# Patient Record
Sex: FEMALE | Race: WHITE | Employment: PART TIME | URBAN - METROPOLITAN AREA
[De-identification: names, ages, dates, MRNs, and addresses within clinical notes are randomized per-mention and may not be internally consistent; named-entity substitution may affect disease eponyms.]

---

## 2017-01-05 ENCOUNTER — MYC REFILL (OUTPATIENT)
Dept: INTERNAL MEDICINE | Facility: CLINIC | Age: 32
End: 2017-01-05

## 2017-01-05 DIAGNOSIS — B00.1 RECURRENT COLD SORES: ICD-10-CM

## 2017-01-05 DIAGNOSIS — L40.50 PSORIATIC ARTHRITIS (H): Primary | ICD-10-CM

## 2017-01-11 RX ORDER — OXYCODONE AND ACETAMINOPHEN 5; 325 MG/1; MG/1
1 TABLET ORAL EVERY 4 HOURS PRN
Qty: 30 TABLET | Refills: 0 | Status: SHIPPED | OUTPATIENT
Start: 2017-01-14 | End: 2017-02-07

## 2017-01-11 NOTE — TELEPHONE ENCOUNTER
Reason For Call:   Chief Complaint   Patient presents with     Refill Request       Medication Name, Dose and Monthly Quantity:   Oxycodone with APAP (Percocet): Dose 5-325 Schedule 1 tablet Q 4 hours Monthly Quantity 30     Diagnosis requiring opiates:   Psoriatic arthritis (H) [L40.50]  - Primary     Problem List Updated:   Yes    Opioid Agreement On File - [facility] PAIN CONTRACT ID# [id number]:  Unsure    Last Urine Drug Screen (at least once every 12 months) Date:   N/A  Unexpected Results:   N/A    [place]  Data Reviewed (at least once every 3 months) Date:    01/11/2017    Unexpected Results:    No.    Last Fill Date:   12/15/16    Last Visit with PCP:   05/18/16    Future Visits with PCP:   No.    Processing:   Mail to pharmacy.     Joseph Goodrich RN

## 2017-01-23 ENCOUNTER — MYC REFILL (OUTPATIENT)
Dept: RHEUMATOLOGY | Facility: CLINIC | Age: 32
End: 2017-01-23

## 2017-01-23 ENCOUNTER — MYC REFILL (OUTPATIENT)
Dept: INTERNAL MEDICINE | Facility: CLINIC | Age: 32
End: 2017-01-23

## 2017-01-23 DIAGNOSIS — E11.9 DIABETES MELLITUS (H): Primary | ICD-10-CM

## 2017-01-23 DIAGNOSIS — L40.50 PSORIATIC ARTHRITIS (H): ICD-10-CM

## 2017-01-23 RX ORDER — TRAMADOL HYDROCHLORIDE 50 MG/1
50 TABLET ORAL EVERY 8 HOURS PRN
Qty: 90 TABLET | Refills: 0 | Status: SHIPPED | OUTPATIENT
Start: 2017-01-23 | End: 2017-02-20

## 2017-01-23 NOTE — TELEPHONE ENCOUNTER
Last OFV: 11/30/16  Pending OFV: 6/9/17  Dx: Psoriatic Arthritis  Last filled: 1/3/17  Qty: 90  Pharmacy: [facility] in [place]    percocet 5-325mg #30 filled 1/14/17 by Dr. Haskins.  Filled at [facility]

## 2017-01-23 NOTE — TELEPHONE ENCOUNTER
Message from [employer]:  Original authorizing provider: Rene Granados MD    Chrissy Sanchezrigal would like a refill of the following medications:  traMADol (ULTRAM) 50 MG tablet [Rene Granados MD]    Preferred pharmacy: [facility] [address] [place] [address]  AT [facility]    Comment:      Medication renewals requested in this message routed to other providers:  metFORMIN (GLUCOPHAGE) 500 MG tablet [Nando Haskins MD]  vitamin D (ERGOCALCIFEROL) 84909 UNIT capsule [Nando Haskins MD]

## 2017-01-26 ENCOUNTER — MYC MEDICAL ADVICE (OUTPATIENT)
Dept: OTHER | Age: 32
End: 2017-01-26

## 2017-01-26 NOTE — TELEPHONE ENCOUNTER
Message left informing pt that pharmacy was called and the okay was given to fill this Rx as of today and that if her arthritis is causing a sustained need for pain pills we need to reevaluate her current treatment plan in clinic ASAP.  Pt to call back if this is the case.

## 2017-01-26 NOTE — TELEPHONE ENCOUNTER
Ms Dixon can refill early on this one occasion.  However, I am concerned that the refill request now implies greater than 3 tramadol per day for inflammatory arthritis. This rate of use is neither safe nor sustainable. We should be using immunomodulatory medications as the major control for inflammatory arthritis pain, not opiate-related pain pills. So if the arthritis is causing sustained need for pain pills, reevaluation of the arthritis treatment regimen in clinic needs to happen ASAP.

## 2017-01-26 NOTE — TELEPHONE ENCOUNTER
Provider to please see the patient message below and advise if okay to refill medication early.  Last OFV: 11/30/16  Pending OFV: 6/9/17  Dx: Psoriatic Arthritis  Last filled: 1/3/17  Qty: 90

## 2017-02-06 ENCOUNTER — MYC REFILL (OUTPATIENT)
Dept: INTERNAL MEDICINE | Facility: CLINIC | Age: 32
End: 2017-02-06

## 2017-02-06 DIAGNOSIS — B00.1 RECURRENT COLD SORES: ICD-10-CM

## 2017-02-06 DIAGNOSIS — L40.50 PSORIATIC ARTHRITIS (H): Primary | ICD-10-CM

## 2017-02-07 DIAGNOSIS — E11.9 DIABETES MELLITUS (H): ICD-10-CM

## 2017-02-07 DIAGNOSIS — E55.9 VITAMIN D DEFICIENCY: Primary | ICD-10-CM

## 2017-02-07 RX ORDER — OXYCODONE AND ACETAMINOPHEN 5; 325 MG/1; MG/1
1 TABLET ORAL EVERY 4 HOURS PRN
Qty: 30 TABLET | Refills: 0 | Status: SHIPPED | OUTPATIENT
Start: 2017-02-13 | End: 2017-03-08

## 2017-02-07 NOTE — TELEPHONE ENCOUNTER
metFORMIN (GLUCOPHAGE) 500 MG      Last Written Prescription Date:  12/6/16  Last Fill Quantity: 360,   # refills: 0  Last Office Visit : 5/18/16  Future Office visit:  NONE  CREATININE   Date Value Ref Range Status   11/30/2016 0.53 0.52 - 1.04 mg/dL Final    Routing refill request to provider for review/approval because:  OVER DUE MD APPT. (EVERY 6 MOS)   APPT.REMINDER LETTER TO PT + 30 DAY RF     vitamin D 50,,000 U       Last Written Prescription Date:  12/8/16  Last Fill Quantity: 240,   # refills: 0

## 2017-02-07 NOTE — TELEPHONE ENCOUNTER
Reason For Call:   Chief Complaint   Patient presents with     Refill Request       Medication Name, Dose and Monthly Quantity:   Oxycodone with APAP (Percocet): Dose 5-325 Schedule 1 Q 4 hours  Monthly Quantity 30     Diagnosis requiring opiates:   Psoriatic arthritis (H) [L40.50]  - Primary     Problem List Updated:   Yes    Opioid Agreement On File - [facility] PAIN CONTRACT ID# [id number]:  No    Last Urine Drug Screen (at least once every 12 months) Date:   N/A    Unexpected Results:   N/A    [place]  Data Reviewed (at least once every 3 months) Date:   02/07/2017    Unexpected Results:    No.    Last Fill Date:   01/14/17    Last Visit with PCP:   05/18/16    Future Visits with PCP:   No.    Processing:   Mail to patient.     Joseph Goodrich RN

## 2017-02-07 NOTE — Clinical Note
February 7, 2017        TO: Chrissy Zack  [address] [place] [id number]         Dear Ms. Chrissy Dixon,  This letter is a reminder that you are overdue to see your Primary Care Provider for an Annual Visit and needed labs. You must be seen by your Primary Care Provider on a yearly basis and have appropriate labs drawn for continued care and prescription refills. Please call [phone] to schedule an appointment for an Annual Visit with INDU ALAN MD.   LAST SEEN 5/18/16   *APPT. EVERY 6 MOS WHILE ON THIS MEDICTION    You have been given a 30 day supply/refill of your metFORMIN (GLUCOPHAGE) 500 MG while you get your clinic visit/labs completed.    Regards,  [facility]

## 2017-02-09 DIAGNOSIS — E55.9 VITAMIN D DEFICIENCY: Primary | ICD-10-CM

## 2017-02-14 DIAGNOSIS — E55.9 VITAMIN D DEFICIENCY: ICD-10-CM

## 2017-02-14 RX ORDER — ERGOCALCIFEROL 1.25 MG/1
CAPSULE, LIQUID FILLED ORAL
Qty: 24 CAPSULE | Refills: 0 | OUTPATIENT
Start: 2017-02-14

## 2017-02-14 RX ORDER — ERGOCALCIFEROL 1.25 MG/1
50000 CAPSULE, LIQUID FILLED ORAL
Qty: 24 CAPSULE | Refills: 0 | Status: SHIPPED | OUTPATIENT
Start: 2017-02-16 | End: 2017-04-03

## 2017-02-20 ENCOUNTER — MYC REFILL (OUTPATIENT)
Dept: RHEUMATOLOGY | Facility: CLINIC | Age: 32
End: 2017-02-20

## 2017-02-20 DIAGNOSIS — L40.50 PSORIATIC ARTHRITIS (H): ICD-10-CM

## 2017-02-20 NOTE — TELEPHONE ENCOUNTER
"The following refill request for a controlled substance was sent to the covering nurse to be addressed.  Prescription(s):  Pending Prescriptions:                       Disp   Refills    traMADol (ULTRAM) 50 MG tablet            90 tab*0            Sig: Take 1 tablet (50 mg) by mouth every 8 hours as           needed for moderate pain     [facility] [address] [place] [address] [address] AT [facility] [address]    Last Date Ordered: 1/23/17 - \"Do not fill until 2/2/17\"  Last Office Visit: 11/30/16  Next Office Visit: 6/9/17    Melony Lambert CMA (AAMA)        "

## 2017-02-20 NOTE — TELEPHONE ENCOUNTER
Message from [employer]:  Original authorizing provider: MD Adriana Hornnifer Zack would like a refill of the following medications:  traMADol (ULTRAM) 50 MG tablet [Rene Granados MD]    Preferred pharmacy: [facility] [address] [place] [address] [address] [facility] [address]    Comment:

## 2017-02-21 RX ORDER — USTEKINUMAB 45 MG/.5ML
INJECTION, SOLUTION SUBCUTANEOUS
OUTPATIENT
Start: 2017-02-21

## 2017-02-21 NOTE — TELEPHONE ENCOUNTER
STELARA 45 MG/0.5ML SOSY      Last Written Prescription Date:  2-19-16  Last Fill Quantity: 0.5 ml,   # refills: 4  Last Office Visit : 5-18-16  Future Office visit:  6-9-17    Kathleen M Doege RN

## 2017-02-22 RX ORDER — TRAMADOL HYDROCHLORIDE 50 MG/1
50 TABLET ORAL EVERY 8 HOURS PRN
Qty: 90 TABLET | Refills: 0 | Status: SHIPPED | OUTPATIENT
Start: 2017-02-22 | End: 2017-03-21

## 2017-02-28 ENCOUNTER — TELEPHONE (OUTPATIENT)
Dept: RHEUMATOLOGY | Facility: CLINIC | Age: 32
End: 2017-02-28

## 2017-02-28 NOTE — TELEPHONE ENCOUNTER
Called patient back to update that the paper copy of the Rx was awaiting Dr. Granados's signature, and that once it was signed we would provide it to her [facility] pharmacy in [facility] either via mail or by calling in.  Patient thanked caller for the update.

## 2017-02-28 NOTE — TELEPHONE ENCOUNTER
Prior Authorization Specialty Medication Request    Medication/Dose: ustekinumab (STELARA) 45 MG/0.5ML SOSY  Diagnosis and ICD: Psoriatic arthritis (H) (L40.50)  New/Renewal/Insurance Change PA: Renewal    Important Lab Values: n/a    Previously Tried and Failed Therapies: Humira, Otezla and Leflunomide    Rationale:    Would you like to include any research articles?    If yes please include the hyperlink(s) below or fax @ [phone].    (Include Name and MRN)    If you received a fax notification from an outside Pharmacy;  Pharmacy Name:[facility]  Pharmacy #:  Pharmacy Fax:

## 2017-02-28 NOTE — TELEPHONE ENCOUNTER
PA Initiation    Medication: ustekinumab (STELARA) 45 MG/0.5ML SOSY  Insurance Company: [employer] - Phone [phone] Fax [phone]  Pharmacy Filling the Rx: [facility] - [place], [place] - [address]  Filling Pharmacy Phone:    Filling Pharmacy Fax:    Start Date: 2/28/2017

## 2017-03-01 NOTE — TELEPHONE ENCOUNTER
Called medication in to pharmacy [facility].  Called patient to let her know, left generic VM that RX was called in to her pharmacy.

## 2017-03-02 NOTE — TELEPHONE ENCOUNTER
Phoned [employer] at tel [phone] and the PA was received and is in process.

## 2017-03-03 RX ORDER — ERGOCALCIFEROL 1.25 MG/1
50000 CAPSULE, LIQUID FILLED ORAL
Qty: 24 CAPSULE | Refills: 0 | OUTPATIENT
Start: 2017-03-06

## 2017-03-06 ASSESSMENT — ENCOUNTER SYMPTOMS
ABDOMINAL PAIN: 1
ALTERED TEMPERATURE REGULATION: 1
FATIGUE: 1
PANIC: 1
RECTAL PAIN: 0
INCREASED ENERGY: 1
JAUNDICE: 0
MUSCLE CRAMPS: 1
DOUBLE VISION: 0
TREMORS: 0
MUSCLE WEAKNESS: 1
NECK PAIN: 0
STIFFNESS: 1
NAUSEA: 1
CONSTIPATION: 0
EYE PAIN: 0
SINUS PAIN: 0
MEMORY LOSS: 0
HEADACHES: 1
EYE WATERING: 0
MYALGIAS: 1
EYE REDNESS: 0
NAIL CHANGES: 0
SINUS CONGESTION: 1
DEPRESSION: 0
PARALYSIS: 0
TROUBLE SWALLOWING: 0
EYE IRRITATION: 1
HEARTBURN: 0
INSOMNIA: 1
POOR WOUND HEALING: 0
DECREASED APPETITE: 0
FEVER: 0
DECREASED CONCENTRATION: 1
VOMITING: 0
NUMBNESS: 1
NIGHT SWEATS: 1
SEIZURES: 0
NECK MASS: 0
LOSS OF CONSCIOUSNESS: 0
ARTHRALGIAS: 1
BLOOD IN STOOL: 1
TASTE DISTURBANCE: 0
WEIGHT LOSS: 0
HOARSE VOICE: 0
RECTAL BLEEDING: 0
SMELL DISTURBANCE: 0
HALLUCINATIONS: 0
NERVOUS/ANXIOUS: 1
WEAKNESS: 1
BLOATING: 1
WEIGHT GAIN: 0
SKIN CHANGES: 0
POLYPHAGIA: 0
JOINT SWELLING: 1
BACK PAIN: 1
TINGLING: 1
DIARRHEA: 1
BOWEL INCONTINENCE: 0
DIZZINESS: 1
SORE THROAT: 1
CHILLS: 0
SPEECH CHANGE: 0
DISTURBANCES IN COORDINATION: 0
POLYDIPSIA: 0

## 2017-03-06 ASSESSMENT — ACTIVITIES OF DAILY LIVING (ADL)
ARE_THERE_CARBON_MONOXIDE_DETECTORS_IN_YOUR_HOME?: Y
ARE_THERE_SMOKE_DETECTORS_IN_YOUR_HOME?: Y
ARE_THERE_FIREARMS_IN_YOUR_HOME?: N
DO_MEMBERS_OF_YOUR_HOUSEHOLD_USE_SUNSCREEN?: Y
DO_MEMBERS_OF_YOUR_HOUSEHOLD_USE_SAFETY_HELMETS?: Y
DO_MEMBERS_OF_YOUR_HOUSEHOLD_WEAR_SEAT_BELTS?: Y

## 2017-03-13 NOTE — TELEPHONE ENCOUNTER
Patient requested that she will need assistance in paying for the Stelara. Signed patient up for Stelara co-pay card. Mailed patient a copy of the card and mycharted her back.      *Patient offered to switch to [facility] but patient states she was told by [employer] she must use [employer].

## 2017-03-13 NOTE — TELEPHONE ENCOUNTER
Prior Authorization Approval    Authorization Effective Date: 2/28/2017  Authorization Expiration Date: 2/28/2018  Medication: ustekinumab (STELARA) 45 MG/0.5ML SOSY APPROVED  Approved Dose/Quantity: 1 EVERY 84 DAYS  Reference #: N/A   Insurance Company: [employer] - Phone [phone] Fax [phone]  Expected CoPay: N/A     CoPay Card Available:      Foundation Assistance Needed:    Which Pharmacy is filling the prescription (Not needed for infusion/clinic administered): [facility] - [place], [place] - [address] [place] [address]  Pharmacy Notified: No  Patient Notified: Yes

## 2017-03-20 ENCOUNTER — OFFICE VISIT (OUTPATIENT)
Dept: INTERNAL MEDICINE | Facility: CLINIC | Age: 32
End: 2017-03-20

## 2017-03-20 VITALS
WEIGHT: 110.5 LBS | BODY MASS INDEX: 19.57 KG/M2 | HEART RATE: 105 BPM | DIASTOLIC BLOOD PRESSURE: 77 MMHG | SYSTOLIC BLOOD PRESSURE: 120 MMHG

## 2017-03-20 DIAGNOSIS — R51.9 NONINTRACTABLE HEADACHE, UNSPECIFIED CHRONICITY PATTERN, UNSPECIFIED HEADACHE TYPE: ICD-10-CM

## 2017-03-20 DIAGNOSIS — Z79.4 CONTROLLED TYPE 2 DIABETES MELLITUS WITHOUT COMPLICATION, WITH LONG-TERM CURRENT USE OF INSULIN (H): Primary | ICD-10-CM

## 2017-03-20 DIAGNOSIS — D50.0 IRON DEFICIENCY ANEMIA DUE TO CHRONIC BLOOD LOSS: ICD-10-CM

## 2017-03-20 DIAGNOSIS — L40.50: ICD-10-CM

## 2017-03-20 DIAGNOSIS — L40.50 PSORIATIC ARTHRITIS (H): ICD-10-CM

## 2017-03-20 DIAGNOSIS — E11.9 CONTROLLED TYPE 2 DIABETES MELLITUS WITHOUT COMPLICATION, WITH LONG-TERM CURRENT USE OF INSULIN (H): ICD-10-CM

## 2017-03-20 DIAGNOSIS — D64.9 ANEMIA, UNSPECIFIED TYPE: ICD-10-CM

## 2017-03-20 DIAGNOSIS — E55.9 VITAMIN D DEFICIENCY: ICD-10-CM

## 2017-03-20 DIAGNOSIS — E11.9 CONTROLLED TYPE 2 DIABETES MELLITUS WITHOUT COMPLICATION, WITH LONG-TERM CURRENT USE OF INSULIN (H): Primary | ICD-10-CM

## 2017-03-20 DIAGNOSIS — Z79.899 HIGH RISK MEDICATIONS (NOT ANTICOAGULANTS) LONG-TERM USE: ICD-10-CM

## 2017-03-20 DIAGNOSIS — Z79.4 CONTROLLED TYPE 2 DIABETES MELLITUS WITHOUT COMPLICATION, WITH LONG-TERM CURRENT USE OF INSULIN (H): ICD-10-CM

## 2017-03-20 LAB
ALBUMIN SERPL-MCNC: 3.9 G/DL (ref 3.4–5)
ALP SERPL-CCNC: 59 U/L (ref 40–150)
ALT SERPL W P-5'-P-CCNC: 15 U/L (ref 0–50)
ANION GAP SERPL CALCULATED.3IONS-SCNC: 9 MMOL/L (ref 3–14)
AST SERPL W P-5'-P-CCNC: 6 U/L (ref 0–45)
BASOPHILS # BLD AUTO: 0 10E9/L (ref 0–0.2)
BASOPHILS NFR BLD AUTO: 0.5 %
BILIRUB SERPL-MCNC: 0.2 MG/DL (ref 0.2–1.3)
BUN SERPL-MCNC: 9 MG/DL (ref 7–30)
CALCIUM SERPL-MCNC: 8.6 MG/DL (ref 8.5–10.1)
CHLORIDE SERPL-SCNC: 107 MMOL/L (ref 94–109)
CO2 SERPL-SCNC: 27 MMOL/L (ref 20–32)
CREAT SERPL-MCNC: 0.63 MG/DL (ref 0.52–1.04)
CREAT UR-MCNC: 138 MG/DL
DIFFERENTIAL METHOD BLD: ABNORMAL
EOSINOPHIL # BLD AUTO: 0.2 10E9/L (ref 0–0.7)
EOSINOPHIL NFR BLD AUTO: 4.1 %
ERYTHROCYTE [DISTWIDTH] IN BLOOD BY AUTOMATED COUNT: 19.1 % (ref 10–15)
FERRITIN SERPL-MCNC: 1 NG/ML (ref 12–150)
GFR SERPL CREATININE-BSD FRML MDRD: ABNORMAL ML/MIN/1.7M2
GLUCOSE SERPL-MCNC: 139 MG/DL (ref 70–99)
HBA1C MFR BLD: 8.2 % (ref 4.3–6)
HCT VFR BLD AUTO: 26.7 % (ref 35–47)
HGB BLD-MCNC: 7.4 G/DL (ref 11.7–15.7)
IMM GRANULOCYTES # BLD: 0 10E9/L (ref 0–0.4)
IMM GRANULOCYTES NFR BLD: 0.2 %
IRON SATN MFR SERPL: 2 % (ref 15–46)
IRON SERPL-MCNC: 10 UG/DL (ref 35–180)
LYMPHOCYTES # BLD AUTO: 2 10E9/L (ref 0.8–5.3)
LYMPHOCYTES NFR BLD AUTO: 33.9 %
MCH RBC QN AUTO: 16.9 PG (ref 26.5–33)
MCHC RBC AUTO-ENTMCNC: 27.7 G/DL (ref 31.5–36.5)
MCV RBC AUTO: 61 FL (ref 78–100)
MICROALBUMIN UR-MCNC: 8 MG/L
MICROALBUMIN/CREAT UR: 5.76 MG/G CR (ref 0–25)
MONOCYTES # BLD AUTO: 0.3 10E9/L (ref 0–1.3)
MONOCYTES NFR BLD AUTO: 4.8 %
NEUTROPHILS # BLD AUTO: 3.3 10E9/L (ref 1.6–8.3)
NEUTROPHILS NFR BLD AUTO: 56.5 %
NRBC # BLD AUTO: 0 10*3/UL
NRBC BLD AUTO-RTO: 0 /100
PLATELET # BLD AUTO: 475 10E9/L (ref 150–450)
POTASSIUM SERPL-SCNC: 4.1 MMOL/L (ref 3.4–5.3)
PROT SERPL-MCNC: 7.1 G/DL (ref 6.8–8.8)
RBC # BLD AUTO: 4.38 10E12/L (ref 3.8–5.2)
RETICS # AUTO: 39.9 10E9/L (ref 25–95)
RETICS/RBC NFR AUTO: 0.9 % (ref 0.5–2)
SODIUM SERPL-SCNC: 143 MMOL/L (ref 133–144)
TIBC SERPL-MCNC: 451 UG/DL (ref 240–430)
TSH SERPL DL<=0.005 MIU/L-ACNC: 0.51 MU/L (ref 0.4–4)
WBC # BLD AUTO: 5.8 10E9/L (ref 4–11)

## 2017-03-20 RX ORDER — GABAPENTIN 300 MG/1
CAPSULE ORAL
COMMUNITY
Start: 2016-11-16 | End: 2017-03-20

## 2017-03-20 RX ORDER — FERROUS GLUCONATE 324(38)MG
324 TABLET ORAL
Qty: 100 TABLET | Refills: 11 | Status: SHIPPED | OUTPATIENT
Start: 2017-03-20 | End: 2017-05-05

## 2017-03-20 ASSESSMENT — PAIN SCALES - GENERAL: PAINLEVEL: SEVERE PAIN (7)

## 2017-03-20 NOTE — PROGRESS NOTES
HPI  HISTORY OF PRESENT ILLNESS:  This is a 31 year old with psoriatic arthritis managed by Rheumatology with immunosuppression.  She has been monitoring her diabetes at home and feels that this has been well controlled by her report, although her most recent A1c was elevated.  This has not been checked now for the last 4 months.  She has had no hypoglycemic episodes.  No problems on her present insulin regimen.  She is having a persistent headache.  This is an infraorbital headache.  It does not feel like it is in the sinuses.  It is not associated with nasal stuffiness or rhinorrhea.  It tends to be present when she gets up in the morning and persists through the day.  There are no visual symptoms.  She had a recent eye exam, which was negative.  She has not noted any focal neurologic symptoms, but she continues to get the intermittent pain and tingling in the extremities as before.  She is intolerant of gabapentin for this.  She has had persistent fatigue.  She does have heavy monthly menstrual periods.  She is chronically anemic, and her most recent iron levels were very low.  She was intolerant of ferrous sulfate and was not able to take that.  She has been taking an over-the-counter iron supplement for this.  No recent blood check, however.  She does have 2 small children at home and spends a lot of time chasing after them.  She has cut her work back to 3 days a week in part related to this as well as her other medical issues.       Past Medical History   Diagnosis Date     Diabetes      Other psoriasis      Polyarthritis      probable psoriatic arthritis     Past Surgical History   Procedure Laterality Date     Surgical history of -        .  Tonsillar abscess     Family History   Problem Relation Age of Onset     Thyroid Disease Mother      DIABETES Mother      DIABETES Maternal Grandfather      DIABETES Maternal Uncle      DIABETES Maternal Uncle      Asthma No family hx of      Hypertension Mother       Hypertension Maternal Grandmother      Hypertension Maternal Grandfather      Prostate Cancer Maternal Grandfather      Connective Tissue Disorder Father      Psoriasis     Social History     Social History     Marital status:      Spouse name: N/A     Number of children: N/A     Years of education: N/A     Occupational History     Dental Assist. Unknown     Social History Main Topics     Smoking status: Former Smoker     Packs/day: 1.00     Years: 5.00     Types: Cigarettes     Quit date: 11/1/2007     Smokeless tobacco: Never Used     Alcohol use No     Drug use: No     Sexual activity: Yes     Partners: Male     Birth control/ protection: IUD      Comment: Mirena     Other Topics Concern     Exercise No     Social History Narrative    How much exercise per week? No    How much calcium per day? Diet      How much caffeine per day? Coffee  2    How much vitamin D per day? Diet    Do you/your family wear seatbelts?  Yes    Do you/your family use safety helmets? No    Do you/your family use sunscreen? Yes    Do you/your family keep firearms in the home? No    Do you/your family have a smoke detector(s)? Yes        Do you feel safe in your home? Yes    Has anyone ever touched you in an unwanted manner? No     Explain              Answers for HPI/ROS submitted by the patient on 3/6/2017   Annual Exam:  General Symptoms: Yes  Skin Symptoms: Yes  HENT Symptoms: Yes  EYE SYMPTOMS: Yes  HEART SYMPTOMS: No  LUNG SYMPTOMS: No  INTESTINAL SYMPTOMS: Yes  URINARY SYMPTOMS: No  GYNECOLOGIC SYMPTOMS: No  BREAST SYMPTOMS: No  SKELETAL SYMPTOMS: Yes  BLOOD SYMPTOMS: No  NERVOUS SYSTEM SYMPTOMS: Yes  MENTAL HEALTH SYMPTOMS: Yes  Fever: No  Loss of appetite: No  Weight loss: No  Weight gain: No  Fatigue: Yes  Night sweats: Yes  Chills: No  Increased stress: No  Excessive hunger: No  Excessive thirst: No  Feeling hot or cold when others believe the temperature is normal: Yes  Loss of height: No  Post-operative complications:  No  Surgical site pain: No  Hallucinations: No  Change in or Loss of Energy: Yes  Hyperactivity: No  Confusion: No  Changes in hair: No  Changes in moles/birth marks: No  Itching: No  Rashes: Yes  Changes in nails: No  Acne: Yes  Hair in places you don't want it: No  Change in facial hair: No  Warts: No  Non-healing sores: No  Scarring: No  Flaking of skin: Yes  Color changes of hands/feet in cold : Yes  Sun sensitivity: No  Skin thickening: No  Ear pain: Yes  Ear discharge: No  Hearing loss: No  Tinnitus: No  Nosebleeds: No  Congestion: Yes  Sinus pain: No  Trouble swallowing: No   Voice hoarseness: No  Sore throat: Yes  Tooth pain: No  Gum tenderness: No  Bleeding gums: No  Change in taste: No  Change in sense of smell: No  Dry mouth: No  Hearing aid used: No  Neck lump: No  Eye pain: No  Vision loss: No  Dry eyes: No  Watery eyes: No  Eye bulging: No  Double vision: No  Flashing of lights: No  Spots: Yes  Floaters: Yes  Redness: No  Crossed eyes: No  Tunnel Vision: No  Yellowing of eyes: No  Eye irritation: Yes  Heart burn or indigestion: No  Nausea: Yes  Vomiting: No  Abdominal pain: Yes  Bloating: Yes  Constipation: No  Diarrhea: Yes  Blood in stool: Yes  Black stools: No  Rectal or Anal pain: No  Fecal incontinence: No  Rectal bleeding: No  Yellowing of skin or eyes: No  Vomit with blood: No  Change in stools: Yes  Hemorrhoids: Yes  Back pain: Yes  Muscle aches: Yes  Neck pain: No  Swollen joints: Yes  Joint pain: Yes  Bone pain: No  Muscle cramps: Yes  Muscle weakness: Yes  Joint stiffness: Yes  Bone fracture: No  Trouble with coordination: No  Dizziness or trouble with balance: Yes  Fainting or black-out spells: No  Memory loss: No  Headache: Yes  Seizures: No  Speech problems: No  Tingling: Yes  Tremor: No  Weakness: Yes  Difficulty walking: No  Paralysis: No  Numbness: Yes  Nervous or Anxious: Yes  Depression: No  Trouble sleeping: Yes  Trouble thinking or concentrating: Yes  Mood changes: No  Panic  attacks: Yes  Frequency of exercise:: 1 day/week  Duration of exercise:: 15-30 minutes    Exam:  /77 (BP Location: Right arm, Patient Position: Chair, Cuff Size: Adult Regular)  Pulse 105  Wt 50.1 kg (110 lb 8 oz)  BMI 19.57 kg/m2  110 lbs 8 oz  PHYSICAL EXAMINATION:   The patient is alert, oriented with a clear sensorium.   Skin shows no lesions or rashes and good turgor.   Head is normocephalic and atraumatic.   Eyes show PERRLA. Fundi are benign.   Ears show normal TMs bilaterally.   Mouth shows clear oral mucosa.   Neck shows no nodes, thyromegaly or bruits.   Back is nontender.   Lungs are clear to percussion and auscultation.   Heart shows normal S1 and S2 without murmur or gallop.   Abdomen is soft, nontender without masses or organomegaly.   Extremities show no edema and no evidence of active synovitis.   Neurologic examination shows cranial nerves II-XII intact. Motor shows normal strength. Reflexes are full and symmetrical.     ASSESSMENT:   1.  Psoriasis, appears well controlled.   2.  Psoriatic arthritis, fair control.   3.  Diabetes mellitus, needs reevaluation.   4.  Iron-deficiency anemia, needs reevaluation.   5.  History of vitamin D deficiency, needs followup.   6.  Mild headache, suspect multifactorial related to all of the above.      PLAN:  I am going to reassess her labs today.  If she remains low on iron, we will try her on ferrous gluconate, and if unsuccessful, we may consider trying IV iron on her.  We will have her reassessed in 3 months or follow up sooner or immediately if any increased symptoms or problems before that time.       Nando Haskins MD  General Internal Medicine  [facility]  [phone]

## 2017-03-20 NOTE — MR AVS SNAPSHOT
After Visit Summary   3/20/2017    Chrissy Dixon    MRN: [id number]           Patient Information     Date Of Birth          1985        Visit Information        Provider Department      3/20/2017 4:40 PM Nando Haskins MD [facility]        Today's Diagnoses     Controlled type 2 diabetes mellitus without complication, with long-term current use of insulin (H)    -  1    Psoriasis arthropathica (H)        Nonintractable headache, unspecified chronicity pattern, unspecified headache type        Vitamin D deficiency        Anemia, unspecified type          Care Instructions    Primary Care Center Medication Refill Request Information:  * Please contact your pharmacy regarding ANY request for medication refills.  ** [facility] Prescription Fax = [phone]  * Please allow 3 business days for routine medication refills.  * Please allow 5 business days for controlled substance medication refills.     Primary Care Center Test Result notification information:  *You will be notified with in 7-10 days of your appointment day regarding the results of your test.  If you are on MyChart you will be notified as soon as the provider has reviewed the results and signed off on them.      Primary Care Center   [facility]  [address] [place] [address] (4th Floor )   [place] [address] [phone]  [phone]          Follow-ups after your visit        Follow-up notes from your care team     Return in about 3 months (around 6/20/2017).      Your next 10 appointments already scheduled     Mar 20, 2017  6:00 PM CDT   LAB with  LAB    Health Lab ([facility])    [address] [place] [address]  1st Floor  [facility] [address] [phone]           Patient must bring picture ID.  Patient should be prepared to give a urine specimen  Please do not eat 10-12 hours before your appointment if you are coming in fasting for labs on lipids, cholesterol, or glucose (sugar).   Pregnant women should follow their Care Team instructions. Water with medications is okay. Do not drink coffee or other fluids.   If you have concerns about taking  your medications, please ask at office or if scheduling via iConclude, send a message by clicking on Secure Messaging, Message Your Care Team.            Jun 09, 2017  9:30 AM CDT   (Arrive by 9:15 AM)   Return Visit with Rene Granados MD   [facility] ([facility])    [address] [place] [address] [phone]              Future tests that were ordered for you today     Open Future Orders        Priority Expected Expires Ordered    Albumin Random Urine Quantitative - (Today) Routine 3/20/2017 3/20/2018 3/20/2017    Vitamin D Deficiency Routine  3/20/2018 3/20/2017    TSH with free T4 reflex Routine  3/20/2018 3/20/2017    Hemoglobin A1c Routine  3/20/2018 3/20/2017    CBC with platelets differential Routine  3/20/2018 3/20/2017    Ferritin Routine  3/20/2018 3/20/2017    Iron and iron binding capacity Routine  3/20/2018 3/20/2017    Reticulocyte count Routine  3/20/2018 3/20/2017    Comprehensive metabolic panel Routine  3/20/2018 3/20/2017            Who to contact     Please call your clinic at [phone] to:    Ask questions about your health    Make or cancel appointments    Discuss your medicines    Learn about your test results    Speak to your doctor   If you have compliments or concerns about an experience at your clinic, or if you wish to file a complaint, please contact [facility] Patient Relations at [phone] or email us at [email or web address]         Additional Information About Your Visit        linkedÃ¼hart Information     iConclude gives you secure access to your electronic health record. If you see a primary care provider, you can also send messages to your care team and make appointments. If you have questions, please call your  primary care clinic.  If you do not have a primary care provider, please call [phone] and they will assist you.      [employer] is an electronic gateway that provides easy, online access to your medical records. With [employer], you can request a clinic appointment, read your test results, renew a prescription or communicate with your care team.     To access your existing account, please contact your [facility] or call [phone] for assistance.        Care EveryWhere ID     This is your Care EveryWhere ID. This could be used by other organizations to access your [facility] medical records  [id number]        Your Vitals Were     Pulse BMI (Body Mass Index)                105 19.57 kg/m2           Blood Pressure from Last 3 Encounters:   03/20/17 120/77   11/30/16 113/76   05/18/16 122/80    Weight from Last 3 Encounters:   03/20/17 50.1 kg (110 lb 8 oz)   11/30/16 49.9 kg (110 lb)   05/18/16 52.6 kg (116 lb)                 Today's Medication Changes          These changes are accurate as of: 3/20/17  5:47 PM.  If you have any questions, ask your nurse or doctor.               These medicines have changed or have updated prescriptions.        Dose/Directions    clobetasol 0.05 % external solution   Commonly known as:  TEMOVATE   This may have changed:  Another medication with the same name was removed. Continue taking this medication, and follow the directions you see here.   Used for:  Psoriasis   Changed by:  Yung Henderson MD        Apply topically 2 times daily   Quantity:  50 mL   Refills:  6       DERMA-SMOOTHE/FS SCALP 0.01 % Oil   This may have changed:  Another medication with the same name was removed. Continue taking this medication, and follow the directions you see here.   Used for:  Other psoriasis   Changed by:  Yvan Camacho MD        Apply to scalp at bedtime, then wear showercap, rinse off in morning.   Quantity:  118 mL   Refills:  3       ibuprofen 600  MG tablet   Commonly known as:  ADVIL/MOTRIN   This may have changed:  Another medication with the same name was removed. Continue taking this medication, and follow the directions you see here.   Used for:  Psoriasis arthropathica (H)   Changed by:  Nando Haskins MD        Dose:  600 mg   Take 1 tablet (600 mg) by mouth every 8 hours as needed for pain   Quantity:  90 tablet   Refills:  2         Stop taking these medicines if you haven't already. Please contact your care team if you have questions.     betamethasone dipropionate 0.05 % cream   Commonly known as:  DIPROSONE   Stopped by:  Nando Haskins MD           calcipotriene 0.005 % Soln solution   Commonly known as:  DOVONOX   Stopped by:  Nando Haskins MD           citalopram 20 MG tablet   Commonly known as:  celeXA   Stopped by:  Nando Haskins MD           ferrous sulfate 325 (65 FE) MG tablet   Commonly known as:  IRON   Stopped by:  Nando Haskins MD           folic acid 1 MG tablet   Commonly known as:  FOLVITE   Stopped by:  Nando Haskins MD           gabapentin 300 MG capsule   Commonly known as:  NEURONTIN   Stopped by:  Nando Haskins MD           leflunomide 20 MG tablet   Commonly known as:  ARAVA   Stopped by:  Nando Haskins MD           methotrexate 25 MG/ML injection   Stopped by:  Nando Haskins MD                    Primary Care Provider Office Phone # Fax #    Nando Haskins [phone]        [facility] [address] [facility] [address]  [facility] [address]        Thank you!     Thank you for choosing [facility]  for your care. Our goal is always to provide you with excellent care. Hearing back from our patients is one way we can continue to improve our services. Please take a few minutes to complete the written survey that you may receive in the mail after your visit with us. Thank you!              Your Updated Medication List - Protect others around you: Learn how to safely use, store and throw away your medicines at www.disposemymeds.org.          This list is accurate as of: 3/20/17  5:47 PM.  Always use your most recent med list.                   Brand Name Dispense Instructions for use    apremilast 30 MG tablet    OTEZLA    180 tablet    Take 1 tablet (30 mg) by mouth 2 times daily       blood glucose monitoring test strip    ACCU-CHEK SMARTVIEW    700 each    Use to test blood sugar 6-8 times daily or as directed.       clobetasol 0.05 % external solution    TEMOVATE    50 mL    Apply topically 2 times daily       DERMA-SMOOTHE/FS SCALP 0.01 % Oil     118 mL    Apply to scalp at bedtime, then wear showercap, rinse off in morning.       fluticasone 50 MCG/ACT spray    FLONASE    1 Package    Spray 2 sprays into both nostrils daily       ibuprofen 600 MG tablet    ADVIL/MOTRIN    90 tablet    Take 1 tablet (600 mg) by mouth every 8 hours as needed for pain       insulin lispro 100 UNIT/ML injection    HumaLOG KWIKpen    15 mL    USE 3 TIMES DAILY WITH MEALS, CURRENTLY  30 UNITS DAILY.  ADJUST PER MD ORDERS *MD APPT. & LAB/ MICROALBUMIN FOR FURTHER RF       insulin pen needle 29G X 12.7MM    BD ULTRA-FINE    200 each    Used to inject insulin up to twice daily       * LEVEMIR FLEXpen 100 UNIT/ML injection   Generic drug:  insulin detemir     3 Month    6 units in the morning, 14 units in evening = for total of 20 units daily       * insulin detemir 100 UNIT/ML injection    LEVEMIR FLEXTOUCH    30 mL    Inject 25 Units Subcutaneous At Bedtime       levonorgestrel 20 MCG/24HR IUD    MIRENA (52 MG)    1 each    Use as directed       metFORMIN 500 MG tablet    GLUCOPHAGE    120 tablet    Take 2 tablets (1,000 mg) by mouth 2 times daily (with meals) *LETTER SENT/ MD APPT. FOR RFs       ondansetron 4 MG ODT tab    ZOFRAN-ODT    60 tablet    Take 1 tablet (4 mg) by mouth every 8 hours as needed for nausea        oxyCODONE-acetaminophen 5-325 MG per tablet    PERCOCET    30 tablet    Take 1 tablet by mouth every 4 hours as needed (must last 30 days from dispense date)       * predniSONE 10 MG tablet    DELTASONE    30 tablet    Take 1 tablet (10 mg) by mouth daily As needed for flares only       * predniSONE 5 MG tablet    DELTASONE    50 tablet    3 tabs daily for 1 wk, then 2 tabs daily for 1 week.       * predniSONE 5 MG tablet    DELTASONE    20 tablet    Take 3 tablets (15mg) by mouth for 4 days, then 2 tablets (10mg) by mouth for 4 days.       traMADol 50 MG tablet    ULTRAM    90 tablet    Take 1 tablet (50 mg) by mouth every 8 hours as needed for moderate pain       triamcinolone 0.1 % cream    KENALOG    453.6 g    Apply topically 2 times daily       ustekinumab 45 MG/0.5ML Sosy    STELARA    3 Syringe    Inject 0.5 mLs (45 mg) Subcutaneous every 3 months       valACYclovir 500 MG tablet    VALTREX    90 tablet    Take 1 tablet (500 mg) by mouth daily       vitamin D 84336 UNIT capsule    ERGOCALCIFEROL    24 capsule    Take 1 capsule (50,000 Units) by mouth twice a week       * Notice:  This list has 5 medication(s) that are the same as other medications prescribed for you. Read the directions carefully, and ask your doctor or other care provider to review them with you.

## 2017-03-20 NOTE — NURSING NOTE
Chief Complaint   Patient presents with     Headache     pt would like to discuss her headaches     Sisi Rowell CMA at 5:07 PM on 3/20/2017.

## 2017-03-20 NOTE — PATIENT INSTRUCTIONS
Primary Care Center Medication Refill Request Information:  * Please contact your pharmacy regarding ANY request for medication refills.  ** [facility] Prescription Fax = [phone]  * Please allow 3 business days for routine medication refills.  * Please allow 5 business days for controlled substance medication refills.     Primary Care Center Test Result notification information:  *You will be notified with in 7-10 days of your appointment day regarding the results of your test.  If you are on MyChart you will be notified as soon as the provider has reviewed the results and signed off on them.      Primary Care Center   [facility]  [address] [place] [address] (4th Floor )   [place] [address] [phone]  [phone]

## 2017-03-21 ENCOUNTER — MYC REFILL (OUTPATIENT)
Dept: RHEUMATOLOGY | Facility: CLINIC | Age: 32
End: 2017-03-21

## 2017-03-21 DIAGNOSIS — L40.50 PSORIATIC ARTHRITIS (H): ICD-10-CM

## 2017-03-21 LAB — DEPRECATED CALCIDIOL+CALCIFEROL SERPL-MC: 39 UG/L (ref 20–75)

## 2017-03-21 NOTE — TELEPHONE ENCOUNTER
traMADol (ULTRAM) 50 MG tablet       Last Written Prescription Date:  2-22-17 (signed) appears not to have been called to the pharmacy until 3-1-17  Last Fill Quantity: 90,   # refills: 0  Last Office Visit : 11-30-16  Future Office visit:  6-9-17    Routing refill request to provider for review/approval because:  Drug not on the [facility], [facility] or [facility] refill protocol or controlled substance.    Kathleen M Doege RN

## 2017-03-21 NOTE — TELEPHONE ENCOUNTER
Message from Erica:  Pedro Marti, RN Tue Mar 21, 2017 12:25 PM        ----- Message -----   From: Chrissy Dixon   Sent: 3/21/2017 12:23 PM   To: Adult Rheum Triage-[facility]  Subject: Medication Renewal Request     Original authorizing provider: MD Chrissy Horn would like a refill of the following medications:  traMADol (ULTRAM) 50 MG tablet [Rene Granados MD]    Preferred pharmacy: [facility] [address] [place] [address] [address] AT [facility] [address]    Comment:

## 2017-03-27 ENCOUNTER — MYC MEDICAL ADVICE (OUTPATIENT)
Dept: INTERNAL MEDICINE | Facility: CLINIC | Age: 32
End: 2017-03-27

## 2017-03-27 NOTE — TELEPHONE ENCOUNTER
According to , pt last filled the tramadol on 3/1/17 with qty 90. She also refilled her oxycodone -acetaminophen on 3/14/17.     Routed to Dr Granados.

## 2017-03-28 RX ORDER — TRAMADOL HYDROCHLORIDE 50 MG/1
50 TABLET ORAL EVERY 8 HOURS PRN
Qty: 90 TABLET | Refills: 0 | Status: SHIPPED | OUTPATIENT
Start: 2017-03-28 | End: 2017-04-23

## 2017-03-29 NOTE — TELEPHONE ENCOUNTER
Tramadol refill called to [facility] as requested by pt. [employer] message sent to pt informing her of this.

## 2017-03-30 DIAGNOSIS — D50.0 IRON DEFICIENCY ANEMIA DUE TO CHRONIC BLOOD LOSS: Primary | ICD-10-CM

## 2017-03-30 RX ORDER — DIPHENHYDRAMINE HCL 25 MG
25 TABLET ORAL
Status: CANCELLED
Start: 2017-03-31

## 2017-03-30 RX ORDER — ACETAMINOPHEN 325 MG/1
650 TABLET ORAL
Status: CANCELLED
Start: 2017-03-31

## 2017-04-03 ENCOUNTER — MYC REFILL (OUTPATIENT)
Dept: INTERNAL MEDICINE | Facility: CLINIC | Age: 32
End: 2017-04-03

## 2017-04-03 DIAGNOSIS — E11.9 DIABETES MELLITUS (H): ICD-10-CM

## 2017-04-03 DIAGNOSIS — E55.9 VITAMIN D DEFICIENCY: ICD-10-CM

## 2017-04-03 DIAGNOSIS — E11.9 DIABETES MELLITUS, TYPE 2 (H): ICD-10-CM

## 2017-04-04 ENCOUNTER — MYC MEDICAL ADVICE (OUTPATIENT)
Dept: INTERNAL MEDICINE | Facility: CLINIC | Age: 32
End: 2017-04-04

## 2017-04-04 ENCOUNTER — MYC REFILL (OUTPATIENT)
Dept: INTERNAL MEDICINE | Facility: CLINIC | Age: 32
End: 2017-04-04

## 2017-04-04 DIAGNOSIS — L40.50 PSORIATIC ARTHRITIS (H): ICD-10-CM

## 2017-04-04 DIAGNOSIS — B00.1 RECURRENT COLD SORES: ICD-10-CM

## 2017-04-04 RX ORDER — OXYCODONE AND ACETAMINOPHEN 5; 325 MG/1; MG/1
1 TABLET ORAL EVERY 4 HOURS PRN
Qty: 30 TABLET | Refills: 0 | Status: CANCELLED | OUTPATIENT
Start: 2017-04-04

## 2017-04-04 RX ORDER — ERGOCALCIFEROL 1.25 MG/1
50000 CAPSULE, LIQUID FILLED ORAL
Qty: 24 CAPSULE | Refills: 0 | Status: SHIPPED | OUTPATIENT
Start: 2017-04-06 | End: 2017-07-11

## 2017-04-04 NOTE — TELEPHONE ENCOUNTER
Vitamin D 50,000 U      Last Written Prescription Date:  2-16-17  Last Fill Quantity: 24,   # refills: 0        Metformin      Last Written Prescription Date:  2-7-17  Last Fill Quantity: 120,   # refills: 0  Creatinine   Date Value Ref Range Status   03/20/2017 0.63 0.52 - 1.04 mg/dL Final   ]  Lab Results   Component Value Date    A1C 8.2 03/20/2017    A1C 8.7 11/30/2016    A1C 7.9 05/18/2016    A1C 7.0 04/09/2014    A1C 4.5 10/11/2013     Lab Results   Component Value Date    MICROL 8 03/20/2017     No results found for: MICROALBUMIN      Last Office Visit : 3-20-17  Future Office visit:  none    Kathleen M Doege RN

## 2017-04-04 NOTE — TELEPHONE ENCOUNTER
Message from Erica:  Christen Roberson, RN Mon Apr 3, 2017 9:06 AM        ----- Message -----   From: Chrissy Dixon   Sent: 4/3/2017 9:00 AM   To: [facility] Nursing Staff-  Subject: Medication Renewal Request     Original authorizing provider: MD Chrissy Vallecillo would like a refill of the following medications:  blood glucose monitoring (ACCU-CHEK SMARTVIEW) test strip [Nando Haskins MD]  metFORMIN (GLUCOPHAGE) 500 MG tablet [Nando Haskins MD]  vitamin D (ERGOCALCIFEROL) 65744 UNIT capsule [Nando Haskins MD]    Preferred pharmacy: [facility] [address] [place] [address] [address] [place] [facility] [place] [address]    Comment:

## 2017-04-10 RX ORDER — OXYCODONE AND ACETAMINOPHEN 5; 325 MG/1; MG/1
1 TABLET ORAL EVERY 4 HOURS PRN
Qty: 30 TABLET | Refills: 0 | Status: SHIPPED | OUTPATIENT
Start: 2017-04-10 | End: 2017-05-05

## 2017-04-17 ENCOUNTER — MYC REFILL (OUTPATIENT)
Dept: RHEUMATOLOGY | Facility: CLINIC | Age: 32
End: 2017-04-17

## 2017-04-17 DIAGNOSIS — L40.50 PSORIATIC ARTHRITIS (H): ICD-10-CM

## 2017-04-17 NOTE — TELEPHONE ENCOUNTER
Message from Erica:  Pedro Marti, RAISA Mon Apr 17, 2017 8:47 AM    Please queue up and send to Esa to check .  Thanks!    ----- Message -----   From: Chrissy Dixon   Sent: 4/17/2017 8:43 AM   To: Adult Rheum Triage-[facility]  Subject: Medication Renewal Request     Original authorizing provider: MD Chrissy Horn would like a refill of the following medications:  traMADol (ULTRAM) 50 MG tablet [Rene Granados MD]    Preferred pharmacy: [facility] [address] [place] [address] [address] AT [facility] [address]    Comment:

## 2017-04-18 NOTE — TELEPHONE ENCOUNTER
Last seen: 11/30/2016  Pending appt: 6/09/2017  Medication: Tramadol 50 mg    Last filled: 03/29/2017    Qty: 90 tablets      checked and no other narcotics noted.   Samantha Gomez LPN

## 2017-04-19 DIAGNOSIS — L40.50: ICD-10-CM

## 2017-04-20 RX ORDER — TRAMADOL HYDROCHLORIDE 50 MG/1
50 TABLET ORAL EVERY 8 HOURS PRN
Qty: 90 TABLET | Refills: 0 | OUTPATIENT
Start: 2017-04-20

## 2017-04-21 ENCOUNTER — INFUSION THERAPY VISIT (OUTPATIENT)
Dept: INFUSION THERAPY | Facility: CLINIC | Age: 32
End: 2017-04-21
Attending: INTERNAL MEDICINE
Payer: COMMERCIAL

## 2017-04-21 VITALS
OXYGEN SATURATION: 98 % | HEART RATE: 87 BPM | SYSTOLIC BLOOD PRESSURE: 116 MMHG | RESPIRATION RATE: 18 BRPM | DIASTOLIC BLOOD PRESSURE: 74 MMHG | TEMPERATURE: 99.1 F

## 2017-04-21 DIAGNOSIS — R53.81 MALAISE AND FATIGUE: ICD-10-CM

## 2017-04-21 DIAGNOSIS — D50.0 IRON DEFICIENCY ANEMIA DUE TO CHRONIC BLOOD LOSS: Primary | ICD-10-CM

## 2017-04-21 DIAGNOSIS — R53.83 MALAISE AND FATIGUE: ICD-10-CM

## 2017-04-21 PROCEDURE — 25000132 ZZH RX MED GY IP 250 OP 250 PS 637: Mod: ZF | Performed by: INTERNAL MEDICINE

## 2017-04-21 PROCEDURE — 25000128 H RX IP 250 OP 636: Mod: ZF | Performed by: INTERNAL MEDICINE

## 2017-04-21 PROCEDURE — 96365 THER/PROPH/DIAG IV INF INIT: CPT

## 2017-04-21 RX ORDER — DIPHENHYDRAMINE HCL 25 MG
25 CAPSULE ORAL
Status: COMPLETED | OUTPATIENT
Start: 2017-04-21 | End: 2017-04-21

## 2017-04-21 RX ORDER — ACETAMINOPHEN 325 MG/1
650 TABLET ORAL
Status: CANCELLED
Start: 2017-04-22

## 2017-04-21 RX ORDER — IBUPROFEN 600 MG/1
600 TABLET, FILM COATED ORAL EVERY 8 HOURS PRN
Qty: 100 TABLET | Refills: 3 | Status: SHIPPED | OUTPATIENT
Start: 2017-04-21 | End: 2017-04-24

## 2017-04-21 RX ORDER — ACETAMINOPHEN 325 MG/1
650 TABLET ORAL
Status: DISCONTINUED | OUTPATIENT
Start: 2017-04-21 | End: 2017-04-21 | Stop reason: HOSPADM

## 2017-04-21 RX ORDER — DIPHENHYDRAMINE HCL 25 MG
25 TABLET ORAL
Status: CANCELLED
Start: 2017-04-22

## 2017-04-21 RX ORDER — DIPHENHYDRAMINE HCL 25 MG
25 TABLET ORAL
Status: DISCONTINUED | OUTPATIENT
Start: 2017-04-21 | End: 2017-04-21 | Stop reason: CLARIF

## 2017-04-21 RX ADMIN — IRON SUCROSE 200 MG: 20 INJECTION, SOLUTION INTRAVENOUS at 14:30

## 2017-04-21 RX ADMIN — ACETAMINOPHEN 650 MG: 325 TABLET ORAL at 14:10

## 2017-04-21 RX ADMIN — DIPHENHYDRAMINE HYDROCHLORIDE 25 MG: 25 CAPSULE ORAL at 14:11

## 2017-04-21 NOTE — PATIENT INSTRUCTIONS
Dear Chrissy Dixon    Thank you for choosing [facility] ([facility]) for your infusion.  The following information is a summary of our appointment as well as important reminders.      Today was your first dose of venofer. You have 4 more doses ordered by your doctor. Each dose needs to be at least 48 hours apart.    We look forward in seeing you on your next appointment here at [facility].  Please don t hesitate to call us at [phone] to reschedule any of your appointments or to speak with one of the [facility] registered nurses.  It was a pleasure taking care of you today.    Sincerely,  Ericka Severson, RAISA  [facility]  [address] [place] [address]  [address]  Phone:  [phone]      Patient Education    Iron Sucrose Chewable tablet    Iron Sucrose Solution for injection  Iron Sucrose Solution for injection  What is this medicine?  IRON SUCROSE (AHY maikol STAR krohs) is an iron complex. Iron is used to make healthy red blood cells, which carry oxygen and nutrients throughout the body. This medicine is used to treat iron deficiency anemia in people with chronic kidney disease.  This medicine may be used for other purposes; ask your health care provider or pharmacist if you have questions.  What should I tell my health care provider before I take this medicine?  They need to know if you have any of these conditions:    anemia not caused by low iron levels    heart disease    high levels of iron in the blood    kidney disease    liver disease    an unusual or allergic reaction to iron, other medicines, foods, dyes, or preservatives    pregnant or trying to get pregnant    breast-feeding  How should I use this medicine?  This medicine is for infusion into a vein. It is given by a health care professional in a hospital or clinic setting.  Talk to your pediatrician regarding the use of this  medicine in children. While this drug may be prescribed for children as young as 2 years for selected conditions, precautions do apply.  Overdosage: If you think you have taken too much of this medicine contact a poison control center or emergency room at once.  NOTE: This medicine is only for you. Do not share this medicine with others.  What if I miss a dose?  It is important not to miss your dose. Call your doctor or health care professional if you are unable to keep an appointment.  What may interact with this medicine?  Do not take this medicine with any of the following medications:    deferoxamine    dimercaprol    other iron products  This medicine may also interact with the following medications:    chloramphenicol    deferasirox  This list may not describe all possible interactions. Give your health care provider a list of all the medicines, herbs, non-prescription drugs, or dietary supplements you use. Also tell them if you smoke, drink alcohol, or use illegal drugs. Some items may interact with your medicine.  What should I watch for while using this medicine?  Visit your doctor or healthcare professional regularly. Tell your doctor or healthcare professional if your symptoms do not start to get better or if they get worse. You may need blood work done while you are taking this medicine.  You may need to follow a special diet. Talk to your doctor. Foods that contain iron include: whole grains/cereals, dried fruits, beans, or peas, leafy green vegetables, and organ meats (liver, kidney).  What side effects may I notice from receiving this medicine?  Side effects that you should report to your doctor or health care professional as soon as possible:    allergic reactions like skin rash, itching or hives, swelling of the face, lips, or tongue    breathing problems    changes in blood pressure    cough    fast, irregular heartbeat    feeling faint or lightheaded, falls    fever or chills    flushing,  sweating, or hot feelings    joint or muscle aches/pains    seizures    swelling of the ankles or feet    unusually weak or tired  Side effects that usually do not require medical attention (report to your doctor or health care professional if they continue or are bothersome):    diarrhea    feeling achy    headache    irritation at site where injected    nausea, vomiting    stomach upset    tiredness  This list may not describe all possible side effects. Call your doctor for medical advice about side effects. You may report side effects to FDA at 1-352-RGX-1533.  Where should I keep my medicine?  This drug is given in a hospital or clinic and will not be stored at home.  NOTE:This sheet is a summary. It may not cover all possible information. If you have questions about this medicine, talk to your doctor, pharmacist, or health care provider. Copyright  2016 [employer]

## 2017-04-21 NOTE — MR AVS SNAPSHOT
After Visit Summary   4/21/2017    Chrissy Dixon    MRN: [id number]           Patient Information     Date Of Birth          1985        Visit Information        Provider Department      4/21/2017 2:00 PM UC 43 ATC;  SPEC [facility]        Today's Diagnoses     Iron deficiency anemia due to chronic blood loss    -  1    Malaise and fatigue          Care Instructions    Dear Chrissy Dixon    Thank you for choosing [facility] ([facility]) for your infusion.  The following information is a summary of our appointment as well as important reminders.      Today was your first dose of venofer. You have 4 more doses ordered by your doctor. Each dose needs to be at least 48 hours apart.    We look forward in seeing you on your next appointment here at [facility].  Please don t hesitate to call us at [phone] to reschedule any of your appointments or to speak with one of the [facility] registered nurses.  It was a pleasure taking care of you today.    Sincerely,  Ericka Severson, RN  [facility]  [facility]  [address] [place] [address]  [id number]  Phone:  [phone]      Patient Education    Iron Sucrose Chewable tablet    Iron Sucrose Solution for injection  Iron Sucrose Solution for injection  What is this medicine?  IRON SUCROSE (AHY maikol STAR krohs) is an iron complex. Iron is used to make healthy red blood cells, which carry oxygen and nutrients throughout the body. This medicine is used to treat iron deficiency anemia in people with chronic kidney disease.  This medicine may be used for other purposes; ask your health care provider or pharmacist if you have questions.  What should I tell my health care provider before I take this medicine?  They need to know if you have any of these conditions:    anemia not caused by low iron  levels    heart disease    high levels of iron in the blood    kidney disease    liver disease    an unusual or allergic reaction to iron, other medicines, foods, dyes, or preservatives    pregnant or trying to get pregnant    breast-feeding  How should I use this medicine?  This medicine is for infusion into a vein. It is given by a health care professional in a hospital or clinic setting.  Talk to your pediatrician regarding the use of this medicine in children. While this drug may be prescribed for children as young as 2 years for selected conditions, precautions do apply.  Overdosage: If you think you have taken too much of this medicine contact a poison control center or emergency room at once.  NOTE: This medicine is only for you. Do not share this medicine with others.  What if I miss a dose?  It is important not to miss your dose. Call your doctor or health care professional if you are unable to keep an appointment.  What may interact with this medicine?  Do not take this medicine with any of the following medications:    deferoxamine    dimercaprol    other iron products  This medicine may also interact with the following medications:    chloramphenicol    deferasirox  This list may not describe all possible interactions. Give your health care provider a list of all the medicines, herbs, non-prescription drugs, or dietary supplements you use. Also tell them if you smoke, drink alcohol, or use illegal drugs. Some items may interact with your medicine.  What should I watch for while using this medicine?  Visit your doctor or healthcare professional regularly. Tell your doctor or healthcare professional if your symptoms do not start to get better or if they get worse. You may need blood work done while you are taking this medicine.  You may need to follow a special diet. Talk to your doctor. Foods that contain iron include: whole grains/cereals, dried fruits, beans, or peas, leafy green vegetables, and organ  meats (liver, kidney).  What side effects may I notice from receiving this medicine?  Side effects that you should report to your doctor or health care professional as soon as possible:    allergic reactions like skin rash, itching or hives, swelling of the face, lips, or tongue    breathing problems    changes in blood pressure    cough    fast, irregular heartbeat    feeling faint or lightheaded, falls    fever or chills    flushing, sweating, or hot feelings    joint or muscle aches/pains    seizures    swelling of the ankles or feet    unusually weak or tired  Side effects that usually do not require medical attention (report to your doctor or health care professional if they continue or are bothersome):    diarrhea    feeling achy    headache    irritation at site where injected    nausea, vomiting    stomach upset    tiredness  This list may not describe all possible side effects. Call your doctor for medical advice about side effects. You may report side effects to FDA at 0-932-FDA-0895.  Where should I keep my medicine?  This drug is given in a hospital or clinic and will not be stored at home.  NOTE:This sheet is a summary. It may not cover all possible information. If you have questions about this medicine, talk to your doctor, pharmacist, or health care provider. Copyright  2016 Gold Standard              Follow-ups after your visit        Your next 10 appointments already scheduled     May 01, 2017 11:00 AM CDT   Infusion 60 with UC SPEC INFUSION, UC 49 ATC   [facility] Specialty and Procedure ([facility])    [address] [place] [id number] [phone]            May 05, 2017  2:00 PM CDT   Infusion 60 with UC SPEC INFUSION, UC 41 ATC   [facility] Procedure ([facility])    [address] [place] [address] [id number] [phone]            May 12,  2017  2:00 PM CDT   Infusion 60 with UC SPEC INFUSION, UC 46 ATC   [facility] Specialty and Procedure ([facility])    [address]  2nd Floor  [facility] [id number] [phone]            May 19, 2017  2:00 PM CDT   Infusion 60 with UC SPEC INFUSION, UC 49 ATC   [facility] Specialty [facility] Procedure ([facility])    [address]  2nd Floor  [facility] [id number] [phone]            Jun 09, 2017  9:30 AM CDT   (Arrive by 9:15 AM)   Return Visit with Rene Granados MD   [facility] Rheumatology ([facility])    [address]  3rd Floor  [facility] [id number] [phone]              Who to contact     If you have questions or need follow up information about today's clinic visit or your schedule please contact [facility] SPECIALTY AND PROCEDURE directly at [phone].  Normal or non-critical lab and imaging results will be communicated to you by [employer], letter or phone within 4 business days after the clinic has received the results. If you do not hear from us within 7 days, please contact the clinic through [employer] or phone. If you have a critical or abnormal lab result, we will notify you by phone as soon as possible.  Submit refill requests through [employer] or call your pharmacy and they will forward the refill request to us. Please allow 3 business days for your refill to be completed.          Additional Information About Your Visit        [employer] Information     [employer] gives you secure access to your electronic health record. If you see a primary care provider, you can also send messages to your care team and make appointments. If you have questions, please call your primary care clinic.  If you do not have a primary care provider, please call [phone] and they will assist you.        Care EveryWhere ID     This is your  Care EveryWhere ID. This could be used by other organizations to access your [place] medical records  [id number]        Your Vitals Were     Pulse Temperature Respirations Pulse Oximetry          87 99.1  F (37.3  C) (Oral) 18 98%         Blood Pressure from Last 3 Encounters:   04/21/17 116/74   03/20/17 120/77   11/30/16 113/76    Weight from Last 3 Encounters:   03/20/17 50.1 kg (110 lb 8 oz)   11/30/16 49.9 kg (110 lb)   05/18/16 52.6 kg (116 lb)              Today, you had the following     No orders found for display       Primary Care Provider Office Phone # Fax #    Nando Haskins [phone]        [facility] [address] [facility] [address]  [facility] [address]        Thank you!     Thank you for choosing [facility] SPECIALTY [facility] PROCEDURE  for your care. Our goal is always to provide you with excellent care. Hearing back from our patients is one way we can continue to improve our services. Please take a few minutes to complete the written survey that you may receive in the mail after your visit with us. Thank you!             Your Updated Medication List - Protect others around you: Learn how to safely use, store and throw away your medicines at www.disposemymeds.org.          This list is accurate as of: 4/21/17  2:57 PM.  Always use your most recent med list.                   Brand Name Dispense Instructions for use    apremilast 30 MG tablet    OTEZLA    180 tablet    Take 1 tablet (30 mg) by mouth 2 times daily       blood glucose monitoring test strip    ACCU-CHEK SMARTVIEW    700 each    Use to test blood sugar 6-8 times daily or as directed.       clobetasol 0.05 % external solution    TEMOVATE    50 mL    Apply topically 2 times daily       DERMA-SMOOTHE/FS SCALP 0.01 % Oil     118 mL    Apply to scalp at bedtime, then wear showercap, rinse off in morning.       ferrous gluconate 324 (38 FE) MG tablet    FERGON    100 tablet    Take 1 tablet (324  mg) by mouth 3 times daily (with meals)       fluticasone 50 MCG/ACT spray    FLONASE    1 Package    Spray 2 sprays into both nostrils daily       ibuprofen 600 MG tablet    ADVIL/MOTRIN    100 tablet    Take 1 tablet (600 mg) by mouth every 8 hours as needed for moderate pain       insulin lispro 100 UNIT/ML injection    HumaLOG KWIKpen    15 mL    USE 3 TIMES DAILY WITH MEALS, CURRENTLY  30 UNITS DAILY.  ADJUST PER MD ORDERS *MD APPT. & LAB/ MICROALBUMIN FOR FURTHER RF       insulin pen needle 29G X 12.7MM    BD ULTRA-FINE    200 each    Used to inject insulin up to twice daily       * LEVEMIR FLEXpen 100 UNIT/ML injection   Generic drug:  insulin detemir     3 Month    6 units in the morning, 14 units in evening = for total of 20 units daily       * insulin detemir 100 UNIT/ML injection    LEVEMIR FLEXTOUCH    30 mL    Inject 25 Units Subcutaneous At Bedtime       levonorgestrel 20 MCG/24HR IUD    MIRENA (52 MG)    1 each    Use as directed       metFORMIN 500 MG tablet    GLUCOPHAGE    360 tablet    Take 2 tablets (1,000 mg) by mouth 2 times daily (with meals)       ondansetron 4 MG ODT tab    ZOFRAN-ODT    60 tablet    Take 1 tablet (4 mg) by mouth every 8 hours as needed for nausea       oxyCODONE-acetaminophen 5-325 MG per tablet    PERCOCET    30 tablet    Take 1 tablet by mouth every 4 hours as needed (must last 30 days from dispense date)       * predniSONE 10 MG tablet    DELTASONE    30 tablet    Take 1 tablet (10 mg) by mouth daily As needed for flares only       * predniSONE 5 MG tablet    DELTASONE    50 tablet    3 tabs daily for 1 wk, then 2 tabs daily for 1 week.       * predniSONE 5 MG tablet    DELTASONE    20 tablet    Take 3 tablets (15mg) by mouth for 4 days, then 2 tablets (10mg) by mouth for 4 days.       traMADol 50 MG tablet    ULTRAM    90 tablet    Take 1 tablet (50 mg) by mouth every 8 hours as needed for moderate pain       triamcinolone 0.1 % cream    KENALOG    453.6 g    Apply  topically 2 times daily       ustekinumab 45 MG/0.5ML Sosy    STELARA    3 Syringe    Inject 0.5 mLs (45 mg) Subcutaneous every 3 months       valACYclovir 500 MG tablet    VALTREX    90 tablet    Take 1 tablet (500 mg) by mouth daily       vitamin D 54232 UNIT capsule    ERGOCALCIFEROL    24 capsule    Take 1 capsule (50,000 Units) by mouth twice a week       * Notice:  This list has 5 medication(s) that are the same as other medications prescribed for you. Read the directions carefully, and ask your doctor or other care provider to review them with you.

## 2017-04-21 NOTE — TELEPHONE ENCOUNTER
IBUPROFEN 600MG      Last Written Prescription Date:  10/12/16  Last Fill Quantity: 90,   # refills: 2  Last Office Visit : 3/20/17  Future Office visit:  6/9/17  BP Readings from Last 3 Encounters:   03/20/17 120/77   11/30/16 113/76   05/18/16 122/80     Creatinine   Date Value Ref Range Status   03/20/2017 0.63 0.52 - 1.04 mg/dL Final

## 2017-04-21 NOTE — PROGRESS NOTES
Infusion Nursing Note:  Chrissy Dixon presents today to [facility] for a Venofer infusion.  During today's [facility] appointment orders from Dr. Nando Barnard were completed.  Frequency: Today is dose 1 of 5.    Progress note:  ID verified by name and .  Assessment completed. Vitals were stable throughout time in [facility]. Patient education regarding infusion and possible side effects provided.  Patient verbalized understanding.   Patient verbalized that she had an allergic reaction in the past to a multivitamin containing iron and hasn't tolerated oral iron supplements either.     Premedications: administered per order. Patient took PO Benadryl and tylenol- this is patient's first dose of venofer.    Infusion Rates: Infusion given over approximately 20 minutes.     Labs were not ordered for this appointment.    Vascular access: Peripheral IV placed today.    Treatment Conditions:   Patient denies fever, chills, signs of infections, recent illness, on antibiotics, productive cough, or elevated temperature.    Patient tolerated infusion well.    Discharge Plan:   Follow up plan of care with: [facility]ng[facility]  Discharge instructions were reviewed with patient.  Patient/representative verbalized understanding of discharge instructions and all questions answered.    Patient discharged [facility] in stable condition.    Ericka Severson, RN    Administrations This Visit     acetaminophen (TYLENOL) tablet 650 mg     Admin Date Action Dose Route Administered By             2017 Given 650 mg Oral Severson, Ericka, RN                    diphenhydrAMINE (BENADRYL) capsule 25 mg     Admin Date Action Dose Route Administered By             2017 Given 25 mg Oral Severson, Ericka, RN                    iron sucrose (VENOFER) 200 mg in NaCl 0.9 % 100 mL intermittent infusion     Admin Date Action Dose Rate Route Administered By          2017 New  Bag 200 mg 480 mL/hr Intravenous Severson, Ericka, RN                         /74  Pulse 87  Temp 99.1  F (37.3  C) (Oral)  Resp 18  SpO2 98%

## 2017-04-23 ENCOUNTER — MYC REFILL (OUTPATIENT)
Dept: RHEUMATOLOGY | Facility: CLINIC | Age: 32
End: 2017-04-23

## 2017-04-23 DIAGNOSIS — L40.50 PSORIATIC ARTHRITIS (H): ICD-10-CM

## 2017-04-24 ENCOUNTER — MYC MEDICAL ADVICE (OUTPATIENT)
Dept: INTERNAL MEDICINE | Facility: CLINIC | Age: 32
End: 2017-04-24

## 2017-04-24 DIAGNOSIS — L40.50: ICD-10-CM

## 2017-04-24 RX ORDER — IBUPROFEN 600 MG/1
600 TABLET, FILM COATED ORAL EVERY 8 HOURS PRN
Qty: 270 TABLET | Refills: 3 | Status: SHIPPED | OUTPATIENT
Start: 2017-04-24 | End: 2018-04-02

## 2017-04-24 NOTE — TELEPHONE ENCOUNTER
ibuprofen (ADVIL/MOTRIN) 600 MG tablet   *PATIENT REQUESTING A 90 DAY SUPPLY  Last Written Prescription Date:  4/21/2017  Last Fill Quantity: 100,   # refills: 3  Last Office Visit : 3/20/2017  Future Office visit:  6/7/2017    Creatinine   Date Value Ref Range Status   03/20/2017 0.63 0.52 - 1.04 mg/dL Final   ]  BP Readings from Last 1 Encounters:   04/21/17 116/74        Meets requirements.  Last filled qty # 100.  Patient may take 3 times daily-  Will send for qty #270 with 3 refills.

## 2017-04-25 NOTE — TELEPHONE ENCOUNTER
Writer spoke with patient and informed her that he refill request was denied due to medication refill requested to soon. Medication will be able to refill 4/29/2017. Writer will send rx to provider to review and advise.   Samantha Gomez LPN

## 2017-04-25 NOTE — TELEPHONE ENCOUNTER
Message from Erica:  Vaishali Batres, RN Mon Apr 24, 2017 8:53 AM        ----- Message -----   From: Chrissy Dixon   Sent: 4/23/2017 8:18 PM   To: Adult [facility]  Subject: Medication Renewal Request     Original authorizing provider: MD Chrissy Horn would like a refill of the following medications:  traMADol (ULTRAM) 50 MG tablet [Rene Granados MD]    Preferred pharmacy: [facility] [address] [place] [address] [address] AT [facility] [address]    Comment:

## 2017-04-25 NOTE — TELEPHONE ENCOUNTER
Last seen: 11/30/2016  Pending appt: 6/9/2017  Medication: Tramadol 50 mg tabs   Last filled: 3/29/2017  Qty: 90 tabs 0 refills     Patient requesting that rx be called in to desired pharmacy 4/28/2017 so she can refill the rx 4/29/2017. Encounter being routed to provider to review and advise.     Samantha Gomez LPN

## 2017-04-26 ENCOUNTER — MYC REFILL (OUTPATIENT)
Dept: INTERNAL MEDICINE | Facility: CLINIC | Age: 32
End: 2017-04-26

## 2017-04-26 DIAGNOSIS — L40.50 PSORIATIC ARTHRITIS (H): ICD-10-CM

## 2017-04-26 RX ORDER — TRAMADOL HYDROCHLORIDE 50 MG/1
50 TABLET ORAL EVERY 8 HOURS PRN
Qty: 90 TABLET | Refills: 0 | Status: SHIPPED | OUTPATIENT
Start: 2017-04-26 | End: 2017-05-22

## 2017-04-27 NOTE — TELEPHONE ENCOUNTER
Valtrex 500  mg tabs      Last Written Prescription Date:  8-14-14  Last Fill Quantity: 90   # refills: 3  Last Office Visit : 3-20-17  Future Office visit:  5-5-17    Creatinine   Date Value Ref Range Status   03/20/2017 0.63 0.52 - 1.04 mg/dL Final   ]      Kathleen M Doege RN

## 2017-04-27 NOTE — TELEPHONE ENCOUNTER
Message from Erica:  Christen Roberson, RN Wed Apr 26, 2017 12:02 PM        ----- Message -----   From: Chrissy Dixon   Sent: 4/26/2017 11:19 AM   To: [facility] Nursing Staff-  Subject: Medication Renewal Request     Original authorizing provider: MD Chrissy Vallecillo would like a refill of the following medications:  valACYclovir (VALTREX) 500 MG tablet [Nando Haskins MD]    Preferred pharmacy: [facility] [address] [place] [address]  AT [facility]    Comment:

## 2017-04-28 RX ORDER — VALACYCLOVIR HYDROCHLORIDE 500 MG/1
500 TABLET, FILM COATED ORAL DAILY
Qty: 90 TABLET | Refills: 0 | Status: SHIPPED | OUTPATIENT
Start: 2017-04-28 | End: 2018-04-26

## 2017-04-28 NOTE — TELEPHONE ENCOUNTER
Writer called in rx to desired pharmacy and left a voice message with patient. Writer verbally informed patient that medication can't be refilled until 4/29/2017. Patient stated understanding.   Samantha Gomez LPN

## 2017-05-01 ENCOUNTER — INFUSION THERAPY VISIT (OUTPATIENT)
Dept: INFUSION THERAPY | Facility: CLINIC | Age: 32
End: 2017-05-01
Attending: INTERNAL MEDICINE
Payer: COMMERCIAL

## 2017-05-01 VITALS
TEMPERATURE: 99.3 F | OXYGEN SATURATION: 100 % | HEART RATE: 78 BPM | DIASTOLIC BLOOD PRESSURE: 67 MMHG | SYSTOLIC BLOOD PRESSURE: 121 MMHG

## 2017-05-01 DIAGNOSIS — R53.83 MALAISE AND FATIGUE: ICD-10-CM

## 2017-05-01 DIAGNOSIS — D50.0 IRON DEFICIENCY ANEMIA DUE TO CHRONIC BLOOD LOSS: Primary | ICD-10-CM

## 2017-05-01 DIAGNOSIS — R53.81 MALAISE AND FATIGUE: ICD-10-CM

## 2017-05-01 PROCEDURE — 96365 THER/PROPH/DIAG IV INF INIT: CPT

## 2017-05-01 PROCEDURE — 25000128 H RX IP 250 OP 636: Mod: ZF | Performed by: INTERNAL MEDICINE

## 2017-05-01 RX ORDER — ACETAMINOPHEN 325 MG/1
650 TABLET ORAL
Status: CANCELLED
Start: 2017-05-03

## 2017-05-01 RX ORDER — ACETAMINOPHEN 325 MG/1
650 TABLET ORAL
Status: DISCONTINUED | OUTPATIENT
Start: 2017-05-01 | End: 2017-05-01 | Stop reason: HOSPADM

## 2017-05-01 RX ORDER — DIPHENHYDRAMINE HCL 25 MG
25 TABLET ORAL
Status: CANCELLED
Start: 2017-05-03

## 2017-05-01 RX ORDER — DIPHENHYDRAMINE HCL 25 MG
25 CAPSULE ORAL
Status: DISCONTINUED | OUTPATIENT
Start: 2017-05-01 | End: 2017-05-01 | Stop reason: HOSPADM

## 2017-05-01 RX ADMIN — IRON SUCROSE 200 MG: 20 INJECTION, SOLUTION INTRAVENOUS at 11:22

## 2017-05-01 NOTE — PROGRESS NOTES
Nursing Note  Chrissy Dixon [facility]:   Chief Complaint   Patient presents with     Infusion     Venofer     During today's [facility] appointment, orders from Dr. Haskins were completed.  Frequency: today is dose 2 of 5 total.    Progress note:  Patient identification verified by name and date of birth.  Assessment completed.  Vitals recorded in Doc Flowsheets.  Patient was provided with education regarding infusion and possible side effects.  Patient verbalized understanding.      needed: No  Premedications: declined due to side effect of drowsiness and hx of no reaction to IV Venofer.  Infusion Rates: infusion given over approximately 20 minutes.  Approximate Infusion length:20 minutes.   Labs: were not ordered for this appointment.  Vascular access: peripheral IV placed today.  Treatment Conditions: non-applicable.  Patient tolerated infusion: well.      Discharge Plan:   Follow up plan of care with: ongoing infusions at [facility].  Discharge instructions were reviewed with patient.  Patient/representative verbalized understanding of discharge instructions and all questions answered.  Patient discharged [facility] in stable condition.    Gemma Nolasco RN    Administrations This Visit     iron sucrose (VENOFER) 200 mg in NaCl 0.9 % 100 mL intermittent infusion     Admin Date Action Dose Rate Route Administered By          05/01/2017 New Bag 200 mg 480 mL/hr Intravenous Gemma Nolasco RN                         /66  Pulse 83  Temp 99.3  F (37.4  C) (Oral)  SpO2 100%

## 2017-05-01 NOTE — MR AVS SNAPSHOT
After Visit Summary   5/1/2017    Chrissy Dixon    MRN: [id number]           Patient Information     Date Of Birth          1985        Visit Information        Provider Department      5/1/2017 11:00 AM UC 49 ATC; [facility] SPEC INFUSION [facility]        Today's Diagnoses     Iron deficiency anemia due to chronic blood loss    -  1    Malaise and fatigue          Care Instructions    Dear Chrissy Dixon    Thank you for choosing [facility] ([facility]) for your infusion.  The following information is a summary of our appointment as well as important reminders.        Patient Education    Iron Sucrose Chewable tablet    Iron Sucrose Solution for injection  Iron Sucrose Solution for injection  What is this medicine?  IRON SUCROSE (AHY maikol STAR krohs) is an iron complex. Iron is used to make healthy red blood cells, which carry oxygen and nutrients throughout the body. This medicine is used to treat iron deficiency anemia in people with chronic kidney disease.  This medicine may be used for other purposes; ask your health care provider or pharmacist if you have questions.  What should I tell my health care provider before I take this medicine?  They need to know if you have any of these conditions:    anemia not caused by low iron levels    heart disease    high levels of iron in the blood    kidney disease    liver disease    an unusual or allergic reaction to iron, other medicines, foods, dyes, or preservatives    pregnant or trying to get pregnant    breast-feeding  How should I use this medicine?  This medicine is for infusion into a vein. It is given by a health care professional in a hospital or clinic setting.  Talk to your pediatrician regarding the use of this medicine in children. While this drug may be prescribed for children as young as 2 years for selected conditions, precautions do  apply.  Overdosage: If you think you have taken too much of this medicine contact a poison control center or emergency room at once.  NOTE: This medicine is only for you. Do not share this medicine with others.  What if I miss a dose?  It is important not to miss your dose. Call your doctor or health care professional if you are unable to keep an appointment.  What may interact with this medicine?  Do not take this medicine with any of the following medications:    deferoxamine    dimercaprol    other iron products  This medicine may also interact with the following medications:    chloramphenicol    deferasirox  This list may not describe all possible interactions. Give your health care provider a list of all the medicines, herbs, non-prescription drugs, or dietary supplements you use. Also tell them if you smoke, drink alcohol, or use illegal drugs. Some items may interact with your medicine.  What should I watch for while using this medicine?  Visit your doctor or healthcare professional regularly. Tell your doctor or healthcare professional if your symptoms do not start to get better or if they get worse. You may need blood work done while you are taking this medicine.  You may need to follow a special diet. Talk to your doctor. Foods that contain iron include: whole grains/cereals, dried fruits, beans, or peas, leafy green vegetables, and organ meats (liver, kidney).  What side effects may I notice from receiving this medicine?  Side effects that you should report to your doctor or health care professional as soon as possible:    allergic reactions like skin rash, itching or hives, swelling of the face, lips, or tongue    breathing problems    changes in blood pressure    cough    fast, irregular heartbeat    feeling faint or lightheaded, falls    fever or chills    flushing, sweating, or hot feelings    joint or muscle aches/pains    seizures    swelling of the ankles or feet    unusually weak or tired  Side  effects that usually do not require medical attention (report to your doctor or health care professional if they continue or are bothersome):    diarrhea    feeling achy    headache    irritation at site where injected    nausea, vomiting    stomach upset    tiredness  This list may not describe all possible side effects. Call your doctor for medical advice about side effects. You may report side effects to FDA at 9-987-OPX-3687.  Where should I keep my medicine?  This drug is given in a hospital or clinic and will not be stored at home.  NOTE:This sheet is a summary. It may not cover all possible information. If you have questions about this medicine, talk to your doctor, pharmacist, or health care provider. Copyright  2016 Gold Standard          Additional information: you had an infusion of Venofer 200 mg today via IV.      We look forward in seeing you on your next appointment here at [facility].  Please don t hesitate to call us at [phone] to reschedule any of your appointments or to speak with one of the [facility] registered nurses.  It was a pleasure taking care of you today.    Sincerely,  Gemma Nolasco RN  [facility]  Specialty Infusion & Procedure Center  [address] [place] [address]  [address]  Phone:  [phone]          Follow-ups after your visit        Your next 10 appointments already scheduled     May 05, 2017  1:20 PM CDT   (Arrive by 1:05 PM)   ACUTE/CHRONIC SINGLE CONDITION with Nando Haskins MD   [facility] ([facility])    [address] [place] [address]  4th [facility] [address] [phone]            May 05, 2017  2:00 PM CDT   Infusion 60 with UC SPEC INFUSION, UC 41 ATC   [facility] Special[facility] ([facility])    [address] [place] [address]  2nd [facility] [address] [phone]            May 12, 2017  2:00 PM CDT   Infusion 60 with UC  SPEC INFUSION, UC 46 ATC   [facility] Specialty and Procedure ([facility])    [address]  2nd Floor  [facility] [id number] [phone]            May 19, 2017  2:00 PM CDT   Infusion 60 with UC SPEC INFUSION, UC 49 ATC   [facility] Specialty and Procedure ([facility])    [address]  2nd Floor  [facility] [id number] [phone]            Jun 09, 2017  9:30 AM CDT   (Arrive by 9:15 AM)   Return Visit with Rene Granados MD   [facility] ([facility])    [address]  3rd Floor  [facility] [address] [phone]              Who to contact     If you have questions or need follow up information about today's clinic visit or your schedule please contact [facility] SPECIALTY AND PROCEDURE directly at [phone].  Normal or non-critical lab and imaging results will be communicated to you by [employer], letter or phone within 4 business days after the clinic has received the results. If you do not hear from us within 7 days, please contact the clinic through [employer] or phone. If you have a critical or abnormal lab result, we will notify you by phone as soon as possible.  Submit refill requests through [employer] or call your pharmacy and they will forward the refill request to us. Please allow 3 business days for your refill to be completed.          Additional Information About Your Visit        [employer] Information     [employer] gives you secure access to your electronic health record. If you see a primary care provider, you can also send messages to your care team and make appointments. If you have questions, please call your primary care clinic.  If you do not have a primary care provider, please call [phone] and they will assist you.        Care EveryWhere ID     This is your Care EveryWhere ID. This could be used  by other organizations to access your [place] medical records  [id number]        Your Vitals Were     Pulse Temperature Pulse Oximetry             83 99.3  F (37.4  C) (Oral) 100%          Blood Pressure from Last 3 Encounters:   05/01/17 119/66   04/21/17 116/74   03/20/17 120/77    Weight from Last 3 Encounters:   03/20/17 50.1 kg (110 lb 8 oz)   11/30/16 49.9 kg (110 lb)   05/18/16 52.6 kg (116 lb)              Today, you had the following     No orders found for display       Primary Care Provider Office Phone # Fax #    Nando Haskins [phone]        [facility] [address] [facility] [address]  [facility] [address]        Thank you!     Thank you for choosing [facility] SPECIALTY AND PROCEDURE  for your care. Our goal is always to provide you with excellent care. Hearing back from our patients is one way we can continue to improve our services. Please take a few minutes to complete the written survey that you may receive in the mail after your visit with us. Thank you!             Your Updated Medication List - Protect others around you: Learn how to safely use, store and throw away your medicines at www.disposemymeds.org.          This list is accurate as of: 5/1/17 11:24 AM.  Always use your most recent med list.                   Brand Name Dispense Instructions for use    apremilast 30 MG tablet    OTEZLA    180 tablet    Take 1 tablet (30 mg) by mouth 2 times daily       blood glucose monitoring test strip    ACCU-CHEK SMARTVIEW    700 each    Use to test blood sugar 6-8 times daily or as directed.       clobetasol 0.05 % external solution    TEMOVATE    50 mL    Apply topically 2 times daily       DERMA-SMOOTHE/FS SCALP 0.01 % Oil     118 mL    Apply to scalp at bedtime, then wear showercap, rinse off in morning.       ferrous gluconate 324 (38 FE) MG tablet    FERGON    100 tablet    Take 1 tablet (324 mg) by mouth 3 times daily (with meals)        fluticasone 50 MCG/ACT spray    FLONASE    1 Package    Spray 2 sprays into both nostrils daily       ibuprofen 600 MG tablet    ADVIL/MOTRIN    270 tablet    Take 1 tablet (600 mg) by mouth every 8 hours as needed for moderate pain       insulin lispro 100 UNIT/ML injection    HumaLOG KWIKpen    15 mL    USE 3 TIMES DAILY WITH MEALS, CURRENTLY  30 UNITS DAILY.  ADJUST PER MD ORDERS *MD APPT. & LAB/ MICROALBUMIN FOR FURTHER RF       insulin pen needle 29G X 12.7MM    BD ULTRA-FINE    200 each    Used to inject insulin up to twice daily       * LEVEMIR FLEXpen 100 UNIT/ML injection   Generic drug:  insulin detemir     3 Month    6 units in the morning, 14 units in evening = for total of 20 units daily       * insulin detemir 100 UNIT/ML injection    LEVEMIR FLEXTOUCH    30 mL    Inject 25 Units Subcutaneous At Bedtime       levonorgestrel 20 MCG/24HR IUD    MIRENA (52 MG)    1 each    Use as directed       metFORMIN 500 MG tablet    GLUCOPHAGE    360 tablet    Take 2 tablets (1,000 mg) by mouth 2 times daily (with meals)       ondansetron 4 MG ODT tab    ZOFRAN-ODT    60 tablet    Take 1 tablet (4 mg) by mouth every 8 hours as needed for nausea       oxyCODONE-acetaminophen 5-325 MG per tablet    PERCOCET    30 tablet    Take 1 tablet by mouth every 4 hours as needed (must last 30 days from dispense date)       * predniSONE 10 MG tablet    DELTASONE    30 tablet    Take 1 tablet (10 mg) by mouth daily As needed for flares only       * predniSONE 5 MG tablet    DELTASONE    50 tablet    3 tabs daily for 1 wk, then 2 tabs daily for 1 week.       * predniSONE 5 MG tablet    DELTASONE    20 tablet    Take 3 tablets (15mg) by mouth for 4 days, then 2 tablets (10mg) by mouth for 4 days.       traMADol 50 MG tablet    ULTRAM    90 tablet    Take 1 tablet (50 mg) by mouth every 8 hours as needed for moderate pain       triamcinolone 0.1 % cream    KENALOG    453.6 g    Apply topically 2 times daily       ustekinumab 45  MG/0.5ML Sosy    STELARA    3 Syringe    Inject 0.5 mLs (45 mg) Subcutaneous every 3 months       valACYclovir 500 MG tablet    VALTREX    90 tablet    Take 1 tablet (500 mg) by mouth daily       vitamin D 38508 UNIT capsule    ERGOCALCIFEROL    24 capsule    Take 1 capsule (50,000 Units) by mouth twice a week       * Notice:  This list has 5 medication(s) that are the same as other medications prescribed for you. Read the directions carefully, and ask your doctor or other care provider to review them with you.

## 2017-05-01 NOTE — PATIENT INSTRUCTIONS
Dear Chrissy Dixon    Thank you for choosing [facility] ([facility]) for your infusion.  The following information is a summary of our appointment as well as important reminders.        Patient Education    Iron Sucrose Chewable tablet    Iron Sucrose Solution for injection  Iron Sucrose Solution for injection  What is this medicine?  IRON SUCROSE (CARLEY blakelyohs) is an iron complex. Iron is used to make healthy red blood cells, which carry oxygen and nutrients throughout the body. This medicine is used to treat iron deficiency anemia in people with chronic kidney disease.  This medicine may be used for other purposes; ask your health care provider or pharmacist if you have questions.  What should I tell my health care provider before I take this medicine?  They need to know if you have any of these conditions:    anemia not caused by low iron levels    heart disease    high levels of iron in the blood    kidney disease    liver disease    an unusual or allergic reaction to iron, other medicines, foods, dyes, or preservatives    pregnant or trying to get pregnant    breast-feeding  How should I use this medicine?  This medicine is for infusion into a vein. It is given by a health care professional in a hospital or clinic setting.  Talk to your pediatrician regarding the use of this medicine in children. While this drug may be prescribed for children as young as 2 years for selected conditions, precautions do apply.  Overdosage: If you think you have taken too much of this medicine contact a poison control center or emergency room at once.  NOTE: This medicine is only for you. Do not share this medicine with others.  What if I miss a dose?  It is important not to miss your dose. Call your doctor or health care professional if you are unable to keep an appointment.  What may interact with this medicine?  Do not take this medicine with any of the  following medications:    deferoxamine    dimercaprol    other iron products  This medicine may also interact with the following medications:    chloramphenicol    deferasirox  This list may not describe all possible interactions. Give your health care provider a list of all the medicines, herbs, non-prescription drugs, or dietary supplements you use. Also tell them if you smoke, drink alcohol, or use illegal drugs. Some items may interact with your medicine.  What should I watch for while using this medicine?  Visit your doctor or healthcare professional regularly. Tell your doctor or healthcare professional if your symptoms do not start to get better or if they get worse. You may need blood work done while you are taking this medicine.  You may need to follow a special diet. Talk to your doctor. Foods that contain iron include: whole grains/cereals, dried fruits, beans, or peas, leafy green vegetables, and organ meats (liver, kidney).  What side effects may I notice from receiving this medicine?  Side effects that you should report to your doctor or health care professional as soon as possible:    allergic reactions like skin rash, itching or hives, swelling of the face, lips, or tongue    breathing problems    changes in blood pressure    cough    fast, irregular heartbeat    feeling faint or lightheaded, falls    fever or chills    flushing, sweating, or hot feelings    joint or muscle aches/pains    seizures    swelling of the ankles or feet    unusually weak or tired  Side effects that usually do not require medical attention (report to your doctor or health care professional if they continue or are bothersome):    diarrhea    feeling achy    headache    irritation at site where injected    nausea, vomiting    stomach upset    tiredness  This list may not describe all possible side effects. Call your doctor for medical advice about side effects. You may report side effects to FDA at 4-244-BBZ-6366.  Where  should I keep my medicine?  This drug is given in a hospital or clinic and will not be stored at home.  NOTE:This sheet is a summary. It may not cover all possible information. If you have questions about this medicine, talk to your doctor, pharmacist, or health care provider. Copyright  2016 Gold Standard          Additional information: you had an infusion of Venofer 200 mg today via IV.      We look forward in seeing you on your next appointment here at [facility].  Please don t hesitate to call us at [phone] to reschedule any of your appointments or to speak with one of the [facility] registered nurses.  It was a pleasure taking care of you today.    Sincerely,  Gemma Nolasco RN  [facility]  Specialty Infusion & Procedure Center  [address] [place] [address]  [address]  Phone:  [phone]

## 2017-05-05 ENCOUNTER — INFUSION THERAPY VISIT (OUTPATIENT)
Dept: INFUSION THERAPY | Facility: CLINIC | Age: 32
End: 2017-05-05
Attending: INTERNAL MEDICINE
Payer: COMMERCIAL

## 2017-05-05 ENCOUNTER — OFFICE VISIT (OUTPATIENT)
Dept: INTERNAL MEDICINE | Facility: CLINIC | Age: 32
End: 2017-05-05

## 2017-05-05 VITALS — SYSTOLIC BLOOD PRESSURE: 136 MMHG | DIASTOLIC BLOOD PRESSURE: 67 MMHG | HEART RATE: 86 BPM | TEMPERATURE: 97 F

## 2017-05-05 VITALS
WEIGHT: 110 LBS | BODY MASS INDEX: 19.49 KG/M2 | HEART RATE: 96 BPM | SYSTOLIC BLOOD PRESSURE: 127 MMHG | DIASTOLIC BLOOD PRESSURE: 66 MMHG

## 2017-05-05 DIAGNOSIS — L40.50 PSORIATIC ARTHRITIS (H): ICD-10-CM

## 2017-05-05 DIAGNOSIS — R53.83 MALAISE AND FATIGUE: ICD-10-CM

## 2017-05-05 DIAGNOSIS — D50.9 IRON DEFICIENCY ANEMIA, UNSPECIFIED IRON DEFICIENCY ANEMIA TYPE: ICD-10-CM

## 2017-05-05 DIAGNOSIS — B00.1 RECURRENT COLD SORES: ICD-10-CM

## 2017-05-05 DIAGNOSIS — D50.0 IRON DEFICIENCY ANEMIA DUE TO CHRONIC BLOOD LOSS: ICD-10-CM

## 2017-05-05 DIAGNOSIS — D50.9 IRON DEFICIENCY ANEMIA, UNSPECIFIED IRON DEFICIENCY ANEMIA TYPE: Primary | ICD-10-CM

## 2017-05-05 DIAGNOSIS — D50.0 IRON DEFICIENCY ANEMIA DUE TO CHRONIC BLOOD LOSS: Primary | ICD-10-CM

## 2017-05-05 DIAGNOSIS — R53.81 MALAISE AND FATIGUE: ICD-10-CM

## 2017-05-05 LAB
ERYTHROCYTE [DISTWIDTH] IN BLOOD BY AUTOMATED COUNT: 26.4 % (ref 10–15)
FERRITIN SERPL-MCNC: 31 NG/ML (ref 12–150)
HCT VFR BLD AUTO: 32.1 % (ref 35–47)
HGB BLD-MCNC: 9 G/DL (ref 11.7–15.7)
IRON SATN MFR SERPL: 5 % (ref 15–46)
IRON SERPL-MCNC: 21 UG/DL (ref 35–180)
MCH RBC QN AUTO: 18.3 PG (ref 26.5–33)
MCHC RBC AUTO-ENTMCNC: 28 G/DL (ref 31.5–36.5)
MCV RBC AUTO: 65 FL (ref 78–100)
PLATELET # BLD AUTO: 298 10E9/L (ref 150–450)
RBC # BLD AUTO: 4.93 10E12/L (ref 3.8–5.2)
TIBC SERPL-MCNC: 435 UG/DL (ref 240–430)
WBC # BLD AUTO: 6.9 10E9/L (ref 4–11)

## 2017-05-05 PROCEDURE — 85027 COMPLETE CBC AUTOMATED: CPT | Performed by: INTERNAL MEDICINE

## 2017-05-05 PROCEDURE — 83550 IRON BINDING TEST: CPT | Performed by: INTERNAL MEDICINE

## 2017-05-05 PROCEDURE — 82728 ASSAY OF FERRITIN: CPT | Performed by: INTERNAL MEDICINE

## 2017-05-05 PROCEDURE — 25000128 H RX IP 250 OP 636: Mod: ZF | Performed by: INTERNAL MEDICINE

## 2017-05-05 PROCEDURE — 96365 THER/PROPH/DIAG IV INF INIT: CPT

## 2017-05-05 PROCEDURE — 83540 ASSAY OF IRON: CPT | Performed by: INTERNAL MEDICINE

## 2017-05-05 RX ORDER — ACETAMINOPHEN 325 MG/1
650 TABLET ORAL
Status: CANCELLED
Start: 2017-05-07

## 2017-05-05 RX ORDER — FERROUS GLUCONATE 324(38)MG
324 TABLET ORAL EVERY OTHER DAY
Qty: 100 TABLET | Refills: 11 | COMMUNITY
Start: 2017-05-05 | End: 2020-09-15

## 2017-05-05 RX ORDER — ACETAMINOPHEN 325 MG/1
650 TABLET ORAL
Status: DISCONTINUED | OUTPATIENT
Start: 2017-05-05 | End: 2017-05-05 | Stop reason: HOSPADM

## 2017-05-05 RX ORDER — OXYCODONE AND ACETAMINOPHEN 5; 325 MG/1; MG/1
1 TABLET ORAL EVERY 4 HOURS PRN
Qty: 30 TABLET | Refills: 0 | Status: SHIPPED | OUTPATIENT
Start: 2017-05-05 | End: 2017-05-31

## 2017-05-05 RX ORDER — DIPHENHYDRAMINE HCL 25 MG
25 CAPSULE ORAL
Status: DISCONTINUED | OUTPATIENT
Start: 2017-05-05 | End: 2017-05-05 | Stop reason: HOSPADM

## 2017-05-05 RX ORDER — DIPHENHYDRAMINE HCL 25 MG
25 TABLET ORAL
Status: CANCELLED
Start: 2017-05-07

## 2017-05-05 RX ADMIN — IRON SUCROSE 200 MG: 20 INJECTION, SOLUTION INTRAVENOUS at 14:24

## 2017-05-05 ASSESSMENT — PAIN SCALES - GENERAL: PAINLEVEL: SEVERE PAIN (7)

## 2017-05-05 NOTE — MR AVS SNAPSHOT
After Visit Summary   5/5/2017    Chrissy Dixon    MRN: [id number]           Patient Information     Date Of Birth          1985        Visit Information        Provider Department      5/5/2017 2:00 PM UC 41 ATC; UC SPEC INFUSION [facility]        Today's Diagnoses     Iron deficiency anemia due to chronic blood loss    -  1    Malaise and fatigue        Iron deficiency anemia, unspecified iron deficiency anemia type          Care Instructions    Dear Chrissy Dixon    Thank you for choosing [facility] ([facility]) for your infusion.  The following information is a summary of our appointment as well as important reminders.      We look forward in seeing you on your next appointment here at [facility].  Please don t hesitate to call us at [phone] to reschedule any of your appointments or to speak with one of the [facility] registered nurses.  It was a pleasure taking care of you today.    Sincerely,  Hillary Cruz RN  [facility]  [address] [place] [address]  [address]  Phone:  [phone]          Follow-ups after your visit        Your next 10 appointments already scheduled     May 12, 2017  2:00 PM CDT   Infusion 60 with UC SPEC INFUSION, UC 46 ATC   [facility] ([facility])    [address] [place] [address] [phone]            May 19, 2017  2:00 PM CDT   Infusion 60 with UC SPEC INFUSION, UC 49 ATC   [facility] ([facility])    [address] [place] [address] [phone]            Jun 09, 2017  9:30 AM CDT   (Arrive by 9:15 AM)   Return Visit with Rene Granados MD   [facility] ([facility])    [address] [facility]  3rd Floor  [facility] [address] [phone]              Future tests that were ordered for you today     Open Standing Orders        Priority Remaining Interval Expires Ordered    CBC with platelets Routine 7/8 5/5/2018 5/5/2017            Who to contact     If you have questions or need follow up information about today's clinic visit or your schedule please contact [facility] directly at [phone].  Normal or non-critical lab and imaging results will be communicated to you by Avanzithart, letter or phone within 4 business days after the clinic has received the results. If you do not hear from us within 7 days, please contact the clinic through Buildingeyet or phone. If you have a critical or abnormal lab result, we will notify you by phone as soon as possible.  Submit refill requests through Telovations or call your pharmacy and they will forward the refill request to us. Please allow 3 business days for your refill to be completed.          Additional Information About Your Visit        Telovations Information     Telovations gives you secure access to your electronic health record. If you see a primary care provider, you can also send messages to your care team and make appointments. If you have questions, please call your primary care clinic.  If you do not have a primary care provider, please call [phone] and they will assist you.        Care EveryWhere ID     This is your Care EveryWhere ID. This could be used by other organizations to access your [facility] medical records  [id number]        Your Vitals Were     Pulse Temperature                86 97  F (36.1  C) (Oral)           Blood Pressure from Last 3 Encounters:   05/05/17 136/67   05/05/17 127/66   05/01/17 121/67    Weight from Last 3 Encounters:   05/05/17 49.9 kg (110 lb)   03/20/17 50.1 kg (110 lb 8 oz)   11/30/16 49.9 kg (110 lb)              We Performed  the Following     CBC with platelets     Ferritin     Iron and iron binding capacity          Today's Medication Changes          These changes are accurate as of: 5/5/17  2:52 PM.  If you have any questions, ask your nurse or doctor.               These medicines have changed or have updated prescriptions.        Dose/Directions    ferrous gluconate 324 (38 FE) MG tablet   Commonly known as:  FERGON   This may have changed:  when to take this   Used for:  Iron deficiency anemia due to chronic blood loss, Iron deficiency anemia, unspecified iron deficiency anemia type   Changed by:  Nando Haskins MD        Dose:  324 mg   Take 1 tablet (324 mg) by mouth every other day   Quantity:  100 tablet   Refills:  11                Primary Care Provider Office Phone # Fax #    Nando Haskins [phone]        [facility] [address] [facility] [address]  [facility] [address]        Thank you!     Thank you for choosing [facility]  for your care. Our goal is always to provide you with excellent care. Hearing back from our patients is one way we can continue to improve our services. Please take a few minutes to complete the written survey that you may receive in the mail after your visit with us. Thank you!             Your Updated Medication List - Protect others around you: Learn how to safely use, store and throw away your medicines at www.disposemymeds.org.          This list is accurate as of: 5/5/17  2:52 PM.  Always use your most recent med list.                   Brand Name Dispense Instructions for use    apremilast 30 MG tablet    OTEZLA    180 tablet    Take 1 tablet (30 mg) by mouth 2 times daily       blood glucose monitoring test strip    ACCU-CHEK SMARTVIEW    700 each    Use to test blood sugar 6-8 times daily or as directed.       clobetasol 0.05 % external solution    TEMOVATE    50 mL    Apply topically 2 times daily        DERMA-SMOOTHE/FS SCALP 0.01 % Oil     118 mL    Apply to scalp at bedtime, then wear showercap, rinse off in morning.       ferrous gluconate 324 (38 FE) MG tablet    FERGON    100 tablet    Take 1 tablet (324 mg) by mouth every other day       fluticasone 50 MCG/ACT spray    FLONASE    1 Package    Spray 2 sprays into both nostrils daily       ibuprofen 600 MG tablet    ADVIL/MOTRIN    270 tablet    Take 1 tablet (600 mg) by mouth every 8 hours as needed for moderate pain       insulin lispro 100 UNIT/ML injection    HumaLOG KWIKpen    15 mL    USE 3 TIMES DAILY WITH MEALS, CURRENTLY  30 UNITS DAILY.  ADJUST PER MD ORDERS *MD APPT. & LAB/ MICROALBUMIN FOR FURTHER RF       insulin pen needle 29G X 12.7MM    BD ULTRA-FINE    200 each    Used to inject insulin up to twice daily       * LEVEMIR FLEXpen 100 UNIT/ML injection   Generic drug:  insulin detemir     3 Month    6 units in the morning, 14 units in evening = for total of 20 units daily       * insulin detemir 100 UNIT/ML injection    LEVEMIR FLEXTOUCH    30 mL    Inject 25 Units Subcutaneous At Bedtime       levonorgestrel 20 MCG/24HR IUD    MIRENA (52 MG)    1 each    Use as directed       metFORMIN 500 MG tablet    GLUCOPHAGE    360 tablet    Take 2 tablets (1,000 mg) by mouth 2 times daily (with meals)       ondansetron 4 MG ODT tab    ZOFRAN-ODT    60 tablet    Take 1 tablet (4 mg) by mouth every 8 hours as needed for nausea       oxyCODONE-acetaminophen 5-325 MG per tablet    PERCOCET    30 tablet    Take 1 tablet by mouth every 4 hours as needed (must last 30 days from dispense date)       * predniSONE 10 MG tablet    DELTASONE    30 tablet    Take 1 tablet (10 mg) by mouth daily As needed for flares only       * predniSONE 5 MG tablet    DELTASONE    50 tablet    3 tabs daily for 1 wk, then 2 tabs daily for 1 week.       * predniSONE 5 MG tablet    DELTASONE    20 tablet    Take 3 tablets (15mg) by mouth for 4 days, then 2 tablets (10mg) by mouth for 4  days.       traMADol 50 MG tablet    ULTRAM    90 tablet    Take 1 tablet (50 mg) by mouth every 8 hours as needed for moderate pain       triamcinolone 0.1 % cream    KENALOG    453.6 g    Apply topically 2 times daily       ustekinumab 45 MG/0.5ML Sosy    STELARA    3 Syringe    Inject 0.5 mLs (45 mg) Subcutaneous every 3 months       valACYclovir 500 MG tablet    VALTREX    90 tablet    Take 1 tablet (500 mg) by mouth daily       vitamin D 52477 UNIT capsule    ERGOCALCIFEROL    24 capsule    Take 1 capsule (50,000 Units) by mouth twice a week       * Notice:  This list has 5 medication(s) that are the same as other medications prescribed for you. Read the directions carefully, and ask your doctor or other care provider to review them with you.

## 2017-05-05 NOTE — NURSING NOTE
Chief Complaint   Patient presents with     Recheck Medication     Pt is here to follow up on her medications.      Nani Whitman LPN May 5, 2017 1:22 PM

## 2017-05-05 NOTE — PATIENT INSTRUCTIONS
Dear Chrissy Dixon    Thank you for choosing [facility] ([facility]) for your infusion.  The following information is a summary of our appointment as well as important reminders.      We look forward in seeing you on your next appointment here at [facility].  Please don t hesitate to call us at [phone] to reschedule any of your appointments or to speak with one of the [facility] registered nurses.  It was a pleasure taking care of you today.    Sincerely,  Hillary Cruz RN  [facility]  Special[facility]  [address] [place] [address]  [address]  Phone:  [phone]

## 2017-05-05 NOTE — PROGRESS NOTES
HPI  HISTORY OF PRESENT ILLNESS:  The patient is here today for reevaluation.  She is in the midst of her iron infusions.  These have been going well; however, she has not noted any significant improvement in her pep and energy at this point.  She still feels somewhat fatigued although she has no other.  Blood sugars are more stable and not as fluctuating.  She has had issues with some recurrent mouth sores as well as frequent viral infections but attributes this to her immunosuppression.  Had no side effects or ill effects related to the iron.  We did discuss the use of oral supplemental iron taken once every other day and she will try this with food.  Otherwise, blood sugars have been under reasonably good control recently.       Past Medical History:   Diagnosis Date     Diabetes      Other psoriasis      Polyarthritis     probable psoriatic arthritis     Past Surgical History:   Procedure Laterality Date     SURGICAL HISTORY OF -       .  Tonsillar abscess     Family History   Problem Relation Age of Onset     Thyroid Disease Mother      DIABETES Mother      DIABETES Maternal Grandfather      DIABETES Maternal Uncle      DIABETES Maternal Uncle      Asthma No family hx of      Hypertension Mother      Hypertension Maternal Grandmother      Hypertension Maternal Grandfather      Prostate Cancer Maternal Grandfather      Connective Tissue Disorder Father      Psoriasis     Social History     Social History     Marital status:      Spouse name: N/A     Number of children: N/A     Years of education: N/A     Occupational History     Dental Assist. Unknown     Social History Main Topics     Smoking status: Former Smoker     Packs/day: 1.00     Years: 5.00     Types: Cigarettes     Quit date: 11/1/2007     Smokeless tobacco: Never Used     Alcohol use No     Drug use: No     Sexual activity: Yes     Partners: Male     Birth control/ protection: IUD      Comment: Mirena     Other Topics Concern     Exercise No      Social History Narrative    How much exercise per week? No    How much calcium per day? Diet      How much caffeine per day? Coffee  2    How much vitamin D per day? Diet    Do you/your family wear seatbelts?  Yes    Do you/your family use safety helmets? No    Do you/your family use sunscreen? Yes    Do you/your family keep firearms in the home? No    Do you/your family have a smoke detector(s)? Yes        Do you feel safe in your home? Yes    Has anyone ever touched you in an unwanted manner? No     Explain                Exam:  /66  Pulse 96  Wt 49.9 kg (110 lb)  BMI 19.49 kg/m2  110 lbs 0 oz  The patient is alert, oriented with a clear sensorium.   Skin shows no lesions or rashes and good turgor.   Head is normocephalic and atraumatic.    Neck shows no nodes, thyromegaly.     Lungs are clear.   Heart shows normal S1 and S2 without murmur or gallop.    Extremities show no edema.    ASSESSMENT:   1.  Iron deficiency anemia, on parenteral iron supplementation.   2.  Diabetes mellitus, fair control.   3.  Psoriatic arthritis, on immunosuppression.        PLAN:  I am going to have her finish her iron infusions.  We will check her CBC today and will check her CBC and her iron studies next month after the set of 5 infusions.  I am going to have her try and take oral iron 1 every other day with food.  We will notify her of lab results and any additional treatment and followup.     Over 25 minutes spent with patient the majority in counseling and coordinating care.    Nando Haskins MD  General Internal Medicine  Primary Care Center  [phone]

## 2017-05-05 NOTE — PROGRESS NOTES
Nursing Note  Chrissy Dixon [facility] for:   Chief Complaint   Patient presents with     Infusion     Venofer     During today's [facility] appointment, orders from Dr. Haskins were completed.  Frequency: 3/5    Progress note:  Patient identification verified by name and date of birth.  Assessment completed.  Vitals recorded in Doc Flowsheets.  Patient was provided with education regarding infusion and possible side effects.  Patient verbalized understanding.      needed: No  Premedications: were not ordered.  Infusion Rates: infusion given over approximately 15 minutes.  Approximate Infusion length:15 minutes.   Labs: were drawn per orders.   Vascular access: peripheral IV placed today.  Treatment Conditions: non-applicable.  Patient tolerated infusion: well.    Discharge Plan:   Follow up plan of care with: ongoing infusions at [facility].  Discharge instructions were reviewed with patient.  Patient/representative verbalized understanding of discharge instructions and all questions answered.  Patient discharged from [facility] in stable condition.    Hillary Cruz RN        /78  Pulse 80  Temp 97  F (36.1  C) (Oral)

## 2017-05-05 NOTE — PATIENT INSTRUCTIONS
Primary Care Center Medication Refill Request Information:  * Please contact your pharmacy regarding ANY request for medication refills.  ** [facility] Prescription Fax = [phone]  * Please allow 3 business days for routine medication refills.  * Please allow 5 business days for controlled substance medication refills.     Primary Care Center Test Result notification information:  *You will be notified within 7-10 days of your appointment day regarding the results of your test.  If you are on MyChart you will be notified as soon as the provider has reviewed the results and signed off on them.

## 2017-05-05 NOTE — MR AVS SNAPSHOT
After Visit Summary   5/5/2017    Chrissy Dixon    MRN: [id number]           Patient Information     Date Of Birth          1985        Visit Information        Provider Department      5/5/2017 1:20 PM Nando Haskins MD [facility]        Today's Diagnoses     Iron deficiency anemia, unspecified iron deficiency anemia type    -  1    Iron deficiency anemia due to chronic blood loss        Psoriatic arthritis (H)        Recurrent cold sores          Care Instructions    Primary Care Center Medication Refill Request Information:  * Please contact your pharmacy regarding ANY request for medication refills.  ** [facility] Prescription Fax = [phone]  * Please allow 3 business days for routine medication refills.  * Please allow 5 business days for controlled substance medication refills.     Primary Care Center Test Result notification information:  *You will be notified within 7-10 days of your appointment day regarding the results of your test.  If you are on MyChart you will be notified as soon as the provider has reviewed the results and signed off on them.          Follow-ups after your visit        Your next 10 appointments already scheduled     May 12, 2017  2:00 PM CDT   Infusion 60 with UC SPEC INFUSION, UC 46 ATC   [facility] ([facility])    [address] [place] [address] [phone]            May 19, 2017  2:00 PM CDT   Infusion 60 with UC SPEC INFUSION, UC 49 ATC   [facility] ([facility])    [address]  [address] [facility] [address] [phone]            Jun 09, 2017  9:30 AM CDT   (Arrive by 9:15 AM)   Return Visit with Rene Granados MD   [facility] ([facility])    [address] [place] [address]  3rd [facility] [address]    [phone]              Who to contact     Please call your clinic at [phone] to:    Ask questions about your health    Make or cancel appointments    Discuss your medicines    Learn about your test results    Speak to your doctor   If you have compliments or concerns about an experience at your clinic, or if you wish to file a complaint, please contact [facility] Patient Relations at [phone] or email us at [email or web address]         Additional Information About Your Visit        Tranzhart Information     Winkapp gives you secure access to your electronic health record. If you see a primary care provider, you can also send messages to your care team and make appointments. If you have questions, please call your primary care clinic.  If you do not have a primary care provider, please call [phone] and they will assist you.      Winkapp is an electronic gateway that provides easy, online access to your medical records. With Winkapp, you can request a clinic appointment, read your test results, renew a prescription or communicate with your care team.     To access your existing account, please contact your [facility] or call [phone] for assistance.        Care EveryWhere ID     This is your Care EveryWhere ID. This could be used by other organizations to access your [place] medical records  [id number]        Your Vitals Were     Pulse BMI (Body Mass Index)                96 19.49 kg/m2           Blood Pressure from Last 3 Encounters:   05/05/17 136/67   05/05/17 127/66   05/01/17 121/67    Weight from Last 3 Encounters:   05/05/17 49.9 kg (110 lb)   03/20/17 50.1 kg (110 lb 8 oz)   11/30/16 49.9 kg (110 lb)                 Today's Medication Changes          These changes are accurate as of: 5/5/17 11:59 PM.  If you have any questions, ask your nurse or doctor.               These medicines have changed or have updated  prescriptions.        Dose/Directions    ferrous gluconate 324 (38 FE) MG tablet   Commonly known as:  FERGON   This may have changed:  when to take this   Used for:  Iron deficiency anemia due to chronic blood loss, Iron deficiency anemia, unspecified iron deficiency anemia type   Changed by:  Nando Haskins MD        Dose:  324 mg   Take 1 tablet (324 mg) by mouth every other day   Quantity:  100 tablet   Refills:  11            Where to get your medicines      Some of these will need a paper prescription and others can be bought over the counter.  Ask your nurse if you have questions.     Bring a paper prescription for each of these medications     oxyCODONE-acetaminophen 5-325 MG per tablet                Primary Care Provider Office Phone # Fax #    Nando Haskins -[phone]        [facility] [address] [facility] [address]  [facility] [address]        Thank you!     Thank you for choosing [facility]  for your care. Our goal is always to provide you with excellent care. Hearing back from our patients is one way we can continue to improve our services. Please take a few minutes to complete the written survey that you may receive in the mail after your visit with us. Thank you!             Your Updated Medication List - Protect others around you: Learn how to safely use, store and throw away your medicines at www.disposemymeds.org.          This list is accurate as of: 5/5/17 11:59 PM.  Always use your most recent med list.                   Brand Name Dispense Instructions for use    apremilast 30 MG tablet    OTEZLA    180 tablet    Take 1 tablet (30 mg) by mouth 2 times daily       blood glucose monitoring test strip    ACCU-CHEK SMARTVIEW    700 each    Use to test blood sugar 6-8 times daily or as directed.       clobetasol 0.05 % external solution    TEMOVATE    50 mL    Apply topically 2 times daily       DERMA-SMOOTHE/FS SCALP 0.01 % Oil     118 mL     Apply to scalp at bedtime, then wear showercap, rinse off in morning.       ferrous gluconate 324 (38 FE) MG tablet    FERGON    100 tablet    Take 1 tablet (324 mg) by mouth every other day       fluticasone 50 MCG/ACT spray    FLONASE    1 Package    Spray 2 sprays into both nostrils daily       ibuprofen 600 MG tablet    ADVIL/MOTRIN    270 tablet    Take 1 tablet (600 mg) by mouth every 8 hours as needed for moderate pain       insulin lispro 100 UNIT/ML injection    HumaLOG KWIKpen    15 mL    USE 3 TIMES DAILY WITH MEALS, CURRENTLY  30 UNITS DAILY.  ADJUST PER MD ORDERS *MD APPT. & LAB/ MICROALBUMIN FOR FURTHER RF       insulin pen needle 29G X 12.7MM    BD ULTRA-FINE    200 each    Used to inject insulin up to twice daily       * LEVEMIR FLEXpen 100 UNIT/ML injection   Generic drug:  insulin detemir     3 Month    6 units in the morning, 14 units in evening = for total of 20 units daily       * insulin detemir 100 UNIT/ML injection    LEVEMIR FLEXTOUCH    30 mL    Inject 25 Units Subcutaneous At Bedtime       levonorgestrel 20 MCG/24HR IUD    MIRENA (52 MG)    1 each    Use as directed       metFORMIN 500 MG tablet    GLUCOPHAGE    360 tablet    Take 2 tablets (1,000 mg) by mouth 2 times daily (with meals)       ondansetron 4 MG ODT tab    ZOFRAN-ODT    60 tablet    Take 1 tablet (4 mg) by mouth every 8 hours as needed for nausea       oxyCODONE-acetaminophen 5-325 MG per tablet    PERCOCET    30 tablet    Take 1 tablet by mouth every 4 hours as needed (must last 30 days from dispense date)       * predniSONE 10 MG tablet    DELTASONE    30 tablet    Take 1 tablet (10 mg) by mouth daily As needed for flares only       * predniSONE 5 MG tablet    DELTASONE    50 tablet    3 tabs daily for 1 wk, then 2 tabs daily for 1 week.       * predniSONE 5 MG tablet    DELTASONE    20 tablet    Take 3 tablets (15mg) by mouth for 4 days, then 2 tablets (10mg) by mouth for 4 days.       traMADol 50 MG tablet    ULTRAM    90  tablet    Take 1 tablet (50 mg) by mouth every 8 hours as needed for moderate pain       triamcinolone 0.1 % cream    KENALOG    453.6 g    Apply topically 2 times daily       ustekinumab 45 MG/0.5ML Sosy    STELARA    3 Syringe    Inject 0.5 mLs (45 mg) Subcutaneous every 3 months       valACYclovir 500 MG tablet    VALTREX    90 tablet    Take 1 tablet (500 mg) by mouth daily       vitamin D 03120 UNIT capsule    ERGOCALCIFEROL    24 capsule    Take 1 capsule (50,000 Units) by mouth twice a week       * Notice:  This list has 5 medication(s) that are the same as other medications prescribed for you. Read the directions carefully, and ask your doctor or other care provider to review them with you.

## 2017-05-10 DIAGNOSIS — E11.9 DIABETES MELLITUS (H): ICD-10-CM

## 2017-05-12 ENCOUNTER — INFUSION THERAPY VISIT (OUTPATIENT)
Dept: INFUSION THERAPY | Facility: CLINIC | Age: 32
End: 2017-05-12
Attending: INTERNAL MEDICINE
Payer: COMMERCIAL

## 2017-05-12 VITALS
OXYGEN SATURATION: 100 % | SYSTOLIC BLOOD PRESSURE: 116 MMHG | TEMPERATURE: 98.6 F | DIASTOLIC BLOOD PRESSURE: 65 MMHG | HEART RATE: 91 BPM | RESPIRATION RATE: 16 BRPM

## 2017-05-12 DIAGNOSIS — R53.83 MALAISE AND FATIGUE: ICD-10-CM

## 2017-05-12 DIAGNOSIS — D50.0 IRON DEFICIENCY ANEMIA DUE TO CHRONIC BLOOD LOSS: Primary | ICD-10-CM

## 2017-05-12 DIAGNOSIS — R53.81 MALAISE AND FATIGUE: ICD-10-CM

## 2017-05-12 PROCEDURE — 25000128 H RX IP 250 OP 636: Mod: ZF | Performed by: INTERNAL MEDICINE

## 2017-05-12 PROCEDURE — 96365 THER/PROPH/DIAG IV INF INIT: CPT

## 2017-05-12 RX ORDER — ACETAMINOPHEN 325 MG/1
650 TABLET ORAL
Status: DISCONTINUED | OUTPATIENT
Start: 2017-05-12 | End: 2017-05-12 | Stop reason: HOSPADM

## 2017-05-12 RX ORDER — ACETAMINOPHEN 325 MG/1
650 TABLET ORAL
Status: CANCELLED
Start: 2017-05-13

## 2017-05-12 RX ORDER — INSULIN LISPRO 100 [IU]/ML
INJECTION, SOLUTION INTRAVENOUS; SUBCUTANEOUS
Qty: 30 ML | Refills: 1 | Status: SHIPPED | OUTPATIENT
Start: 2017-05-12 | End: 2017-06-13

## 2017-05-12 RX ORDER — DIPHENHYDRAMINE HCL 25 MG
25 CAPSULE ORAL
Status: DISCONTINUED | OUTPATIENT
Start: 2017-05-12 | End: 2017-05-12 | Stop reason: HOSPADM

## 2017-05-12 RX ORDER — DIPHENHYDRAMINE HCL 25 MG
25 TABLET ORAL
Status: CANCELLED
Start: 2017-05-13

## 2017-05-12 RX ADMIN — IRON SUCROSE 200 MG: 20 INJECTION, SOLUTION INTRAVENOUS at 14:06

## 2017-05-12 NOTE — TELEPHONE ENCOUNTER
Humalog  Last Written Prescription Date:  10/12/16  Last Fill Quantity: 15,   # refills: 0  Last Office Visit : 5/5/17  Future Office visit:  No future appt  Lab Results   Component Value Date    A1C 8.2 03/20/2017    A1C 8.7 11/30/2016     3/20/17  6:21 PM [id number]    Component Results   Component Value Flag Ref Range Units Status Collected Lab   Creatinine Urine 138   mg/dL Final 03/20/2017  6:21 PM FrStHsLb   Albumin Urine mg/L 8   mg/L Final 03/20/2017  6:21 PM FrStHsLb   Albumin Urine mg/g Cr 5.76  0 - 25 mg/g Cr Final 03/20/2017  6:21 PM

## 2017-05-12 NOTE — PROGRESS NOTES
Infusion Nursing Note:  Chrissy Dixon presents today to [facility] for a iron infusion.  During today's [facility] appointment orders from Dr. Haskins were completed.  Frequency: dose 4/5    Progress note:  ID verified by name and .  Assessment completed. Vitals were stable throughout time in [facility]. Patient education regarding infusion and possible side effects provided.  Patient verbalized understanding. yes    Premedications: were not ordered.    Infusion Rates: Infusion given over approximately 15 minutes.     Labs were not ordered for this appointment.    Vascular access: Peripheral IV placed today.    Treatment Conditions:   No treatment conditions.    Patient tolerated infusion well.    Discharge Plan:   Follow up plan of care with: Ongoing infusions [facility]  Discharge instructions were reviewed with patient.  Patient/representative verbalized understanding of discharge instructions and all questions answered.    Patient discharged [facility] in stable condition.    Eufemia Gotti RN        /80  Temp 98.6  F (37  C) (Oral)  Resp 16  SpO2 100%

## 2017-05-12 NOTE — MR AVS SNAPSHOT
After Visit Summary   5/12/2017    Chrissy Dixon    MRN: [id number]           Patient Information     Date Of Birth          1985        Visit Information        Provider Department      5/12/2017 2:00 PM UC 46 ATC; UC SPEC INFUSION [facility] Procedure        Today's Diagnoses     Iron deficiency anemia due to chronic blood loss    -  1    Malaise and fatigue           Follow-ups after your visit        Your next 10 appointments already scheduled     May 19, 2017  2:00 PM CDT   Infusion 60 with UC SPEC INFUSION, UC 49 ATC   [facility] Specialty [facility] Procedure ([facility] [place])    [address] [place]  2nd Floor  [facility] [id number] [phone]            Jun 09, 2017  9:30 AM CDT   (Arrive by 9:15 AM)   Return Visit with Rene Granados MD   [facility] Rheumatology ([facility])    [address] [place]  3rd Floor  [facility] [id number] [phone]              Who to contact     If you have questions or need follow up information about today's clinic visit or your schedule please contact [facility] SPECIALTY AND PROCEDURE directly at [phone].  Normal or non-critical lab and imaging results will be communicated to you by MyChart, letter or phone within 4 business days after the clinic has received the results. If you do not hear from us within 7 days, please contact the clinic through [employer] or phone. If you have a critical or abnormal lab result, we will notify you by phone as soon as possible.  Submit refill requests through [employer] or call your pharmacy and they will forward the refill request to us. Please allow 3 business days for your refill to be completed.          Additional Information About Your Visit        MyChart Information     [employer] gives you secure access to your electronic health record. If you see a primary care provider, you  can also send messages to your care team and make appointments. If you have questions, please call your primary care clinic.  If you do not have a primary care provider, please call [phone] and they will assist you.        Care EveryWhere ID     This is your Care EveryWhere ID. This could be used by other organizations to access your [place] medical records  [id number]        Your Vitals Were     Pulse Temperature Respirations Pulse Oximetry          91 98.6  F (37  C) (Oral) 16 100%         Blood Pressure from Last 3 Encounters:   05/12/17 116/65   05/05/17 136/67   05/05/17 127/66    Weight from Last 3 Encounters:   05/05/17 49.9 kg (110 lb)   03/20/17 50.1 kg (110 lb 8 oz)   11/30/16 49.9 kg (110 lb)              Today, you had the following     No orders found for display       Primary Care Provider Office Phone # Fax #    Nando Haskins -[phone]        [facility] [address]  [facility] [address]        Thank you!     Thank you for choosing [facility] SPECIALTY [facility] PROCEDURE  for your care. Our goal is always to provide you with excellent care. Hearing back from our patients is one way we can continue to improve our services. Please take a few minutes to complete the written survey that you may receive in the mail after your visit with us. Thank you!             Your Updated Medication List - Protect others around you: Learn how to safely use, store and throw away your medicines at www.disposemymeds.org.          This list is accurate as of: 5/12/17  2:36 PM.  Always use your most recent med list.                   Brand Name Dispense Instructions for use    apremilast 30 MG tablet    OTEZLA    180 tablet    Take 1 tablet (30 mg) by mouth 2 times daily       blood glucose monitoring test strip    ACCU-CHEK SMARTVIEW    700 each    Use to test blood sugar 6-8 times daily or as directed.       clobetasol 0.05 % external solution     TEMOVATE    50 mL    Apply topically 2 times daily       DERMA-SMOOTHE/FS SCALP 0.01 % Oil     118 mL    Apply to scalp at bedtime, then wear showercap, rinse off in morning.       ferrous gluconate 324 (38 FE) MG tablet    FERGON    100 tablet    Take 1 tablet (324 mg) by mouth every other day       fluticasone 50 MCG/ACT spray    FLONASE    1 Package    Spray 2 sprays into both nostrils daily       ibuprofen 600 MG tablet    ADVIL/MOTRIN    270 tablet    Take 1 tablet (600 mg) by mouth every 8 hours as needed for moderate pain       insulin lispro 100 UNIT/ML injection    HumaLOG KWIKpen    15 mL    USE 3 TIMES DAILY WITH MEALS, CURRENTLY  30 UNITS DAILY.  ADJUST PER MD ORDERS *MD APPT. & LAB/ MICROALBUMIN FOR FURTHER RF       insulin pen needle 29G X 12.7MM    BD ULTRA-FINE    200 each    Used to inject insulin up to twice daily       * LEVEMIR FLEXpen 100 UNIT/ML injection   Generic drug:  insulin detemir     3 Month    6 units in the morning, 14 units in evening = for total of 20 units daily       * insulin detemir 100 UNIT/ML injection    LEVEMIR FLEXTOUCH    30 mL    Inject 25 Units Subcutaneous At Bedtime       levonorgestrel 20 MCG/24HR IUD    MIRENA (52 MG)    1 each    Use as directed       metFORMIN 500 MG tablet    GLUCOPHAGE    360 tablet    Take 2 tablets (1,000 mg) by mouth 2 times daily (with meals)       ondansetron 4 MG ODT tab    ZOFRAN-ODT    60 tablet    Take 1 tablet (4 mg) by mouth every 8 hours as needed for nausea       oxyCODONE-acetaminophen 5-325 MG per tablet    PERCOCET    30 tablet    Take 1 tablet by mouth every 4 hours as needed (must last 30 days from dispense date)       * predniSONE 10 MG tablet    DELTASONE    30 tablet    Take 1 tablet (10 mg) by mouth daily As needed for flares only       * predniSONE 5 MG tablet    DELTASONE    50 tablet    3 tabs daily for 1 wk, then 2 tabs daily for 1 week.       * predniSONE 5 MG tablet    DELTASONE    20 tablet    Take 3 tablets (15mg) by  mouth for 4 days, then 2 tablets (10mg) by mouth for 4 days.       traMADol 50 MG tablet    ULTRAM    90 tablet    Take 1 tablet (50 mg) by mouth every 8 hours as needed for moderate pain       triamcinolone 0.1 % cream    KENALOG    453.6 g    Apply topically 2 times daily       ustekinumab 45 MG/0.5ML Sosy    STELARA    3 Syringe    Inject 0.5 mLs (45 mg) Subcutaneous every 3 months       valACYclovir 500 MG tablet    VALTREX    90 tablet    Take 1 tablet (500 mg) by mouth daily       vitamin D 44759 UNIT capsule    ERGOCALCIFEROL    24 capsule    Take 1 capsule (50,000 Units) by mouth twice a week       * Notice:  This list has 5 medication(s) that are the same as other medications prescribed for you. Read the directions carefully, and ask your doctor or other care provider to review them with you.

## 2017-05-19 ENCOUNTER — INFUSION THERAPY VISIT (OUTPATIENT)
Dept: INFUSION THERAPY | Facility: CLINIC | Age: 32
End: 2017-05-19
Attending: INTERNAL MEDICINE
Payer: COMMERCIAL

## 2017-05-19 VITALS
RESPIRATION RATE: 16 BRPM | DIASTOLIC BLOOD PRESSURE: 76 MMHG | SYSTOLIC BLOOD PRESSURE: 121 MMHG | HEART RATE: 79 BPM | OXYGEN SATURATION: 100 %

## 2017-05-19 DIAGNOSIS — R53.81 MALAISE AND FATIGUE: ICD-10-CM

## 2017-05-19 DIAGNOSIS — R53.83 MALAISE AND FATIGUE: ICD-10-CM

## 2017-05-19 DIAGNOSIS — D50.0 IRON DEFICIENCY ANEMIA DUE TO CHRONIC BLOOD LOSS: Primary | ICD-10-CM

## 2017-05-19 PROCEDURE — 25000128 H RX IP 250 OP 636: Mod: ZF | Performed by: INTERNAL MEDICINE

## 2017-05-19 PROCEDURE — 96365 THER/PROPH/DIAG IV INF INIT: CPT

## 2017-05-19 RX ORDER — ACETAMINOPHEN 325 MG/1
650 TABLET ORAL
Status: CANCELLED
Start: 2017-05-20

## 2017-05-19 RX ORDER — ACETAMINOPHEN 325 MG/1
650 TABLET ORAL
Status: DISCONTINUED | OUTPATIENT
Start: 2017-05-19 | End: 2017-05-19 | Stop reason: HOSPADM

## 2017-05-19 RX ORDER — DIPHENHYDRAMINE HCL 25 MG
25 TABLET ORAL
Status: CANCELLED
Start: 2017-05-20

## 2017-05-19 RX ORDER — DIPHENHYDRAMINE HCL 25 MG
25 CAPSULE ORAL
Status: DISCONTINUED | OUTPATIENT
Start: 2017-05-19 | End: 2017-05-19 | Stop reason: HOSPADM

## 2017-05-19 RX ADMIN — IRON SUCROSE 200 MG: 20 INJECTION, SOLUTION INTRAVENOUS at 14:32

## 2017-05-19 NOTE — PATIENT INSTRUCTIONS
Dear Chrissy Dixon    Thank you for choosing [facility] ([facility]) for your infusion.  The following information is a summary of our appointment as well as important reminders.          We look forward in seeing you on your next appointment here at [facility].  Please don t hesitate to call us at [phone] to reschedule any of your appointments or to speak with one of the [facility] registered nurses.  It was a pleasure taking care of you today.    Sincerely,  Alicia Hua, RAISA  [facility]  [facility]  [address] [place] [address]  [address]  Phone:  [phone]

## 2017-05-19 NOTE — MR AVS SNAPSHOT
After Visit Summary   5/19/2017    Chrissy Dixon    MRN: [id number]           Patient Information     Date Of Birth          1985        Visit Information        Provider Department      5/19/2017 2:00 PM UC 49 ATC; UC SPEC INFUSION [facility] Procedure        Today's Diagnoses     Iron deficiency anemia due to chronic blood loss    -  1    Malaise and fatigue          Care Instructions    Dear Chrissy Dixon    Thank you for choosing [facility] ([facility]) for your infusion.  The following information is a summary of our appointment as well as important reminders.          We look forward in seeing you on your next appointment here at [facility].  Please don t hesitate to call us at [phone] to reschedule any of your appointments or to speak with one of the [facility] registered nurses.  It was a pleasure taking care of you today.    Sincerely,  Alicia Hua RN  [facility]  [facility]  [address] [place] [address]  [address]  Phone:  [phone]          Follow-ups after your visit        Your next 10 appointments already scheduled     Jun 09, 2017  9:30 AM CDT   (Arrive by 9:15 AM)   Return Visit with Rene Granados MD   [facility] Rheumatology ([facility])    [address] [place] [id number] [phone]              Who to contact     If you have questions or need follow up information about today's clinic visit or your schedule please contact [facility] SPECIALTY AND PROCEDURE directly at [phone].  Normal or non-critical lab and imaging results will be communicated to you by MyChart, letter or phone within 4 business days after the clinic has received the results. If you do not hear from us within 7 days, please contact the clinic through MyChart or  phone. If you have a critical or abnormal lab result, we will notify you by phone as soon as possible.  Submit refill requests through [employer] or call your pharmacy and they will forward the refill request to us. Please allow 3 business days for your refill to be completed.          Additional Information About Your Visit        [employer] Information     [employer] gives you secure access to your electronic health record. If you see a primary care provider, you can also send messages to your care team and make appointments. If you have questions, please call your primary care clinic.  If you do not have a primary care provider, please call [phone] and they will assist you.        Care EveryWhere ID     This is your Care EveryWhere ID. This could be used by other organizations to access your [place] medical records  [id number]        Your Vitals Were     Pulse Respirations Pulse Oximetry             79 16 100%          Blood Pressure from Last 3 Encounters:   05/19/17 121/76   05/12/17 116/65   05/05/17 136/67    Weight from Last 3 Encounters:   05/05/17 49.9 kg (110 lb)   03/20/17 50.1 kg (110 lb 8 oz)   11/30/16 49.9 kg (110 lb)              Today, you had the following     No orders found for display       Primary Care Provider Office Phone # Fax #    Nando Haskins [phone]        [facility] [address] [place] [address] [address]  [facility] [address]        Thank you!     Thank you for choosing [facility] SPECIALTY AND PROCEDURE  for your care. Our goal is always to provide you with excellent care. Hearing back from our patients is one way we can continue to improve our services. Please take a few minutes to complete the written survey that you may receive in the mail after your visit with us. Thank you!             Your Updated Medication List - Protect others around you: Learn how to safely use, store and throw away your medicines at www.disposemymeds.org.           This list is accurate as of: 5/19/17  4:11 PM.  Always use your most recent med list.                   Brand Name Dispense Instructions for use    apremilast 30 MG tablet    OTEZLA    180 tablet    Take 1 tablet (30 mg) by mouth 2 times daily       blood glucose monitoring test strip    ACCU-CHEK SMARTVIEW    700 each    Use to test blood sugar 6-8 times daily or as directed.       clobetasol 0.05 % external solution    TEMOVATE    50 mL    Apply topically 2 times daily       DERMA-SMOOTHE/FS SCALP 0.01 % Oil     118 mL    Apply to scalp at bedtime, then wear showercap, rinse off in morning.       ferrous gluconate 324 (38 FE) MG tablet    FERGON    100 tablet    Take 1 tablet (324 mg) by mouth every other day       fluticasone 50 MCG/ACT spray    FLONASE    1 Package    Spray 2 sprays into both nostrils daily       HumaLOG KWIKpen 100 UNIT/ML injection   Generic drug:  insulin lispro     30 mL    USE THREE TIMES DAILY WITH MEALS, CURRENTLY 30 UNITS DAILY       ibuprofen 600 MG tablet    ADVIL/MOTRIN    270 tablet    Take 1 tablet (600 mg) by mouth every 8 hours as needed for moderate pain       insulin pen needle 29G X 12.7MM    BD ULTRA-FINE    200 each    Used to inject insulin up to twice daily       * LEVEMIR FLEXpen 100 UNIT/ML injection   Generic drug:  insulin detemir     3 Month    6 units in the morning, 14 units in evening = for total of 20 units daily       * insulin detemir 100 UNIT/ML injection    LEVEMIR FLEXTOUCH    30 mL    Inject 25 Units Subcutaneous At Bedtime       levonorgestrel 20 MCG/24HR IUD    MIRENA (52 MG)    1 each    Use as directed       metFORMIN 500 MG tablet    GLUCOPHAGE    360 tablet    Take 2 tablets (1,000 mg) by mouth 2 times daily (with meals)       ondansetron 4 MG ODT tab    ZOFRAN-ODT    60 tablet    Take 1 tablet (4 mg) by mouth every 8 hours as needed for nausea       oxyCODONE-acetaminophen 5-325 MG per tablet    PERCOCET    30 tablet    Take 1 tablet by mouth every 4  hours as needed (must last 30 days from dispense date)       * predniSONE 10 MG tablet    DELTASONE    30 tablet    Take 1 tablet (10 mg) by mouth daily As needed for flares only       * predniSONE 5 MG tablet    DELTASONE    50 tablet    3 tabs daily for 1 wk, then 2 tabs daily for 1 week.       * predniSONE 5 MG tablet    DELTASONE    20 tablet    Take 3 tablets (15mg) by mouth for 4 days, then 2 tablets (10mg) by mouth for 4 days.       traMADol 50 MG tablet    ULTRAM    90 tablet    Take 1 tablet (50 mg) by mouth every 8 hours as needed for moderate pain       triamcinolone 0.1 % cream    KENALOG    453.6 g    Apply topically 2 times daily       ustekinumab 45 MG/0.5ML Sosy    STELARA    3 Syringe    Inject 0.5 mLs (45 mg) Subcutaneous every 3 months       valACYclovir 500 MG tablet    VALTREX    90 tablet    Take 1 tablet (500 mg) by mouth daily       vitamin D 08260 UNIT capsule    ERGOCALCIFEROL    24 capsule    Take 1 capsule (50,000 Units) by mouth twice a week       * Notice:  This list has 5 medication(s) that are the same as other medications prescribed for you. Read the directions carefully, and ask your doctor or other care provider to review them with you.

## 2017-05-19 NOTE — PROGRESS NOTES
Nursing Note  Chrissy Dixon [facility] for:   Chief Complaint   Patient presents with     Infusion     Venofer infusion     During today's [facility] appointment, orders from Dr. Nando Haskins were completed.  Frequency: final dose.     Progress note:  Patient identification verified by name and date of birth.  Assessment completed.  Vitals recorded in Doc Flowsheets.  Patient was provided with education regarding infusion and possible side effects.  Patient verbalized understanding.      needed: No  Premedications: declined due to pt receiving Venofer infusion previously without pre meds and tolerating well.  Infusion Rates: infusion given over approximately 15-20 min.  Approximate Infusion length:15 minutes.   Labs: were not ordered for this appointment.  Vascular access: peripheral IV placed today.  Treatment Conditions: non-applicable.  Patient tolerated infusion: well.          Discharge Plan:   Follow up plan of care with: primary medical doctor.  Discharge instructions were reviewed with patient.  Patient/representative verbalized understanding of discharge instructions and all questions answered.  [facility] in stable condition.    Alicia Hua RN    Administrations This Visit     iron sucrose (VENOFER) 200 mg in NaCl 0.9 % 100 mL intermittent infusion     Admin Date Action Dose Rate Route Administered By          05/19/2017 New Bag 200 mg 480 mL/hr Intravenous Alicia Hua RN                         /77  Pulse 74  Resp 16  SpO2 100%

## 2017-05-22 ENCOUNTER — MYC REFILL (OUTPATIENT)
Dept: RHEUMATOLOGY | Facility: CLINIC | Age: 32
End: 2017-05-22

## 2017-05-22 DIAGNOSIS — L40.50 PSORIATIC ARTHRITIS (H): ICD-10-CM

## 2017-05-22 RX ORDER — TRAMADOL HYDROCHLORIDE 50 MG/1
50 TABLET ORAL EVERY 8 HOURS PRN
Qty: 90 TABLET | Refills: 0 | Status: SHIPPED | OUTPATIENT
Start: 2017-05-22 | End: 2017-06-09

## 2017-05-22 NOTE — TELEPHONE ENCOUNTER
Message from Erica:  Samantha Gomez LPN Mon May 22, 2017 9:48 AM        ----- Message -----   From: Chrissy Dixon   Sent: 5/22/2017 7:26 AM   To: Adult [facility]um T[facility]  Subject: Medication Renewal Request     Original authorizing provider: MD Chrissy Horn would like a refill of the following medications:  traMADol (ULTRAM) 50 MG tablet [Rene Granados MD]    Preferred pharmacy: [facility] [address] [place] [address] [address] AT [facility] [address]    Comment:

## 2017-05-22 NOTE — TELEPHONE ENCOUNTER
Last seen: 11/30/16  Pending appt: 6/9/17  Medication: tramadol 50 mg    Last filled: 4/29/17  Qty: 90 tabs 0 refills      checked and no other providers prescribing medication.   Samantha Gomez LPN

## 2017-05-31 ENCOUNTER — MYC REFILL (OUTPATIENT)
Dept: INTERNAL MEDICINE | Facility: CLINIC | Age: 32
End: 2017-05-31

## 2017-05-31 DIAGNOSIS — B00.1 RECURRENT COLD SORES: ICD-10-CM

## 2017-05-31 DIAGNOSIS — L40.50 PSORIATIC ARTHRITIS (H): ICD-10-CM

## 2017-06-01 DIAGNOSIS — R11.0 NAUSEA: ICD-10-CM

## 2017-06-01 RX ORDER — OXYCODONE AND ACETAMINOPHEN 5; 325 MG/1; MG/1
1 TABLET ORAL EVERY 4 HOURS PRN
Qty: 30 TABLET | Refills: 0 | Status: SHIPPED | OUTPATIENT
Start: 2017-06-11 | End: 2017-06-05

## 2017-06-01 NOTE — TELEPHONE ENCOUNTER
Reason For Call:   Chief Complaint   Patient presents with     Refill Request       Medication Name, Dose and Monthly Quantity:   Oxycodone with APAP (Percocet): Dose 5-325 Schedule 1 tablet Q4H  Monthly Quantity 30     Diagnosis requiring opiates:   Psoriatic arthritis (H) [L40.50]  - Primary     Problem List Updated:   Yes    Opioid Agreement On File - [facility] PAIN CONTRACT ID# [id number]:  No    Last Urine Drug Screen (at least once every 12 months) Date:   N/A    Unexpected Results:   N/A    [place]  Data Reviewed (at least once every 3 months) Date:   6/01/07    Unexpected Results:    No.    Last Fill Date:   5/13/17    Last Visit with PCP:   05/05/2017    Future Visits with PCP:   No.    Processing:   Mail to patient. At [address] [place] [address]    Tyra Singleton RN

## 2017-06-04 RX ORDER — ONDANSETRON 4 MG/1
4 TABLET, ORALLY DISINTEGRATING ORAL EVERY 8 HOURS PRN
Qty: 60 TABLET | Refills: 2 | Status: SHIPPED | OUTPATIENT
Start: 2017-06-04 | End: 2017-11-16

## 2017-06-04 NOTE — TELEPHONE ENCOUNTER
ondansetron (ZOFRAN-ODT) 4 MG ODT tab        Last Written Prescription Date:  5-18-17  Last Fill Quantity: 60,   # refills: 3  Last Office Visit : 5-5-17  Future Office visit:  none    Kathleen M Doege RN

## 2017-06-05 DIAGNOSIS — B00.1 RECURRENT COLD SORES: ICD-10-CM

## 2017-06-05 DIAGNOSIS — L40.50 PSORIATIC ARTHRITIS (H): ICD-10-CM

## 2017-06-05 RX ORDER — OXYCODONE AND ACETAMINOPHEN 5; 325 MG/1; MG/1
1 TABLET ORAL EVERY 4 HOURS PRN
Qty: 30 TABLET | Refills: 0 | Status: SHIPPED | OUTPATIENT
Start: 2017-06-11 | End: 2017-07-03

## 2017-06-08 ENCOUNTER — MYC MEDICAL ADVICE (OUTPATIENT)
Dept: INTERNAL MEDICINE | Facility: CLINIC | Age: 32
End: 2017-06-08

## 2017-06-08 NOTE — PROGRESS NOTES
"Rheumatology Visit     Chrissy Dixon MRN# [id number]   YOB: 1985 Age: 31 year old     Date of Visit: June 9, 2017  Primary care provider: Nando Haskins        Assessment and Plan:   # Psoriatic arthritis (polyarthritis, scalp and pustular psoriasis; failed Humira and leflunomide, observed post-partum in 2015): She displays remarkable improvement in flaring, prednisone-sensitive oligoarticular arthritis , associated with combination stelara and otezla use. Exam shows no synovitis or psoriasis lesions. CBC, LFT, and Cr show only mild microcytic anemia on 5-5-17. I think that she is responding well to therapy. We discussed that while I am loath to argue with therapy that is working, the combination of otezla and stelara is not well studied for either safety or efficacy. I'm glad that she is doing well with minimal side effects, but I think risks of opportunistic infection may be increased with the combination treatment, especially given the patient's diagnosis of type I diabetes. Patient is concerned that discontinuing either agent will be attended by resurgent painful joint symptoms and more skin disease. I recommend that she make a trial of reducing Katlyn led to 30 mg once daily. If this is well tolerated, she can make a trial of discontinuing otezla and moving ahead with stelara monotherapy in 3 months.    - continue Otezla 30 mg qd  -  Continue ustekinumab q 3 months  -- q 10-12 week LFT, CBC, Cr, UA  - patient may use \"bridge\" treatment with prednisone 15 X 7, 10 X 7d once q 3-4 mos    # Pustular psoriasis, started after injection of Humira in 8-15: Rash has not recurred since early 2016 when the drug was discontinued. Reason for temporal association between psoriasis with restart of anti-TNF is not clear. I do not think she should receive anti-TNF again, however.     #Anxiety/dysphoria:  Symptoms are improved, but still present. Weight has stabilized and symptoms are more stable under " monitoring and therapy by Dr. Haskins.    However, I am concerned that the patient reports use of 2 different opiates for pain control, with dosing concentrated in the evenings. I recommend that she discontinue Percocet, and that she utilize tramadol 50 mg no more often than every 8 hours. I prescribed number 90 tramadol; I expect this prescription should last at least 30 days for the patient.    # Microcytic anemia:  Anemia is stabilized and improved following 5 intravenous iron infusions per Dr. Haskins.  Source of blood loss is presumed to be heavy menstrual flow.    No orders of the defined types were placed in this encounter.      Rene Granados M.D.  Staff Rheumatologist, [facility]  Pager [phone]          Active Problem List:     Patient Active Problem List    Diagnosis Date Noted     Iron deficiency anemia due to chronic blood loss 03/30/2017     Priority: Medium     Malaise and fatigue 10/01/2012     Priority: Medium     Problem list name updated by automated process. Provider to review       Psoriasis arthropathica (H) 02/09/2012     Priority: Medium     Psoriatic arthritis (H) 01/31/2012     Priority: Medium     Diabetes mellitus type 2, controlled (H) 01/31/2012     Priority: Medium     CARDIOVASCULAR SCREENING; LDL GOAL LESS THAN 160 10/31/2010     Priority: Medium            History of Present Illness:   Presents for f/u psoriatic arthritis. Last seen 11-16. Otezla monotherapy was continued at that visit.    H/o psoriasis since birth (presented with diffuse rash at birth and not dx'd with psoriasis until age 11) and then developed arthritis in her 20s. Was on Enbrel age 21 but developed infections such as pneumonia, sinusitis and mono, and stopped it within 7 months. Was on MTX from 2011 until Feb 2012; she did not tolerate this due to GI upset. She switched to sulfasalazine 2/2012 and tolerated well at 1,000mg tid, but had decreasing efficacy as of 11-1-13. Started leflunomide in 10-13;  "stopped in 1-14 due to GI intolerance. Started humira 1-14. Held humira in 7-14 during pregnancy. Restarted in 7-15 for flaring disease. Restarted mtx +Humira 7-15 which was then discontinued due to worsened psoriasis and lack of efficacy. Leflunomide was restarted but loose stools developed. Otezla was started on 9/11/2015 and leflunomide stopped. Started stelara in Fall 2015; stopped due to expense and inconvenience in early 2016. Continued otezla monotherapy through present. Restarted stelara in 2017.    Interval history 6-17:    She reports minor \"flares\" of joint pain in the knees, L > R. Reduced bending capacity, or pain with sitting. Fluid behind the knee intermittently. Her skin has been well-controlled with small \"breakouts\" on hands and feet, lasting only 3-4 days. She restarted Stelara in 3-17, 2nd dose at end of May-- the impetus for restarting the drug is not clear to me, as the patient had discontinued it in early 2016 due to cost and inconvenience. However, she noted gradual improvement in joint symptoms overall since then.  She has minimal low back pain, there is no symptom of Kurtis synovitis, dactylitis, iritis. She continues apremilast qd or bid. She took one course of prednisone for a knee flare in 4-17.    She had dramatic increase in fatigue, dizziness in early 2017. Dr. Haskins diagnosed Fe defcy anemia; she started Iron infusions in 4-17--she has tolerated well, and her fatigue/energy has increased.  Dizziness has receded substantially.    She reports using one Percocet and 2 to 3 tablets of tramadol in the evenings for \"pain\". She feels that her anxiety and stress arising from increased business and responsibility at work is a factor in the requirements for opiate dosing in the evenings.    Interval history 11-16:    She is doing well overall. No psoriasis, and joint symptoms have been much reduced. No morning stiffness. Joints are controlled. She has skipped some evening doses of otezla due " "to nausea--no N/V with morning dose, interestingly. The R wrist is subject to intermittent stiffness. The lower back is still painful in the morning, but there is no specific EAS. Sometimes her hands have a blue color.  She has continued otezla, but stopped stelara (due to logistics of injection). She needed 2 courses of prednisone (last over the summer) for joint flares.    She notes significant nausea/diarrhea and intermittent dizzy spells, which she attributes possibly to otezla.  Episodes occur several times weekly, and there is no obvious trigger. No problems with stelara, except that it was too inconvenient to travel across town to receive in-clinic injection. Last Stelara injection was 3-16.    She reports complete resolution of former diffuse, pruritic rash involving the palms and soles.         Review of Systems:   Gen: no fevers or chills; weight is stable  HEENT: no oral/nasal ulcers; + \"pressure\" behind the eyes, not associated with nasal drainage.   CV: no chest pain  Lungs: no shortness of breath; sometimes gets occasional cough with sinusitis  GI: no N/V. Has loose stools daily  : frequent urination--not thirsty.  MSK: see HPI  Skin: HPI  Endo: sugars improved off of prednisone  12 point review o/w negative  Neuro: numbness, L leg intermittent          Past Medical History:     Past Medical History:   Diagnosis Date     Diabetes      Other psoriasis      Polyarthritis     probable psoriatic arthritis     Past Surgical History:   Procedure Laterality Date     SURGICAL HISTORY OF -       .  Tonsillar abscess            Social History:     Social History     Occupational History     Dental Assist. Unknown     Social History Main Topics     Smoking status: Former Smoker     Packs/day: 1.00     Years: 5.00     Types: Cigarettes     Quit date: 11/1/2007     Smokeless tobacco: Never Used     Alcohol use No     Drug use: No     Sexual activity: Yes     Partners: Male     Birth control/ protection: IUD      " Comment: Mirena            Family History:     Family History   Problem Relation Age of Onset     Thyroid Disease Mother      DIABETES Mother      DIABETES Maternal Grandfather      DIABETES Maternal Uncle      DIABETES Maternal Uncle      Asthma No family hx of      Hypertension Mother      Hypertension Maternal Grandmother      Hypertension Maternal Grandfather      Prostate Cancer Maternal Grandfather      Connective Tissue Disorder Father      Psoriasis            Allergies:     Allergies   Allergen Reactions     Diagnostic X-Ray Materials Swelling     Betadine [Povidone Iodine] Swelling     Levofloxacin Other (See Comments)     High blood sugars, doesn't feel well at all      Penicillins Hives     Pneumococcal Vaccines Swelling     Prenatal Vit-Fe Fumarate-Fa [Cavan-Folate Ob] Hives     Tdap [Daptacel] Swelling            Medications:     Current Outpatient Prescriptions   Medication Sig Dispense Refill     traMADol (ULTRAM) 50 MG tablet Take 1 tablet (50 mg) by mouth every 8 hours as needed for moderate pain 90 tablet 0     apremilast (OTEZLA) 30 MG tablet Take 1 tablet (30 mg) by mouth 2 times daily 180 tablet 3     [START ON 6/11/2017] oxyCODONE-acetaminophen (PERCOCET) 5-325 MG per tablet Take 1 tablet by mouth every 4 hours as needed (must last 30 days from dispense date) 30 tablet 0     ondansetron (ZOFRAN-ODT) 4 MG ODT tab Take 1 tablet (4 mg) by mouth every 8 hours as needed for nausea 60 tablet 2     HUMALOG KWIKPEN 100 UNIT/ML soln USE THREE TIMES DAILY WITH MEALS, CURRENTLY 30 UNITS DAILY 30 mL 1     ferrous gluconate (FERGON) 324 (38 FE) MG tablet Take 1 tablet (324 mg) by mouth every other day 100 tablet 11     valACYclovir (VALTREX) 500 MG tablet Take 1 tablet (500 mg) by mouth daily 90 tablet 0     ibuprofen (ADVIL/MOTRIN) 600 MG tablet Take 1 tablet (600 mg) by mouth every 8 hours as needed for moderate pain 270 tablet 3     metFORMIN (GLUCOPHAGE) 500 MG tablet Take 2 tablets (1,000 mg) by mouth  "2 times daily (with meals) 360 tablet 1     vitamin D (ERGOCALCIFEROL) 07689 UNIT capsule Take 1 capsule (50,000 Units) by mouth twice a week 24 capsule 0     ustekinumab (STELARA) 45 MG/0.5ML SOSY Inject 0.5 mLs (45 mg) Subcutaneous every 3 months 3 Syringe 1     insulin pen needle (BD ULTRA-FINE) 29G X 12.7MM Used to inject insulin up to twice daily 200 each 3     blood glucose monitoring (ACCU-CHEK SMARTVIEW) test strip Use to test blood sugar 6-8 times daily or as directed. 700 each 3     insulin detemir (LEVEMIR FLEXTOUCH) 100 UNIT/ML soln Inject 25 Units Subcutaneous At Bedtime 30 mL 3     predniSONE (DELTASONE) 5 MG tablet Take 3 tablets (15mg) by mouth for 4 days, then 2 tablets (10mg) by mouth for 4 days. 20 tablet 0     predniSONE (DELTASONE) 5 MG tablet 3 tabs daily for 1 wk, then 2 tabs daily for 1 week. 50 tablet 2     Fluocinolone Acetonide (DERMA-SMOOTHE/FS SCALP) 0.01 % OIL Apply to scalp at bedtime, then wear showercap, rinse off in morning. 118 mL 3     triamcinolone (KENALOG) 0.1 % cream Apply topically 2 times daily 453.6 g 1     predniSONE (DELTASONE) 10 MG tablet Take 1 tablet (10 mg) by mouth daily As needed for flares only 30 tablet 1     clobetasol (TEMOVATE) 0.05 % external solution Apply topically 2 times daily 50 mL 6     fluticasone (FLONASE) 50 MCG/ACT nasal spray Spray 2 sprays into both nostrils daily 1 Package 12     LEVEMIR FLEXPEN 100 UNIT/ML 6 units in the morning, 14 units in evening = for total of 20 units daily 3 Month 1     levonorgestrel (MIRENA) 20 MCG/24HR IUD Use as directed 1 each 0     Using metformin  Not on methotrexate or stelara         Physical Exam:   Blood pressure 119/78, pulse 89, height 1.6 m (5' 3\"), weight 49.7 kg (109 lb 8 oz), SpO2 100 %, not currently breastfeeding.  Wt Readings from Last 4 Encounters:   06/09/17 49.7 kg (109 lb 8 oz)   05/05/17 49.9 kg (110 lb)   03/20/17 50.1 kg (110 lb 8 oz)   11/30/16 49.9 kg (110 lb)     Constitutional: " well-developed, appearing stated age; vibrant but very thin  Eyes: nl EOM, PERRLA, vision, conjunctiva, sclera   ENT: nl external ears, nose, hearing, lips, teeth, gums, throat   No mucous membrane lesions, normal saliva pool   Neck: no mass or thyroid enlargement   Resp: lungs clear to auscultation, nl to palpation   CV: RRR, no murmurs, rubs or gallops, no edema   GI: no ABD mass or tenderness, no HSM   : not tested   MS: All TMJ, neck, shoulder, elbow, wrist, MCP/PIP/DIP, spine, hip, knee, ankle, and foot MTP/IP joints were examined.Sustained resolution of all former abnormalities: No synovitis at the wrists; unrestricted ROM. No effusions at knees and ankles. The MCPs are nontender to palpation and  ROM is normal.  There is no dactylitis or tenosynovitis.  Skin: no visible psoriasis  Neuro: nl cranial nerves, strength, sensation, DTRs.   Psych: nl judgement, orientation, memory, affect.  All TMJ, neck, shoulder, elbow, wrist, MCP/PIP/DIP, spine, hip, knee, ankle, and foot MTP/IP joints were examined.Sustained resolution of all former abnormalities: No synovitis at the wrists; unrestricted ROM. No effusions at knees and ankles. The MCPs are nontender to palpation and  ROM is normal.  There is no dactylitis or tenosynovitis.       Data:     Results for orders placed or performed in visit on 05/05/17   Ferritin   Result Value Ref Range    Ferritin 31 12 - 150 ng/mL   Iron and iron binding capacity   Result Value Ref Range    Iron 21 (L) 35 - 180 ug/dL    Iron Binding Cap 435 (H) 240 - 430 ug/dL    Iron Saturation Index 5 (L) 15 - 46 %   CBC with platelets   Result Value Ref Range    WBC 6.9 4.0 - 11.0 10e9/L    RBC Count 4.93 3.8 - 5.2 10e12/L    Hemoglobin 9.0 (L) 11.7 - 15.7 g/dL    Hematocrit 32.1 (L) 35.0 - 47.0 %    MCV 65 (L) 78 - 100 fl    MCH 18.3 (L) 26.5 - 33.0 pg    MCHC 28.0 (L) 31.5 - 36.5 g/dL    RDW 26.4 (H) 10.0 - 15.0 %    Platelet Count 298 150 - 450 10e9/L       Recent Labs   Lab Test   05/05/17   1411  03/20/17   1815  11/30/16   1042  05/18/16   1947   10/11/13   1644   WBC  6.9  5.8  5.5  5.9   < >  6.1   HGB  9.0*  7.4*  8.2*  8.2*   < >  11.6*   HCT  32.1*  26.7*  29.7*  28.5*   < >  35.9   MCV  65*  61*  62*  67*   < >  91   PLT  298  475*  481*  316   < >  374   BUN   --   9  7   --    --   15   TSH   --   0.51   --   1.32   --   2.23   AST   --   6  9  7   < >  16   ALT   --   15  14  12   < >  28   ALKPHOS   --   59  56   --    --   75    < > = values in this interval not displayed.       Reviewed Rheumatology lab flowsheet

## 2017-06-09 ENCOUNTER — OFFICE VISIT (OUTPATIENT)
Dept: RHEUMATOLOGY | Facility: CLINIC | Age: 32
End: 2017-06-09
Attending: INTERNAL MEDICINE
Payer: COMMERCIAL

## 2017-06-09 VITALS
OXYGEN SATURATION: 100 % | HEIGHT: 63 IN | DIASTOLIC BLOOD PRESSURE: 78 MMHG | BODY MASS INDEX: 19.4 KG/M2 | HEART RATE: 89 BPM | WEIGHT: 109.5 LBS | SYSTOLIC BLOOD PRESSURE: 119 MMHG

## 2017-06-09 DIAGNOSIS — L40.50 PSORIATIC ARTHRITIS (H): ICD-10-CM

## 2017-06-09 PROCEDURE — 99212 OFFICE O/P EST SF 10 MIN: CPT | Mod: ZF

## 2017-06-09 RX ORDER — TRAMADOL HYDROCHLORIDE 50 MG/1
50 TABLET ORAL EVERY 8 HOURS PRN
Qty: 90 TABLET | Refills: 0 | Status: SHIPPED | OUTPATIENT
Start: 2017-06-09 | End: 2017-07-17

## 2017-06-09 RX ORDER — APREMILAST 30 MG/1
TABLET, FILM COATED ORAL
Qty: 60 TABLET | Refills: 4 | OUTPATIENT
Start: 2017-06-09

## 2017-06-09 ASSESSMENT — PAIN SCALES - GENERAL: PAINLEVEL: SEVERE PAIN (7)

## 2017-06-09 NOTE — LETTER
"6/9/2017       RE: Chrissy Dixon  [address] [facility] [address]     Dear Colleague,    Thank you for referring your patient, Chrissy Dixon, to the [facility] RHEUMATOLOGY at [facility]. Please see a copy of my visit note below.    Rheumatology Visit     Chrissy Dixon MRN# [id number]   YOB: 1985 Age: 31 year old     Date of Visit: June 9, 2017  Primary care provider: Nando Haskins        Assessment and Plan:   # Psoriatic arthritis (polyarthritis, scalp and pustular psoriasis; failed Humira and leflunomide, observed post-partum in 2015): She displays remarkable improvement in flaring, prednisone-sensitive oligoarticular arthritis , associated with combination stelara and otezla use. Exam shows no synovitis or psoriasis lesions. CBC, LFT, and Cr show only mild microcytic anemia on 5-5-17. I think that she is responding well to therapy. We discussed that while I am loath to argue with therapy that is working, the combination of otezla and stelara is not well studied for either safety or efficacy. I'm glad that she is doing well with minimal side effects, but I think risks of opportunistic infection may be increased with the combination treatment, especially given the patient's diagnosis of type I diabetes. Patient is concerned that discontinuing either agent will be attended by resurgent painful joint symptoms and more skin disease. I recommend that she make a trial of reducing Katlyn led to 30 mg once daily. If this is well tolerated, she can make a trial of discontinuing otezla and moving ahead with stelara monotherapy in 3 months.    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 Reason for temporal association between psoriasis with restart of anti-TNF is not clear. I do not think she should receive anti-TNF again, however.     #Anxiety/dysphoria:  Symptoms are improved, but still present. Weight has stabilized and symptoms are more stable under monitoring and therapy by Dr. Haskins.    However, I am concerned that the patient reports use of 2 different opiates for pain control, with dosing concentrated in the evenings. I recommend that she discontinue Percocet, and that she utilize tramadol 50 mg no more often than every 8 hours. I prescribed number 90 tramadol; I expect this prescription should last at least 30 days for the patient.    # Microcytic anemia:  Anemia is stabilized and improved following 5 intravenous iron infusions per Dr. Haskins.  Source of blood loss is presumed to be heavy menstrual flow.    No orders of the defined types were placed in this encounter.      Rene Granados M.D.  Staff Rheumatologist, [facility]  Pager [phone]          Active Problem List:     Patient Active Problem List    Diagnosis Date Noted     Iron deficiency anemia due to chronic blood loss 03/30/2017     Priority: Medium     Malaise and fatigue 10/01/2012     Priority: Medium     Problem list name updated by automated process. Provider to review       Psoriasis arthropathica (H) 02/09/2012     Priority: Medium     Psoriatic arthritis (H) 01/31/2012     Priority: Medium     Diabetes mellitus type 2, controlled (H) 01/31/2012     Priority: Medium     CARDIOVASCULAR SCREENING; LDL GOAL LESS THAN 160 10/31/2010     Priority: Medium            History of Present Illness:   Presents for f/u psoriatic arthritis. Last seen 11-16. Otezla monotherapy was continued at that visit.    H/o psoriasis since birth (presented with diffuse rash at birth and not dx'd with psoriasis until age 11) and then developed arthritis in her 20s. Was on Enbrel age 21 but developed infections such as  "pneumonia, sinusitis and mono, and stopped it within 7 months. Was on MTX from 2011 until Feb 2012; she did not tolerate this due to GI upset. She switched to sulfasalazine 2/2012 and tolerated well at 1,000mg tid, but had decreasing efficacy as of 11-1-13. Started leflunomide in 10-13; stopped in 1-14 due to GI intolerance. Started humira 1-14. Held humira in 7-14 during pregnancy. Restarted in 7-15 for flaring disease. Restarted mtx +Humira 7-15 which was then discontinued due to worsened psoriasis and lack of efficacy. Leflunomide was restarted but loose stools developed. Otezla was started on 9/11/2015 and leflunomide stopped. Started stelara in Fall 2015; stopped due to expense and inconvenience in early 2016. Continued otezla monotherapy through present. Restarted stelara in 2017.    Interval history 6-17:    She reports minor \"flares\" of joint pain in the knees, L > R. Reduced bending capacity, or pain with sitting. Fluid behind the knee intermittently. Her skin has been well-controlled with small \"breakouts\" on hands and feet, lasting only 3-4 days. She restarted Stelara in 3-17, 2nd dose at end of May-- the impetus for restarting the drug is not clear to me, as the patient had discontinued it in early 2016 due to cost and inconvenience. However, she noted gradual improvement in joint symptoms overall since then.  She has minimal low back pain, there is no symptom of Kurtis synovitis, dactylitis, iritis. She continues apremilast qd or bid. She took one course of prednisone for a knee flare in 4-17.    She had dramatic increase in fatigue, dizziness in early 2017. Dr. Haskins diagnosed Fe defcy anemia; she started Iron infusions in 4-17--she has tolerated well, and her fatigue/energy has increased.  Dizziness has receded substantially.    She reports using one Percocet and 2 to 3 tablets of tramadol in the evenings for \"pain\". She feels that her anxiety and stress arising from increased business and " "responsibility at work is a factor in the requirements for opiate dosing in the evenings.    Interval history 11-16:    She is doing well overall. No psoriasis, and joint symptoms have been much reduced. No morning stiffness. Joints are controlled. She has skipped some evening doses of otezla due to nausea--no N/V with morning dose, interestingly. The R wrist is subject to intermittent stiffness. The lower back is still painful in the morning, but there is no specific EAS. Sometimes her hands have a blue color.  She has continued otezla, but stopped stelara (due to logistics of injection). She needed 2 courses of prednisone (last over the summer) for joint flares.    She notes significant nausea/diarrhea and intermittent dizzy spells, which she attributes possibly to otezla.  Episodes occur several times weekly, and there is no obvious trigger. No problems with stelara, except that it was too inconvenient to travel across town to receive in-clinic injection. Last Stelara injection was 3-16.    She reports complete resolution of former diffuse, pruritic rash involving the palms and soles.         Review of Systems:   Gen: no fevers or chills; weight is stable  HEENT: no oral/nasal ulcers; + \"pressure\" behind the eyes, not associated with nasal drainage.   CV: no chest pain  Lungs: no shortness of breath; sometimes gets occasional cough with sinusitis  GI: no N/V. Has loose stools daily  : frequent urination--not thirsty.  MSK: see HPI  Skin: HPI  Endo: sugars improved off of prednisone  12 point review o/w negative  Neuro: numbness, L leg intermittent          Past Medical History:     Past Medical History:   Diagnosis Date     Diabetes      Other psoriasis      Polyarthritis     probable psoriatic arthritis     Past Surgical History:   Procedure Laterality Date     SURGICAL HISTORY OF -       .  Tonsillar abscess            Social History:     Social History     Occupational History     Dental Assist. Unknown "     Social History Main Topics     Smoking status: Former Smoker     Packs/day: 1.00     Years: 5.00     Types: Cigarettes     Quit date: 11/1/2007     Smokeless tobacco: Never Used     Alcohol use No     Drug use: No     Sexual activity: Yes     Partners: Male     Birth control/ protection: IUD      Comment: Mirena            Family History:     Family History   Problem Relation Age of Onset     Thyroid Disease Mother      DIABETES Mother      DIABETES Maternal Grandfather      DIABETES Maternal Uncle      DIABETES Maternal Uncle      Asthma No family hx of      Hypertension Mother      Hypertension Maternal Grandmother      Hypertension Maternal Grandfather      Prostate Cancer Maternal Grandfather      Connective Tissue Disorder Father      Psoriasis            Allergies:     Allergies   Allergen Reactions     Diagnostic X-Ray Materials Swelling     Betadine [Povidone Iodine] Swelling     Levofloxacin Other (See Comments)     High blood sugars, doesn't feel well at all      Penicillins Hives     Pneumococcal Vaccines Swelling     Prenatal Vit-Fe Fumarate-Fa [Cavan-Folate Ob] Hives     Tdap [Daptacel] Swelling            Medications:     Current Outpatient Prescriptions   Medication Sig Dispense Refill     traMADol (ULTRAM) 50 MG tablet Take 1 tablet (50 mg) by mouth every 8 hours as needed for moderate pain 90 tablet 0     apremilast (OTEZLA) 30 MG tablet Take 1 tablet (30 mg) by mouth 2 times daily 180 tablet 3     [START ON 6/11/2017] oxyCODONE-acetaminophen (PERCOCET) 5-325 MG per tablet Take 1 tablet by mouth every 4 hours as needed (must last 30 days from dispense date) 30 tablet 0     ondansetron (ZOFRAN-ODT) 4 MG ODT tab Take 1 tablet (4 mg) by mouth every 8 hours as needed for nausea 60 tablet 2     HUMALOG KWIKPEN 100 UNIT/ML soln USE THREE TIMES DAILY WITH MEALS, CURRENTLY 30 UNITS DAILY 30 mL 1     ferrous gluconate (FERGON) 324 (38 FE) MG tablet Take 1 tablet (324 mg) by mouth every other day 100  tablet 11     valACYclovir (VALTREX) 500 MG tablet Take 1 tablet (500 mg) by mouth daily 90 tablet 0     ibuprofen (ADVIL/MOTRIN) 600 MG tablet Take 1 tablet (600 mg) by mouth every 8 hours as needed for moderate pain 270 tablet 3     metFORMIN (GLUCOPHAGE) 500 MG tablet Take 2 tablets (1,000 mg) by mouth 2 times daily (with meals) 360 tablet 1     vitamin D (ERGOCALCIFEROL) 66854 UNIT capsule Take 1 capsule (50,000 Units) by mouth twice a week 24 capsule 0     ustekinumab (STELARA) 45 MG/0.5ML SOSY Inject 0.5 mLs (45 mg) Subcutaneous every 3 months 3 Syringe 1     insulin pen needle (BD ULTRA-FINE) 29G X 12.7MM Used to inject insulin up to twice daily 200 each 3     blood glucose monitoring (ACCU-CHEK SMARTVIEW) test strip Use to test blood sugar 6-8 times daily or as directed. 700 each 3     insulin detemir (LEVEMIR FLEXTOUCH) 100 UNIT/ML soln Inject 25 Units Subcutaneous At Bedtime 30 mL 3     predniSONE (DELTASONE) 5 MG tablet Take 3 tablets (15mg) by mouth for 4 days, then 2 tablets (10mg) by mouth for 4 days. 20 tablet 0     predniSONE (DELTASONE) 5 MG tablet 3 tabs daily for 1 wk, then 2 tabs daily for 1 week. 50 tablet 2     Fluocinolone Acetonide (DERMA-SMOOTHE/FS SCALP) 0.01 % OIL Apply to scalp at bedtime, then wear showercap, rinse off in morning. 118 mL 3     triamcinolone (KENALOG) 0.1 % cream Apply topically 2 times daily 453.6 g 1     predniSONE (DELTASONE) 10 MG tablet Take 1 tablet (10 mg) by mouth daily As needed for flares only 30 tablet 1     clobetasol (TEMOVATE) 0.05 % external solution Apply topically 2 times daily 50 mL 6     fluticasone (FLONASE) 50 MCG/ACT nasal spray Spray 2 sprays into both nostrils daily 1 Package 12     LEVEMIR FLEXPEN 100 UNIT/ML 6 units in the morning, 14 units in evening = for total of 20 units daily 3 Month 1     levonorgestrel (MIRENA) 20 MCG/24HR IUD Use as directed 1 each 0     Using metformin  Not on methotrexate or stelara         Physical Exam:   Blood  "pressure 119/78, pulse 89, height 1.6 m (5' 3\"), weight 49.7 kg (109 lb 8 oz), SpO2 100 %, not currently breastfeeding.  Wt Readings from Last 4 Encounters:   06/09/17 49.7 kg (109 lb 8 oz)   05/05/17 49.9 kg (110 lb)   03/20/17 50.1 kg (110 lb 8 oz)   11/30/16 49.9 kg (110 lb)     Constitutional: well-developed, appearing stated age; vibrant but very thin  Eyes: nl EOM, PERRLA, vision, conjunctiva, sclera   ENT: nl external ears, nose, hearing, lips, teeth, gums, throat   No mucous membrane lesions, normal saliva pool   Neck: no mass or thyroid enlargement   Resp: lungs clear to auscultation, nl to palpation   CV: RRR, no murmurs, rubs or gallops, no edema   GI: no ABD mass or tenderness, no HSM   : not tested   MS: All TMJ, neck, shoulder, elbow, wrist, MCP/PIP/DIP, spine, hip, knee, ankle, and foot MTP/IP joints were examined.Sustained resolution of all former abnormalities: No synovitis at the wrists; unrestricted ROM. No effusions at knees and ankles. The MCPs are nontender to palpation and  ROM is normal.  There is no dactylitis or tenosynovitis.  Skin: no visible psoriasis  Neuro: nl cranial nerves, strength, sensation, DTRs.   Psych: nl judgement, orientation, memory, affect.  All TMJ, neck, shoulder, elbow, wrist, MCP/PIP/DIP, spine, hip, knee, ankle, and foot MTP/IP joints were examined.Sustained resolution of all former abnormalities: No synovitis at the wrists; unrestricted ROM. No effusions at knees and ankles. The MCPs are nontender to palpation and  ROM is normal.  There is no dactylitis or tenosynovitis.       Data:     Results for orders placed or performed in visit on 05/05/17   Ferritin   Result Value Ref Range    Ferritin 31 12 - 150 ng/mL   Iron and iron binding capacity   Result Value Ref Range    Iron 21 (L) 35 - 180 ug/dL    Iron Binding Cap 435 (H) 240 - 430 ug/dL    Iron Saturation Index 5 (L) 15 - 46 %   CBC with platelets   Result Value Ref Range    WBC 6.9 4.0 - 11.0 10e9/L    " RBC Count 4.93 3.8 - 5.2 10e12/L    Hemoglobin 9.0 (L) 11.7 - 15.7 g/dL    Hematocrit 32.1 (L) 35.0 - 47.0 %    MCV 65 (L) 78 - 100 fl    MCH 18.3 (L) 26.5 - 33.0 pg    MCHC 28.0 (L) 31.5 - 36.5 g/dL    RDW 26.4 (H) 10.0 - 15.0 %    Platelet Count 298 150 - 450 10e9/L       Recent Labs   Lab Test  05/05/17   1411  03/20/17   1815  11/30/16   1042  05/18/16   1947   10/11/13   1644   WBC  6.9  5.8  5.5  5.9   < >  6.1   HGB  9.0*  7.4*  8.2*  8.2*   < >  11.6*   HCT  32.1*  26.7*  29.7*  28.5*   < >  35.9   MCV  65*  61*  62*  67*   < >  91   PLT  298  475*  481*  316   < >  374   BUN   --   9  7   --    --   15   TSH   --   0.51   --   1.32   --   2.23   AST   --   6  9  7   < >  16   ALT   --   15  14  12   < >  28   ALKPHOS   --   59  56   --    --   75    < > = values in this interval not displayed.       Reviewed Rheumatology lab flowsheet      Again, thank you for allowing me to participate in the care of your patient.      Sincerely,    Rene Granados MD

## 2017-06-09 NOTE — NURSING NOTE
"Chief Complaint   Patient presents with     RECHECK     Follow up for Psoriasis arthropathica/Psoriatic arthritis       Initial /78  Pulse 89  Ht 1.6 m (5' 3\")  Wt 49.7 kg (109 lb 8 oz)  SpO2 100%  BMI 19.4 kg/m2 Estimated body mass index is 19.4 kg/(m^2) as calculated from the following:    Height as of this encounter: 1.6 m (5' 3\").    Weight as of this encounter: 49.7 kg (109 lb 8 oz).  Medication Reconciliation: complete   Lilian Pineda CMA    "

## 2017-06-09 NOTE — PATIENT INSTRUCTIONS
As needed:  Prednisone 15 mg daily X 7, then 10 mg dialy X 7 for joint flares--contact clinic with plans to do prednisone course.      Plan use only tramadol or only percocet for nighttime pain.  Continue otezla 2 tabs daily, stelara every 3 months.

## 2017-06-09 NOTE — MR AVS SNAPSHOT
After Visit Summary   6/9/2017    Chrissy Dixon    MRN: [id number]           Patient Information     Date Of Birth          1985        Visit Information        Provider Department      6/9/2017 9:30 AM Rene Granados MD [facility] Rheumatology        Today's Diagnoses     Psoriatic arthritis (H)          Care Instructions    As needed:  Prednisone 15 mg daily X 7, then 10 mg dialy X 7 for joint flares--contact clinic with plans to do prednisone course.      Plan use only tramadol or only percocet for nighttime pain.  Continue otezla 2 tabs daily, stelara every 3 months.          Follow-ups after your visit        Follow-up notes from your care team     Return in about 4 months (around 10/9/2017).      Who to contact     If you have questions or need follow up information about today's clinic visit or your schedule please contact [facility] RHEUMATOLOGY directly at [phone].  Normal or non-critical lab and imaging results will be communicated to you by [employer], letter or phone within 4 business days after the clinic has received the results. If you do not hear from us within 7 days, please contact the clinic through [employer] or phone. If you have a critical or abnormal lab result, we will notify you by phone as soon as possible.  Submit refill requests through [employer] or call your pharmacy and they will forward the refill request to us. Please allow 3 business days for your refill to be completed.          Additional Information About Your Visit        MyChart Information     [employer] gives you secure access to your electronic health record. If you see a primary care provider, you can also send messages to your care team and make appointments. If you have questions, please call your primary care clinic.  If you do not have a primary care provider, please call [phone] and they will assist you.        Care EveryWhere ID     This is your Care EveryWhere ID. This could be used by other  "organizations to access your [facility] medical records  [id number]        Your Vitals Were     Pulse Height Pulse Oximetry BMI (Body Mass Index)          89 1.6 m (5' 3\") 100% 19.4 kg/m2         Blood Pressure from Last 3 Encounters:   06/09/17 119/78   05/19/17 121/76   05/12/17 116/65    Weight from Last 3 Encounters:   06/09/17 49.7 kg (109 lb 8 oz)   05/05/17 49.9 kg (110 lb)   03/20/17 50.1 kg (110 lb 8 oz)              Today, you had the following     No orders found for display         Where to get your medicines      These medications were sent to [id number] - [place], [place] - [address] AT [facility] [address], [place] [address]    Hours:  called pharm.  they do accept faxes Phone:  [phone]     apremilast 30 MG tablet         Some of these will need a paper prescription and others can be bought over the counter.  Ask your nurse if you have questions.     Bring a paper prescription for each of these medications     traMADol 50 MG tablet          Primary Care Provider Office Phone # Fax #    Nando Haskins [phone]        [facility] [address] [facility] [address]  [facility] [address]        Thank you!     Thank you for choosing [facility]  for your care. Our goal is always to provide you with excellent care. Hearing back from our patients is one way we can continue to improve our services. Please take a few minutes to complete the written survey that you may receive in the mail after your visit with us. Thank you!             Your Updated Medication List - Protect others around you: Learn how to safely use, store and throw away your medicines at www.disposemymeds.org.          This list is accurate as of: 6/9/17 11:59 PM.  Always use your most recent med list.                   Brand Name Dispense Instructions for use    apremilast 30 MG tablet    OTEZLA    180 tablet    Take 1 tablet (30 " mg) by mouth 2 times daily       blood glucose monitoring test strip    ACCU-CHEK SMARTVIEW    700 each    Use to test blood sugar 6-8 times daily or as directed.       clobetasol 0.05 % external solution    TEMOVATE    50 mL    Apply topically 2 times daily       DERMA-SMOOTHE/FS SCALP 0.01 % Oil     118 mL    Apply to scalp at bedtime, then wear showercap, rinse off in morning.       ferrous gluconate 324 (38 FE) MG tablet    FERGON    100 tablet    Take 1 tablet (324 mg) by mouth every other day       fluticasone 50 MCG/ACT spray    FLONASE    1 Package    Spray 2 sprays into both nostrils daily       HumaLOG KWIKpen 100 UNIT/ML injection   Generic drug:  insulin lispro     30 mL    USE THREE TIMES DAILY WITH MEALS, CURRENTLY 30 UNITS DAILY       ibuprofen 600 MG tablet    ADVIL/MOTRIN    270 tablet    Take 1 tablet (600 mg) by mouth every 8 hours as needed for moderate pain       insulin pen needle 29G X 12.7MM    BD ULTRA-FINE    200 each    Used to inject insulin up to twice daily       * LEVEMIR FLEXpen 100 UNIT/ML injection   Generic drug:  insulin detemir     3 Month    6 units in the morning, 14 units in evening = for total of 20 units daily       * insulin detemir 100 UNIT/ML injection    LEVEMIR FLEXTOUCH    30 mL    Inject 25 Units Subcutaneous At Bedtime       levonorgestrel 20 MCG/24HR IUD    MIRENA (52 MG)    1 each    Use as directed       metFORMIN 500 MG tablet    GLUCOPHAGE    360 tablet    Take 2 tablets (1,000 mg) by mouth 2 times daily (with meals)       ondansetron 4 MG ODT tab    ZOFRAN-ODT    60 tablet    Take 1 tablet (4 mg) by mouth every 8 hours as needed for nausea       oxyCODONE-acetaminophen 5-325 MG per tablet   Start taking on:  6/11/2017    PERCOCET    30 tablet    Take 1 tablet by mouth every 4 hours as needed (must last 30 days from dispense date)       * predniSONE 10 MG tablet    DELTASONE    30 tablet    Take 1 tablet (10 mg) by mouth daily As needed for flares only       *  predniSONE 5 MG tablet    DELTASONE    50 tablet    3 tabs daily for 1 wk, then 2 tabs daily for 1 week.       * predniSONE 5 MG tablet    DELTASONE    20 tablet    Take 3 tablets (15mg) by mouth for 4 days, then 2 tablets (10mg) by mouth for 4 days.       traMADol 50 MG tablet    ULTRAM    90 tablet    Take 1 tablet (50 mg) by mouth every 8 hours as needed for moderate pain       triamcinolone 0.1 % cream    KENALOG    453.6 g    Apply topically 2 times daily       ustekinumab 45 MG/0.5ML Sosy    STELARA    3 Syringe    Inject 0.5 mLs (45 mg) Subcutaneous every 3 months       valACYclovir 500 MG tablet    VALTREX    90 tablet    Take 1 tablet (500 mg) by mouth daily       vitamin D 88543 UNIT capsule    ERGOCALCIFEROL    24 capsule    Take 1 capsule (50,000 Units) by mouth twice a week       * Notice:  This list has 5 medication(s) that are the same as other medications prescribed for you. Read the directions carefully, and ask your doctor or other care provider to review them with you.

## 2017-06-09 NOTE — LETTER
"6/9/2017      RE: Chrissy Dixon  [address] [address]       Rheumatology Visit     Chrissy Dixon MRN# [id number]   YOB: 1985 Age: 31 year old     Date of Visit: June 9, 2017  Primary care provider: Nando Haskins        Assessment and Plan:   # Psoriatic arthritis (polyarthritis, scalp and pustular psoriasis; failed Humira and leflunomide, observed post-partum in 2015): She displays remarkable improvement in flaring, prednisone-sensitive oligoarticular arthritis , associated with combination stelara and otezla use. Exam shows no synovitis or psoriasis lesions. CBC, LFT, and Cr show only mild microcytic anemia on 5-5-17. I think that she is responding well to therapy. We discussed that while I am loath to argue with therapy that is working, the combination of otezla and stelara is not well studied for either safety or efficacy. I'm glad that she is doing well with minimal side effects, but I think risks of opportunistic infection may be increased with the combination treatment, especially given the patient's diagnosis of type I diabetes. Patient is concerned that discontinuing either agent will be attended by resurgent painful joint symptoms and more skin disease. I recommend that she make a trial of reducing Katlyn led to 30 mg once daily. If this is well tolerated, she can make a trial of discontinuing otezla and moving ahead with stelara monotherapy in 3 months.    - continue Otezla 30 mg qd  -  Continue ustekinumab q 3 months  -- q 10-12 week LFT, CBC, Cr, UA  - patient may use \"bridge\" treatment with prednisone 15 X 7, 10 X 7d once q 3-4 mos    # Pustular psoriasis, started after injection of Humira in 8-15: Rash has not recurred since early 2016 when the drug was discontinued. Reason for temporal association between psoriasis with restart of anti-TNF is not clear. I do not think she should receive anti-TNF again, however.     #Anxiety/dysphoria:  Symptoms are " improved, but still present. Weight has stabilized and symptoms are more stable under monitoring and therapy by Dr. Haskins.    However, I am concerned that the patient reports use of 2 different opiates for pain control, with dosing concentrated in the evenings. I recommend that she discontinue Percocet, and that she utilize tramadol 50 mg no more often than every 8 hours. I prescribed number 90 tramadol; I expect this prescription should last at least 30 days for the patient.    # Microcytic anemia:  Anemia is stabilized and improved following 5 intravenous iron infusions per Dr. Haskins.  Source of blood loss is presumed to be heavy menstrual flow.    No orders of the defined types were placed in this encounter.      Rene Granados M.D.  Staff Rheumatologist, [facility]  Pager [phone]          Active Problem List:     Patient Active Problem List    Diagnosis Date Noted     Iron deficiency anemia due to chronic blood loss 03/30/2017     Priority: Medium     Malaise and fatigue 10/01/2012     Priority: Medium     Problem list name updated by automated process. Provider to review       Psoriasis arthropathica (H) 02/09/2012     Priority: Medium     Psoriatic arthritis (H) 01/31/2012     Priority: Medium     Diabetes mellitus type 2, controlled (H) 01/31/2012     Priority: Medium     CARDIOVASCULAR SCREENING; LDL GOAL LESS THAN 160 10/31/2010     Priority: Medium            History of Present Illness:   Presents for f/u psoriatic arthritis. Last seen 11-16. Otezla monotherapy was continued at that visit.    H/o psoriasis since birth (presented with diffuse rash at birth and not dx'd with psoriasis until age 11) and then developed arthritis in her 20s. Was on Enbrel age 21 but developed infections such as pneumonia, sinusitis and mono, and stopped it within 7 months. Was on MTX from 2011 until Feb 2012; she did not tolerate this due to GI upset. She switched to sulfasalazine 2/2012 and tolerated well at  "1,000mg tid, but had decreasing efficacy as of 11-1-13. Started leflunomide in 10-13; stopped in 1-14 due to GI intolerance. Started humira 1-14. Held humira in 7-14 during pregnancy. Restarted in 7-15 for flaring disease. Restarted mtx +Humira 7-15 which was then discontinued due to worsened psoriasis and lack of efficacy. Leflunomide was restarted but loose stools developed. Otezla was started on 9/11/2015 and leflunomide stopped. Started stelara in Fall 2015; stopped due to expense and inconvenience in early 2016. Continued otezla monotherapy through present. Restarted stelara in 2017.    Interval history 6-17:    She reports minor \"flares\" of joint pain in the knees, L > R. Reduced bending capacity, or pain with sitting. Fluid behind the knee intermittently. Her skin has been well-controlled with small \"breakouts\" on hands and feet, lasting only 3-4 days. She restarted Stelara in 3-17, 2nd dose at end of May-- the impetus for restarting the drug is not clear to me, as the patient had discontinued it in early 2016 due to cost and inconvenience. However, she noted gradual improvement in joint symptoms overall since then.  She has minimal low back pain, there is no symptom of Kurtis synovitis, dactylitis, iritis. She continues apremilast qd or bid. She took one course of prednisone for a knee flare in 4-17.    She had dramatic increase in fatigue, dizziness in early 2017. Dr. Haskins diagnosed Fe defcy anemia; she started Iron infusions in 4-17--she has tolerated well, and her fatigue/energy has increased.  Dizziness has receded substantially.    She reports using one Percocet and 2 to 3 tablets of tramadol in the evenings for \"pain\". She feels that her anxiety and stress arising from increased business and responsibility at work is a factor in the requirements for opiate dosing in the evenings.    Interval history 11-16:    She is doing well overall. No psoriasis, and joint symptoms have been much reduced. No " "morning stiffness. Joints are controlled. She has skipped some evening doses of otezla due to nausea--no N/V with morning dose, interestingly. The R wrist is subject to intermittent stiffness. The lower back is still painful in the morning, but there is no specific EAS. Sometimes her hands have a blue color.  She has continued otezla, but stopped stelara (due to logistics of injection). She needed 2 courses of prednisone (last over the summer) for joint flares.    She notes significant nausea/diarrhea and intermittent dizzy spells, which she attributes possibly to otezla.  Episodes occur several times weekly, and there is no obvious trigger. No problems with stelara, except that it was too inconvenient to travel across town to receive in-clinic injection. Last Stelara injection was 3-16.    She reports complete resolution of former diffuse, pruritic rash involving the palms and soles.         Review of Systems:   Gen: no fevers or chills; weight is stable  HEENT: no oral/nasal ulcers; + \"pressure\" behind the eyes, not associated with nasal drainage.   CV: no chest pain  Lungs: no shortness of breath; sometimes gets occasional cough with sinusitis  GI: no N/V. Has loose stools daily  : frequent urination--not thirsty.  MSK: see HPI  Skin: HPI  Endo: sugars improved off of prednisone  12 point review o/w negative  Neuro: numbness, L leg intermittent          Past Medical History:     Past Medical History:   Diagnosis Date     Diabetes      Other psoriasis      Polyarthritis     probable psoriatic arthritis     Past Surgical History:   Procedure Laterality Date     SURGICAL HISTORY OF -       .  Tonsillar abscess            Social History:     Social History     Occupational History     Dental Assist. Unknown     Social History Main Topics     Smoking status: Former Smoker     Packs/day: 1.00     Years: 5.00     Types: Cigarettes     Quit date: 11/1/2007     Smokeless tobacco: Never Used     Alcohol use No     Drug " use: No     Sexual activity: Yes     Partners: Male     Birth control/ protection: IUD      Comment: Mirena            Family History:     Family History   Problem Relation Age of Onset     Thyroid Disease Mother      DIABETES Mother      DIABETES Maternal Grandfather      DIABETES Maternal Uncle      DIABETES Maternal Uncle      Asthma No family hx of      Hypertension Mother      Hypertension Maternal Grandmother      Hypertension Maternal Grandfather      Prostate Cancer Maternal Grandfather      Connective Tissue Disorder Father      Psoriasis            Allergies:     Allergies   Allergen Reactions     Diagnostic X-Ray Materials Swelling     Betadine [Povidone Iodine] Swelling     Levofloxacin Other (See Comments)     High blood sugars, doesn't feel well at all      Penicillins Hives     Pneumococcal Vaccines Swelling     Prenatal Vit-Fe Fumarate-Fa [Cavan-Folate Ob] Hives     Tdap [Daptacel] Swelling            Medications:     Current Outpatient Prescriptions   Medication Sig Dispense Refill     traMADol (ULTRAM) 50 MG tablet Take 1 tablet (50 mg) by mouth every 8 hours as needed for moderate pain 90 tablet 0     apremilast (OTEZLA) 30 MG tablet Take 1 tablet (30 mg) by mouth 2 times daily 180 tablet 3     [START ON 6/11/2017] oxyCODONE-acetaminophen (PERCOCET) 5-325 MG per tablet Take 1 tablet by mouth every 4 hours as needed (must last 30 days from dispense date) 30 tablet 0     ondansetron (ZOFRAN-ODT) 4 MG ODT tab Take 1 tablet (4 mg) by mouth every 8 hours as needed for nausea 60 tablet 2     HUMALOG KWIKPEN 100 UNIT/ML soln USE THREE TIMES DAILY WITH MEALS, CURRENTLY 30 UNITS DAILY 30 mL 1     ferrous gluconate (FERGON) 324 (38 FE) MG tablet Take 1 tablet (324 mg) by mouth every other day 100 tablet 11     valACYclovir (VALTREX) 500 MG tablet Take 1 tablet (500 mg) by mouth daily 90 tablet 0     ibuprofen (ADVIL/MOTRIN) 600 MG tablet Take 1 tablet (600 mg) by mouth every 8 hours as needed for moderate  "pain 270 tablet 3     metFORMIN (GLUCOPHAGE) 500 MG tablet Take 2 tablets (1,000 mg) by mouth 2 times daily (with meals) 360 tablet 1     vitamin D (ERGOCALCIFEROL) 47237 UNIT capsule Take 1 capsule (50,000 Units) by mouth twice a week 24 capsule 0     ustekinumab (STELARA) 45 MG/0.5ML SOSY Inject 0.5 mLs (45 mg) Subcutaneous every 3 months 3 Syringe 1     insulin pen needle (BD ULTRA-FINE) 29G X 12.7MM Used to inject insulin up to twice daily 200 each 3     blood glucose monitoring (ACCU-CHEK SMARTVIEW) test strip Use to test blood sugar 6-8 times daily or as directed. 700 each 3     insulin detemir (LEVEMIR FLEXTOUCH) 100 UNIT/ML soln Inject 25 Units Subcutaneous At Bedtime 30 mL 3     predniSONE (DELTASONE) 5 MG tablet Take 3 tablets (15mg) by mouth for 4 days, then 2 tablets (10mg) by mouth for 4 days. 20 tablet 0     predniSONE (DELTASONE) 5 MG tablet 3 tabs daily for 1 wk, then 2 tabs daily for 1 week. 50 tablet 2     Fluocinolone Acetonide (DERMA-SMOOTHE/FS SCALP) 0.01 % OIL Apply to scalp at bedtime, then wear showercap, rinse off in morning. 118 mL 3     triamcinolone (KENALOG) 0.1 % cream Apply topically 2 times daily 453.6 g 1     predniSONE (DELTASONE) 10 MG tablet Take 1 tablet (10 mg) by mouth daily As needed for flares only 30 tablet 1     clobetasol (TEMOVATE) 0.05 % external solution Apply topically 2 times daily 50 mL 6     fluticasone (FLONASE) 50 MCG/ACT nasal spray Spray 2 sprays into both nostrils daily 1 Package 12     LEVEMIR FLEXPEN 100 UNIT/ML 6 units in the morning, 14 units in evening = for total of 20 units daily 3 Month 1     levonorgestrel (MIRENA) 20 MCG/24HR IUD Use as directed 1 each 0     Using metformin  Not on methotrexate or stelara         Physical Exam:   Blood pressure 119/78, pulse 89, height 1.6 m (5' 3\"), weight 49.7 kg (109 lb 8 oz), SpO2 100 %, not currently breastfeeding.  Wt Readings from Last 4 Encounters:   06/09/17 49.7 kg (109 lb 8 oz)   05/05/17 49.9 kg (110 lb) "   03/20/17 50.1 kg (110 lb 8 oz)   11/30/16 49.9 kg (110 lb)     Constitutional: well-developed, appearing stated age; vibrant but very thin  Eyes: nl EOM, PERRLA, vision, conjunctiva, sclera   ENT: nl external ears, nose, hearing, lips, teeth, gums, throat   No mucous membrane lesions, normal saliva pool   Neck: no mass or thyroid enlargement   Resp: lungs clear to auscultation, nl to palpation   CV: RRR, no murmurs, rubs or gallops, no edema   GI: no ABD mass or tenderness, no HSM   : not tested   MS: All TMJ, neck, shoulder, elbow, wrist, MCP/PIP/DIP, spine, hip, knee, ankle, and foot MTP/IP joints were examined.Sustained resolution of all former abnormalities: No synovitis at the wrists; unrestricted ROM. No effusions at knees and ankles. The MCPs are nontender to palpation and  ROM is normal.  There is no dactylitis or tenosynovitis.  Skin: no visible psoriasis  Neuro: nl cranial nerves, strength, sensation, DTRs.   Psych: nl judgement, orientation, memory, affect.  All TMJ, neck, shoulder, elbow, wrist, MCP/PIP/DIP, spine, hip, knee, ankle, and foot MTP/IP joints were examined.Sustained resolution of all former abnormalities: No synovitis at the wrists; unrestricted ROM. No effusions at knees and ankles. The MCPs are nontender to palpation and  ROM is normal.  There is no dactylitis or tenosynovitis.       Data:     Results for orders placed or performed in visit on 05/05/17   Ferritin   Result Value Ref Range    Ferritin 31 12 - 150 ng/mL   Iron and iron binding capacity   Result Value Ref Range    Iron 21 (L) 35 - 180 ug/dL    Iron Binding Cap 435 (H) 240 - 430 ug/dL    Iron Saturation Index 5 (L) 15 - 46 %   CBC with platelets   Result Value Ref Range    WBC 6.9 4.0 - 11.0 10e9/L    RBC Count 4.93 3.8 - 5.2 10e12/L    Hemoglobin 9.0 (L) 11.7 - 15.7 g/dL    Hematocrit 32.1 (L) 35.0 - 47.0 %    MCV 65 (L) 78 - 100 fl    MCH 18.3 (L) 26.5 - 33.0 pg    MCHC 28.0 (L) 31.5 - 36.5 g/dL    RDW 26.4 (H)  10.0 - 15.0 %    Platelet Count 298 150 - 450 10e9/L       Recent Labs   Lab Test  05/05/17   1411  03/20/17   1815  11/30/16   1042  05/18/16   1947   10/11/13   1644   WBC  6.9  5.8  5.5  5.9   < >  6.1   HGB  9.0*  7.4*  8.2*  8.2*   < >  11.6*   HCT  32.1*  26.7*  29.7*  28.5*   < >  35.9   MCV  65*  61*  62*  67*   < >  91   PLT  298  475*  481*  316   < >  374   BUN   --   9  7   --    --   15   TSH   --   0.51   --   1.32   --   2.23   AST   --   6  9  7   < >  16   ALT   --   15  14  12   < >  28   ALKPHOS   --   59  56   --    --   75    < > = values in this interval not displayed.       Reviewed Rheumatology lab flowsheet      Rene Granados MD

## 2017-06-13 DIAGNOSIS — E11.9 DIABETES MELLITUS (H): ICD-10-CM

## 2017-06-13 DIAGNOSIS — L40.50 PSORIATIC ARTHRITIS (H): ICD-10-CM

## 2017-06-13 DIAGNOSIS — E11.9 DIABETES MELLITUS, TYPE 2 (H): Primary | ICD-10-CM

## 2017-06-13 NOTE — TELEPHONE ENCOUNTER
HUMALOG KWIKPEN 100 UNIT/ML soln      Last Written Prescription Date:  5/12/2017  Last Fill Quantity: 30 ml,   # refills: 1  Last Office Visit : 5/5/2017  Future Office visit:  0    Lab Results   Component Value Date    A1C 8.2 03/20/2017     Lab Results   Component Value Date    MICROL 8 03/20/2017         Meets protocol requirements- OK refill for 6 months supply.

## 2017-06-14 ENCOUNTER — TELEPHONE (OUTPATIENT)
Dept: RHEUMATOLOGY | Facility: CLINIC | Age: 32
End: 2017-06-14

## 2017-06-14 DIAGNOSIS — L40.50 PSORIATIC ARTHRITIS (H): ICD-10-CM

## 2017-06-14 NOTE — TELEPHONE ENCOUNTER
[facility] left VM re: tramadol. Saying too soon to refill 90 tabs Rx, want to make sure Dr. Granados fine with doing so. Please advise at [phone]. Sent to [facility].

## 2017-06-15 NOTE — TELEPHONE ENCOUNTER
Pt called back for Samantha, call was disconnected before Triage RN could help pt. Sent to Samantha, JOELLE.

## 2017-06-15 NOTE — TELEPHONE ENCOUNTER
Pharmacy technician stated that patient is requesting medication too soon and insurance will not cover medication's cost. Patient to [facility], from [facility], to have filled. She informed pharmacy staff that she was instructed to take more when swelling. Writer did not note such instructions in provider's last OV note. Writer left message attempting to inform patient.   Samantha Gomez LPN

## 2017-06-21 ENCOUNTER — MYC MEDICAL ADVICE (OUTPATIENT)
Dept: RHEUMATOLOGY | Facility: CLINIC | Age: 32
End: 2017-06-21

## 2017-06-21 ENCOUNTER — OFFICE VISIT - HEALTHEAST (OUTPATIENT)
Dept: FAMILY MEDICINE | Facility: CLINIC | Age: 32
End: 2017-06-21

## 2017-06-21 DIAGNOSIS — M79.89 PAIN AND SWELLING OF LOWER LEG, LEFT: ICD-10-CM

## 2017-06-21 DIAGNOSIS — M79.662 PAIN AND SWELLING OF LOWER LEG, LEFT: ICD-10-CM

## 2017-06-21 NOTE — TELEPHONE ENCOUNTER
Dr. Granados's note below;      Rene Granados MD                   A synovial cyst is possible, but I am concerned about the possibility of an infection or a clot as well. Since the symptoms are progressing, I recommend that Ms. Dixon be seen in primary care or urgent care/ER today.   Thanks. (Routing comment)           Writer attempted to contact patient regarding provider's note. Writer left message and will attempt to contact patient by the end of the business day.     Samantha Gomez LPN

## 2017-06-21 NOTE — TELEPHONE ENCOUNTER
"Pt returned call and MyChart message. See notes below. The \"hard cyst\" sensation is behind the left knee, she experiences pain when she straightens it, and it will feel like she has shin splints. She is calling today because it was especially bad this am, couldn't straighten it and is noting swelling on the outer left calf just below the knee. Chrissy states it is a pitting edema.      In the past the pain was worse in the am and would get better throughout the day- she is feeling it whenever she stands and it will go away when she sits. Chrissy states she will sometimes get a sharp pain up her shin when she brings her toes up or points them. Denies a hx of blood clots. Denies discoloration, heat or warmth of the lower leg. No chest pain, shortness of breath, difficulty breathing, or chest tightness.    Pt is not on prednisone, and would prefer not to be if possible as her BS was elevated (230) this AM. Message routed to Dr Granados.    DAVIS Moya RN  Rheumatology RN Coordinator  [facility]    "

## 2017-06-21 NOTE — TELEPHONE ENCOUNTER
Writer informed patient of provider's note. She stated that she understood and will have her  take her to the nearest healthcare facility.   Samantha Gomez LPN

## 2017-06-26 ENCOUNTER — MYC MEDICAL ADVICE (OUTPATIENT)
Dept: RHEUMATOLOGY | Facility: CLINIC | Age: 32
End: 2017-06-26

## 2017-06-26 DIAGNOSIS — M71.20 BAKER'S CYST OF KNEE, UNSPECIFIED LATERALITY: Primary | ICD-10-CM

## 2017-06-26 NOTE — TELEPHONE ENCOUNTER
Spoke with patient, informed her order was in for US.     Provided her with number for [facility] appt line and number for [facility] app[place], patient will try both and see when she can get in soonest.

## 2017-06-28 DIAGNOSIS — T48.5X5A RHINITIS MEDICAMENTOSA: ICD-10-CM

## 2017-06-28 DIAGNOSIS — J31.0 RHINITIS MEDICAMENTOSA: ICD-10-CM

## 2017-06-28 RX ORDER — FLUTICASONE PROPIONATE 50 MCG
2 SPRAY, SUSPENSION (ML) NASAL DAILY
Qty: 1 BOTTLE | Refills: 6 | Status: SHIPPED | OUTPATIENT
Start: 2017-06-28 | End: 2017-06-30

## 2017-06-28 NOTE — TELEPHONE ENCOUNTER
flonase  Last Written Prescription Date:  10/11/13  Last Fill Quantity: 1,   # refills: 12  Last Office Visit : 5/5/17  Future Office visit:  No    Changed to 90 day supply per pharmacy request.

## 2017-06-29 ENCOUNTER — MYC MEDICAL ADVICE (OUTPATIENT)
Dept: RHEUMATOLOGY | Facility: CLINIC | Age: 32
End: 2017-06-29

## 2017-06-29 NOTE — TELEPHONE ENCOUNTER
Patient called regarding this, very anxious to have a plan in place before the long weekend, requesting call back ASAP with plan, has been waiting since Tuesday.

## 2017-06-29 NOTE — TELEPHONE ENCOUNTER
Writer called patient to update her of recent ultra sound that has not been resulted.   Samantha Gomez LPN

## 2017-06-30 RX ORDER — FLUTICASONE PROPIONATE 50 MCG
2 SPRAY, SUSPENSION (ML) NASAL DAILY
Qty: 48 G | Refills: 1 | Status: SHIPPED | OUTPATIENT
Start: 2017-06-30 | End: 2017-12-27

## 2017-06-30 NOTE — TELEPHONE ENCOUNTER
VIT D 50,000 IU D2 (ERGO) CAPS (RX)      Last Written Prescription Date:  12-8-16  Last Fill Quantity: 24,   # refills: 0  Last Office Visit : 5-18-16  Future Office visit:  none    Vitamin D Deficiency Screening Results:  Lab Results   Component Value Date    VITDT 18* 11/30/2016    VITDT 20* 10/11/2013       Kathleen M Doege RN    
Note Text (......Xxx Chief Complaint.): This diagnosis correlates with the
Detail Level: Detailed
Other (Free Text): Patient reports present 20+ years.
Other (Free Text): Recommend close observation. Biopsy with any change

## 2017-07-03 ENCOUNTER — MYC MEDICAL ADVICE (OUTPATIENT)
Dept: RHEUMATOLOGY | Facility: CLINIC | Age: 32
End: 2017-07-03

## 2017-07-03 ENCOUNTER — MYC REFILL (OUTPATIENT)
Dept: INTERNAL MEDICINE | Facility: CLINIC | Age: 32
End: 2017-07-03

## 2017-07-03 ENCOUNTER — TELEPHONE (OUTPATIENT)
Dept: GASTROENTEROLOGY | Facility: CLINIC | Age: 32
End: 2017-07-03

## 2017-07-03 DIAGNOSIS — B00.1 RECURRENT COLD SORES: ICD-10-CM

## 2017-07-03 DIAGNOSIS — L40.50 PSORIATIC ARTHRITIS (H): ICD-10-CM

## 2017-07-03 NOTE — TELEPHONE ENCOUNTER
Writer called IM and orthopaedics to check availability for an appointment. IM stated that the nurse and the provider will need to view schedule to determine if patient can been seen sooner than August.  stated that a message will be given to nurse to follow up. Orthopaedics stated that there is an appointment available 7/11/17. Writer left voice message with patient attempting to inform patient.   Samantha Gomez LPN

## 2017-07-03 NOTE — TELEPHONE ENCOUNTER
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    Last Fill Date:   6/12/17    Last Visit with PCP:   5/05/17    Future Visits with PCP:   No.    Processing:   Mail to patient.

## 2017-07-03 NOTE — TELEPHONE ENCOUNTER
It shows large Baker cyst. i recommend an urgent eval by ortho to drain the cyst and inject with steroid.      Here is the result:    Ultrasound lower extremity venous duplex left 6/26/2017     HISTORY: Synovial cyst of the popliteal space in the left knee     COMPARISON: 1/31/2012     Ordering provider: Dr. Granados     FINDINGS:  Targeted grayscale and color Doppler imaging was performed in the area  of clinical interest in the left popliteal fossa. In this region,  there is a circumscribed anechoic fluid collection with a few thick  septum. No internal blood flow. It contains echogenic debris. This  fluid collection measures 9.7 x 7.4 x 2.7 cm.     No Doppler evaluation of the vasculature was performed.         IMPRESSION:  Large left Baker's cyst.     I have personally reviewed the examination and initial interpretation  and I agree with the findings.     DARREN JAMES MD

## 2017-07-03 NOTE — TELEPHONE ENCOUNTER
Patient spoke with Rheumatology clinic today and had her US read. Showed a cyst and was advised to have the fluid removed and a cortisol shot to be given. Patient unable to get in with her PCP until August 1st and was calling back requesting assistance trying to get a sooner appointment expedited.    Patient can be reached at her mobile number and ok to leave a detailed message.

## 2017-07-03 NOTE — TELEPHONE ENCOUNTER
Writer spoke with patient and informed her of provider's note. Patient stated that she would like to have her primary care provider drain cyst and inject with steroid as he has done this for patient in the past. Writer discussed with on call provider and provider is okay with PCP draining cyst. Patient notified and stated understanding.   Samantha Gomez LPN

## 2017-07-05 ENCOUNTER — MYC MEDICAL ADVICE (OUTPATIENT)
Dept: INTERNAL MEDICINE | Facility: CLINIC | Age: 32
End: 2017-07-05

## 2017-07-05 RX ORDER — OXYCODONE AND ACETAMINOPHEN 5; 325 MG/1; MG/1
1 TABLET ORAL EVERY 4 HOURS PRN
Qty: 30 TABLET | Refills: 0 | Status: SHIPPED | OUTPATIENT
Start: 2017-07-11 | End: 2017-07-31

## 2017-07-05 NOTE — TELEPHONE ENCOUNTER
Writer noted that there are several encounters open regarding matter. Writer has resolved patient's concern by offering an appointment with ortho. Writer will close encounter.   Samantha Gomez LPN

## 2017-07-07 ENCOUNTER — OFFICE VISIT (OUTPATIENT)
Dept: INTERNAL MEDICINE | Facility: CLINIC | Age: 32
End: 2017-07-07

## 2017-07-07 VITALS
OXYGEN SATURATION: 100 % | BODY MASS INDEX: 19.91 KG/M2 | RESPIRATION RATE: 16 BRPM | WEIGHT: 112.4 LBS | HEART RATE: 82 BPM | DIASTOLIC BLOOD PRESSURE: 77 MMHG | SYSTOLIC BLOOD PRESSURE: 119 MMHG

## 2017-07-07 DIAGNOSIS — L40.50 PSORIATIC ARTHRITIS (H): ICD-10-CM

## 2017-07-07 DIAGNOSIS — Z79.4 CONTROLLED TYPE 2 DIABETES MELLITUS WITHOUT COMPLICATION, WITH LONG-TERM CURRENT USE OF INSULIN (H): ICD-10-CM

## 2017-07-07 DIAGNOSIS — E11.9 CONTROLLED TYPE 2 DIABETES MELLITUS WITHOUT COMPLICATION, WITH LONG-TERM CURRENT USE OF INSULIN (H): ICD-10-CM

## 2017-07-07 DIAGNOSIS — Z13.89 SCREENING FOR DIABETIC PERIPHERAL NEUROPATHY: Primary | ICD-10-CM

## 2017-07-07 DIAGNOSIS — Z79.899 HIGH RISK MEDICATIONS (NOT ANTICOAGULANTS) LONG-TERM USE: ICD-10-CM

## 2017-07-07 LAB
ALT SERPL W P-5'-P-CCNC: 13 U/L (ref 0–50)
AST SERPL W P-5'-P-CCNC: 7 U/L (ref 0–45)
CHOLEST SERPL-MCNC: 143 MG/DL
ERYTHROCYTE [DISTWIDTH] IN BLOOD BY AUTOMATED COUNT: 22.8 % (ref 10–15)
HCT VFR BLD AUTO: 40 % (ref 35–47)
HDLC SERPL-MCNC: 66 MG/DL
HGB BLD-MCNC: 12.8 G/DL (ref 11.7–15.7)
LDLC SERPL CALC-MCNC: 65 MG/DL
MCH RBC QN AUTO: 25 PG (ref 26.5–33)
MCHC RBC AUTO-ENTMCNC: 32 G/DL (ref 31.5–36.5)
MCV RBC AUTO: 78 FL (ref 78–100)
NONHDLC SERPL-MCNC: 78 MG/DL
PLATELET # BLD AUTO: 276 10E9/L (ref 150–450)
RBC # BLD AUTO: 5.13 10E12/L (ref 3.8–5.2)
TRIGL SERPL-MCNC: 63 MG/DL
WBC # BLD AUTO: 5.4 10E9/L (ref 4–11)

## 2017-07-07 RX ORDER — LIDOCAINE HYDROCHLORIDE AND EPINEPHRINE 10; 10 MG/ML; UG/ML
7 INJECTION, SOLUTION INFILTRATION; PERINEURAL ONCE
Qty: 7 ML | Refills: 0
Start: 2017-07-07 | End: 2017-07-07

## 2017-07-07 RX ORDER — TRIAMCINOLONE ACETONIDE 40 MG/ML
40 INJECTION, SUSPENSION INTRA-ARTICULAR; INTRAMUSCULAR ONCE
Qty: 1 ML | Refills: 0
Start: 2017-07-07 | End: 2017-07-07

## 2017-07-07 ASSESSMENT — PAIN SCALES - GENERAL: PAINLEVEL: SEVERE PAIN (7)

## 2017-07-07 NOTE — NURSING NOTE
Chief Complaint   Patient presents with     Cyst     Patient has a cyst on the left knee.      Swelling     Patient has swelling on the right knee.     Soo Carlos LPN at 3:45 PM on 7/7/2017.

## 2017-07-07 NOTE — MR AVS SNAPSHOT
After Visit Summary   7/7/2017    Chrissy Dixon    MRN: [id number]           Patient Information     Date Of Birth          1985        Visit Information        Provider Department      7/7/2017 4:00 PM Nando Haskins MD [facility]        Today's Diagnoses     Screening for diabetic peripheral neuropathy    -  1    Psoriatic arthritis (H)        Controlled type 2 diabetes mellitus without complication, with long-term current use of insulin (H)          Care Instructions    Primary Care Center Medication Refill Request Information:  * Please contact your pharmacy regarding ANY request for medication refills.  ** [facility] Prescription Fax = [phone]  * Please allow 3 business days for routine medication refills.  * Please allow 5 business days for controlled substance medication refills.     Primary Care Center Test Result notification information:  *You will be notified with in 7-10 days of your appointment day regarding the results of your test.  If you are on MyChart you will be notified as soon as the provider has reviewed the results and signed off on them.    [facility] [phone]             Follow-ups after your visit        Follow-up notes from your care team     Return if symptoms worsen or fail to improve.      Who to contact     Please call your clinic at [phone] to:    Ask questions about your health    Make or cancel appointments    Discuss your medicines    Learn about your test results    Speak to your doctor   If you have compliments or concerns about an experience at your clinic, or if you wish to file a complaint, please contact [facility] Patient Relations at [phone] or email us at [email or web address]         Additional Information About Your Visit        MyChart Information     [employer] gives you secure access to your electronic health record. If you see a primary care provider, you can  also send messages to your care team and make appointments. If you have questions, please call your primary care clinic.  If you do not have a primary care provider, please call [phone] and they will assist you.      [employer] is an electronic gateway that provides easy, online access to your medical records. With [employer], you can request a clinic appointment, read your test results, renew a prescription or communicate with your care team.     To access your existing account, please contact your [facility] or call [phone] for assistance.        Care EveryWhere ID     This is your Care EveryWhere ID. This could be used by other organizations to access your [facility] medical records  [id number]        Your Vitals Were     Pulse Respirations Last Period Pulse Oximetry Breastfeeding? BMI (Body Mass Index)    82 16 06/13/2017 (Approximate) 100% No 19.91 kg/m2       Blood Pressure from Last 3 Encounters:   07/07/17 119/77   06/09/17 119/78   05/19/17 121/76    Weight from Last 3 Encounters:   07/07/17 51 kg (112 lb 6.4 oz)   06/09/17 49.7 kg (109 lb 8 oz)   05/05/17 49.9 kg (110 lb)              We Performed the Following     DRAIN/INJECT LARGE JOINT/BURSA     FOOT EXAM - NO CHARGE          Today's Medication Changes          These changes are accurate as of: 7/7/17 11:59 PM.  If you have any questions, ask your nurse or doctor.               Start taking these medicines.        Dose/Directions    lidocaine 1% with EPINEPHrine 1:100,000 1 %-1:824440 injection   Used for:  Psoriatic arthritis (H)   Started by:  Nando Haskins MD        Dose:  7 mL   Inject 7 mLs as directed once for 1 dose   Quantity:  7 mL   Refills:  0       triamcinolone acetonide 40 MG/ML injection   Commonly known as:  KENALOG-40   Used for:  Psoriatic arthritis (H)   Started by:  Nando Haskins MD        Dose:  40 mg   1 mL (40 mg) by INTRA-ARTICULAR route once for 1 dose   Quantity:  1 mL    Refills:  0            Where to get your medicines      Some of these will need a paper prescription and others can be bought over the counter.  Ask your nurse if you have questions.     You don't need a prescription for these medications     lidocaine 1% with EPINEPHrine 1:100,000 1 %-1:787132 injection    triamcinolone acetonide 40 MG/ML injection                Primary Care Provider Office Phone # Fax #    Nando David Haskins [phone]        [facility] [address]  [facility] [address]        Equal Access to Services     SHERINE MARTINEZ : Hadii vick ku hadasho Soomaali, waaxda luqadaha, qaybta kaalmada adeegyada, stanley oropeza . So [facility] [phone].    ATENCIÓN: Si habla español, tiene a hameed disposición servicios gratuitos de asistencia lingüística. [facility] [phone].    We comply with applicable federal civil rights laws and [place] laws. We do not discriminate on the basis of race, color, national origin, age, disability sex, sexual orientation or gender identity.            Thank you!     Thank you for choosing [facility]  for your care. Our goal is always to provide you with excellent care. Hearing back from our patients is one way we can continue to improve our services. Please take a few minutes to complete the written survey that you may receive in the mail after your visit with us. Thank you!             Your Updated Medication List - Protect others around you: Learn how to safely use, store and throw away your medicines at www.disposemymeds.org.          This list is accurate as of: 7/7/17 11:59 PM.  Always use your most recent med list.                   Brand Name Dispense Instructions for use Diagnosis    * apremilast 30 MG tablet    OTEZLA    180 tablet    Take 1 tablet (30 mg) by mouth 2 times daily    Psoriatic arthritis (H)       * apremilast 30 MG tablet    OTEZLA    180 tablet    Take 1 tablet (30 mg) by  mouth 2 times daily    Psoriatic arthritis (H)       blood glucose monitoring test strip    ACCU-CHEK SMARTVIEW    700 each    Use to test blood sugar 6-8 times daily or as directed.    Diabetes mellitus, type 2 (H)       clobetasol 0.05 % external solution    TEMOVATE    50 mL    Apply topically 2 times daily    Psoriasis       DERMA-SMOOTHE/FS SCALP 0.01 % Oil     118 mL    Apply to scalp at bedtime, then wear showercap, rinse off in morning.    Other psoriasis       ferrous gluconate 324 (38 FE) MG tablet    FERGON    100 tablet    Take 1 tablet (324 mg) by mouth every other day    Iron deficiency anemia due to chronic blood loss, Iron deficiency anemia, unspecified iron deficiency anemia type       fluticasone 50 MCG/ACT spray    FLONASE    48 g    Spray 2 sprays into both nostrils daily    Rhinitis medicamentosa       ibuprofen 600 MG tablet    ADVIL/MOTRIN    270 tablet    Take 1 tablet (600 mg) by mouth every 8 hours as needed for moderate pain    Psoriasis arthropathica (H)       insulin lispro 100 UNIT/ML injection    HumaLOG KWIKpen    30 mL    USE THREE TIMES DAILY WITH MEALS, CURRENTLY 30 UNITS DAILY    Diabetes mellitus, type 2 (H)       insulin pen needle 29G X 12.7MM    BD ULTRA-FINE    200 each    Used to inject insulin up to twice daily    Diabetes mellitus (H)       * LEVEMIR FLEXpen 100 UNIT/ML injection   Generic drug:  insulin detemir     3 Month    6 units in the morning, 14 units in evening = for total of 20 units daily    Diabetes mellitus type 2, uncontrolled (H), Psoriatic arthritis (H), Other malaise and fatigue       * insulin detemir 100 UNIT/ML injection    LEVEMIR FLEXTOUCH    30 mL    Inject 25 Units Subcutaneous At Bedtime    Diabetes mellitus (H)       levonorgestrel 20 MCG/24HR IUD    MIRENA (52 MG)    1 each    Use as directed    Family planning, IUD (intrauterine device) check/reinsertion/removal       lidocaine 1% with EPINEPHrine 1:100,000 1 %-1:751422 injection     7 mL     Inject 7 mLs as directed once for 1 dose    Psoriatic arthritis (H)       metFORMIN 500 MG tablet    GLUCOPHAGE    360 tablet    Take 2 tablets (1,000 mg) by mouth 2 times daily (with meals)    Diabetes mellitus (H)       ondansetron 4 MG ODT tab    ZOFRAN-ODT    60 tablet    Take 1 tablet (4 mg) by mouth every 8 hours as needed for nausea    Nausea       oxyCODONE-acetaminophen 5-325 MG per tablet    PERCOCET    30 tablet    Take 1 tablet by mouth every 4 hours as needed (must last 30 days from dispense date)    Psoriatic arthritis (H), Recurrent cold sores       * predniSONE 10 MG tablet    DELTASONE    30 tablet    Take 1 tablet (10 mg) by mouth daily As needed for flares only    Psoriatic arthropathy (H)       * predniSONE 5 MG tablet    DELTASONE    50 tablet    3 tabs daily for 1 wk, then 2 tabs daily for 1 week.    Psoriatic arthritis (H)       * predniSONE 5 MG tablet    DELTASONE    20 tablet    Take 3 tablets (15mg) by mouth for 4 days, then 2 tablets (10mg) by mouth for 4 days.    Psoriatic arthritis (H)       traMADol 50 MG tablet    ULTRAM    90 tablet    Take 1 tablet (50 mg) by mouth every 8 hours as needed for moderate pain    Psoriatic arthritis (H)       triamcinolone 0.1 % cream    KENALOG    453.6 g    Apply topically 2 times daily    Guttate psoriasis       triamcinolone acetonide 40 MG/ML injection    KENALOG-40    1 mL    1 mL (40 mg) by INTRA-ARTICULAR route once for 1 dose    Psoriatic arthritis (H)       ustekinumab 45 MG/0.5ML Sosy    STELARA    3 Syringe    Inject 0.5 mLs (45 mg) Subcutaneous every 3 months    Psoriatic arthritis (H)       valACYclovir 500 MG tablet    VALTREX    90 tablet    Take 1 tablet (500 mg) by mouth daily    Psoriatic arthritis (H)       * Notice:  This list has 7 medication(s) that are the same as other medications prescribed for you. Read the directions carefully, and ask your doctor or other care provider to review them with you.

## 2017-07-07 NOTE — PROGRESS NOTES
Chrissy Gil is a 31-year-old with history of diabetes mellitus and psoriatic arthritis presents today with swelling and discomfort in the left knee she's noted increased swelling posteriorly in the knee is been associated with some occasional swelling in the leg she recently had a sound done which showed evidence of a popliteal cyst. Because of the increased swelling in the knee and behind the knee she is interested in potential injection therapy. We discussed the risks and benefits of an intra-articular steroid injection and aspiration of the joint fluid she verbally consented to proceed    Objective on examination the knee shows a mild to moderate joint effusion no joint line tenderness bilateral popliteal cyst left greater than right.  Procedure note patient was placed supine the knee and slight flexion the superior medial aspect of the knee was prepped with Betadine and injected with 3 cc of 1% lidocaine. Using a 18-gauge needle the knee was then aspirated for 18 cc of clear yellow fluid without difficulty. The knee was then injected with 40 mg or 1 cc of Kenalog and 4 cc of 1% lidocaine there is no evidence of complications the patient felt better after the procedure.    Assessment #1 psoriatic arthritis with flare #2 popliteal cysts secondary to above #3 diabetes mellitus #4 history of iron deficiency anemia    Plan she is to monitor blood sugars closely and adjust her insulin based on any rise in the blood sugar advised to take it easy on the knee for the next several days she'll follow-up p.r.n. regarding this fluid was not sent for analysis    Nando Haskins

## 2017-07-10 DIAGNOSIS — E11.9 DIABETES MELLITUS (H): ICD-10-CM

## 2017-07-11 DIAGNOSIS — E55.9 VITAMIN D DEFICIENCY: ICD-10-CM

## 2017-07-11 RX ORDER — INSULIN DETEMIR 100 [IU]/ML
INJECTION, SOLUTION SUBCUTANEOUS
Qty: 30 ML | Refills: 0 | OUTPATIENT
Start: 2017-07-11

## 2017-07-11 NOTE — TELEPHONE ENCOUNTER
vitamin D (ERGOCALCIFEROL) 08671 UNIT capsule      Last Written Prescription Date:  4/6/2017  Last Fill Quantity: 24,   # refills: 0  Last Office Visit : 7/7/2017  Future Office visit:  0    Routing refill request to provider for review/approval because:  1. Per the [facility] refill protocol for this dose strength Vit D.   2. Allergy/Contraindication.

## 2017-07-12 ENCOUNTER — MYC REFILL (OUTPATIENT)
Dept: INTERNAL MEDICINE | Facility: CLINIC | Age: 32
End: 2017-07-12

## 2017-07-12 ENCOUNTER — MYC MEDICAL ADVICE (OUTPATIENT)
Dept: RHEUMATOLOGY | Facility: CLINIC | Age: 32
End: 2017-07-12

## 2017-07-12 DIAGNOSIS — E11.9 DIABETES MELLITUS (H): ICD-10-CM

## 2017-07-12 DIAGNOSIS — M05.742 RHEUMATOID ARTHRITIS INVOLVING BOTH HANDS WITH POSITIVE RHEUMATOID FACTOR (H): Primary | ICD-10-CM

## 2017-07-12 DIAGNOSIS — M05.741 RHEUMATOID ARTHRITIS INVOLVING BOTH HANDS WITH POSITIVE RHEUMATOID FACTOR (H): Primary | ICD-10-CM

## 2017-07-12 RX ORDER — ERGOCALCIFEROL 1.25 MG/1
50000 CAPSULE, LIQUID FILLED ORAL
Qty: 24 CAPSULE | Refills: 1 | Status: SHIPPED | OUTPATIENT
Start: 2017-07-13 | End: 2017-11-16

## 2017-07-14 NOTE — TELEPHONE ENCOUNTER
Writer attempting to contact patient to gather more information about encounter below.   Samantha Gomez LPN

## 2017-07-17 ENCOUNTER — MYC REFILL (OUTPATIENT)
Dept: RHEUMATOLOGY | Facility: CLINIC | Age: 32
End: 2017-07-17

## 2017-07-17 DIAGNOSIS — L40.50 PSORIATIC ARTHRITIS (H): ICD-10-CM

## 2017-07-18 RX ORDER — PREDNISONE 5 MG/1
TABLET ORAL
Qty: 18 TABLET | Refills: 0 | Status: SHIPPED | OUTPATIENT
Start: 2017-07-18 | End: 2019-04-22

## 2017-07-18 NOTE — TELEPHONE ENCOUNTER
Writer cued up medication for provider to review and advise. Writer left voice message with patient attempting to inform her of provider's recommendation.   Samantha Gomez LPN

## 2017-07-19 NOTE — TELEPHONE ENCOUNTER
Message from Erica:  Samantha Gomez LPN Mon Jul 17, 2017 9:48 AM        ----- Message -----   From: Chrissy Dixon   Sent: 7/17/2017 7:18 AM   To: Adult Rheum Triage[facility]  Subject: Medication Renewal Request     Original authorizing provider: MD Chrissy Horn would like a refill of the following medications:  traMADol (ULTRAM) 50 MG tablet [Rene Granados MD]    Preferred pharmacy: [facility] [address] [place] [address] [address] AT [facility] [place] [place] [address]    Comment:

## 2017-07-19 NOTE — TELEPHONE ENCOUNTER
Writer informed patient of provider's note and patient stated understanding.   Samantha Gomez LPN

## 2017-07-20 RX ORDER — TRAMADOL HYDROCHLORIDE 50 MG/1
50 TABLET ORAL EVERY 8 HOURS PRN
Qty: 90 TABLET | Refills: 0 | Status: SHIPPED | OUTPATIENT
Start: 2017-07-20 | End: 2017-08-14

## 2017-07-20 NOTE — TELEPHONE ENCOUNTER
Last seen: 6/9/17  Pending appt: none  Medication: tramadol    Last filled: 6/22/17  Qty: 90 tabs 0 refills      checked and no other provider's prescribing medication.   Samantha Gomez LPN

## 2017-07-21 NOTE — TELEPHONE ENCOUNTER
Pt called, asking about Rx: below. Triage RN did not see if Rx had been called into pharmacy. Please advise. Sent to JOELLE Singh.

## 2017-07-24 ENCOUNTER — MYC MEDICAL ADVICE (OUTPATIENT)
Dept: RHEUMATOLOGY | Facility: CLINIC | Age: 32
End: 2017-07-24

## 2017-07-26 DIAGNOSIS — E11.9 DIABETES MELLITUS, TYPE 2 (H): Primary | ICD-10-CM

## 2017-07-30 ENCOUNTER — MYC MEDICAL ADVICE (OUTPATIENT)
Dept: RHEUMATOLOGY | Facility: CLINIC | Age: 32
End: 2017-07-30

## 2017-07-31 ENCOUNTER — MYC REFILL (OUTPATIENT)
Dept: INTERNAL MEDICINE | Facility: CLINIC | Age: 32
End: 2017-07-31

## 2017-07-31 DIAGNOSIS — B00.1 RECURRENT COLD SORES: ICD-10-CM

## 2017-07-31 DIAGNOSIS — L40.50 PSORIATIC ARTHRITIS (H): ICD-10-CM

## 2017-07-31 RX ORDER — OXYCODONE AND ACETAMINOPHEN 5; 325 MG/1; MG/1
1 TABLET ORAL EVERY 4 HOURS PRN
Qty: 30 TABLET | Refills: 0 | Status: SHIPPED | OUTPATIENT
Start: 2017-08-09 | End: 2017-08-07

## 2017-07-31 NOTE — TELEPHONE ENCOUNTER
Reason For Call:   Chief Complaint   Patient presents with     Refill Request            Medication Name, Dose and Monthly Quantity:   Oxycodone with APAP (Percocet): Dose 5-325mg Schedule 1 tab Q4H Monthly Quantity 30     Diagnosis requiring opiates:   Psoriatic arthritis (H) [L40.50]       Recurrent cold sores [B00.1]           Problem List Updated:   No    Opioid Agreement On File - [facility] PAIN CONTRACT ID# [id number]:  Unsure    Last Urine Drug Screen (at least once every 12 months) Date:   none  Unexpected Results:   N/A    [place]  Data Reviewed (at least once every 3 months) Date:   7/31/17   Unexpected Results:    No.    Last Fill Date:   7/11/17    Last Visit with PCP:   7/07/17    Future Visits with PCP:   No.    Processing:   Mail to patient.     Tyra Singleton RN

## 2017-07-31 NOTE — TELEPHONE ENCOUNTER
Called pt and offered appointment on Friday, 8/4/17 at 7 am. Pt accepted. Staff message sent to the pool asking for assistance in scheduling this appointment. Pt did not have any questions at this time.    ERIC MoyaN RN  Rheumatology RN Coordinator  TERI Portillo

## 2017-08-04 ENCOUNTER — OFFICE VISIT (OUTPATIENT)
Dept: RHEUMATOLOGY | Facility: CLINIC | Age: 32
End: 2017-08-04
Attending: INTERNAL MEDICINE
Payer: COMMERCIAL

## 2017-08-04 VITALS
HEIGHT: 63 IN | WEIGHT: 110.7 LBS | SYSTOLIC BLOOD PRESSURE: 114 MMHG | BODY MASS INDEX: 19.61 KG/M2 | DIASTOLIC BLOOD PRESSURE: 74 MMHG | OXYGEN SATURATION: 100 % | HEART RATE: 95 BPM

## 2017-08-04 DIAGNOSIS — L40.50 PSORIATIC ARTHRITIS (H): ICD-10-CM

## 2017-08-04 DIAGNOSIS — E11.9 DIABETES MELLITUS (H): ICD-10-CM

## 2017-08-04 DIAGNOSIS — M25.561 ACUTE PAIN OF RIGHT KNEE: Primary | ICD-10-CM

## 2017-08-04 DIAGNOSIS — M25.561 ARTHRALGIA OF RIGHT LOWER LEG: ICD-10-CM

## 2017-08-04 DIAGNOSIS — E11.9 DIABETES MELLITUS, TYPE 2 (H): Primary | ICD-10-CM

## 2017-08-04 LAB
ALT SERPL W P-5'-P-CCNC: 14 U/L (ref 0–50)
AST SERPL W P-5'-P-CCNC: 9 U/L (ref 0–45)
CREAT SERPL-MCNC: 0.56 MG/DL (ref 0.52–1.04)
CRP SERPL-MCNC: <2.9 MG/L (ref 0–8)
GFR SERPL CREATININE-BSD FRML MDRD: NORMAL ML/MIN/1.7M2

## 2017-08-04 PROCEDURE — 25000125 ZZHC RX 250: Mod: ZF

## 2017-08-04 PROCEDURE — 20610 DRAIN/INJ JOINT/BURSA W/O US: CPT | Mod: ZF | Performed by: INTERNAL MEDICINE

## 2017-08-04 PROCEDURE — 25000128 H RX IP 250 OP 636: Mod: ZF

## 2017-08-04 PROCEDURE — 82565 ASSAY OF CREATININE: CPT | Performed by: INTERNAL MEDICINE

## 2017-08-04 PROCEDURE — 99212 OFFICE O/P EST SF 10 MIN: CPT | Mod: ZF

## 2017-08-04 PROCEDURE — 86140 C-REACTIVE PROTEIN: CPT | Performed by: INTERNAL MEDICINE

## 2017-08-04 PROCEDURE — 84450 TRANSFERASE (AST) (SGOT): CPT | Performed by: INTERNAL MEDICINE

## 2017-08-04 PROCEDURE — 84460 ALANINE AMINO (ALT) (SGPT): CPT | Performed by: INTERNAL MEDICINE

## 2017-08-04 PROCEDURE — 36415 COLL VENOUS BLD VENIPUNCTURE: CPT | Performed by: INTERNAL MEDICINE

## 2017-08-04 RX ORDER — TRIAMCINOLONE ACETONIDE 40 MG/ML
40 INJECTION, SUSPENSION INTRA-ARTICULAR; INTRAMUSCULAR ONCE
Status: DISCONTINUED | OUTPATIENT
Start: 2017-08-04 | End: 2019-04-22

## 2017-08-04 RX ORDER — LIDOCAINE HYDROCHLORIDE 10 MG/ML
5 INJECTION, SOLUTION EPIDURAL; INFILTRATION; INTRACAUDAL; PERINEURAL ONCE
Status: DISCONTINUED | OUTPATIENT
Start: 2017-08-04 | End: 2019-04-22

## 2017-08-04 ASSESSMENT — PAIN SCALES - GENERAL
PAINLEVEL: SEVERE PAIN (7)
PAINLEVEL: SEVERE PAIN (7)

## 2017-08-04 NOTE — PROGRESS NOTES
"Rheumatology Visit     Chrissy Dixon MRN# [id number]   YOB: 1985 Age: 31 year old     Date of Visit: August 4, 2017  Primary care provider: Nando Haskins       Assessment and Plan:   # Psoriatic arthritis (polyarthritis, scalp and pustular psoriasis; failed Humira and leflunomide, dx'd post-partum in 2015): She displays continued overall improvement in flaring, prednisone-sensitive oligoarticular arthritis , associated with combination stelara and otezla use. Exam shows R knee effusion and posterior scalp psoriasis. CBC shows resolution of microcytic anemia in 7-17. I think that she is responding well to therapy. We discussed that the combination of otezla and stelara is not well studied for either safety or efficacy. I'm glad that she is doing well with minimal side effects, but I think risks of opportunistic infection may be increased with the combination treatment, especially given the patient's diagnosis of type I diabetes. I recommend that she make a trial of continuing Otezla monotherapy. If poor results, she can consider moving ahead with stelara monotherapy in 3 months.    - continue Otezla 30 mg bid  -  HOLD ustekinumab q 3 months; if Ps or arthritis flare despite continuous bid otezla, will reconsider subsstitution of anti-IL-12  -- q 10-12 week LFT, CBC, Cr, UA  - patient may use \"bridge\" treatment with prednisone 15 X 7, 10 X 7d once q 3-4 mos    # Pustular psoriasis, started after injection of Humira in 8-15: Rash has not recurred since early 2016 when the drug was discontinued. Reason for temporal association between psoriasis with restart of anti-TNF is not clear. I do not think she should receive anti-TNF again, however.    # R knee effusion: s/p aspiration/injection--see procedure note. R knee synovial fluid aspirated today was not inflammatory in appearance, although I suspect loculation on the basis of my inability to easily remove joint fluid. I expect that " swelling/pain should decrease over the next 7-10 days. Pt will call prn problems over the weekend.    #Anxiety/dysphoria:  Symptoms are improved, but still present. Weight has stabilized and symptoms are more stable under monitoring and therapy by Dr. Haskins. Opiates (percocet and tramadol) are managed through Dr. Haskins.    # Microcytic anemia:  Anemia is stabilized and improved following 5 intravenous iron infusions per Dr. Haskins.  Source of blood loss is presumed to be heavy menstrual flow.    No orders of the defined types were placed in this encounter.    RTC 3 mos    Rene Granados M.D.  Staff Rheumatologist, [facility]  Pager [phone]          Active Problem List:     Patient Active Problem List    Diagnosis Date Noted     Iron deficiency anemia due to chronic blood loss 03/30/2017     Priority: Medium     Malaise and fatigue 10/01/2012     Priority: Medium     Problem list name updated by automated process. Provider to review       Psoriasis arthropathica (H) 02/09/2012     Priority: Medium     Psoriatic arthritis (H) 01/31/2012     Priority: Medium     Diabetes mellitus type 2, controlled (H) 01/31/2012     Priority: Medium     CARDIOVASCULAR SCREENING; LDL GOAL LESS THAN 160 10/31/2010     Priority: Medium     Barnett's cyst, L knee 2017.       History of Present Illness:   Presents for f/u psoriatic arthritis. Last seen 6-17. Otezla and stelara therapy were continued at that visit.    H/o psoriasis since birth (presented with diffuse rash at birth and not dx'd with psoriasis until age 11) and then developed arthritis in her 20s. Was on Enbrel age 21 but developed infections such as pneumonia, sinusitis and mono, and stopped it within 7 months. Was on MTX from 2011 until Feb 2012; she did not tolerate this due to GI upset. She switched to sulfasalazine 2/2012 and tolerated well at 1,000mg tid, but had decreasing efficacy as of 11-1-13. Started leflunomide in 10-13; stopped in 1-14 due to GI  "intolerance. Started humira 1-14. Held humira in 7-14 during pregnancy. Restarted in 7-15 for flaring disease. Restarted mtx +Humira 7-15 which was then discontinued due to worsened psoriasis and lack of efficacy. Leflunomide was restarted but loose stools developed. Otezla was started on 9/11/2015 and leflunomide stopped. Started stelara in Fall 2015; stopped due to expense and inconvenience in early 2016. Continued otezla monotherapy through present. Restarted stelara in 2017--last dose 5-17.    Interval 8-17:    She developed swelling/stiffness in the L knee worse after last visit. She was diagnosed with Baker's cyst by ultrasound, and given steroid injection in mid-July by Dr. Haskins. L knee symptoms have now resolved completely.  ~ 1 week ago, she developed swelling/stiffness, and pain with prolonged standing in the R knee. No F/C/S; no prior injury. No inflammatory signs in the knee.  Otherwise, she reports minor \"flares\" of joint pain in the fingers, L > R hands.  Her skin has been well-controlled with small \"breakouts\" on hands and feet, lasting only 3-4 days. She has minimal low back pain, there is no symptom of tenosynovitis, dactylitis, iritis. She continues apremilast bid. She restarted Stelara in 3-17, 2nd dose at end of May, but has no further plans to take more at present.    Interval history 6-17:    She reports minor \"flares\" of joint pain in the knees, L > R. Reduced bending capacity, or pain with sitting. Fluid behind the knee intermittently. Her skin has been well-controlled with small \"breakouts\" on hands and feet, lasting only 3-4 days. She restarted Stelara in 3-17, 2nd dose at end of May-- the impetus for restarting the drug is not clear to me, as the patient had discontinued it in early 2016 due to cost and inconvenience. However, she noted gradual improvement in joint symptoms overall since then.  She has minimal low back pain, there is no symptom of Kurtis synovitis, dactylitis, iritis. " "She continues apremilast qd or bid    She had dramatic increase in fatigue, dizziness in early 2017. Dr. Haskins diagnosed Fe defcy anemia; she started Iron infusions in 4-17--she has tolerated well, and her fatigue/energy has increased.  Dizziness has receded substantially.    She reports using one Percocet and 2 to 3 tablets of tramadol in the evenings for \"pain\". She feels that her anxiety and stress arising from increased business and responsibility at work is a factor in the requirements for opiate dosing in the evenings.    Interval history 11-16:    She is doing well overall. No psoriasis, and joint symptoms have been much reduced. No morning stiffness. Joints are controlled. She has skipped some evening doses of otezla due to nausea--no N/V with morning dose, interestingly. The R wrist is subject to intermittent stiffness. The lower back is still painful in the morning, but there is no specific EAS. Sometimes her hands have a blue color.  She has continued otezla, but stopped stelara (due to logistics of injection). She needed 2 courses of prednisone (last over the summer) for joint flares.    She notes significant nausea/diarrhea and intermittent dizzy spells, which she attributes possibly to otezla.  Episodes occur several times weekly, and there is no obvious trigger. No problems with stelara, except that it was too inconvenient to travel across town to receive in-clinic injection. Last Stelara injection was 3-16.    She reports complete resolution of former diffuse, pruritic rash involving the palms and soles.             Review of Systems:   Gen: no fevers or chills; weight is stable  HEENT: no oral/nasal ulcers  CV: no chest pain  Lungs: no shortness of breath; sometimes gets occasional cough with sinusitis  GI: no N/V. Has loose stools daily  : frequent urination--not thirsty.  MSK: see HPI  Skin: HPI  Endo: sugars improved off of prednisone  12 point review o/w negative  Neuro: numbness, L leg " intermittent          Past Medical History:     Past Medical History:   Diagnosis Date     Diabetes      Other psoriasis      Polyarthritis     probable psoriatic arthritis     Past Surgical History:   Procedure Laterality Date     SURGICAL HISTORY OF -       .  Tonsillar abscess            Social History:     Social History     Occupational History     Dental Assist. Unknown     Social History Main Topics     Smoking status: Former Smoker     Packs/day: 1.00     Years: 5.00     Types: Cigarettes     Quit date: 11/1/2007     Smokeless tobacco: Never Used     Alcohol use No     Drug use: No     Sexual activity: Yes     Partners: Male     Birth control/ protection: IUD      Comment: Mirena            Family History:     Family History   Problem Relation Age of Onset     Thyroid Disease Mother      DIABETES Mother      DIABETES Maternal Grandfather      DIABETES Maternal Uncle      DIABETES Maternal Uncle      Asthma No family hx of      Hypertension Mother      Hypertension Maternal Grandmother      Hypertension Maternal Grandfather      Prostate Cancer Maternal Grandfather      Connective Tissue Disorder Father      Psoriasis            Allergies:     Allergies   Allergen Reactions     Diagnostic X-Ray Materials Swelling     Betadine [Povidone Iodine] Swelling     Levofloxacin Other (See Comments)     High blood sugars, doesn't feel well at all      Penicillins Hives     Pneumococcal Vaccines Swelling     Prenatal Vit-Fe Fumarate-Fa [Cavan-Folate Ob] Hives     Tdap [Daptacel] Swelling            Medications:     Current Outpatient Prescriptions   Medication Sig Dispense Refill     [START ON 8/9/2017] oxyCODONE-acetaminophen (PERCOCET) 5-325 MG per tablet Take 1 tablet by mouth every 4 hours as needed (must last 30 days from dispense date) 30 tablet 0     insulin pen needle 31G X 8 MM Use  3-4 pen needles daily or as directed. 300 each 4     traMADol (ULTRAM) 50 MG tablet Take 1 tablet (50 mg) by mouth every 8 hours  as needed for moderate pain 90 tablet 0     predniSONE (DELTASONE) 5 MG tablet 20 mg per day x 2 days, 15 mg per day x 2 days, 10 mg per day x 2 days then stop. - 18 tablet 0     insulin detemir (LEVEMIR FLEXTOUCH) 100 UNIT/ML injection Inject 25 Units Subcutaneous At Bedtime 30 mL 1     vitamin D (ERGOCALCIFEROL) 39298 UNIT capsule Take 1 capsule (50,000 Units) by mouth twice a week 24 capsule 1     fluticasone (FLONASE) 50 MCG/ACT spray Spray 2 sprays into both nostrils daily 48 g 1     apremilast (OTEZLA) 30 MG tablet Take 1 tablet (30 mg) by mouth 2 times daily 180 tablet 3     insulin lispro (HUMALOG KWIKPEN) 100 UNIT/ML injection USE THREE TIMES DAILY WITH MEALS, CURRENTLY 30 UNITS DAILY 30 mL 5     apremilast (OTEZLA) 30 MG tablet Take 1 tablet (30 mg) by mouth 2 times daily 180 tablet 3     ondansetron (ZOFRAN-ODT) 4 MG ODT tab Take 1 tablet (4 mg) by mouth every 8 hours as needed for nausea 60 tablet 2     ferrous gluconate (FERGON) 324 (38 FE) MG tablet Take 1 tablet (324 mg) by mouth every other day 100 tablet 11     valACYclovir (VALTREX) 500 MG tablet Take 1 tablet (500 mg) by mouth daily 90 tablet 0     ibuprofen (ADVIL/MOTRIN) 600 MG tablet Take 1 tablet (600 mg) by mouth every 8 hours as needed for moderate pain 270 tablet 3     metFORMIN (GLUCOPHAGE) 500 MG tablet Take 2 tablets (1,000 mg) by mouth 2 times daily (with meals) 360 tablet 1     ustekinumab (STELARA) 45 MG/0.5ML SOSY Inject 0.5 mLs (45 mg) Subcutaneous every 3 months 3 Syringe 1     insulin pen needle (BD ULTRA-FINE) 29G X 12.7MM Used to inject insulin up to twice daily 200 each 3     blood glucose monitoring (ACCU-CHEK SMARTVIEW) test strip Use to test blood sugar 6-8 times daily or as directed. 700 each 3     predniSONE (DELTASONE) 5 MG tablet Take 3 tablets (15mg) by mouth for 4 days, then 2 tablets (10mg) by mouth for 4 days. 20 tablet 0     predniSONE (DELTASONE) 5 MG tablet 3 tabs daily for 1 wk, then 2 tabs daily for 1 week. 50  "tablet 2     Fluocinolone Acetonide (DERMA-SMOOTHE/FS SCALP) 0.01 % OIL Apply to scalp at bedtime, then wear showercap, rinse off in morning. 118 mL 3     triamcinolone (KENALOG) 0.1 % cream Apply topically 2 times daily 453.6 g 1     predniSONE (DELTASONE) 10 MG tablet Take 1 tablet (10 mg) by mouth daily As needed for flares only 30 tablet 1     clobetasol (TEMOVATE) 0.05 % external solution Apply topically 2 times daily 50 mL 6     LEVEMIR FLEXPEN 100 UNIT/ML 6 units in the morning, 14 units in evening = for total of 20 units daily 3 Month 1     levonorgestrel (MIRENA) 20 MCG/24HR IUD Use as directed 1 each 0     Using metformin  Not on methotrexate or stelara         Physical Exam:   Blood pressure 114/74, pulse 95, height 1.6 m (5' 3\"), weight 50.2 kg (110 lb 11.2 oz), last menstrual period 06/13/2017, SpO2 100 %, not currently breastfeeding.  Wt Readings from Last 4 Encounters:   08/04/17 50.2 kg (110 lb 11.2 oz)   07/07/17 51 kg (112 lb 6.4 oz)   06/09/17 49.7 kg (109 lb 8 oz)   05/05/17 49.9 kg (110 lb)     Constitutional: well-developed, appearing stated age; vibrant but very thin  Eyes: nl EOM, PERRLA, vision, conjunctiva, sclera   ENT: nl external ears, nose, hearing, lips, teeth, gums, throat   No mucous membrane lesions, normal saliva pool   Neck: no mass or thyroid enlargement   Resp: lungs clear to auscultation, nl to palpation   CV: RRR, no murmurs, rubs or gallops, no edema   GI: no ABD mass or tenderness, no HSM   : not tested   MS: All TMJ, neck, shoulder, elbow, wrist, MCP/PIP/DIP, spine, hip, knee, ankle, and foot MTP/IP joints were examined.All normal except cool R knee effusion and medial joint line tenderness. The MCPs are nontender to palpation and  ROM is normal.  There is no dactylitis or tenosynovitis.  Skin: posterior scalp psoriasis above the hairline  Neuro: nl cranial nerves, strength, sensation, DTRs.   Psych: nl judgement, orientation, memory, affect.       Data:     Results " for orders placed or performed in visit on 07/07/17   CBC with platelets   Result Value Ref Range    WBC 5.4 4.0 - 11.0 10e9/L    RBC Count 5.13 3.8 - 5.2 10e12/L    Hemoglobin 12.8 11.7 - 15.7 g/dL    Hematocrit 40.0 35.0 - 47.0 %    MCV 78 78 - 100 fl    MCH 25.0 (L) 26.5 - 33.0 pg    MCHC 32.0 31.5 - 36.5 g/dL    RDW 22.8 (H) 10.0 - 15.0 %    Platelet Count 276 150 - 450 10e9/L   ALT   Result Value Ref Range    ALT 13 0 - 50 U/L   AST   Result Value Ref Range    AST 7 0 - 45 U/L   Lipid panel reflex to direct LDL - -(Today)   Result Value Ref Range    Cholesterol 143 <200 mg/dL    Triglycerides 63 <150 mg/dL    HDL Cholesterol 66 >49 mg/dL    LDL Cholesterol Calculated 65 <100 mg/dL    Non HDL Cholesterol 78 <130 mg/dL       Recent Labs   Lab Test  07/07/17   1712  05/05/17   1411  03/20/17   1815  11/30/16   1042  05/18/16   1947   10/11/13   1644   WBC  5.4  6.9  5.8  5.5  5.9   < >  6.1   HGB  12.8  9.0*  7.4*  8.2*  8.2*   < >  11.6*   HCT  40.0  32.1*  26.7*  29.7*  28.5*   < >  35.9   MCV  78  65*  61*  62*  67*   < >  91   PLT  276  298  475*  481*  316   < >  374   BUN   --    --   9  7   --    --   15   TSH   --    --   0.51   --   1.32   --   2.23   AST  7   --   6  9  7   < >  16   ALT  13   --   15  14  12   < >  28   ALKPHOS   --    --   59  56   --    --   75    < > = values in this interval not displayed.       Reviewed Rheumatology lab flowsheet    PROCEDURE:  JOINT ASPIRATION/INJECTION    After a discussion of risks, benefits and side effects of procedure, informed patient consent was obtained.  The right knee was prepped and draped in the usual clean fashion (sterile not required for this procedure).  Spray was used for local analgesia.    ASPIRATION: Using a 20 gauge needle and 60 cc syringe, 2 cc of clear fluid was aspirated without complications.     INJECTION:  Using 3 cc of 1 % lidocaine mixed with 40 mg of kenalog, the R knee was successfully injected without complication.  Patient did not  experience some pain relief following injection.    Invasive Procedure Safety Checklist completed by nurse? Yes

## 2017-08-04 NOTE — TELEPHONE ENCOUNTER
metFORMIN (GLUCOPHAGE) 500 MG tablet      Last Written Prescription Date:  4/4/2017  Last Fill Quantity: 360,   # refills: 1  Last Office Visit : 7/7/2017  Future Office visit:  0    Creatinine   Date Value Ref Range Status   08/04/2017 0.56 0.52 - 1.04 mg/dL Final   ]  Lab Results   Component Value Date    A1C 8.2 03/20/2017     Lab Results   Component Value Date    MICROL 8 03/20/2017

## 2017-08-04 NOTE — NURSING NOTE
"Chief Complaint   Patient presents with     RECHECK     Follow up for right knee swelling with fluid        Initial /74  Pulse 95  Ht 1.6 m (5' 3\")  Wt 50.2 kg (110 lb 11.2 oz)  LMP 06/13/2017 (Approximate)  SpO2 100%  BMI 19.61 kg/m2 Estimated body mass index is 19.61 kg/(m^2) as calculated from the following:    Height as of this encounter: 1.6 m (5' 3\").    Weight as of this encounter: 50.2 kg (110 lb 11.2 oz).  Medication Reconciliation: complete   Lilian Pineda CMA    "

## 2017-08-04 NOTE — MR AVS SNAPSHOT
After Visit Summary   8/4/2017    Chrissy Dixon    MRN: [id number]           Patient Information     Date Of Birth          1985        Visit Information        Provider Department      8/4/2017 7:00 AM Rene Granados MD [facility]        Today's Diagnoses     Acute pain of right knee    -  1    Psoriatic arthritis (H)           Follow-ups after your visit        Follow-up notes from your care team     Return in about 3 months (around 11/4/2017).      Your next 10 appointments already scheduled     Aug 04, 2017  7:45 AM CDT   Lab with [facility]   [facility] ([facility])    [address]  [address] [facility] [address]   [phone]            Dec 08, 2017 10:30 AM CST   (Arrive by 10:15 AM)   Return Visit with Rene Granados MD   [facility] ([facility])    [address] [id number] [phone]              Future tests that were ordered for you today     Open Future Orders        Priority Expected Expires Ordered    ALT Routine  8/4/2018 8/4/2017    AST Routine  8/4/2018 8/4/2017    Creatinine Routine  8/4/2018 8/4/2017    CRP inflammation Routine  8/4/2018 8/4/2017            Who to contact     If you have questions or need follow up information about today's clinic visit or your schedule please contact [facility] directly at [phone].  Normal or non-critical lab and imaging results will be communicated to you by MyChart, letter or phone within 4 business days after the clinic has received the results. If you do not hear from us within 7 days, please contact the clinic through MyChart or phone. If you have a critical or abnormal lab result, we will notify you by phone as soon as possible.  Submit refill requests through [employer] or call your pharmacy and they will forward the refill request to us. Please allow 3 business days for your refill to be  "completed.          Additional Information About Your Visit        [employer] Information     [employer] gives you secure access to your electronic health record. If you see a primary care provider, you can also send messages to your care team and make appointments. If you have questions, please call your primary care clinic.  If you do not have a primary care provider, please call [phone] and they will assist you.        Care EveryWhere ID     This is your Care EveryWhere ID. This could be used by other organizations to access your [facility] medical records  [id number]        Your Vitals Were     Pulse Height Last Period Pulse Oximetry BMI (Body Mass Index)       95 1.6 m (5' 3\") 06/13/2017 (Approximate) 100% 19.61 kg/m2        Blood Pressure from Last 3 Encounters:   08/04/17 114/74   07/07/17 119/77   06/09/17 119/78    Weight from Last 3 Encounters:   08/04/17 50.2 kg (110 lb 11.2 oz)   07/07/17 51 kg (112 lb 6.4 oz)   06/09/17 49.7 kg (109 lb 8 oz)               Primary Care Provider Office Phone # Fax #    Nando Haskins [phone]        [facility] [address] [facility] [address]  [facility] [address]        Equal Access to Services     SHERINE MARTINEZ AH: Hadii aad ku hadasho Soomaali, waaxda luqadaha, qaybta kaalmada adeegyada, waxay idiin hayayadn yvon luong la'arthur puente. So [facility] [phone].    ATENCIÓN: Si habla español, tiene a hameed disposición servicios gratuitos de asistencia lingüística. Llame al [phone].    We comply with applicable federal civil rights laws and [place] laws. We do not discriminate on the basis of race, color, national origin, age, disability sex, sexual orientation or gender identity.            Thank you!     Thank you for choosing [facility]  for your care. Our goal is always to provide you with excellent care. Hearing back from our patients is one way we can continue to improve our services. Please take a few minutes to complete the written survey " that you may receive in the mail after your visit with us. Thank you!             Your Updated Medication List - Protect others around you: Learn how to safely use, store and throw away your medicines at www.disposemymeds.org.          This list is accurate as of: 8/4/17  7:41 AM.  Always use your most recent med list.                   Brand Name Dispense Instructions for use Diagnosis    * apremilast 30 MG tablet    OTEZLA    180 tablet    Take 1 tablet (30 mg) by mouth 2 times daily    Psoriatic arthritis (H)       * apremilast 30 MG tablet    OTEZLA    180 tablet    Take 1 tablet (30 mg) by mouth 2 times daily    Psoriatic arthritis (H)       blood glucose monitoring test strip    ACCU-CHEK SMARTVIEW    700 each    Use to test blood sugar 6-8 times daily or as directed.    Diabetes mellitus, type 2 (H)       clobetasol 0.05 % external solution    TEMOVATE    50 mL    Apply topically 2 times daily    Psoriasis       DERMA-SMOOTHE/FS SCALP 0.01 % Oil     118 mL    Apply to scalp at bedtime, then wear showercap, rinse off in morning.    Other psoriasis       ferrous gluconate 324 (38 FE) MG tablet    FERGON    100 tablet    Take 1 tablet (324 mg) by mouth every other day    Iron deficiency anemia due to chronic blood loss, Iron deficiency anemia, unspecified iron deficiency anemia type       fluticasone 50 MCG/ACT spray    FLONASE    48 g    Spray 2 sprays into both nostrils daily    Rhinitis medicamentosa       ibuprofen 600 MG tablet    ADVIL/MOTRIN    270 tablet    Take 1 tablet (600 mg) by mouth every 8 hours as needed for moderate pain    Psoriasis arthropathica (H)       insulin lispro 100 UNIT/ML injection    HumaLOG KWIKpen    30 mL    USE THREE TIMES DAILY WITH MEALS, CURRENTLY 30 UNITS DAILY    Diabetes mellitus, type 2 (H)       * insulin pen needle 29G X 12.7MM    BD ULTRA-FINE    200 each    Used to inject insulin up to twice daily    Diabetes mellitus (H)       * insulin pen needle 31G X 8 MM     300  each    Use  3-4 pen needles daily or as directed.    Diabetes mellitus, type 2 (H)       * LEVEMIR FLEXpen 100 UNIT/ML injection   Generic drug:  insulin detemir     3 Month    6 units in the morning, 14 units in evening = for total of 20 units daily    Diabetes mellitus type 2, uncontrolled (H), Psoriatic arthritis (H), Other malaise and fatigue       * insulin detemir 100 UNIT/ML injection    LEVEMIR FLEXTOUCH    30 mL    Inject 25 Units Subcutaneous At Bedtime    Diabetes mellitus (H)       levonorgestrel 20 MCG/24HR IUD    MIRENA (52 MG)    1 each    Use as directed    Family planning, IUD (intrauterine device) check/reinsertion/removal       metFORMIN 500 MG tablet    GLUCOPHAGE    360 tablet    Take 2 tablets (1,000 mg) by mouth 2 times daily (with meals)    Diabetes mellitus (H)       ondansetron 4 MG ODT tab    ZOFRAN-ODT    60 tablet    Take 1 tablet (4 mg) by mouth every 8 hours as needed for nausea    Nausea       oxyCODONE-acetaminophen 5-325 MG per tablet   Start taking on:  8/9/2017    PERCOCET    30 tablet    Take 1 tablet by mouth every 4 hours as needed (must last 30 days from dispense date)    Psoriatic arthritis (H), Recurrent cold sores       * predniSONE 10 MG tablet    DELTASONE    30 tablet    Take 1 tablet (10 mg) by mouth daily As needed for flares only    Psoriatic arthropathy (H)       * predniSONE 5 MG tablet    DELTASONE    50 tablet    3 tabs daily for 1 wk, then 2 tabs daily for 1 week.    Psoriatic arthritis (H)       * predniSONE 5 MG tablet    DELTASONE    20 tablet    Take 3 tablets (15mg) by mouth for 4 days, then 2 tablets (10mg) by mouth for 4 days.    Psoriatic arthritis (H)       * predniSONE 5 MG tablet    DELTASONE    18 tablet    20 mg per day x 2 days, 15 mg per day x 2 days, 10 mg per day x 2 days then stop. -    Rheumatoid arthritis involving both hands with positive rheumatoid factor (H)       traMADol 50 MG tablet    ULTRAM    90 tablet    Take 1 tablet (50 mg) by  mouth every 8 hours as needed for moderate pain    Psoriatic arthritis (H)       triamcinolone 0.1 % cream    KENALOG    453.6 g    Apply topically 2 times daily    Guttate psoriasis       ustekinumab 45 MG/0.5ML Sosy    STELARA    3 Syringe    Inject 0.5 mLs (45 mg) Subcutaneous every 3 months    Psoriatic arthritis (H)       valACYclovir 500 MG tablet    VALTREX    90 tablet    Take 1 tablet (500 mg) by mouth daily    Psoriatic arthritis (H)       vitamin D 22668 UNIT capsule    ERGOCALCIFEROL    24 capsule    Take 1 capsule (50,000 Units) by mouth twice a week    Vitamin D deficiency       * Notice:  This list has 10 medication(s) that are the same as other medications prescribed for you. Read the directions carefully, and ask your doctor or other care provider to review them with you.

## 2017-08-04 NOTE — LETTER
"8/4/2017      RE: Chrissy Dixon  [address] [facility] [address]       Rheumatology Visit     Chrissy Dixon MRN# [id number]   YOB: 1985 Age: 31 year old     Date of Visit: August 4, 2017  Primary care provider: Nando Haskins       Assessment and Plan:   # Psoriatic arthritis (polyarthritis, scalp and pustular psoriasis; failed Humira and leflunomide, dx'd post-partum in 2015): She displays continued overall improvement in flaring, prednisone-sensitive oligoarticular arthritis , associated with combination stelara and otezla use. Exam shows R knee effusion and posterior scalp psoriasis. CBC shows resolution of microcytic anemia in 7-17. I think that she is responding well to therapy. We discussed that the combination of otezla and stelara is not well studied for either safety or efficacy. I'm glad that she is doing well with minimal side effects, but I think risks of opportunistic infection may be increased with the combination treatment, especially given the patient's diagnosis of type I diabetes. I recommend that she make a trial of continuing Otezla monotherapy. If poor results, she can consider moving ahead with stelara monotherapy in 3 months.    - continue Otezla 30 mg bid  -  HOLD ustekinumab q 3 months; if Ps or arthritis flare despite continuous bid otezla, will reconsider subsstitution of anti-IL-12  -- q 10-12 week LFT, CBC, Cr, UA  - patient may use \"bridge\" treatment with prednisone 15 X 7, 10 X 7d once q 3-4 mos    # Pustular psoriasis, started after injection of Humira in 8-15: Rash has not recurred since early 2016 when the drug was discontinued. Reason for temporal association between psoriasis with restart of anti-TNF is not clear. I do not think she should receive anti-TNF again, however.    # R knee effusion: s/p aspiration/injection--see procedure note. R knee synovial fluid aspirated today was not inflammatory in appearance, although I suspect loculation " on the basis of my inability to easily remove joint fluid. I expect that swelling/pain should decrease over the next 7-10 days. Pt will call prn problems over the weekend.    #Anxiety/dysphoria:  Symptoms are improved, but still present. Weight has stabilized and symptoms are more stable under monitoring and therapy by Dr. Haskins. Opiates (percocet and tramadol) are managed through Dr. Haskins.    # Microcytic anemia:  Anemia is stabilized and improved following 5 intravenous iron infusions per Dr. Haskins.  Source of blood loss is presumed to be heavy menstrual flow.    No orders of the defined types were placed in this encounter.    RTC 3 mos    Rene Granados M.D.  Staff Rheumatologist, [facility]  Pager [phone]          Active Problem List:     Patient Active Problem List    Diagnosis Date Noted     Iron deficiency anemia due to chronic blood loss 03/30/2017     Priority: Medium     Malaise and fatigue 10/01/2012     Priority: Medium     Problem list name updated by automated process. Provider to review       Psoriasis arthropathica (H) 02/09/2012     Priority: Medium     Psoriatic arthritis (H) 01/31/2012     Priority: Medium     Diabetes mellitus type 2, controlled (H) 01/31/2012     Priority: Medium     CARDIOVASCULAR SCREENING; LDL GOAL LESS THAN 160 10/31/2010     Priority: Medium     Barnett's cyst, L knee 2017.       History of Present Illness:   Presents for f/u psoriatic arthritis. Last seen 6-17. Otezla and stelara therapy were continued at that visit.    H/o psoriasis since birth (presented with diffuse rash at birth and not dx'd with psoriasis until age 11) and then developed arthritis in her 20s. Was on Enbrel age 21 but developed infections such as pneumonia, sinusitis and mono, and stopped it within 7 months. Was on MTX from 2011 until Feb 2012; she did not tolerate this due to GI upset. She switched to sulfasalazine 2/2012 and tolerated well at 1,000mg tid, but had decreasing efficacy  "as of 11-1-13. Started leflunomide in 10-13; stopped in 1-14 due to GI intolerance. Started humira 1-14. Held humira in 7-14 during pregnancy. Restarted in 7-15 for flaring disease. Restarted mtx +Humira 7-15 which was then discontinued due to worsened psoriasis and lack of efficacy. Leflunomide was restarted but loose stools developed. Otezla was started on 9/11/2015 and leflunomide stopped. Started stelara in Fall 2015; stopped due to expense and inconvenience in early 2016. Continued otezla monotherapy through present. Restarted stelara in 2017--last dose 5-17.    Interval 8-17:    She developed swelling/stiffness in the L knee worse after last visit. She was diagnosed with Baker's cyst by ultrasound, and given steroid injection in mid-July by Dr. Haskins. L knee symptoms have now resolved completely.  ~ 1 week ago, she developed swelling/stiffness, and pain with prolonged standing in the R knee. No F/C/S; no prior injury. No inflammatory signs in the knee.  Otherwise, she reports minor \"flares\" of joint pain in the fingers, L > R hands.  Her skin has been well-controlled with small \"breakouts\" on hands and feet, lasting only 3-4 days. She has minimal low back pain, there is no symptom of tenosynovitis, dactylitis, iritis. She continues apremilast bid. She restarted Stelara in 3-17, 2nd dose at end of May, but has no further plans to take more at present.    Interval history 6-17:    She reports minor \"flares\" of joint pain in the knees, L > R. Reduced bending capacity, or pain with sitting. Fluid behind the knee intermittently. Her skin has been well-controlled with small \"breakouts\" on hands and feet, lasting only 3-4 days. She restarted Stelara in 3-17, 2nd dose at end of May-- the impetus for restarting the drug is not clear to me, as the patient had discontinued it in early 2016 due to cost and inconvenience. However, she noted gradual improvement in joint symptoms overall since then.  She has minimal low " "back pain, there is no symptom of Kurtis synovitis, dactylitis, iritis. She continues apremilast qd or bid    She had dramatic increase in fatigue, dizziness in early 2017. Dr. Haskins diagnosed Fe defcy anemia; she started Iron infusions in 4-17--she has tolerated well, and her fatigue/energy has increased.  Dizziness has receded substantially.    She reports using one Percocet and 2 to 3 tablets of tramadol in the evenings for \"pain\". She feels that her anxiety and stress arising from increased business and responsibility at work is a factor in the requirements for opiate dosing in the evenings.    Interval history 11-16:    She is doing well overall. No psoriasis, and joint symptoms have been much reduced. No morning stiffness. Joints are controlled. She has skipped some evening doses of otezla due to nausea--no N/V with morning dose, interestingly. The R wrist is subject to intermittent stiffness. The lower back is still painful in the morning, but there is no specific EAS. Sometimes her hands have a blue color.  She has continued otezla, but stopped stelara (due to logistics of injection). She needed 2 courses of prednisone (last over the summer) for joint flares.    She notes significant nausea/diarrhea and intermittent dizzy spells, which she attributes possibly to otezla.  Episodes occur several times weekly, and there is no obvious trigger. No problems with stelara, except that it was too inconvenient to travel across town to receive in-clinic injection. Last Stelara injection was 3-16.    She reports complete resolution of former diffuse, pruritic rash involving the palms and soles.             Review of Systems:   Gen: no fevers or chills; weight is stable  HEENT: no oral/nasal ulcers  CV: no chest pain  Lungs: no shortness of breath; sometimes gets occasional cough with sinusitis  GI: no N/V. Has loose stools daily  : frequent urination--not thirsty.  MSK: see HPI  Skin: HPI  Endo: sugars improved off " of prednisone  12 point review o/w negative  Neuro: numbness, L leg intermittent          Past Medical History:     Past Medical History:   Diagnosis Date     Diabetes      Other psoriasis      Polyarthritis     probable psoriatic arthritis     Past Surgical History:   Procedure Laterality Date     SURGICAL HISTORY OF -       .  Tonsillar abscess            Social History:     Social History     Occupational History     Dental Assist. Unknown     Social History Main Topics     Smoking status: Former Smoker     Packs/day: 1.00     Years: 5.00     Types: Cigarettes     Quit date: 11/1/2007     Smokeless tobacco: Never Used     Alcohol use No     Drug use: No     Sexual activity: Yes     Partners: Male     Birth control/ protection: IUD      Comment: Mirena            Family History:     Family History   Problem Relation Age of Onset     Thyroid Disease Mother      DIABETES Mother      DIABETES Maternal Grandfather      DIABETES Maternal Uncle      DIABETES Maternal Uncle      Asthma No family hx of      Hypertension Mother      Hypertension Maternal Grandmother      Hypertension Maternal Grandfather      Prostate Cancer Maternal Grandfather      Connective Tissue Disorder Father      Psoriasis            Allergies:     Allergies   Allergen Reactions     Diagnostic X-Ray Materials Swelling     Betadine [Povidone Iodine] Swelling     Levofloxacin Other (See Comments)     High blood sugars, doesn't feel well at all      Penicillins Hives     Pneumococcal Vaccines Swelling     Prenatal Vit-Fe Fumarate-Fa [Cavan-Folate Ob] Hives     Tdap [Daptacel] Swelling            Medications:     Current Outpatient Prescriptions   Medication Sig Dispense Refill     [START ON 8/9/2017] oxyCODONE-acetaminophen (PERCOCET) 5-325 MG per tablet Take 1 tablet by mouth every 4 hours as needed (must last 30 days from dispense date) 30 tablet 0     insulin pen needle 31G X 8 MM Use  3-4 pen needles daily or as directed. 300 each 4     traMADol  (ULTRAM) 50 MG tablet Take 1 tablet (50 mg) by mouth every 8 hours as needed for moderate pain 90 tablet 0     predniSONE (DELTASONE) 5 MG tablet 20 mg per day x 2 days, 15 mg per day x 2 days, 10 mg per day x 2 days then stop. - 18 tablet 0     insulin detemir (LEVEMIR FLEXTOUCH) 100 UNIT/ML injection Inject 25 Units Subcutaneous At Bedtime 30 mL 1     vitamin D (ERGOCALCIFEROL) 28219 UNIT capsule Take 1 capsule (50,000 Units) by mouth twice a week 24 capsule 1     fluticasone (FLONASE) 50 MCG/ACT spray Spray 2 sprays into both nostrils daily 48 g 1     apremilast (OTEZLA) 30 MG tablet Take 1 tablet (30 mg) by mouth 2 times daily 180 tablet 3     insulin lispro (HUMALOG KWIKPEN) 100 UNIT/ML injection USE THREE TIMES DAILY WITH MEALS, CURRENTLY 30 UNITS DAILY 30 mL 5     apremilast (OTEZLA) 30 MG tablet Take 1 tablet (30 mg) by mouth 2 times daily 180 tablet 3     ondansetron (ZOFRAN-ODT) 4 MG ODT tab Take 1 tablet (4 mg) by mouth every 8 hours as needed for nausea 60 tablet 2     ferrous gluconate (FERGON) 324 (38 FE) MG tablet Take 1 tablet (324 mg) by mouth every other day 100 tablet 11     valACYclovir (VALTREX) 500 MG tablet Take 1 tablet (500 mg) by mouth daily 90 tablet 0     ibuprofen (ADVIL/MOTRIN) 600 MG tablet Take 1 tablet (600 mg) by mouth every 8 hours as needed for moderate pain 270 tablet 3     metFORMIN (GLUCOPHAGE) 500 MG tablet Take 2 tablets (1,000 mg) by mouth 2 times daily (with meals) 360 tablet 1     ustekinumab (STELARA) 45 MG/0.5ML SOSY Inject 0.5 mLs (45 mg) Subcutaneous every 3 months 3 Syringe 1     insulin pen needle (BD ULTRA-FINE) 29G X 12.7MM Used to inject insulin up to twice daily 200 each 3     blood glucose monitoring (ACCU-CHEK SMARTVIEW) test strip Use to test blood sugar 6-8 times daily or as directed. 700 each 3     predniSONE (DELTASONE) 5 MG tablet Take 3 tablets (15mg) by mouth for 4 days, then 2 tablets (10mg) by mouth for 4 days. 20 tablet 0     predniSONE (DELTASONE) 5  "MG tablet 3 tabs daily for 1 wk, then 2 tabs daily for 1 week. 50 tablet 2     Fluocinolone Acetonide (DERMA-SMOOTHE/FS SCALP) 0.01 % OIL Apply to scalp at bedtime, then wear showercap, rinse off in morning. 118 mL 3     triamcinolone (KENALOG) 0.1 % cream Apply topically 2 times daily 453.6 g 1     predniSONE (DELTASONE) 10 MG tablet Take 1 tablet (10 mg) by mouth daily As needed for flares only 30 tablet 1     clobetasol (TEMOVATE) 0.05 % external solution Apply topically 2 times daily 50 mL 6     LEVEMIR FLEXPEN 100 UNIT/ML 6 units in the morning, 14 units in evening = for total of 20 units daily 3 Month 1     levonorgestrel (MIRENA) 20 MCG/24HR IUD Use as directed 1 each 0     Using metformin  Not on methotrexate or stelara         Physical Exam:   Blood pressure 114/74, pulse 95, height 1.6 m (5' 3\"), weight 50.2 kg (110 lb 11.2 oz), last menstrual period 06/13/2017, SpO2 100 %, not currently breastfeeding.  Wt Readings from Last 4 Encounters:   08/04/17 50.2 kg (110 lb 11.2 oz)   07/07/17 51 kg (112 lb 6.4 oz)   06/09/17 49.7 kg (109 lb 8 oz)   05/05/17 49.9 kg (110 lb)     Constitutional: well-developed, appearing stated age; vibrant but very thin  Eyes: nl EOM, PERRLA, vision, conjunctiva, sclera   ENT: nl external ears, nose, hearing, lips, teeth, gums, throat   No mucous membrane lesions, normal saliva pool   Neck: no mass or thyroid enlargement   Resp: lungs clear to auscultation, nl to palpation   CV: RRR, no murmurs, rubs or gallops, no edema   GI: no ABD mass or tenderness, no HSM   : not tested   MS: All TMJ, neck, shoulder, elbow, wrist, MCP/PIP/DIP, spine, hip, knee, ankle, and foot MTP/IP joints were examined.All normal except cool R knee effusion and medial joint line tenderness. The MCPs are nontender to palpation and  ROM is normal.  There is no dactylitis or tenosynovitis.  Skin: posterior scalp psoriasis above the hairline  Neuro: nl cranial nerves, strength, sensation, DTRs.   Psych: nl " judgement, orientation, memory, affect.       Data:     Results for orders placed or performed in visit on 07/07/17   CBC with platelets   Result Value Ref Range    WBC 5.4 4.0 - 11.0 10e9/L    RBC Count 5.13 3.8 - 5.2 10e12/L    Hemoglobin 12.8 11.7 - 15.7 g/dL    Hematocrit 40.0 35.0 - 47.0 %    MCV 78 78 - 100 fl    MCH 25.0 (L) 26.5 - 33.0 pg    MCHC 32.0 31.5 - 36.5 g/dL    RDW 22.8 (H) 10.0 - 15.0 %    Platelet Count 276 150 - 450 10e9/L   ALT   Result Value Ref Range    ALT 13 0 - 50 U/L   AST   Result Value Ref Range    AST 7 0 - 45 U/L   Lipid panel reflex to direct LDL - -(Today)   Result Value Ref Range    Cholesterol 143 <200 mg/dL    Triglycerides 63 <150 mg/dL    HDL Cholesterol 66 >49 mg/dL    LDL Cholesterol Calculated 65 <100 mg/dL    Non HDL Cholesterol 78 <130 mg/dL       Recent Labs   Lab Test  07/07/17   1712  05/05/17   1411  03/20/17   1815  11/30/16   1042  05/18/16   1947   10/11/13   1644   WBC  5.4  6.9  5.8  5.5  5.9   < >  6.1   HGB  12.8  9.0*  7.4*  8.2*  8.2*   < >  11.6*   HCT  40.0  32.1*  26.7*  29.7*  28.5*   < >  35.9   MCV  78  65*  61*  62*  67*   < >  91   PLT  276  298  475*  481*  316   < >  374   BUN   --    --   9  7   --    --   15   TSH   --    --   0.51   --   1.32   --   2.23   AST  7   --   6  9  7   < >  16   ALT  13   --   15  14  12   < >  28   ALKPHOS   --    --   59  56   --    --   75    < > = values in this interval not displayed.       Reviewed Rheumatology lab flowsheet    PROCEDURE:  JOINT ASPIRATION/INJECTION    After a discussion of risks, benefits and side effects of procedure, informed patient consent was obtained.  The right knee was prepped and draped in the usual clean fashion (sterile not required for this procedure).  Spray was used for local analgesia.    ASPIRATION: Using a 20 gauge needle and 60 cc syringe, 2 cc of clear fluid was aspirated without complications.     INJECTION:  Using 3 cc of 1 % lidocaine mixed with 40 mg of kenalog, the R knee  was successfully injected without complication.  Patient did not experience some pain relief following injection.    Invasive Procedure Safety Checklist completed by nurse? Yes    Rene Granados MD

## 2017-08-04 NOTE — NURSING NOTE
Procedure completed per organizational policy, [place] Protocol Safety Checklist for Non-OR Invasive Procedures completed and sent for scan.   Lilian Pineda CMA

## 2017-08-07 RX ORDER — OXYCODONE AND ACETAMINOPHEN 5; 325 MG/1; MG/1
1 TABLET ORAL EVERY 4 HOURS PRN
Qty: 30 TABLET | Refills: 0 | Status: SHIPPED | OUTPATIENT
Start: 2017-08-09 | End: 2017-08-28

## 2017-08-13 ENCOUNTER — MYC MEDICAL ADVICE (OUTPATIENT)
Dept: RHEUMATOLOGY | Facility: CLINIC | Age: 32
End: 2017-08-13

## 2017-08-13 DIAGNOSIS — M71.22 BAKER'S CYST OF KNEE, LEFT: Primary | ICD-10-CM

## 2017-08-14 ENCOUNTER — MYC REFILL (OUTPATIENT)
Dept: RHEUMATOLOGY | Facility: CLINIC | Age: 32
End: 2017-08-14

## 2017-08-14 DIAGNOSIS — L40.50 PSORIATIC ARTHRITIS (H): ICD-10-CM

## 2017-08-14 NOTE — TELEPHONE ENCOUNTER
Sorry to hear about recurrent swelling.  We need to rule out another cause of swelling (meniscus, ligamentous tear) at this point. I have ordered L knee MRI.  How is the R knee?

## 2017-08-15 RX ORDER — TRAMADOL HYDROCHLORIDE 50 MG/1
50 TABLET ORAL EVERY 8 HOURS PRN
Qty: 90 TABLET | Refills: 0 | Status: SHIPPED | OUTPATIENT
Start: 2017-08-15 | End: 2017-09-17

## 2017-08-15 NOTE — TELEPHONE ENCOUNTER
Message from Erica:  Samantha Gomez LPN Mon Aug 14, 2017 4:45 PM        ----- Message -----   From: Chrissy Dixon   Sent: 8/14/2017 9:08 AM   To: Adult Rheum Triage-[facility]  Subject: Medication Renewal Request     Original authorizing provider: MD Chrissy Horn would like a refill of the following medications:  traMADol (ULTRAM) 50 MG tablet [Rene Granados MD]    Preferred pharmacy: [facility] [address] [place] [address] [address] AT [facility] [address]    Comment:

## 2017-08-15 NOTE — TELEPHONE ENCOUNTER
Last seen: 8/4/17  Pending appt: 12/8/17  Medication: tramadol    Last filled: 7/24/17  Qty: 90/0     checked and no other providers noted prescribing medication.   Samantha Gomez LPN

## 2017-08-15 NOTE — TELEPHONE ENCOUNTER
Patient called and imaging department's scheduling number was given to patient.   Samantha Gomez LPN

## 2017-08-17 ENCOUNTER — MYC MEDICAL ADVICE (OUTPATIENT)
Dept: RHEUMATOLOGY | Facility: CLINIC | Age: 32
End: 2017-08-17

## 2017-08-18 DIAGNOSIS — L40.50 PSORIATIC ARTHRITIS (H): ICD-10-CM

## 2017-08-18 NOTE — TELEPHONE ENCOUNTER
Tramadol Rx was called into pharmacy and patient notified via [employer].   Samantha Gomez LPN

## 2017-08-20 RX ORDER — TRAMADOL HYDROCHLORIDE 50 MG/1
TABLET ORAL
Qty: 90 TABLET | Refills: 0 | OUTPATIENT
Start: 2017-08-20

## 2017-08-24 ENCOUNTER — TELEPHONE (OUTPATIENT)
Dept: RHEUMATOLOGY | Facility: CLINIC | Age: 32
End: 2017-08-24

## 2017-08-24 DIAGNOSIS — M71.22 BAKER'S CYST, LEFT: Primary | ICD-10-CM

## 2017-08-24 NOTE — TELEPHONE ENCOUNTER
Pt LVM calling Dr. Granados back re: message below. Said he can call any time. Please advise. Sent to RAISA See.

## 2017-08-24 NOTE — TELEPHONE ENCOUNTER
Received the following result note from Dr Granados:    Message  Received: Today       Rene Granados MD  P Adult Rheum Triage-[facility]       The MRI shows a persistent Baker's cyst despite the steroid injection. Rheumatoid arthritis overall is well-controlled. I recommend referral to Orthopedics Matias Gleason for suggestions on management of the Baker's cyst.   I left a message for patient on the listed mobile number to call Rheum back to discuss results--the main message is that cartilage looks good, and the swelling/stiffness is likely due to the fluid collection.       Spoke with pt and relayed the above information to her. She verbalized understanding and is in agreement to schedule an appointment with Dr Matthews. Chrissy prefers to call to schedule, contact information provided. Pt will contact this clinic with any additional questions or concerns. Referral placed.    DAVIS Moya RN  Rheumatology RN Coordinator   [place]

## 2017-08-25 NOTE — TELEPHONE ENCOUNTER
Spoke with pt and answered her questions previously.    ERIC MoyaN RN  Rheumatology RN Coordinator  TERI Portillo

## 2017-08-28 ENCOUNTER — MYC REFILL (OUTPATIENT)
Dept: INTERNAL MEDICINE | Facility: CLINIC | Age: 32
End: 2017-08-28

## 2017-08-28 DIAGNOSIS — L40.50 PSORIATIC ARTHRITIS (H): ICD-10-CM

## 2017-08-28 DIAGNOSIS — B00.1 RECURRENT COLD SORES: ICD-10-CM

## 2017-08-29 RX ORDER — OXYCODONE AND ACETAMINOPHEN 5; 325 MG/1; MG/1
1 TABLET ORAL EVERY 4 HOURS PRN
Qty: 30 TABLET | Refills: 0 | Status: SHIPPED | OUTPATIENT
Start: 2017-09-08 | End: 2017-09-27

## 2017-08-29 NOTE — TELEPHONE ENCOUNTER
Reason For Call:   Chief Complaint   Patient presents with     Refill Request            Medication Name, Dose and Monthly Quantity:   Oxycodone with APAP (Percocet): Dose 5-325mg Schedule 1 tablet Q4H PRN Monthly Quantity 30     Diagnosis requiring opiates:   Psoriatic arthritis (H) [L40.50]       Recurrent cold sores [B00.1]           Problem List Updated:   No    Opioid Agreement On File - [facility] PAIN CONTRACT ID# [id number]:  Unsure    Last Urine Drug Screen (at least once every 12 months) Date:   None    Unexpected Results:   N/A    [place]  Data Reviewed (at least once every 3 months) Date:   8/29/17   Unexpected Results:    No.    Last Fill Date:   8/09/17    Last Visit with PCP:   7/07/2017    Future Visits with PCP:   No.    Processing:   Mail to patient.     Tyra Singleton RN

## 2017-09-08 ENCOUNTER — OFFICE VISIT (OUTPATIENT)
Dept: ORTHOPEDICS | Facility: CLINIC | Age: 32
End: 2017-09-08

## 2017-09-08 VITALS — BODY MASS INDEX: 18.78 KG/M2 | HEIGHT: 64 IN | WEIGHT: 110 LBS

## 2017-09-08 DIAGNOSIS — L40.50 PSORIATIC ARTHRITIS (H): Primary | ICD-10-CM

## 2017-09-08 DIAGNOSIS — M25.469 EFFUSION OF LOWER LEG JOINT: ICD-10-CM

## 2017-09-08 DIAGNOSIS — Z79.4 CONTROLLED TYPE 2 DIABETES MELLITUS WITHOUT COMPLICATION, WITH LONG-TERM CURRENT USE OF INSULIN (H): ICD-10-CM

## 2017-09-08 DIAGNOSIS — E11.9 CONTROLLED TYPE 2 DIABETES MELLITUS WITHOUT COMPLICATION, WITH LONG-TERM CURRENT USE OF INSULIN (H): ICD-10-CM

## 2017-09-08 RX ORDER — TRIAMCINOLONE ACETONIDE 40 MG/ML
40 INJECTION, SUSPENSION INTRA-ARTICULAR; INTRAMUSCULAR ONCE
Qty: 1 ML | Refills: 0 | OUTPATIENT
Start: 2017-09-08 | End: 2017-09-08

## 2017-09-08 NOTE — NURSING NOTE
[address] [address]  Dept: [phone]  ______________________________________________________________________________    Patient: Chrissy Dixon   : 1985   MRN: [id number]   2017    INVASIVE PROCEDURE SAFETY CHECKLIST    Date: 2017   Procedure:Left knee CSI with Kenalog   Patient Name: Chrissy Dixon  MRN: [id number]  YOB: 1985    Action: Complete sections as appropriate. Any discrepancy results in a HARD COPY until resolved.     PRE PROCEDURE:  Patient ID verified with 2 identifiers (name and  or MRN): Yes  Procedure and site verified with patient/designee (when able): Yes  Accurate consent documentation in medical record: Yes  H&P (or appropriate assessment) documented in medical record: NA  H&P must be up to 20 days prior to procedure and updates within 24 hours of procedure as applicable: NA  Relevant diagnostic and radiology test results appropriately labeled and displayed as applicable: Yes  Procedure site(s) marked with provider initials: NA    TIMEOUT:  Time-Out performed immediately prior to starting procedure, including verbal and active participation of all team members addressing the following:Yes  * Correct patient identify  * Confirmed that the correct side and site are marked  * An accurate procedure consent form  * Agreement on the procedure to be done  * Correct patient position  * Relevant images and results are properly labeled and appropriately displayed  * The need to administer antibiotics or fluids for irrigation purposes during the procedure as applicable   * Safety precautions based on patient history or medication use    DURING PROCEDURE: Verification of correct person, site, and procedures any time the responsibility for care of the patient is transferred to another member of the care team.     The following medication was given:     MEDICATION:  Kenalog 40 mg  ROUTE:  Intra-articular  SITE: Left knee   DOSE: 1.5mL  LOT #: [id number]  : [employer]  EXPIRATION DATE: 02/2019  NDC#: 7221-8091-65   Was there drug waste? No     MEDICATION:  Lidocaine without epinephrine  ROUTE: Intra-articular  SITE: Left knee   DOSE: 3.5mL  LOT #: [id number]  : Hospira  EXPIRATION DATE: 04/01/2019  NDC#: 5260-4226-53   Was there drug waste? Yes  Amount of drug waste (mL): 16.5mL.  Reason for waste:  As per MD Ilana Cummings, [facility]  September 8, 2017

## 2017-09-08 NOTE — PROGRESS NOTES
Subjective:   Chrissy Dixon is a 31 year old female who is here for a left knee recurrent Barnett's cyst. Chrissy has a history of psoriatic arthritis and has been under the care of rheumatology. Her arthritis has been well controlled on her current medical regimen. She notes that over the years she said some recurrent knee swelling. Several years ago she had an episode of knee swelling. More recently she had right knee swelling which was addressed by Dr. Haskins. When she saw Dr. Granados in August he noted left knee swelling and performed an aspiration and corticosteroid injection. She states that this improved her left knee but she continued to have posterior and medial aching of the knee. She went on to obtain an MRI and the results are listed below. The most striking feature is her current synovitis. She denies trauma to the left knee. She denies locking or instability. She had an ultrasound which demonstrated her popliteal cyst. She is here for further evaluation of the knee.    Of note, she does have diabetes mellitus and does have predictable hypoglycemia associated with her injections of corticosteroid. Her most recent hyperglycemia was just over 200. She controls these well through her medications and monitoring.    Background:   Date of injury: N/A   Duration of symptoms: 2 months  Mechanism of Injury: Chronic; Unknown   Intensity: 6/10 at rest, 8/10 with activity   Aggravating factors: Stairs, bending and sitting too long   Relieving Factors: ice and heat  Prior Evaluation: MRI and ultrasound     PAST MEDICAL, SOCIAL, SURGICAL AND FAMILY HISTORY: She  has a past medical history of Diabetes; Other psoriasis; and Polyarthritis. She also has no past medical history of Atypical fibroxanthoma; Basal cell carcinoma; Cutaneous T-cell lymphoma (H); Malignant melanoma (H); Other specified malignant neoplasm of skin of trunk, except scrotum; or Squamous cell carcinoma.  She  has a past surgical history that  "includes surgical history of -.  Her family history includes Connective Tissue Disorder in her father; DIABETES in her maternal grandfather, maternal uncle, maternal uncle, and mother; Hypertension in her maternal grandfather, maternal grandmother, and mother; Prostate Cancer in her maternal grandfather; Thyroid Disease in her mother. There is no history of Asthma.  She reports that she quit smoking about 9 years ago. Her smoking use included Cigarettes. She has a 5.00 pack-year smoking history. She has never used smokeless tobacco. She reports that she does not drink alcohol or use illicit drugs.    ALLERGIES: She is allergic to diagnostic x-ray materials; betadine [povidone iodine]; levofloxacin; penicillins; pneumococcal vaccines; prenatal vit-fe fumarate-fa [cavan-folate ob]; and tdap [daptacel].    CURRENT MEDICATIONS: She has a current medication list which includes the following prescription(s): oxycodone-acetaminophen, tramadol, metformin, insulin pen needle, prednisone, insulin detemir, vitamin d, fluticasone, apremilast, insulin lispro, apremilast, ondansetron, ferrous gluconate, valacyclovir, ibuprofen, ustekinumab, insulin pen needle, blood glucose monitoring, prednisone, prednisone, derma-smoothe/fs scalp, triamcinolone, prednisone, clobetasol, levemir flexpen, and levonorgestrel, and the following Facility-Administered Medications: triamcinolone acetonide and lidocaine (pf).     REVIEW OF SYSTEMS: 10 point review of systems is negative except as noted above.     Exam:   Ht 5' 4\" (1.626 m)  Wt 110 lb (49.9 kg)  BMI 18.88 kg/m2      CONSTITUTIONAL: healthy, alert and no distress  SKIN: no suspicious lesions or rashes  GAIT: normal  NEUROLOGIC: Non-focal  PSYCHIATRIC: affect normal/bright and mentation appears normal.    MUSCULOSKELETAL:   LEFT KNEE: Comprehensive knee examination demonstrates FROM, (++) effusion, (-) medial and (+) lateral patellar facet tenderness, (-) patellar inhibition, (-) " apprehension; (-) pain or laxity with valgus stress testing in 0 or 30 degrees of knee flexion, (-) pain or laxity with varus stress testing in 0 or 30 degrees of knee flexion, (-) lachman, (-) PD; (+) diffuse medial joint line tenderness, (-) lateral joint line tenderness, (-) bounce test, (-) Adilene test.      IMPRESSION:  1. Horizontal oblique undersurface tear involving the body and  posterior horn of the medial meniscus.  2. Moderate to large knee joint effusion with diffuse mild to moderate  synovitis along with distention of the popliteus tendon sheath and a  large, multilobulated Baker's cyst with significant extravasation  inferiorly and superiorly. There is increased lobulated/from the like  fat within the posterior aspect of the operative suprapatellar joint  which may be within normal limits or represent areas of lipomatosis  arborescens.  3. Largely intact articular cartilage about the knee without  subchondral changes with the exception of scalloping in the mid and  peripheral medial patella which presents as signal heterogeneity and  surface irregularity on sagittal images with mild apparent cartilage  edema in the superolateral and superior central trochlea on sagittal  images which is not appreciated axially. There is mild subchondral  edema along with subchondral cortical irregularity at the medial  margin of the patella within the mid and superior aspect adjacent to  the medial articular cartilage abnormality. The patellofemoral  articular cartilage findings are of uncertain etiology and  significance. No changes about the anterior fat pads to suggest  chronic patellofemoral impingement.  4. Suboptimal visualization of the attenuated, mildly irregular  popliteal fibular ligament and the arcuate ligament along with minimal  presence of the inferior posterolateral meniscocapsular fascicles.  5. Multiple small intraosseous ganglion cysts in the proximal tibia  about the bases of the tibial spines  and anteriorly. These may be  partly related to prior ACL injury or chronic ACL stresses but are  nonspecific.     MARYSOL STORM MD   Assessment/Plan:   Chrissy is a 31-year-old female with psoriatic arthritis and diabetes mellitus who is having recurrent left knee effusions which have resulted in the development of a popliteal cyst. She did have a recent left knee corticosteroid injection which did not produce marked improvement. I recommended at this time that we increase the dose of corticosteroid and undergo a second injection. If this does not provide her with some significant for sustained relief, which I believe is related to her underlying synovitis, then the next step would be to consider proceeding with a diagnostic and therapeutic arthroscopy. The challenges with arthroscopy remain in determination of whether her medial meniscal tearing is symptomatic or not. We did discuss the associated hyperglycemia that may occur and she is well versed and able to handle this. She would like to proceed with an injection today. I have asked to see her back in 1 month to determine how she is doing. Certainly if she's had limited improvement we'll have her see one of our surgeons to discuss arthroscopy.    Procedure: Consent was obtained after discussion of risks, benefits, and alternatives. After sterile preparation of the left knee, a 22-gauge 1-1/2 inch needle was used to inject the knee with 60 mg of Kenalog and 3.5 cc of 1% lidocaine. There were no complications. Postinjection instructions were given.    Thank you for allowing me to participate in her care.

## 2017-09-08 NOTE — MR AVS SNAPSHOT
After Visit Summary   9/8/2017    Chrissy Dixon    MRN: [id number]           Patient Information     Date Of Birth          1985        Visit Information        Provider Department      9/8/2017 2:30 PM Jad Hightower MD [facility]        Today's Diagnoses     Psoriatic arthritis (H)    -  1    Controlled type 2 diabetes mellitus without complication, with long-term current use of insulin (H)        Effusion of lower leg joint           Follow-ups after your visit        Your next 10 appointments already scheduled     Oct 23, 2017  8:30 AM CDT   (Arrive by 8:15 AM)   Return Visit with Jad Hightower MD   [facility] ([facility])    [address]  5th Floor  [facility] [address] [phone]            Dec 08, 2017 10:30 AM CST   (Arrive by 10:15 AM)   Return Visit with Rene Granados MD   [facility] ([facility])    [address]  3rd Floor  [facility] [address] [phone]              Who to contact     Please call your clinic at [phone] to:    Ask questions about your health    Make or cancel appointments    Discuss your medicines    Learn about your test results    Speak to your doctor   If you have compliments or concerns about an experience at your clinic, or if you wish to file a complaint, please contact [facility] Patient Relations at [phone] or email us at [email or web address]         Additional Information About Your Visit        MyChart Information     [employer] gives you secure access to your electronic health record. If you see a primary care provider, you can also send messages to your care team and make appointments. If you have questions, please call your primary care clinic.  If you do not have a primary care provider, please call [phone] and they will assist you.      [employer] is an electronic  "gateway that provides easy, online access to your medical records. With [employer], you can request a clinic appointment, read your test results, renew a prescription or communicate with your care team.     To access your existing account, please contact your [facility] or call [phone] for assistance.        Care EveryWhere ID     This is your Care EveryWhere ID. This could be used by other organizations to access your [facility] medical records  [id number]        Your Vitals Were     Height BMI (Body Mass Index)                5' 4\" (1.626 m) 18.88 kg/m2           Blood Pressure from Last 3 Encounters:   08/04/17 114/74   07/07/17 119/77   06/09/17 119/78    Weight from Last 3 Encounters:   09/08/17 110 lb (49.9 kg)   08/04/17 110 lb 11.2 oz (50.2 kg)   07/07/17 112 lb 6.4 oz (51 kg)              We Performed the Following     Large Joint/Bursa injection and/or drainage - Unilateral (Shoulder, Knee) [20610]     TRIAMCINOLONE ACET INJ NOS          Today's Medication Changes          These changes are accurate as of: 9/8/17  3:46 PM.  If you have any questions, ask your nurse or doctor.               Start taking these medicines.        Dose/Directions    triamcinolone acetonide 40 MG/ML injection   Commonly known as:  KENALOG   Used for:  Effusion of lower leg joint        Dose:  40 mg   1 mL (40 mg) by INTRA-ARTICULAR route once for 1 dose   Quantity:  1 mL   Refills:  0            Where to get your medicines      Some of these will need a paper prescription and others can be bought over the counter.  Ask your nurse if you have questions.     You don't need a prescription for these medications     triamcinolone acetonide 40 MG/ML injection                Primary Care Provider Office Phone # Fax #    Nando Haskins [phone]       [address] [place] [address]        Equal Access to Services     SHERINE MARTINEZ AH: Hemanth staples " Soradhaali, wakirtda luqadaha, qaybta kaalmada bk, stanley edwardsdemario cindi. So [facility] [phone].    ATENCIÓN: Si jose ventura, tiene a hameed disposición servicios gratuitos de asistencia lingüística. Nestor al [phone].    We comply with applicable federal civil rights laws and [place] laws. We do not discriminate on the basis of race, color, national origin, age, disability sex, sexual orientation or gender identity.            Thank you!     Thank you for choosing [facility]  for your care. Our goal is always to provide you with excellent care. Hearing back from our patients is one way we can continue to improve our services. Please take a few minutes to complete the written survey that you may receive in the mail after your visit with us. Thank you!             Your Updated Medication List - Protect others around you: Learn how to safely use, store and throw away your medicines at www.disposemymeds.org.          This list is accurate as of: 9/8/17  3:46 PM.  Always use your most recent med list.                   Brand Name Dispense Instructions for use Diagnosis    * apremilast 30 MG tablet    OTEZLA    180 tablet    Take 1 tablet (30 mg) by mouth 2 times daily    Psoriatic arthritis (H)       * apremilast 30 MG tablet    OTEZLA    180 tablet    Take 1 tablet (30 mg) by mouth 2 times daily    Psoriatic arthritis (H)       blood glucose monitoring test strip    ACCU-CHEK SMARTVIEW    700 each    Use to test blood sugar 6-8 times daily or as directed.    Diabetes mellitus, type 2 (H)       clobetasol 0.05 % external solution    TEMOVATE    50 mL    Apply topically 2 times daily    Psoriasis       DERMA-SMOOTHE/FS SCALP 0.01 % Oil     118 mL    Apply to scalp at bedtime, then wear showercap, rinse off in morning.    Other psoriasis       ferrous gluconate 324 (38 FE) MG tablet    FERGON    100 tablet    Take 1 tablet (324 mg) by mouth every other day    Iron deficiency anemia  due to chronic blood loss, Iron deficiency anemia, unspecified iron deficiency anemia type       fluticasone 50 MCG/ACT spray    FLONASE    48 g    Spray 2 sprays into both nostrils daily    Rhinitis medicamentosa       ibuprofen 600 MG tablet    ADVIL/MOTRIN    270 tablet    Take 1 tablet (600 mg) by mouth every 8 hours as needed for moderate pain    Psoriasis arthropathica (H)       insulin lispro 100 UNIT/ML injection    HumaLOG KWIKpen    30 mL    USE THREE TIMES DAILY WITH MEALS, CURRENTLY 30 UNITS DAILY    Diabetes mellitus, type 2 (H)       * insulin pen needle 29G X 12.7MM    BD ULTRA-FINE    200 each    Used to inject insulin up to twice daily    Diabetes mellitus (H)       * insulin pen needle 31G X 8 MM     300 each    Use  3-4 pen needles daily or as directed.    Diabetes mellitus, type 2 (H)       * LEVEMIR FLEXpen 100 UNIT/ML injection   Generic drug:  insulin detemir     3 Month    6 units in the morning, 14 units in evening = for total of 20 units daily    Diabetes mellitus type 2, uncontrolled (H), Psoriatic arthritis (H), Other malaise and fatigue       * insulin detemir 100 UNIT/ML injection    LEVEMIR FLEXTOUCH    30 mL    Inject 25 Units Subcutaneous At Bedtime    Diabetes mellitus (H)       levonorgestrel 20 MCG/24HR IUD    MIRENA (52 MG)    1 each    Use as directed    Family planning, IUD (intrauterine device) check/reinsertion/removal       metFORMIN 500 MG tablet    GLUCOPHAGE    360 tablet    Take 2 tablets (1,000 mg) by mouth 2 times daily (with meals)    Diabetes mellitus, type 2 (H)       ondansetron 4 MG ODT tab    ZOFRAN-ODT    60 tablet    Take 1 tablet (4 mg) by mouth every 8 hours as needed for nausea    Nausea       oxyCODONE-acetaminophen 5-325 MG per tablet    PERCOCET    30 tablet    Take 1 tablet by mouth every 4 hours as needed (must last 30 days from dispense date)    Psoriatic arthritis (H), Recurrent cold sores       * predniSONE 10 MG tablet    DELTASONE    30 tablet     Take 1 tablet (10 mg) by mouth daily As needed for flares only    Psoriatic arthropathy (H)       * predniSONE 5 MG tablet    DELTASONE    50 tablet    3 tabs daily for 1 wk, then 2 tabs daily for 1 week.    Psoriatic arthritis (H)       * predniSONE 5 MG tablet    DELTASONE    20 tablet    Take 3 tablets (15mg) by mouth for 4 days, then 2 tablets (10mg) by mouth for 4 days.    Psoriatic arthritis (H)       * predniSONE 5 MG tablet    DELTASONE    18 tablet    20 mg per day x 2 days, 15 mg per day x 2 days, 10 mg per day x 2 days then stop. -    Rheumatoid arthritis involving both hands with positive rheumatoid factor (H)       traMADol 50 MG tablet    ULTRAM    90 tablet    Take 1 tablet (50 mg) by mouth every 8 hours as needed for moderate pain    Psoriatic arthritis (H)       triamcinolone 0.1 % cream    KENALOG    453.6 g    Apply topically 2 times daily    Guttate psoriasis       triamcinolone acetonide 40 MG/ML injection    KENALOG    1 mL    1 mL (40 mg) by INTRA-ARTICULAR route once for 1 dose    Effusion of lower leg joint       ustekinumab 45 MG/0.5ML Sosy    STELARA    3 Syringe    Inject 0.5 mLs (45 mg) Subcutaneous every 3 months    Psoriatic arthritis (H)       valACYclovir 500 MG tablet    VALTREX    90 tablet    Take 1 tablet (500 mg) by mouth daily    Psoriatic arthritis (H)       vitamin D 34750 UNIT capsule    ERGOCALCIFEROL    24 capsule    Take 1 capsule (50,000 Units) by mouth twice a week    Vitamin D deficiency       * Notice:  This list has 10 medication(s) that are the same as other medications prescribed for you. Read the directions carefully, and ask your doctor or other care provider to review them with you.

## 2017-09-08 NOTE — LETTER
9/8/2017      RE: Chrissy Dixon  [address] [facility] [address]        Subjective:   Chrissy Dixon is a 31 year old female who is here for a left knee recurrent Barnett's cyst. Chrissy has a history of psoriatic arthritis and has been under the care of rheumatology. Her arthritis has been well controlled on her current medical regimen. She notes that over the years she said some recurrent knee swelling. Several years ago she had an episode of knee swelling. More recently she had right knee swelling which was addressed by Dr. Haskins. When she saw Dr. Granados in August he noted left knee swelling and performed an aspiration and corticosteroid injection. She states that this improved her left knee but she continued to have posterior and medial aching of the knee. She went on to obtain an MRI and the results are listed below. The most striking feature is her current synovitis. She denies trauma to the left knee. She denies locking or instability. She had an ultrasound which demonstrated her popliteal cyst. She is here for further evaluation of the knee.    Of note, she does have diabetes mellitus and does have predictable hypoglycemia associated with her injections of corticosteroid. Her most recent hyperglycemia was just over 200. She controls these well through her medications and monitoring.    Background:   Date of injury: N/A   Duration of symptoms: 2 months  Mechanism of Injury: Chronic; Unknown   Intensity: 6/10 at rest, 8/10 with activity   Aggravating factors: Stairs, bending and sitting too long   Relieving Factors: ice and heat  Prior Evaluation: MRI and ultrasound     PAST MEDICAL, SOCIAL, SURGICAL AND FAMILY HISTORY: She  has a past medical history of Diabetes; Other psoriasis; and Polyarthritis. She also has no past medical history of Atypical fibroxanthoma; Basal cell carcinoma; Cutaneous T-cell lymphoma (H); Malignant melanoma (H); Other specified malignant neoplasm of skin of trunk,  "except scrotum; or Squamous cell carcinoma.  She  has a past surgical history that includes surgical history of -.  Her family history includes Connective Tissue Disorder in her father; DIABETES in her maternal grandfather, maternal uncle, maternal uncle, and mother; Hypertension in her maternal grandfather, maternal grandmother, and mother; Prostate Cancer in her maternal grandfather; Thyroid Disease in her mother. There is no history of Asthma.  She reports that she quit smoking about 9 years ago. Her smoking use included Cigarettes. She has a 5.00 pack-year smoking history. She has never used smokeless tobacco. She reports that she does not drink alcohol or use illicit drugs.    ALLERGIES: She is allergic to diagnostic x-ray materials; betadine [povidone iodine]; levofloxacin; penicillins; pneumococcal vaccines; prenatal vit-fe fumarate-fa [cavan-folate ob]; and tdap [daptacel].    CURRENT MEDICATIONS: She has a current medication list which includes the following prescription(s): oxycodone-acetaminophen, tramadol, metformin, insulin pen needle, prednisone, insulin detemir, vitamin d, fluticasone, apremilast, insulin lispro, apremilast, ondansetron, ferrous gluconate, valacyclovir, ibuprofen, ustekinumab, insulin pen needle, blood glucose monitoring, prednisone, prednisone, derma-smoothe/fs scalp, triamcinolone, prednisone, clobetasol, levemir flexpen, and levonorgestrel, and the following Facility-Administered Medications: triamcinolone acetonide and lidocaine (pf).     REVIEW OF SYSTEMS: 10 point review of systems is negative except as noted above.     Exam:   Ht 5' 4\" (1.626 m)  Wt 110 lb (49.9 kg)  BMI 18.88 kg/m2      CONSTITUTIONAL: healthy, alert and no distress  SKIN: no suspicious lesions or rashes  GAIT: normal  NEUROLOGIC: Non-focal  PSYCHIATRIC: affect normal/bright and mentation appears normal.    MUSCULOSKELETAL:   LEFT KNEE: Comprehensive knee examination demonstrates FROM, (++) effusion, (-) " medial and (+) lateral patellar facet tenderness, (-) patellar inhibition, (-) apprehension; (-) pain or laxity with valgus stress testing in 0 or 30 degrees of knee flexion, (-) pain or laxity with varus stress testing in 0 or 30 degrees of knee flexion, (-) lachman, (-) PD; (+) diffuse medial joint line tenderness, (-) lateral joint line tenderness, (-) bounce test, (-) Adilene test.      IMPRESSION:  1. Horizontal oblique undersurface tear involving the body and  posterior horn of the medial meniscus.  2. Moderate to large knee joint effusion with diffuse mild to moderate  synovitis along with distention of the popliteus tendon sheath and a  large, multilobulated Baker's cyst with significant extravasation  inferiorly and superiorly. There is increased lobulated/from the like  fat within the posterior aspect of the operative suprapatellar joint  which may be within normal limits or represent areas of lipomatosis  arborescens.  3. Largely intact articular cartilage about the knee without  subchondral changes with the exception of scalloping in the mid and  peripheral medial patella which presents as signal heterogeneity and  surface irregularity on sagittal images with mild apparent cartilage  edema in the superolateral and superior central trochlea on sagittal  images which is not appreciated axially. There is mild subchondral  edema along with subchondral cortical irregularity at the medial  margin of the patella within the mid and superior aspect adjacent to  the medial articular cartilage abnormality. The patellofemoral  articular cartilage findings are of uncertain etiology and  significance. No changes about the anterior fat pads to suggest  chronic patellofemoral impingement.  4. Suboptimal visualization of the attenuated, mildly irregular  popliteal fibular ligament and the arcuate ligament along with minimal  presence of the inferior posterolateral meniscocapsular fascicles.  5. Multiple small  intraosseous ganglion cysts in the proximal tibia  about the bases of the tibial spines and anteriorly. These may be  partly related to prior ACL injury or chronic ACL stresses but are  nonspecific.     MARYSOL STORM MD   Assessment/Plan:   Chrissy is a 31-year-old female with psoriatic arthritis and diabetes mellitus who is having recurrent left knee effusions which have resulted in the development of a popliteal cyst. She did have a recent left knee corticosteroid injection which did not produce marked improvement. I recommended at this time that we increase the dose of corticosteroid and undergo a second injection. If this does not provide her with some significant for sustained relief, which I believe is related to her underlying synovitis, then the next step would be to consider proceeding with a diagnostic and therapeutic arthroscopy. The challenges with arthroscopy remain in determination of whether her medial meniscal tearing is symptomatic or not. We did discuss the associated hyperglycemia that may occur and she is well versed and able to handle this. She would like to proceed with an injection today. I have asked to see her back in 1 month to determine how she is doing. Certainly if she's had limited improvement we'll have her see one of our surgeons to discuss arthroscopy.    Procedure: Consent was obtained after discussion of risks, benefits, and alternatives. After sterile preparation of the left knee, a 22-gauge 1-1/2 inch needle was used to inject the knee with 60 mg of Kenalog and 3.5 cc of 1% lidocaine. There were no complications. Postinjection instructions were given.    Thank you for allowing me to participate in her care.    Jad Hightower MD

## 2017-09-17 ENCOUNTER — MYC REFILL (OUTPATIENT)
Dept: RHEUMATOLOGY | Facility: CLINIC | Age: 32
End: 2017-09-17

## 2017-09-17 ENCOUNTER — HEALTH MAINTENANCE LETTER (OUTPATIENT)
Age: 32
End: 2017-09-17

## 2017-09-17 DIAGNOSIS — L40.50 PSORIATIC ARTHRITIS (H): ICD-10-CM

## 2017-09-18 NOTE — TELEPHONE ENCOUNTER
Last seen: 8/4/17  Pending appt: 12/8/17  Medication: tramadol   Last filled: 8/22/17 per   Qty: 90  Lab:    Prescription set up and routed to provider for approval.    ERIC MoyaN RN  Rheumatology RN Coordinator   [place]

## 2017-09-18 NOTE — TELEPHONE ENCOUNTER
Message from Erica:  Vaishali Batres RN Mon Sep 18, 2017 8:10 AM        ----- Message -----   From: Chrissy Dixon   Sent: 9/17/2017 2:55 PM   To: Adult Rheum Triage-[facility]  Subject: Medication Renewal Request     Original authorizing provider: MD Chrissy Horn would like a refill of the following medications:  traMADol (ULTRAM) 50 MG tablet [Rene Granados MD]    Preferred pharmacy: [facility] [address] [place] [address] [address] AT [facility] [address]    Comment:

## 2017-09-19 RX ORDER — TRAMADOL HYDROCHLORIDE 50 MG/1
50 TABLET ORAL EVERY 8 HOURS PRN
Qty: 90 TABLET | Refills: 0 | Status: SHIPPED | OUTPATIENT
Start: 2017-09-19 | End: 2017-10-16

## 2017-09-20 ENCOUNTER — MYC MEDICAL ADVICE (OUTPATIENT)
Dept: RHEUMATOLOGY | Facility: CLINIC | Age: 32
End: 2017-09-20

## 2017-09-20 NOTE — TELEPHONE ENCOUNTER
Prescription called into desired pharmacy and patient has been notified via [employer].   Samantha Gomez LPN

## 2017-09-27 ENCOUNTER — MYC REFILL (OUTPATIENT)
Dept: INTERNAL MEDICINE | Facility: CLINIC | Age: 32
End: 2017-09-27

## 2017-09-27 DIAGNOSIS — B00.1 RECURRENT COLD SORES: ICD-10-CM

## 2017-09-27 DIAGNOSIS — L40.50 PSORIATIC ARTHRITIS (H): ICD-10-CM

## 2017-09-27 RX ORDER — OXYCODONE AND ACETAMINOPHEN 5; 325 MG/1; MG/1
1 TABLET ORAL EVERY 4 HOURS PRN
Qty: 30 TABLET | Refills: 0 | Status: SHIPPED | OUTPATIENT
Start: 2017-09-27 | End: 2017-11-02

## 2017-09-27 NOTE — TELEPHONE ENCOUNTER
Message from [employer]:  Original authorizing provider: Nando Haskins MD    Chrissy Zack would like a refill of the following medications:  oxyCODONE-acetaminophen (PERCOCET) 5-325 MG per tablet [Nando Haskins MD]    Preferred pharmacy: Other -  on 10/6- I have a pre-op screening     Comment:  I am coming to clinic on 10/6 for a pre-op screaming. Can I  at lock box then?

## 2017-09-27 NOTE — TELEPHONE ENCOUNTER
Reason For Call:        Chief Complaint   Patient presents with     Refill Request                 Medication Name, Dose and Monthly Quantity:   Oxycodone with APAP (Percocet): Dose 5-325mg Schedule 1 tablet Q4H PRN Monthly Quantity 30      Diagnosis requiring opiates:   Psoriatic arthritis (H) [L40.50]       Recurrent cold sores [B00.1]             Problem List Updated:   No     Opioid Agreement On File - [facility] PAIN CONTRACT ID# [id number]:  Unsure     Last Urine Drug Screen (at least once every 12 months) Date:   None     Unexpected Results:   N/A     [place]  Data Reviewed (at least once every 3 months) Date:   9/27/17  Unexpected Results:    No.     Last Fill Date:   9/8/17     Last Visit with PCP:   7/07/2017     Future Visits with PCP:   10/6/17 Pre op      Processing:   Lock Box, will  on 10/6

## 2017-10-02 ASSESSMENT — ENCOUNTER SYMPTOMS
LIGHT-HEADEDNESS: 1
NERVOUS/ANXIOUS: 1
JOINT SWELLING: 1
WEIGHT LOSS: 0
HOT FLASHES: 0
JAUNDICE: 0
VOMITING: 1
SLEEP DISTURBANCES DUE TO BREATHING: 0
WEIGHT GAIN: 0
HALLUCINATIONS: 0
BLOATING: 1
CHILLS: 0
SKIN CHANGES: 0
HYPOTENSION: 0
LEG PAIN: 1
FATIGUE: 1
NIGHT SWEATS: 1
RECTAL PAIN: 0
NECK PAIN: 0
MUSCLE CRAMPS: 0
RECTAL BLEEDING: 0
DECREASED APPETITE: 0
ORTHOPNEA: 0
LEG SWELLING: 0
FLANK PAIN: 0
HEMATURIA: 0
HYPERTENSION: 0
POLYDIPSIA: 0
EYE REDNESS: 0
MYALGIAS: 0
PALPITATIONS: 0
NAIL CHANGES: 0
INSOMNIA: 1
STIFFNESS: 1
BOWEL INCONTINENCE: 0
SYNCOPE: 0
EYE PAIN: 0
FEVER: 0
DIARRHEA: 1
CLAUDICATION: 1
EYE IRRITATION: 1
PANIC: 0
INCREASED ENERGY: 0
TACHYCARDIA: 0
EYE WATERING: 0
EXERCISE INTOLERANCE: 0
BACK PAIN: 1
CONSTIPATION: 0
MUSCLE WEAKNESS: 1
HEARTBURN: 1
NAUSEA: 1
DIFFICULTY URINATING: 0
DEPRESSION: 0
POLYPHAGIA: 0
ABDOMINAL PAIN: 1
DOUBLE VISION: 0
DECREASED CONCENTRATION: 1
POOR WOUND HEALING: 0
ARTHRALGIAS: 1
DYSURIA: 0
DECREASED LIBIDO: 0
BLOOD IN STOOL: 1
ALTERED TEMPERATURE REGULATION: 0

## 2017-10-06 ENCOUNTER — OFFICE VISIT (OUTPATIENT)
Dept: INTERNAL MEDICINE | Facility: CLINIC | Age: 32
End: 2017-10-06

## 2017-10-06 VITALS
SYSTOLIC BLOOD PRESSURE: 111 MMHG | HEART RATE: 90 BPM | BODY MASS INDEX: 19.52 KG/M2 | DIASTOLIC BLOOD PRESSURE: 72 MMHG | RESPIRATION RATE: 16 BRPM | WEIGHT: 113.7 LBS

## 2017-10-06 DIAGNOSIS — Z01.818 PREOP GENERAL PHYSICAL EXAM: Primary | ICD-10-CM

## 2017-10-06 ASSESSMENT — PAIN SCALES - GENERAL: PAINLEVEL: NO PAIN (0)

## 2017-10-06 NOTE — PATIENT INSTRUCTIONS
Primary Care Center Phone Number [phone]  [facility] Medication Refill Request Information:  * Please contact your pharmacy regarding ANY request for medication refills.  ** [facility]ax = [phone]  * Please allow 3 business days for routine medication refills.  * Please allow 5 business days for controlled substance medication refills.     [facility] Test Result notification information:  *You will be notified with in 7-10 days of your appointment day regarding the results of your test.  If you are on MyChart you will be notified as soon as the provider has reviewed the results and signed off on them.        Pre-op recommendations:  Take 20 units of levemir insulin the night before surgery.  Hold metformin 48 hours prior to surgery.  Avoid aspirin or NSAIDs until after the procedure. Continue other medications as prescribed.

## 2017-10-06 NOTE — MR AVS SNAPSHOT
After Visit Summary   10/6/2017    Chrissy Dixon    MRN: [id number]           Patient Information     Date Of Birth          1985        Visit Information        Provider Department      10/6/2017 1:40 PM Gemma Nelson MD [facility]        Care Instructions    Primary Care Center Phone Number [phone]  [facility] Medication Refill Request Information:  * Please contact your pharmacy regarding ANY request for medication refills.  ** [facility] Prescription Fax = [phone]  * Please allow 3 business days for routine medication refills.  * Please allow 5 business days for controlled substance medication refills.     [facility] Test Result notification information:  *You will be notified with in 7-10 days of your appointment day regarding the results of your test.  If you are on MyChart you will be notified as soon as the provider has reviewed the results and signed off on them.        Pre-op recommendations:  Take 20 units of levemir insulin the night before surgery.  Hold metformin 48 hours prior to surgery.  Avoid aspirin or NSAIDs until after the procedure. Continue other medications as prescribed.            Follow-ups after your visit        Your next 10 appointments already scheduled     Dec 08, 2017 10:30 AM CST   (Arrive by 10:15 AM)   Return Visit with Rene Granados MD   [facility] ([facility])    [address] [place] [id number] [phone]              Who to contact     Please call your clinic at [phone] to:    Ask questions about your health    Make or cancel appointments    Discuss your medicines    Learn about your test results    Speak to your doctor   If you have compliments or concerns about an experience at your clinic, or if you wish to file a complaint, please contact [facility] Patient Relations at [phone] or email us at  [email or web address]         Additional Information About Your Visit        [employer] Information     "[employer],"t gives you secure access to your electronic health record. If you see a primary care provider, you can also send messages to your care team and make appointments. If you have questions, please call your primary care clinic.  If you do not have a primary care provider, please call [phone] and they will assist you.      [employer] is an electronic gateway that provides easy, online access to your medical records. With [employer], you can request a clinic appointment, read your test results, renew a prescription or communicate with your care team.     To access your existing account, please contact your [facility] or call [phone] for assistance.        Care EveryWhere ID     This is your Care EveryWhere ID. This could be used by other organizations to access your [place] medical records  [id number]        Your Vitals Were     Pulse Respirations Breastfeeding? BMI (Body Mass Index)          90 16 No 19.52 kg/m2         Blood Pressure from Last 3 Encounters:   10/06/17 111/72   08/04/17 114/74   07/07/17 119/77    Weight from Last 3 Encounters:   10/06/17 51.6 kg (113 lb 11.2 oz)   09/08/17 49.9 kg (110 lb)   08/04/17 50.2 kg (110 lb 11.2 oz)              Today, you had the following     No orders found for display       Primary Care Provider Office Phone # Fax #    Nando David Haskins [phone]       [address] [place] [id number]        Equal Access to Services     SHERINE MARTINEZ : Hadii vick ku hadasho Soomaali, waaxda luqadaha, qaybta kaalmada adeegyaaliya, stanley puente. So [facility] [phone].    ATENCIÓN: Si habla español, tiene a hameed disposición servicios gratuitos de asistencia lingüística. Llame al [phone].    We comply with applicable federal civil rights laws and [place] laws. We do not  discriminate on the basis of race, color, national origin, age, disability, sex, sexual orientation, or gender identity.            Thank you!     Thank you for choosing [facility]  for your care. Our goal is always to provide you with excellent care. Hearing back from our patients is one way we can continue to improve our services. Please take a few minutes to complete the written survey that you may receive in the mail after your visit with us. Thank you!             Your Updated Medication List - Protect others around you: Learn how to safely use, store and throw away your medicines at www.disposemymeds.org.          This list is accurate as of: 10/6/17  1:58 PM.  Always use your most recent med list.                   Brand Name Dispense Instructions for use Diagnosis    * apremilast 30 MG tablet    OTEZLA    180 tablet    Take 1 tablet (30 mg) by mouth 2 times daily    Psoriatic arthritis (H)       * apremilast 30 MG tablet    OTEZLA    180 tablet    Take 1 tablet (30 mg) by mouth 2 times daily    Psoriatic arthritis (H)       blood glucose monitoring test strip    ACCU-CHEK SMARTVIEW    700 each    Use to test blood sugar 6-8 times daily or as directed.    Diabetes mellitus, type 2 (H)       clobetasol 0.05 % external solution    TEMOVATE    50 mL    Apply topically 2 times daily    Psoriasis       DERMA-SMOOTHE/FS SCALP 0.01 % Oil     118 mL    Apply to scalp at bedtime, then wear showercap, rinse off in morning.    Other psoriasis       ferrous gluconate 324 (38 FE) MG tablet    FERGON    100 tablet    Take 1 tablet (324 mg) by mouth every other day    Iron deficiency anemia due to chronic blood loss, Iron deficiency anemia, unspecified iron deficiency anemia type       fluticasone 50 MCG/ACT spray    FLONASE    48 g    Spray 2 sprays into both nostrils daily    Rhinitis medicamentosa       ibuprofen 600 MG tablet    ADVIL/MOTRIN    270 tablet    Take 1 tablet (600 mg) by mouth every 8  hours as needed for moderate pain    Psoriasis arthropathica (H)       insulin lispro 100 UNIT/ML injection    HumaLOG KWIKpen    30 mL    USE THREE TIMES DAILY WITH MEALS, CURRENTLY 30 UNITS DAILY    Diabetes mellitus, type 2 (H)       * insulin pen needle 29G X 12.7MM    BD ULTRA-FINE    200 each    Used to inject insulin up to twice daily    Diabetes mellitus (H)       * insulin pen needle 31G X 8 MM     300 each    Use  3-4 pen needles daily or as directed.    Diabetes mellitus, type 2 (H)       * LEVEMIR FLEXpen 100 UNIT/ML injection   Generic drug:  insulin detemir     3 Month    6 units in the morning, 14 units in evening = for total of 20 units daily    Diabetes mellitus type 2, uncontrolled (H), Psoriatic arthritis (H), Other malaise and fatigue       * insulin detemir 100 UNIT/ML injection    LEVEMIR FLEXTOUCH    30 mL    Inject 25 Units Subcutaneous At Bedtime    Diabetes mellitus (H)       levonorgestrel 20 MCG/24HR IUD    MIRENA (52 MG)    1 each    Use as directed    Family planning, IUD (intrauterine device) check/reinsertion/removal       metFORMIN 500 MG tablet    GLUCOPHAGE    360 tablet    Take 2 tablets (1,000 mg) by mouth 2 times daily (with meals)    Diabetes mellitus, type 2 (H)       ondansetron 4 MG ODT tab    ZOFRAN-ODT    60 tablet    Take 1 tablet (4 mg) by mouth every 8 hours as needed for nausea    Nausea       oxyCODONE-acetaminophen 5-325 MG per tablet    PERCOCET    30 tablet    Take 1 tablet by mouth every 4 hours as needed (must last 30 days from dispense date)    Psoriatic arthritis (H), Recurrent cold sores       * predniSONE 10 MG tablet    DELTASONE    30 tablet    Take 1 tablet (10 mg) by mouth daily As needed for flares only    Psoriatic arthropathy (H)       * predniSONE 5 MG tablet    DELTASONE    50 tablet    3 tabs daily for 1 wk, then 2 tabs daily for 1 week.    Psoriatic arthritis (H)       * predniSONE 5 MG tablet    DELTASONE    20 tablet    Take 3 tablets (15mg) by  mouth for 4 days, then 2 tablets (10mg) by mouth for 4 days.    Psoriatic arthritis (H)       * predniSONE 5 MG tablet    DELTASONE    18 tablet    20 mg per day x 2 days, 15 mg per day x 2 days, 10 mg per day x 2 days then stop. -    Rheumatoid arthritis involving both hands with positive rheumatoid factor (H)       traMADol 50 MG tablet    ULTRAM    90 tablet    Take 1 tablet (50 mg) by mouth every 8 hours as needed for moderate pain    Psoriatic arthritis (H)       triamcinolone 0.1 % cream    KENALOG    453.6 g    Apply topically 2 times daily    Guttate psoriasis       ustekinumab 45 MG/0.5ML Sosy    STELARA    3 Syringe    Inject 0.5 mLs (45 mg) Subcutaneous every 3 months    Psoriatic arthritis (H)       valACYclovir 500 MG tablet    VALTREX    90 tablet    Take 1 tablet (500 mg) by mouth daily    Psoriatic arthritis (H)       vitamin D 46144 UNIT capsule    ERGOCALCIFEROL    24 capsule    Take 1 capsule (50,000 Units) by mouth twice a week    Vitamin D deficiency       * Notice:  This list has 10 medication(s) that are the same as other medications prescribed for you. Read the directions carefully, and ask your doctor or other care provider to review them with you.

## 2017-10-06 NOTE — PROGRESS NOTES
Surgeon (please enter first and last name):  Dr Shen  Fax number for Preop Evaluation:    Location of Surgery: [facility]  Date of Surgery:  10/12/17  Procedure:  IUD removal, and tubal ligation and ablation   History of reaction to anesthesia?  No    Chrissy is a 32 year old female with  has a past medical history of Diabetes and psoriatic arthritis.  She will undergo tubal ligation and ablation to accomplish both birth control and management of heavy periods.  She otherwise feels well, and both diabetes and psoriatic arthritis have been well managed lately.  No flares and no complications.    Past, family, social history unchanged per [facility].   ROS: 10 point ROS neg other than the symptoms noted above in the HPI.    PHYSICAL EXAM:  B/P: 111/72, P: 90, R: 16  Constitutional: no distress, comfortable, pleasant   Eyes: anicteric, normal extra-ocular movements   Ears, Nose and Throat: tympanic membranes clear, nose clear and free of lesions, throat clear, neck supple with full range of motion, no thyromegaly.   Cardiovascular: regular rate and rhythm, normal S1 and S2, no murmurs, rubs or gallops,  peripheral pulses full and symmetric   Respiratory: clear to auscultation, no wheezes or crackles, normal breath sounds   Gastrointestinal: positive bowel sounds, nontender, no hepatosplenomegaly, no masses   Musculoskeletal: full range of motion, no active joints  Skin: no concerning lesions, no jaundice or rash  Neurological: normal gait, no tremor   Psychological: appropriate mood   Lymphatic: no cervical lymphadenopathy and no pedal edema    A/P:  Low risk for perioperative events; proceed with surgery as planned.  No further testing indicated at this time.  I recommend the following for medication management: Take 20 units of levemir insulin the night before surgery.  Hold metformin 48 hours prior to surgery.  Avoid aspirin or NSAIDs until after the procedure. Continue other medications as prescribed.         Mel Caba MD

## 2017-10-06 NOTE — NURSING NOTE
Chief Complaint   Patient presents with     Pre-Op Exam     pt is here for a pre op exam       Nichole Montejo CMA at 1:42 PM on 10/6/2017

## 2017-10-13 ENCOUNTER — RECORDS - HEALTHEAST (OUTPATIENT)
Dept: ADMINISTRATIVE | Facility: OTHER | Age: 32
End: 2017-10-13

## 2017-10-13 LAB
LAB AP CHARGES (HE HISTORICAL CONVERSION): NORMAL
PATH REPORT.COMMENTS IMP SPEC: NORMAL
PATH REPORT.COMMENTS IMP SPEC: NORMAL
PATH REPORT.FINAL DX SPEC: NORMAL
PATH REPORT.GROSS SPEC: NORMAL
PATH REPORT.MICROSCOPIC SPEC OTHER STN: NORMAL
PATH REPORT.RELEVANT HX SPEC: NORMAL
RESULT FLAG (HE HISTORICAL CONVERSION): NORMAL

## 2017-10-16 ENCOUNTER — MYC REFILL (OUTPATIENT)
Dept: RHEUMATOLOGY | Facility: CLINIC | Age: 32
End: 2017-10-16

## 2017-10-16 DIAGNOSIS — L40.50 PSORIATIC ARTHRITIS (H): ICD-10-CM

## 2017-10-17 NOTE — TELEPHONE ENCOUNTER
Message from [employer]:  Original authorizing provider: MD Adriana Hornnifer Zack would like a refill of the following medications:  traMADol (ULTRAM) 50 MG tablet [Rene Granados MD]    Preferred pharmacy: [facility] [address] [place] [address] [address] AT [facility] [address]    Comment:

## 2017-10-17 NOTE — TELEPHONE ENCOUNTER
Last seen: 8/4/17  Pending appt: 12/8/17  Medication: tramadol   Last filled: 9/20/17 verified with   Qty: 90  Lab:    Prescription set up and routed to Dr Granados.    ERIC MoyaN RN  Rheumatology RN Coordinator  [facility]

## 2017-10-20 ENCOUNTER — MYC MEDICAL ADVICE (OUTPATIENT)
Dept: RHEUMATOLOGY | Facility: CLINIC | Age: 32
End: 2017-10-20

## 2017-10-23 ENCOUNTER — MYC REFILL (OUTPATIENT)
Dept: RHEUMATOLOGY | Facility: CLINIC | Age: 32
End: 2017-10-23

## 2017-10-23 DIAGNOSIS — L40.50 PSORIATIC ARTHRITIS (H): ICD-10-CM

## 2017-10-23 RX ORDER — TRAMADOL HYDROCHLORIDE 50 MG/1
50 TABLET ORAL EVERY 8 HOURS PRN
Qty: 90 TABLET | Refills: 0 | Status: CANCELLED | OUTPATIENT
Start: 2017-10-23

## 2017-10-23 RX ORDER — TRAMADOL HYDROCHLORIDE 50 MG/1
50 TABLET ORAL EVERY 8 HOURS PRN
Qty: 75 TABLET | Refills: 0 | Status: SHIPPED | OUTPATIENT
Start: 2017-10-23 | End: 2017-11-20

## 2017-10-24 NOTE — TELEPHONE ENCOUNTER
Patient stated that she recently had a tubal ligation and was experiencing right knee pain swelling and was taking 5 tablets of tramadol daily x 14 days. Writer reiterated importance of taking medications as prescribed and patient stated understanding, will call tramadol prescription into desired pharmacy.   Samantha Gomez LPN

## 2017-10-24 NOTE — TELEPHONE ENCOUNTER
Message from Erica:  Vaishali Batres, RN Mon Oct 23, 2017 10:05 AM        ----- Message -----   From: Chrissy Dixon   Sent: 10/23/2017 8:50 AM   To: Adult Rheum Triage-[facility]  Subject: Medication Renewal Request     Original authorizing provider: MD Chrissy Horn would like a refill of the following medications:  traMADol (ULTRAM) 50 MG tablet [Rene Granados MD]    Preferred pharmacy: [facility] [address] [place] [address] [address] AT [facility] [address]    Comment:

## 2017-10-24 NOTE — TELEPHONE ENCOUNTER
Left message with patient, attempted to ask questions that Dr. Granados wanted answered regarding tramadol use. Prescription has a dispense amount of 75 tablets versus 84. Patient is to take medication as prescribed.   Samantha Gomez LPN

## 2017-11-02 ENCOUNTER — MYC REFILL (OUTPATIENT)
Dept: INTERNAL MEDICINE | Facility: CLINIC | Age: 32
End: 2017-11-02

## 2017-11-02 ENCOUNTER — MYC MEDICAL ADVICE (OUTPATIENT)
Dept: RHEUMATOLOGY | Facility: CLINIC | Age: 32
End: 2017-11-02

## 2017-11-02 DIAGNOSIS — B00.1 RECURRENT COLD SORES: ICD-10-CM

## 2017-11-02 DIAGNOSIS — L40.50 PSORIATIC ARTHRITIS (H): ICD-10-CM

## 2017-11-06 RX ORDER — OXYCODONE AND ACETAMINOPHEN 5; 325 MG/1; MG/1
1 TABLET ORAL EVERY 4 HOURS PRN
Qty: 30 TABLET | Refills: 0 | Status: SHIPPED | OUTPATIENT
Start: 2017-11-06 | End: 2017-12-03

## 2017-11-06 NOTE — TELEPHONE ENCOUNTER
Rene Granados MD                  Thanks for the photo.   I doubt that the rapidly waxin/waning cool swelling represents rheumatoid or psoriatic arthritis. Possible explanations include recurrent Baker's cyst vs internal derangment (such as a meniscal tear). If Ms. Dixon has not yet been seen by sports Medicine for her knee, I recommend a referral to Dr Rodrigez or Dr NARINDER Hightower.     I recommend repeat CRP, CBC, and Cr.                    Left message with patient, attempted to communicate provider's note to patient.   Samantha Gomez LPN

## 2017-11-06 NOTE — TELEPHONE ENCOUNTER
Reason For Call:   Chief Complaint   Patient presents with     Refill Request            Medication Name, Dose and Monthly Quantity:   Oxycodone with APAP (Percocet): Dose 5-325mg Schedule 1 tablet Q4H PRN Monthly Quantity 30     Diagnosis requiring opiates:   Psoriatic arthritis (H) [L40.50]       Recurrent cold sores [B00.1]           Problem List Updated:   No    Opioid Agreement On File - [place] PAIN CONTRACT ID# [id number]:  Unsure    Last Urine Drug Screen (at least once every 12 months) Date:   none  Unexpected Results:   N/A    [place]  Data Reviewed (at least once every 3 months) Date:   11/06/17   Unexpected Results:    Yes. 30 tablets percocet from another provider. Pt had tubal ligation. Refill from surgeon on 10/12. Refill from PCP on 10/06    Last Fill Date:   10/06/17 from PCP, 10/12 from surgeon    Last Visit with PCP:   7/07/17    Future Visits with PCP:   No.    Processing:   Mail to patient. [address] [place] [id number] . Update pt via [employer] when processed.    Tyra Singleton, RAISA

## 2017-11-07 NOTE — TELEPHONE ENCOUNTER
Pt called back, triage relayed message, pt understanding, will F/U w/ sports medicine. Sent to [facility].

## 2017-11-16 DIAGNOSIS — L40.50: ICD-10-CM

## 2017-11-16 DIAGNOSIS — E11.9 DIABETES MELLITUS, TYPE 2 (H): ICD-10-CM

## 2017-11-16 DIAGNOSIS — E55.9 VITAMIN D DEFICIENCY: ICD-10-CM

## 2017-11-16 DIAGNOSIS — R11.0 NAUSEA: ICD-10-CM

## 2017-11-16 NOTE — TELEPHONE ENCOUNTER
zofran  Last Written Prescription Date:  6/4/17  Last Fill Quantity: 60,   # refills: 2  Vit D 50,000 units      Last Written Prescription Date:  7/13/17  Last Fill Quantity: 24,   # refills: 1  Last Office Visit : 10/6/17  Future Office visit:  No future appt    Routing refill request to clinic nurse for review/approval because:  Drug not on the [facility], [facility] or [facility] refill protocol

## 2017-11-17 RX ORDER — ERGOCALCIFEROL 1.25 MG/1
50000 CAPSULE, LIQUID FILLED ORAL
Qty: 24 CAPSULE | Refills: 0 | Status: SHIPPED | OUTPATIENT
Start: 2017-11-20 | End: 2018-03-22 | Stop reason: DRUGHIGH

## 2017-11-17 RX ORDER — ONDANSETRON 4 MG/1
4 TABLET, ORALLY DISINTEGRATING ORAL EVERY 8 HOURS PRN
Qty: 60 TABLET | Refills: 2 | Status: SHIPPED | OUTPATIENT
Start: 2017-11-17 | End: 2019-04-24 | Stop reason: DRUGHIGH

## 2017-11-20 ENCOUNTER — MYC REFILL (OUTPATIENT)
Dept: RHEUMATOLOGY | Facility: CLINIC | Age: 32
End: 2017-11-20

## 2017-11-20 DIAGNOSIS — L40.50 PSORIATIC ARTHRITIS (H): ICD-10-CM

## 2017-11-22 RX ORDER — TRAMADOL HYDROCHLORIDE 50 MG/1
50 TABLET ORAL EVERY 8 HOURS PRN
Qty: 75 TABLET | Refills: 0 | Status: SHIPPED | OUTPATIENT
Start: 2017-11-22 | End: 2017-12-17

## 2017-11-22 NOTE — TELEPHONE ENCOUNTER
Message from Erica:  Samantha Gomez LPN Mon Nov 20, 2017 10:28 AM        ----- Message -----   From: Chrissy Dixon   Sent: 11/20/2017 8:54 AM   To: Adult Rheum Triage-Uc  Subject: Medication Renewal Request     Original authorizing provider: MD Chrissy Horn would like a refill of the following medications:  traMADol (ULTRAM) 50 MG tablet [Rene Granados MD]    Preferred pharmacy: [facility] [address] [place] [address] [address] AT [facility] [address]    Comment:

## 2017-11-22 NOTE — TELEPHONE ENCOUNTER
Last seen: 8/4/2017  Pending appt: 12/8/2017  Medication: tramadol 50 mg    Last filled: 10/24/2017  Qty: 75/0    [place] reviewed:  reviewed- no concerns    [place]  [email or web address]  Samantha Gomez LPN

## 2017-11-22 NOTE — TELEPHONE ENCOUNTER
Prescription has been called into desired pharmacy and left a voice message with patient.   Samantha Gomez LPN

## 2017-12-03 ENCOUNTER — MYC REFILL (OUTPATIENT)
Dept: INTERNAL MEDICINE | Facility: CLINIC | Age: 32
End: 2017-12-03

## 2017-12-03 DIAGNOSIS — B00.1 RECURRENT COLD SORES: ICD-10-CM

## 2017-12-03 DIAGNOSIS — L40.50 PSORIATIC ARTHRITIS (H): ICD-10-CM

## 2017-12-04 RX ORDER — OXYCODONE AND ACETAMINOPHEN 5; 325 MG/1; MG/1
1 TABLET ORAL EVERY 4 HOURS PRN
Qty: 30 TABLET | Refills: 0 | Status: SHIPPED | OUTPATIENT
Start: 2017-12-11 | End: 2018-01-02

## 2017-12-04 NOTE — TELEPHONE ENCOUNTER
Reason For Call:   Chief Complaint   Patient presents with     Refill Request            Medication Name, Dose and Monthly Quantity:   Oxycodone with APAP (Percocet): Dose 5-325mg Schedule 1 tablet PO Q4H PRN Monthly Quantity 30     Diagnosis requiring opiates:   Psoriatic arthritis (H) [L40.50]       Recurrent cold sores [B00.1]           Problem List Updated:   No    Opioid Agreement On File - [facility] PAIN CONTRACT ID# [id number]:  Unsure    Last Urine Drug Screen (at least once every 12 months) Date:   none  Unexpected Results:   N/A    [place]  Data Reviewed (at least once every 3 months) Date:   12/04/17   Unexpected Results:    No.    Last Fill Date:   11/11    Last Visit with PCP:   7/07/17    Future Visits with PCP:   No.    Processing:   Mail to patient.     Tyra Singleton RN

## 2017-12-20 ENCOUNTER — MYC MEDICAL ADVICE (OUTPATIENT)
Dept: RHEUMATOLOGY | Facility: CLINIC | Age: 32
End: 2017-12-20

## 2017-12-20 NOTE — TELEPHONE ENCOUNTER
Dr Granados approved tramadol, refill called to pt's [facility] in [place].    Esa Pathak BSN RN  Rheumatology RN Coordinator  [facility]

## 2017-12-27 ENCOUNTER — MYC REFILL (OUTPATIENT)
Dept: INTERNAL MEDICINE | Facility: CLINIC | Age: 32
End: 2017-12-27

## 2017-12-27 DIAGNOSIS — J31.0 RHINITIS MEDICAMENTOSA: ICD-10-CM

## 2017-12-27 DIAGNOSIS — T48.5X5A RHINITIS MEDICAMENTOSA: ICD-10-CM

## 2017-12-27 DIAGNOSIS — E55.9 VITAMIN D DEFICIENCY: ICD-10-CM

## 2017-12-27 RX ORDER — ERGOCALCIFEROL 1.25 MG/1
50000 CAPSULE, LIQUID FILLED ORAL
Qty: 24 CAPSULE | Refills: 0 | Status: CANCELLED | OUTPATIENT
Start: 2017-12-28

## 2018-01-02 ENCOUNTER — MYC REFILL (OUTPATIENT)
Dept: INTERNAL MEDICINE | Facility: CLINIC | Age: 33
End: 2018-01-02

## 2018-01-02 DIAGNOSIS — B00.1 RECURRENT COLD SORES: ICD-10-CM

## 2018-01-02 DIAGNOSIS — L40.50 PSORIATIC ARTHRITIS (H): ICD-10-CM

## 2018-01-02 RX ORDER — FLUTICASONE PROPIONATE 50 MCG
2 SPRAY, SUSPENSION (ML) NASAL DAILY
Qty: 48 G | Refills: 3 | Status: SHIPPED | OUTPATIENT
Start: 2018-01-02 | End: 2020-09-14

## 2018-01-02 NOTE — TELEPHONE ENCOUNTER
Flonase      Last Written Prescription Date:  6-30-17  Last Fill Quantity: 48 g,   # refills: 1      Vitamin D 50 000      Last Written Prescription Date:  11-20-17  Last Fill Quantity: 24,   # refills: 0  Refill not yet needed - confirmed with pharmacy.      Last Office Visit : 10-6-17  Future Office visit:  none    Kathleen M Doege RN

## 2018-01-02 NOTE — TELEPHONE ENCOUNTER
Message from Erica:  Nuria Viera, RN u Dec 28, 2017 7:34 AM        ----- Message -----   From: Chrissy Dixon   Sent: 12/27/2017 9:26 AM   To: [facility] Nursing Staff-  Subject: Medication Renewal Request     Original authorizing provider: MD Chrissy Vallecillo would like a refill of the following medications:  fluticasone (FLONASE) 50 MCG/ACT spray [Nando Haskins MD]  vitamin D (ERGOCALCIFEROL) 99629 UNIT capsule [Nando Haskins MD]    Preferred pharmacy: [facility] [address] [place] [address] [address] AT [facility] OF [address]    Comment:

## 2018-01-05 RX ORDER — OXYCODONE AND ACETAMINOPHEN 5; 325 MG/1; MG/1
1 TABLET ORAL EVERY 4 HOURS PRN
Qty: 30 TABLET | Refills: 0 | Status: SHIPPED | OUTPATIENT
Start: 2018-01-10 | End: 2018-02-01

## 2018-01-05 NOTE — TELEPHONE ENCOUNTER
Reason For Call:   Chief Complaint   Patient presents with     Refill Request            Medication Name, Dose and Monthly Quantity:   Oxycodone with APAP (Percocet): Dose 5-325mg Schedule 1 tablet Q4H PRN Monthly Quantity 30     Diagnosis requiring opiates:   Psoriatic arthritis (H) [L40.50]       Recurrent cold sores [B00.1]         Problem List Updated:   No    Opioid Agreement On File - [facility] PAIN CONTRACT ID# [id number]:  Unsure    Last Urine Drug Screen (at least once every 12 months) Date:   None  Unexpected Results:   N/A    [place]  Data Reviewed (at least once every 3 months) Date:   1/05/18   Unexpected Results:    No.    Last Fill Date:   12/11/17    Last Visit with PCP:   7/07/17    Future Visits with PCP:   No.    Processing:   Mail to patient. Update pt once mailed.    Tyra Singleton RN

## 2018-01-08 ENCOUNTER — TELEPHONE (OUTPATIENT)
Dept: RHEUMATOLOGY | Facility: CLINIC | Age: 33
End: 2018-01-08

## 2018-01-08 NOTE — TELEPHONE ENCOUNTER
[facility] Prior Authorization Team   Phone: [phone]  Fax: [phone]    PA Initiation    Medication: Otezla - PA pending  Insurance Company: [employer] [facility] - Phone [phone] Fax [phone]  Pharmacy Filling the Rx: [facility] [place], [place] - [address] [place] [address]  Filling Pharmacy Phone: [phone]  Filling Pharmacy Fax: [phone]  Start Date: 1/8/2018

## 2018-01-09 NOTE — TELEPHONE ENCOUNTER
[facility] Prior Authorization Team   Phone: [phone]  Fax: [phone]    Prior Authorization Approval    Authorization Effective Date: 1/8/2018  Authorization Expiration Date: 1/8/2020  Medication: Otezla - PA approved  Approved Dose/Quantity: 60 tabs per 30 days  Reference #: Key: [id number]   Insurance Company: [employer] - Phone [phone] Fax [phone]  Expected CoPay: N/A   CoPay Card Available: N/A   Foundation Assistance Needed: N/A  Which Pharmacy is filling the prescription (Not needed for infusion/clinic administered): [address]  Pharmacy Notified: Yes  Patient Notified: Yes        Patient will be restricted to filling with [facility], we will have prescription transferred out.

## 2018-01-17 ENCOUNTER — MYC MEDICAL ADVICE (OUTPATIENT)
Dept: RHEUMATOLOGY | Facility: CLINIC | Age: 33
End: 2018-01-17

## 2018-01-19 ENCOUNTER — MYC MEDICAL ADVICE (OUTPATIENT)
Dept: RHEUMATOLOGY | Facility: CLINIC | Age: 33
End: 2018-01-19

## 2018-01-19 DIAGNOSIS — M25.50 ARTHRALGIA, UNSPECIFIED JOINT: Primary | ICD-10-CM

## 2018-01-19 RX ORDER — TRAMADOL HYDROCHLORIDE 50 MG/1
TABLET ORAL
Qty: 30 TABLET | Refills: 0 | Status: SHIPPED | OUTPATIENT
Start: 2018-01-19 | End: 2018-02-27

## 2018-01-19 NOTE — TELEPHONE ENCOUNTER
Received the following staff message from Dr Granados:    Rene Granados MD   Adult Rheum Triage-Uc 17 minutes ago (10:35 AM)         Withdrawal symptoms can occur with stopping tramadol. Is patient taking percocet still?   If so, I do not expect the symptoms to be related to tramadol        DAVIS Moya RN  Rheumatology RN Coordinator  [facility]

## 2018-01-19 NOTE — TELEPHONE ENCOUNTER
Received the following staff message from Dr Granados:    To address possible withdrawal symptoms, I will prescribe a final tramadol Rx of 50 mg po qd-bid prn disp #30 to be taken only as needed. No refills. (Routing comment)     Rx called to pt's [facility] pharmacy. Pt notified via [employer] message.    ERIC MoyaN RN  Rheumatology RN Coordinator  [facility]

## 2018-01-24 ENCOUNTER — TELEPHONE (OUTPATIENT)
Dept: RHEUMATOLOGY | Facility: CLINIC | Age: 33
End: 2018-01-24

## 2018-01-25 NOTE — TELEPHONE ENCOUNTER
Central Prior Authorization Team   Phone: [phone]    PA Initiation    Medication: TRAMADOL 50MG  Insurance Company: [employer] - Phone [phone] Fax [phone]  Pharmacy Filling the Rx: [facility] [address] [place] [address] [address] AT [facility] OF [address]  Filling Pharmacy Phone: [phone]  Filling Pharmacy Fax:    Start Date: 1/24/2018

## 2018-01-25 NOTE — TELEPHONE ENCOUNTER
Prior Authorization Approval    Authorization Effective Date: 1/25/2018  Authorization Expiration Date: 1/25/2019  Medication: TRAMADOL 50MG-Initiated-  Approved Dose/Quantity:   Reference #:     Insurance Company: [employer] - Phone [phone] Fax [phone]  Expected CoPay: $1.27     CoPay Card Available:      Foundation Assistance Needed:    Which Pharmacy is filling the prescription (Not needed for infusion/clinic administered): [facility] [address] [place] [address] [address] AT [facility] [address]  Pharmacy Notified: Yes  Patient Notified: Yes

## 2018-02-01 ENCOUNTER — MYC REFILL (OUTPATIENT)
Dept: INTERNAL MEDICINE | Facility: CLINIC | Age: 33
End: 2018-02-01

## 2018-02-01 DIAGNOSIS — L40.50 PSORIATIC ARTHRITIS (H): ICD-10-CM

## 2018-02-01 DIAGNOSIS — B00.1 RECURRENT COLD SORES: ICD-10-CM

## 2018-02-01 NOTE — TELEPHONE ENCOUNTER
Reason For Call:   Chief Complaint   Patient presents with     Refill Request            Medication Name, Dose and Monthly Quantity:   Oxycodone with APAP (Percocet): Dose 5-325mg Schedule 1 tablet Q4H PRN (Must last 30 days) Monthly Quantity 30     Diagnosis requiring opiates:   Psoriatic arthritis (H) [L40.50]       Recurrent cold sores [B00.1]         Problem List Updated:   Yes    Opioid Agreement On File - [facility] PAIN CONTRACT ID# [id number]:  Unsure    Last Urine Drug Screen (at least once every 12 months) Date:   none  Unexpected Results:   N/A    [place]  Data Reviewed (at least once every 3 months) Date:   2/1/18   Unexpected Results:    No.    Last Fill Date:   1/10/18    Last Visit with PCP:   7/07/17    Future Visits with PCP:   No.    Processing:   Mail to pt's home address.

## 2018-02-03 RX ORDER — OXYCODONE AND ACETAMINOPHEN 5; 325 MG/1; MG/1
1 TABLET ORAL EVERY 4 HOURS PRN
Qty: 30 TABLET | Refills: 0 | Status: SHIPPED | OUTPATIENT
Start: 2018-02-09 | End: 2018-02-05

## 2018-02-05 RX ORDER — OXYCODONE AND ACETAMINOPHEN 5; 325 MG/1; MG/1
1 TABLET ORAL EVERY 4 HOURS PRN
Qty: 30 TABLET | Refills: 0 | Status: SHIPPED | OUTPATIENT
Start: 2018-02-09 | End: 2018-03-04

## 2018-02-20 ENCOUNTER — MYC MEDICAL ADVICE (OUTPATIENT)
Dept: RHEUMATOLOGY | Facility: CLINIC | Age: 33
End: 2018-02-20

## 2018-02-22 ENCOUNTER — MYC REFILL (OUTPATIENT)
Dept: RHEUMATOLOGY | Facility: CLINIC | Age: 33
End: 2018-02-22

## 2018-02-22 DIAGNOSIS — L40.50 PSORIATIC ARTHRITIS (H): ICD-10-CM

## 2018-02-23 RX ORDER — TRAMADOL HYDROCHLORIDE 50 MG/1
50 TABLET ORAL EVERY 8 HOURS PRN
Qty: 60 TABLET | Refills: 0 | Status: SHIPPED | OUTPATIENT
Start: 2018-02-23 | End: 2018-03-26

## 2018-02-23 NOTE — TELEPHONE ENCOUNTER
Message from Erica:  Vaishali Batres RN Fri Feb 23, 2018 10:23 AM        ----- Message -----   From: Chrissy Dixon   Sent: 2/22/2018 8:12 PM   To: Adult Rheum Triage-Uc  Subject: Medication Renewal Request     Original authorizing provider: MD Chrissy Horn would like a refill of the following medications:  traMADol (ULTRAM) 50 MG tablet [Rene Granados MD]    Preferred pharmacy: [facility] [address] [place] [address] [address] AT [facility] OF [address]    Comment:

## 2018-02-27 DIAGNOSIS — E11.9 DIABETES MELLITUS, TYPE 2 (H): ICD-10-CM

## 2018-02-27 DIAGNOSIS — E11.9 DIABETES MELLITUS (H): ICD-10-CM

## 2018-02-27 NOTE — TELEPHONE ENCOUNTER
Tramadol script called to pharmacy. Message sent to pt that this has been done.    ERIC MoyaN RN  Rheumatology RN Coordinator  [facility]

## 2018-02-27 NOTE — TELEPHONE ENCOUNTER
insulin detemir (LEVEMIR FLEXTOUCH) 100 UNIT/ML injection  Last Written Prescription Date:  7/14/17  Last Fill Quantity: 30ml,   # refills: 1  Last Office Visit : 10/6/17  Future Office visit:  None       metFORMIN (GLUCOPHAGE) 500 MG tablet  Last Written Prescription Date:  8/4/17  Last Fill Quantity: 360,   # refills: 1      Routing  Because:  A1C past due.

## 2018-03-04 ENCOUNTER — MYC REFILL (OUTPATIENT)
Dept: INTERNAL MEDICINE | Facility: CLINIC | Age: 33
End: 2018-03-04

## 2018-03-04 DIAGNOSIS — B00.1 RECURRENT COLD SORES: ICD-10-CM

## 2018-03-04 DIAGNOSIS — L40.50 PSORIATIC ARTHRITIS (H): ICD-10-CM

## 2018-03-05 RX ORDER — OXYCODONE AND ACETAMINOPHEN 5; 325 MG/1; MG/1
1 TABLET ORAL EVERY 4 HOURS PRN
Qty: 30 TABLET | Refills: 0 | Status: SHIPPED | OUTPATIENT
Start: 2018-03-13 | End: 2018-04-02

## 2018-03-05 NOTE — TELEPHONE ENCOUNTER
Reason For Call:   Chief Complaint   Patient presents with     Refill Request            Medication Name, Dose and Monthly Quantity:   Oxycodone with APAP (Percocet): Dose 5-325mg Schedule 1 tablet Q4H PRN Monthly Quantity 30 (must last 30 days)     Diagnosis requiring opiates:   Psoriatic arthritis (H) [L40.50]       Recurrent cold sores [B00.1]        Problem List Updated:   Yes    Opioid Agreement On File - [facility] PAIN CONTRACT ID# [id number]:  Unsure    Last Urine Drug Screen (at least once every 12 months) Date:   none  Unexpected Results:   N/A    [place]  Data Reviewed (at least once every 3 months) Date:   3/05/18   Unexpected Results:    No.   Pt continues to have tramadol filled monthly by Dr. Granados, percocet by Dr. Haskins. Pt has taken both medications monthly over the past year.    Last Fill Date:   2/11/18    Last Visit with PCP:   7/07/17    Future Visits with PCP:   No.    Processing:   Mail to patient.  [address] [place] [address]    Trya Singleton RN

## 2018-03-05 NOTE — TELEPHONE ENCOUNTER
Message from Erica:  Nuria Viera RN Mon Mar 5, 2018 7:19 AM        ----- Message -----   From: Chrissy Dixon   Sent: 3/4/2018 8:15 PM   To: [facility] Nursing Staff-  Subject: Medication Renewal Request     Original authorizing provider: MD Chrissy Vallecillo would like a refill of the following medications:  oxyCODONE-acetaminophen (PERCOCET) 5-325 MG per tablet [Nando Haskins MD]    Preferred pharmacy: Other - Please mail to my home     Comment:  Could you please mail to my home. [id number] [place] [address]

## 2018-03-22 DIAGNOSIS — E55.9 VITAMIN D DEFICIENCY: ICD-10-CM

## 2018-03-22 RX ORDER — ERGOCALCIFEROL 1.25 MG/1
50000 CAPSULE, LIQUID FILLED ORAL
Qty: 24 CAPSULE | Refills: 0 | Status: CANCELLED | OUTPATIENT
Start: 2018-03-22

## 2018-03-22 NOTE — TELEPHONE ENCOUNTER
Per last vit D screen, pt should only be taking 1000 units of vitamin D daily. Ergocalcigerol 21148 units discontinued. Vit D 1000 units ordered.    Tyra Singleton RN

## 2018-03-22 NOTE — TELEPHONE ENCOUNTER
Vit D 50,000 units     Last Written Prescription Date:  11/20/17  Last Fill Quantity: 24  # refills:0  Last Office Visit : 10/6/17  Future Office visit:  No future appt    Routing refill request to clinic nurse for review/approval because:  Non-Protocol

## 2018-03-26 DIAGNOSIS — L40.50 PSORIATIC ARTHRITIS (H): ICD-10-CM

## 2018-03-26 RX ORDER — TRAMADOL HYDROCHLORIDE 50 MG/1
50 TABLET ORAL EVERY 8 HOURS PRN
Qty: 60 TABLET | Refills: 0 | Status: SHIPPED | OUTPATIENT
Start: 2018-03-29 | End: 2018-04-20

## 2018-03-26 NOTE — TELEPHONE ENCOUNTER
Pt requesting PCP to take over tramadol rx per rheumatology request. Last rx received 2/27/18 for tramadol 1 tablet Q8H PRN #60. Pt last received percocet rx 3/12/18 1 tablet Q4H PRN (Must last 30 days) #30. Will route to provider to determine if tramadol refill is possible.    Tyra Singleton RN    March 27, 2018 1:36 PM  Prescription faxed to [facility] in [place] - fax #[phone].   Lucia Monreal RN, Care Coordinator

## 2018-04-02 ENCOUNTER — MYC REFILL (OUTPATIENT)
Dept: INTERNAL MEDICINE | Facility: CLINIC | Age: 33
End: 2018-04-02

## 2018-04-02 DIAGNOSIS — L40.50 PSORIATIC ARTHRITIS (H): ICD-10-CM

## 2018-04-02 DIAGNOSIS — L40.50: ICD-10-CM

## 2018-04-02 DIAGNOSIS — B00.1 RECURRENT COLD SORES: ICD-10-CM

## 2018-04-02 DIAGNOSIS — E11.9 DIABETES MELLITUS, TYPE 2 (H): ICD-10-CM

## 2018-04-02 RX ORDER — IBUPROFEN 600 MG/1
600 TABLET, FILM COATED ORAL EVERY 8 HOURS PRN
Qty: 270 TABLET | Refills: 0 | Status: SHIPPED | OUTPATIENT
Start: 2018-04-02

## 2018-04-02 NOTE — TELEPHONE ENCOUNTER
advil 600 mg  Last Written Prescription Date:  4/24/17  Last Fill Quantity: 270,   # refills: 3  metformin      Last Written Prescription Date:  2/27/18  Last Fill Quantity: 120,   # refills: 0  Last Office Visit : 10/6/17  Future Office visit:  No future appt    Routing refill request to provider for review/approval because:  Failed protocol

## 2018-04-03 RX ORDER — OXYCODONE AND ACETAMINOPHEN 5; 325 MG/1; MG/1
1 TABLET ORAL EVERY 4 HOURS PRN
Qty: 30 TABLET | Refills: 0 | Status: SHIPPED | OUTPATIENT
Start: 2018-04-11 | End: 2018-05-01

## 2018-04-03 NOTE — TELEPHONE ENCOUNTER
Reason For Call:   Chief Complaint   Patient presents with     Refill Request            Medication Name, Dose and Monthly Quantity:   Oxycodone with APAP (Percocet): Dose 5-325mg Schedule 1 tablet Q4H PRN Monthly Quantity 30     Diagnosis requiring opiates:   Psoriatic arthritis (H) [L40.50]       Recurrent cold sores [B00.1]         Problem List Updated:   Yes    Opioid Agreement On File - [facility] PAIN CONTRACT ID# [id number]:  No    Last Urine Drug Screen (at least once every 12 months) Date:   none  Unexpected Results:   N/A    [place]  Data Reviewed (at least once every 3 months) Date:   4/03/18   Unexpected Results:    No.   Tramadol 50mg #60 3/27    Last Fill Date:   3/12    Last Visit with PCP:   10/06/17    Future Visits with PCP:   No.    Processing:   Drop box.     Tyra Singleton RN

## 2018-04-06 ENCOUNTER — OFFICE VISIT (OUTPATIENT)
Dept: ORTHOPEDICS | Facility: CLINIC | Age: 33
End: 2018-04-06
Payer: COMMERCIAL

## 2018-04-06 VITALS — HEART RATE: 92 BPM | WEIGHT: 111.1 LBS | HEIGHT: 63 IN | BODY MASS INDEX: 19.68 KG/M2

## 2018-04-06 DIAGNOSIS — M22.42 CHONDROMALACIA OF LEFT PATELLA: ICD-10-CM

## 2018-04-06 DIAGNOSIS — L40.50 PSORIATIC ARTHRITIS (H): ICD-10-CM

## 2018-04-06 DIAGNOSIS — M25.469 EFFUSION OF LOWER LEG JOINT: ICD-10-CM

## 2018-04-06 DIAGNOSIS — E11.9 CONTROLLED TYPE 2 DIABETES MELLITUS WITHOUT COMPLICATION, WITH LONG-TERM CURRENT USE OF INSULIN (H): ICD-10-CM

## 2018-04-06 DIAGNOSIS — Z79.4 CONTROLLED TYPE 2 DIABETES MELLITUS WITHOUT COMPLICATION, WITH LONG-TERM CURRENT USE OF INSULIN (H): ICD-10-CM

## 2018-04-06 DIAGNOSIS — M17.12 PRIMARY OSTEOARTHRITIS OF LEFT KNEE: Primary | ICD-10-CM

## 2018-04-06 NOTE — LETTER
4/6/2018      RE: Chrissy Dixon  [id number] [id number]        Subjective:   Chrissy Ramos is a 32 year old female who is here for her bilateral knee pain.  She has a history of psoriatic arthritis.  She is known to me from prior visits.  She has had her left knee injected with escalating doses of corticosteroid x3, once by Dr. Granados and twice by myself.  None of these have provided her with any significant relief.  She is interested in proceeding with a Gel-One injection as she continues to have recurrent swelling in the left knee.  She also notes that she is having some discomfort in the right knee starting in a similar fashion as to the left knee.  We do not have any imaging of the right knee available to us today.  She denies interval injury, locking, instability, but does report intermittent swelling of the left knee.       Date of injury: none  Date last seen: 9/8/2017  Following Therapeutic Plan: Yes   Pain: Unchanged  Function: Unchanged  Interval History: flare ups with swelling and pain     PAST MEDICAL, SOCIAL, SURGICAL AND FAMILY HISTORY: She  has a past medical history of Diabetes; Other psoriasis; and Polyarthritis. She also has no past medical history of Atypical fibroxanthoma; Basal cell carcinoma; Cutaneous T-cell lymphoma (H); Malignant melanoma (H); Other specified malignant neoplasm of skin of trunk, except scrotum; or Squamous cell carcinoma.  She  has a past surgical history that includes surgical history of - .  Her family history includes Connective Tissue Disorder in her father; DIABETES in her maternal grandfather, maternal uncle, maternal uncle, and mother; Hypertension in her maternal grandfather, maternal grandmother, and mother; Prostate Cancer in her maternal grandfather; Thyroid Disease in her mother. There is no history of Asthma.  She reports that she quit smoking about 10 years ago. Her smoking use included Cigarettes. She has a 5.00 pack-year smoking  history. She has never used smokeless tobacco. She reports that she does not drink alcohol or use illicit drugs.    ALLERGIES:   Allergies   Allergen Reactions     Diagnostic X-Ray Materials Swelling     Betadine [Povidone Iodine] Swelling     Levofloxacin Other (See Comments)     High blood sugars, doesn't feel well at all      Penicillins Hives     Pneumococcal Vaccines Swelling     Prenatal Vit-Fe Fumarate-Fa [Cavan-Folate Ob] Hives     Tdap [Daptacel] Swelling     CURRENT MEDICATIONS:   Current Outpatient Prescriptions   Medication Sig Dispense Refill     [START ON 4/11/2018] oxyCODONE-acetaminophen (PERCOCET) 5-325 MG per tablet Take 1 tablet by mouth every 4 hours as needed (must last 30 days from dispense date) 30 tablet 0     metFORMIN (GLUCOPHAGE) 500 MG tablet Take 2 tablets (1,000 mg) by mouth 2 times daily (with meals) 120 tablet 0     ibuprofen (ADVIL/MOTRIN) 600 MG tablet Take 1 tablet (600 mg) by mouth every 8 hours as needed for moderate pain 270 tablet 0     traMADol (ULTRAM) 50 MG tablet Take 1 tablet (50 mg) by mouth every 8 hours as needed for moderate pain . Must last 30 days 60 tablet 0     cholecalciferol (VITAMIN D3) 1000 UNIT tablet Take 1 tablet (1,000 Units) by mouth daily 90 tablet 1     insulin detemir (LEVEMIR FLEXTOUCH) 100 UNIT/ML injection Inject 25 Units Subcutaneous At Bedtime 30 mL 1     blood glucose monitoring (NO BRAND SPECIFIED) test strip Use to test blood sugars 6 to 8 times daily or as directed. Okay to dispense One Touch Verio per patient's insurance. 700 strip 1     blood glucose monitoring (NO BRAND SPECIFIED) meter device kit Use to test blood sugar 6 to 8 times daily or as directed. Okay to dispense One Touch Verio products per patient's insurance. 1 kit 0     ONETOUCH LANCETS MISC Use to test blood sugar 6 to 8 times daily or as directed. Okay to dispense One Touch Verio products per patient's insurance. 700 each 1     fluticasone (FLONASE) 50 MCG/ACT spray Spray 2  sprays into both nostrils daily 48 g 3     ondansetron (ZOFRAN-ODT) 4 MG ODT tab Take 1 tablet (4 mg) by mouth every 8 hours as needed for nausea 60 tablet 2     blood glucose monitoring (ACCU-CHEK SMARTVIEW) test strip Use to test blood sugar 6-8 times daily or as directed. 700 each 3     insulin pen needle 31G X 8 MM Use  3-4 pen needles daily or as directed. 300 each 4     predniSONE (DELTASONE) 5 MG tablet 20 mg per day x 2 days, 15 mg per day x 2 days, 10 mg per day x 2 days then stop. - 18 tablet 0     apremilast (OTEZLA) 30 MG tablet Take 1 tablet (30 mg) by mouth 2 times daily 180 tablet 3     insulin lispro (HUMALOG KWIKPEN) 100 UNIT/ML injection USE THREE TIMES DAILY WITH MEALS, CURRENTLY 30 UNITS DAILY 30 mL 5     apremilast (OTEZLA) 30 MG tablet Take 1 tablet (30 mg) by mouth 2 times daily 180 tablet 3     ferrous gluconate (FERGON) 324 (38 FE) MG tablet Take 1 tablet (324 mg) by mouth every other day 100 tablet 11     valACYclovir (VALTREX) 500 MG tablet Take 1 tablet (500 mg) by mouth daily 90 tablet 0     ustekinumab (STELARA) 45 MG/0.5ML SOSY Inject 0.5 mLs (45 mg) Subcutaneous every 3 months 3 Syringe 1     predniSONE (DELTASONE) 5 MG tablet Take 3 tablets (15mg) by mouth for 4 days, then 2 tablets (10mg) by mouth for 4 days. 20 tablet 0     predniSONE (DELTASONE) 5 MG tablet 3 tabs daily for 1 wk, then 2 tabs daily for 1 week. 50 tablet 2     Fluocinolone Acetonide (DERMA-SMOOTHE/FS SCALP) 0.01 % OIL Apply to scalp at bedtime, then wear showercap, rinse off in morning. 118 mL 3     triamcinolone (KENALOG) 0.1 % cream Apply topically 2 times daily 453.6 g 1     predniSONE (DELTASONE) 10 MG tablet Take 1 tablet (10 mg) by mouth daily As needed for flares only 30 tablet 1     clobetasol (TEMOVATE) 0.05 % external solution Apply topically 2 times daily 50 mL 6     LEVEMIR FLEXPEN 100 UNIT/ML 6 units in the morning, 14 units in evening = for total of 20 units daily 3 Month 1     levonorgestrel (MIRENA)  "20 MCG/24HR IUD Use as directed 1 each 0   .     REVIEW OF SYSTEMS: 12 point review of systems is negative except as noted above.     Exam:     Vitals:    04/06/18 1350   Pulse: 92   Weight: 111 lb 1.6 oz (50.4 kg)   Height: 5' 3.35\" (1.609 m)      CONSTITUTIONAL: healthy, alert and no distress  HEAD: Normocephalic. No masses, lesions, tenderness or abnormalities  SKIN: no suspicious lesions or rashes  GAIT: normal  NEUROLOGIC: Non-focal  PSYCHIATRIC: affect normal/bright and mentation appears normal.    MUSCULOSKELETAL:   RIGHT KNEE: no effusion, FROM, no ligamentous laxity. Mild diffuse MJLT. Patellar exam is unremarkable.    LEFT KNEE: moderate effusion, FROM, no ligamentous laxity, no extensor lag, mild diffuse MJLT. Mildly ttp medial patellar facet.        Assessment/Plan:   (M22.42) Chondromalacia of left patella  (primary encounter diagnosis)  Plan: PHYSICAL THERAPY REFERRAL (Internal)    (L40.50) Psoriatic arthritis (H)  (M25.469) Effusion of lower leg joint    (E11.9,  Z79.4) Controlled type 2 diabetes mellitus without complication, with long-term current use of insulin (H)    Chrissy is a 32-year-old female with psoriatic arthritis and significant chondromalacia involving the left knee with recurrent knee effusions.  She has failed to respond to intra-articular corticosteroids and particularly those of escalating doses.  She asked very good questions about what can be done from the standpoint of more corticosteroids; however, I have explained to her that with no response with her prior doses it is unlikely to see that she will have any significant response moving forward.  To that end, she has obtained clearance from her insurer to proceed with Gel-One.  We will proceed today with Gel-One into the left knee.  She does have questions regarding the right knee; however, I have no imaging of the right knee and we would need to start with individual imaging.  If she does not have a good response to the Gel-One " in the left knee then we have to consider arthroscopy and biopsy and consideration for further addressing it to the medial meniscus.      PROCEDURE NOTE:  Consent was obtained after discussion of risks, benefits and alternatives.  After sterile preparation 3 cc Gel-One were injected into the left knee via an anterolateral approach.  There were no complications.  Post-injection instructions were given and understood.        The patient is aware that the left knee is going to be sore for the next 3-4 days.  She should take her ibuprofen regularly for this and I have advised her to increase her movement as soon as she gets home today.  All questions were answered.  Depending on how she does with this.  We will plan on following up in 6 months and 1 day for repeat injection.       Jad Hightower MD

## 2018-04-06 NOTE — PROGRESS NOTES
Subjective:   Chrissy Ramos is a 32 year old female who is here for her bilateral knee pain.  She has a history of psoriatic arthritis.  She is known to me from prior visits.  She has had her left knee injected with escalating doses of corticosteroid x3, once by Dr. Granados and twice by myself.  None of these have provided her with any significant relief.  She is interested in proceeding with a Gel-One injection as she continues to have recurrent swelling in the left knee.  She also notes that she is having some discomfort in the right knee starting in a similar fashion as to the left knee.  We do not have any imaging of the right knee available to us today.  She denies interval injury, locking, instability, but does report intermittent swelling of the left knee.       Date of injury: none  Date last seen: 9/8/2017  Following Therapeutic Plan: Yes   Pain: Unchanged  Function: Unchanged  Interval History: flare ups with swelling and pain     PAST MEDICAL, SOCIAL, SURGICAL AND FAMILY HISTORY: She  has a past medical history of Diabetes; Other psoriasis; and Polyarthritis. She also has no past medical history of Atypical fibroxanthoma; Basal cell carcinoma; Cutaneous T-cell lymphoma (H); Malignant melanoma (H); Other specified malignant neoplasm of skin of trunk, except scrotum; or Squamous cell carcinoma.  She  has a past surgical history that includes surgical history of - .  Her family history includes Connective Tissue Disorder in her father; DIABETES in her maternal grandfather, maternal uncle, maternal uncle, and mother; Hypertension in her maternal grandfather, maternal grandmother, and mother; Prostate Cancer in her maternal grandfather; Thyroid Disease in her mother. There is no history of Asthma.  She reports that she quit smoking about 10 years ago. Her smoking use included Cigarettes. She has a 5.00 pack-year smoking history. She has never used smokeless tobacco. She reports that she does not drink  alcohol or use illicit drugs.    ALLERGIES:   Allergies   Allergen Reactions     Diagnostic X-Ray Materials Swelling     Betadine [Povidone Iodine] Swelling     Levofloxacin Other (See Comments)     High blood sugars, doesn't feel well at all      Penicillins Hives     Pneumococcal Vaccines Swelling     Prenatal Vit-Fe Fumarate-Fa [Cavan-Folate Ob] Hives     Tdap [Daptacel] Swelling     CURRENT MEDICATIONS:   Current Outpatient Prescriptions   Medication Sig Dispense Refill     [START ON 4/11/2018] oxyCODONE-acetaminophen (PERCOCET) 5-325 MG per tablet Take 1 tablet by mouth every 4 hours as needed (must last 30 days from dispense date) 30 tablet 0     metFORMIN (GLUCOPHAGE) 500 MG tablet Take 2 tablets (1,000 mg) by mouth 2 times daily (with meals) 120 tablet 0     ibuprofen (ADVIL/MOTRIN) 600 MG tablet Take 1 tablet (600 mg) by mouth every 8 hours as needed for moderate pain 270 tablet 0     traMADol (ULTRAM) 50 MG tablet Take 1 tablet (50 mg) by mouth every 8 hours as needed for moderate pain . Must last 30 days 60 tablet 0     cholecalciferol (VITAMIN D3) 1000 UNIT tablet Take 1 tablet (1,000 Units) by mouth daily 90 tablet 1     insulin detemir (LEVEMIR FLEXTOUCH) 100 UNIT/ML injection Inject 25 Units Subcutaneous At Bedtime 30 mL 1     blood glucose monitoring (NO BRAND SPECIFIED) test strip Use to test blood sugars 6 to 8 times daily or as directed. Okay to dispense One Touch Verio per patient's insurance. 700 strip 1     blood glucose monitoring (NO BRAND SPECIFIED) meter device kit Use to test blood sugar 6 to 8 times daily or as directed. Okay to dispense One Touch Verio products per patient's insurance. 1 kit 0     ONETOUCH LANCETS MISC Use to test blood sugar 6 to 8 times daily or as directed. Okay to dispense One Touch Verio products per patient's insurance. 700 each 1     fluticasone (FLONASE) 50 MCG/ACT spray Spray 2 sprays into both nostrils daily 48 g 3     ondansetron (ZOFRAN-ODT) 4 MG ODT tab Take  1 tablet (4 mg) by mouth every 8 hours as needed for nausea 60 tablet 2     blood glucose monitoring (ACCU-CHEK SMARTVIEW) test strip Use to test blood sugar 6-8 times daily or as directed. 700 each 3     insulin pen needle 31G X 8 MM Use  3-4 pen needles daily or as directed. 300 each 4     predniSONE (DELTASONE) 5 MG tablet 20 mg per day x 2 days, 15 mg per day x 2 days, 10 mg per day x 2 days then stop. - 18 tablet 0     apremilast (OTEZLA) 30 MG tablet Take 1 tablet (30 mg) by mouth 2 times daily 180 tablet 3     insulin lispro (HUMALOG KWIKPEN) 100 UNIT/ML injection USE THREE TIMES DAILY WITH MEALS, CURRENTLY 30 UNITS DAILY 30 mL 5     apremilast (OTEZLA) 30 MG tablet Take 1 tablet (30 mg) by mouth 2 times daily 180 tablet 3     ferrous gluconate (FERGON) 324 (38 FE) MG tablet Take 1 tablet (324 mg) by mouth every other day 100 tablet 11     valACYclovir (VALTREX) 500 MG tablet Take 1 tablet (500 mg) by mouth daily 90 tablet 0     ustekinumab (STELARA) 45 MG/0.5ML SOSY Inject 0.5 mLs (45 mg) Subcutaneous every 3 months 3 Syringe 1     predniSONE (DELTASONE) 5 MG tablet Take 3 tablets (15mg) by mouth for 4 days, then 2 tablets (10mg) by mouth for 4 days. 20 tablet 0     predniSONE (DELTASONE) 5 MG tablet 3 tabs daily for 1 wk, then 2 tabs daily for 1 week. 50 tablet 2     Fluocinolone Acetonide (DERMA-SMOOTHE/FS SCALP) 0.01 % OIL Apply to scalp at bedtime, then wear showercap, rinse off in morning. 118 mL 3     triamcinolone (KENALOG) 0.1 % cream Apply topically 2 times daily 453.6 g 1     predniSONE (DELTASONE) 10 MG tablet Take 1 tablet (10 mg) by mouth daily As needed for flares only 30 tablet 1     clobetasol (TEMOVATE) 0.05 % external solution Apply topically 2 times daily 50 mL 6     LEVEMIR FLEXPEN 100 UNIT/ML 6 units in the morning, 14 units in evening = for total of 20 units daily 3 Month 1     levonorgestrel (MIRENA) 20 MCG/24HR IUD Use as directed 1 each 0   .     REVIEW OF SYSTEMS: 12 point review of  "systems is negative except as noted above.     Exam:     Vitals:    04/06/18 1350   Pulse: 92   Weight: 111 lb 1.6 oz (50.4 kg)   Height: 5' 3.35\" (1.609 m)      CONSTITUTIONAL: healthy, alert and no distress  HEAD: Normocephalic. No masses, lesions, tenderness or abnormalities  SKIN: no suspicious lesions or rashes  GAIT: normal  NEUROLOGIC: Non-focal  PSYCHIATRIC: affect normal/bright and mentation appears normal.    MUSCULOSKELETAL:   RIGHT KNEE: no effusion, FROM, no ligamentous laxity. Mild diffuse MJLT. Patellar exam is unremarkable.    LEFT KNEE: moderate effusion, FROM, no ligamentous laxity, no extensor lag, mild diffuse MJLT. Mildly ttp medial patellar facet.        Assessment/Plan:   (M17.12) Primary osteoarthritis of the left knee  (primary encounter diagnosis)  Plan: PHYSICAL THERAPY REFERRAL (Internal)    (L40.50) Psoriatic arthritis (H)  (M25.469) Effusion of lower leg joint    (E11.9,  Z79.4) Controlled type 2 diabetes mellitus without complication, with long-term current use of insulin (H)    Chrissy is a 32-year-old female with psoriatic arthritis and significant PF osteoarthritis involving the left knee with recurrent knee effusions.  She has failed to respond to intra-articular corticosteroids and particularly those of escalating doses.  She asked very good questions about what can be done from the standpoint of more corticosteroids; however, I have explained to her that with no response with her prior doses it is unlikely to see that she will have any significant response moving forward.  To that end, she has obtained clearance from her insurer to proceed with Gel-One.  We will proceed today with Gel-One into the left knee.  She does have questions regarding the right knee; however, I have no imaging of the right knee and we would need to start with individual imaging.  If she does not have a good response to the Gel-One in the left knee then we have to consider arthroscopy and biopsy and " consideration for further addressing it to the medial meniscus.      PROCEDURE NOTE:  Consent was obtained after discussion of risks, benefits and alternatives.  After sterile preparation 3 cc Gel-One were injected into the left knee via an anterolateral approach.  There were no complications.  Post-injection instructions were given and understood.        The patient is aware that the left knee is going to be sore for the next 3-4 days.  She should take her ibuprofen regularly for this and I have advised her to increase her movement as soon as she gets home today.  All questions were answered.  Depending on how she does with this.  We will plan on following up in 6 months and 1 day for repeat injection.

## 2018-04-06 NOTE — MR AVS SNAPSHOT
After Visit Summary   4/6/2018    Chrissy Dixon    MRN: [id number]           Patient Information     Date Of Birth          1985        Visit Information        Provider Department      4/6/2018 2:00 PM Jad Hightower MD [facility]        Today's Diagnoses     Chondromalacia of left patella    -  1    Psoriatic arthritis (H)        Effusion of lower leg joint        Controlled type 2 diabetes mellitus without complication, with long-term current use of insulin (H)           Follow-ups after your visit        Additional Services     PHYSICAL THERAPY REFERRAL (Internal)       Physical Therapy Referral                  Follow-up notes from your care team     Return if symptoms worsen or fail to improve.      Who to contact     Please call your clinic at [phone] to:    Ask questions about your health    Make or cancel appointments    Discuss your medicines    Learn about your test results    Speak to your doctor            Additional Information About Your Visit        MyChart Information     [employer] gives you secure access to your electronic health record. If you see a primary care provider, you can also send messages to your care team and make appointments. If you have questions, please call your primary care clinic.  If you do not have a primary care provider, please call [phone] and they will assist you.      [employer] is an electronic gateway that provides easy, online access to your medical records. With [employer], you can request a clinic appointment, read your test results, renew a prescription or communicate with your care team.     To access your existing account, please contact your [facility] or call [phone] for assistance.        Care EveryWhere ID     This is your Care EveryWhere ID. This could be used by other organizations to access your [place] medical records  [id number]        Your Vitals Were     Pulse Height BMI  "(Body Mass Index)             92 5' 3.35\" (1.609 m) 19.47 kg/m2          Blood Pressure from Last 3 Encounters:   10/06/17 111/72   08/04/17 114/74   07/07/17 119/77    Weight from Last 3 Encounters:   04/06/18 111 lb 1.6 oz (50.4 kg)   10/06/17 113 lb 11.2 oz (51.6 kg)   09/08/17 110 lb (49.9 kg)              We Performed the Following     Large Joint/Bursa injection and/or drainage - Unilateral (Shoulder, Knee) [20610]     PHYSICAL THERAPY REFERRAL (Internal)          Today's Medication Changes          These changes are accurate as of 4/6/18 11:59 PM.  If you have any questions, ask your nurse or doctor.               Start taking these medicines.        Dose/Directions    hyaluronate 30 MG/3ML injection   Commonly known as:  GEL-ONE   Used for:  Chondromalacia of left patella   Started by:  Jda Hightower MD        Dose:  30 mg   3 mLs (30 mg) by INTRA-ARTICULAR route once for 1 dose   Quantity:  3 mL   Refills:  0            Where to get your medicines      Some of these will need a paper prescription and others can be bought over the counter.  Ask your nurse if you have questions.     You don't need a prescription for these medications     hyaluronate 30 MG/3ML injection                Primary Care Provider Office Phone # Fax #    Nando David Haskins [phone]       [address] [place] [id number]        Equal Access to Services     SHERINE MARTINEZ AH: Hadii vick Ta, waaxda luqadaha, qaybta kaalmada bk, waxmilton juan camacho adeerrol puente. So [facility] [phone].    ATENCIÓN: Si habla español, tiene a hameed disposición servicios gratuitos de asistencia lingüística. Llame al [phone].    We comply with applicable federal civil rights laws and [place] laws. We do not discriminate on the basis of race, color, national origin, age, disability, sex, sexual orientation, or gender identity.            Thank you!     Thank you for choosing [facility] " SPORTS MEDICINE  for your care. Our goal is always to provide you with excellent care. Hearing back from our patients is one way we can continue to improve our services. Please take a few minutes to complete the written survey that you may receive in the mail after your visit with us. Thank you!             Your Updated Medication List - Protect others around you: Learn how to safely use, store and throw away your medicines at www.disposemymeds.org.          This list is accurate as of 4/6/18 11:59 PM.  Always use your most recent med list.                   Brand Name Dispense Instructions for use Diagnosis    * apremilast 30 MG tablet    OTEZLA    180 tablet    Take 1 tablet (30 mg) by mouth 2 times daily    Psoriatic arthritis (H)       * apremilast 30 MG tablet    OTEZLA    180 tablet    Take 1 tablet (30 mg) by mouth 2 times daily    Psoriatic arthritis (H)       blood glucose monitoring meter device kit    no brand specified    1 kit    Use to test blood sugar 6 to 8 times daily or as directed. Okay to dispense One Touch Verio products per patient's insurance.    Diabetes mellitus, type 2 (H)       * blood glucose monitoring test strip    ACCU-CHEK SMARTVIEW    700 each    Use to test blood sugar 6-8 times daily or as directed.    Diabetes mellitus, type 2 (H)       * blood glucose monitoring test strip    no brand specified    700 strip    Use to test blood sugars 6 to 8 times daily or as directed. Okay to dispense One Touch Verio per patient's insurance.    Diabetes mellitus, type 2 (H)       cholecalciferol 1000 UNIT tablet    vitamin D3    90 tablet    Take 1 tablet (1,000 Units) by mouth daily    Vitamin D deficiency       clobetasol 0.05 % external solution    TEMOVATE    50 mL    Apply topically 2 times daily    Psoriasis       DERMA-SMOOTHE/FS SCALP 0.01 % Oil oil   Generic drug:  Fluocinolone Acetonide Scalp     118 mL    Apply to scalp at bedtime, then wear showercap, rinse off in morning.    Other  psoriasis       ferrous gluconate 324 (38 Fe) MG tablet    FERGON    100 tablet    Take 1 tablet (324 mg) by mouth every other day    Iron deficiency anemia due to chronic blood loss, Iron deficiency anemia, unspecified iron deficiency anemia type       fluticasone 50 MCG/ACT spray    FLONASE    48 g    Spray 2 sprays into both nostrils daily    Rhinitis medicamentosa       hyaluronate 30 MG/3ML injection    GEL-ONE    3 mL    3 mLs (30 mg) by INTRA-ARTICULAR route once for 1 dose    Chondromalacia of left patella       ibuprofen 600 MG tablet    ADVIL/MOTRIN    270 tablet    Take 1 tablet (600 mg) by mouth every 8 hours as needed for moderate pain    Psoriasis arthropathica (H)       insulin lispro 100 UNIT/ML injection    HumaLOG KWIKpen    30 mL    USE THREE TIMES DAILY WITH MEALS, CURRENTLY 30 UNITS DAILY    Diabetes mellitus, type 2 (H)       insulin pen needle 31G X 8 MM     300 each    Use  3-4 pen needles daily or as directed.    Diabetes mellitus, type 2 (H)       * LEVEMIR FLEXpen 100 UNIT/ML injection   Generic drug:  insulin detemir     3 Month    6 units in the morning, 14 units in evening = for total of 20 units daily    Diabetes mellitus type 2, uncontrolled (H), Psoriatic arthritis (H), Other malaise and fatigue       * insulin detemir 100 UNIT/ML injection    LEVEMIR FLEXTOUCH    30 mL    Inject 25 Units Subcutaneous At Bedtime    Diabetes mellitus (H)       levonorgestrel 20 MCG/24HR IUD    MIRENA (52 MG)    1 each    Use as directed    Family planning, IUD (intrauterine device) check/reinsertion/removal       metFORMIN 500 MG tablet    GLUCOPHAGE    120 tablet    Take 2 tablets (1,000 mg) by mouth 2 times daily (with meals)    Diabetes mellitus, type 2 (H)       ondansetron 4 MG ODT tab    ZOFRAN-ODT    60 tablet    Take 1 tablet (4 mg) by mouth every 8 hours as needed for nausea    Nausea       ONETOUCH LANCETS Misc     700 each    Use to test blood sugar 6 to 8 times daily or as directed. Donnie  to dispense One Touch Verio products per patient's insurance.    Diabetes mellitus, type 2 (H)       oxyCODONE-acetaminophen 5-325 MG per tablet    PERCOCET    30 tablet    Take 1 tablet by mouth every 4 hours as needed (must last 30 days from dispense date)    Psoriatic arthritis (H), Recurrent cold sores       * predniSONE 10 MG tablet    DELTASONE    30 tablet    Take 1 tablet (10 mg) by mouth daily As needed for flares only    Psoriatic arthropathy (H)       * predniSONE 5 MG tablet    DELTASONE    50 tablet    3 tabs daily for 1 wk, then 2 tabs daily for 1 week.    Psoriatic arthritis (H)       * predniSONE 5 MG tablet    DELTASONE    20 tablet    Take 3 tablets (15mg) by mouth for 4 days, then 2 tablets (10mg) by mouth for 4 days.    Psoriatic arthritis (H)       * predniSONE 5 MG tablet    DELTASONE    18 tablet    20 mg per day x 2 days, 15 mg per day x 2 days, 10 mg per day x 2 days then stop. -    Rheumatoid arthritis involving both hands with positive rheumatoid factor (H)       traMADol 50 MG tablet    ULTRAM    60 tablet    Take 1 tablet (50 mg) by mouth every 8 hours as needed for moderate pain . Must last 30 days    Psoriatic arthritis (H)       triamcinolone 0.1 % cream    KENALOG    453.6 g    Apply topically 2 times daily    Guttate psoriasis       ustekinumab 45 MG/0.5ML Sosy    STELARA    3 Syringe    Inject 0.5 mLs (45 mg) Subcutaneous every 3 months    Psoriatic arthritis (H)       valACYclovir 500 MG tablet    VALTREX    90 tablet    Take 1 tablet (500 mg) by mouth daily    Psoriatic arthritis (H)       * Notice:  This list has 10 medication(s) that are the same as other medications prescribed for you. Read the directions carefully, and ask your doctor or other care provider to review them with you.

## 2018-04-06 NOTE — NURSING NOTE
[address] [address]  Dept: [phone]  ______________________________________________________________________________    Patient: Chrissy Dixon   : 1985   MRN: [id number]   2018    INVASIVE PROCEDURE SAFETY CHECKLIST    Date: 2017   Procedure:left knee Gel One injection  Patient Name: Chrissy Dixon  MRN: [id number]  YOB: 1985    Action: Complete sections as appropriate. Any discrepancy results in a HARD COPY until resolved.     PRE PROCEDURE:  Patient ID verified with 2 identifiers (name and  or MRN): Yes  Procedure and site verified with patient/designee (when able): Yes  Accurate consent documentation in medical record: Yes  H&P (or appropriate assessment) documented in medical record: NA  H&P must be up to 20 days prior to procedure and updates within 24 hours of procedure as applicable: NA  Relevant diagnostic and radiology test results appropriately labeled and displayed as applicable: Yes  Procedure site(s) marked with provider initials: Yes    TIMEOUT:  Time-Out performed immediately prior to starting procedure, including verbal and active participation of all team members addressing the following:Yes  * Correct patient identify  * Confirmed that the correct side and site are marked  * An accurate procedure consent form  * Agreement on the procedure to be done  * Correct patient position  * Relevant images and results are properly labeled and appropriately displayed  * The need to administer antibiotics or fluids for irrigation purposes during the procedure as applicable   * Safety precautions based on patient history or medication use    DURING PROCEDURE: Verification of correct person, site, and procedures any time the responsibility for care of the patient is transferred to another member of the care team.     The following medication was given:     MEDICATION:  Gel-One Hyaluronate  ROUTE: intra  articular  SITE: left knee  DOSE: 3 ml  LOT #: [id number]  : Lawson  EXPIRATION DATE: 2018-11-07  NDC#: -925-00   Was there drug waste? No     Teaching Flowsheet   Relevant Diagnosis: Left knee chondromalacia pf the latella  Teaching Topic: Gel One injection     Person(s) involved in teaching:   Patient     Motivation Level:  Asks Questions: Yes  Eager to Learn: Yes  Cooperative: Yes  Receptive (willing/able to accept information): Yes  Any cultural factors/Mandaeism beliefs that may influence understanding or compliance? No       Patient demonstrates understanding of the following:  Reason for the appointment, diagnosis and treatment plan: Yes  Knowledge of proper use of medications and conditions for which they are ordered (with special attention to potential side effects or drug interactions): Yes  Which situations necessitate calling provider and whom to contact: Yes       Teaching Concerns Addressed:        Proper use and care of NA (medical equip, care aids, etc.): NA  Nutritional needs and diet plan: NA  Pain management techniques: NA  Wound Care: NA  How and/when to access community resources: NA     Instructional Materials Used/Given: verbal          Dilcia Barba ATC  April 6, 2018

## 2018-04-20 ENCOUNTER — MYC REFILL (OUTPATIENT)
Dept: INTERNAL MEDICINE | Facility: CLINIC | Age: 33
End: 2018-04-20

## 2018-04-20 DIAGNOSIS — L40.50 PSORIATIC ARTHRITIS (H): ICD-10-CM

## 2018-04-23 RX ORDER — TRAMADOL HYDROCHLORIDE 50 MG/1
50 TABLET ORAL EVERY 8 HOURS PRN
Qty: 60 TABLET | Refills: 0 | Status: SHIPPED | OUTPATIENT
Start: 2018-04-28 | End: 2018-05-21

## 2018-04-23 NOTE — TELEPHONE ENCOUNTER
[facility] site reviewed 4/23/18. Last refill received 3/27/18, rx was due 3/29/18 so filled 2 days early. Will route to provider for review.    Tyra Singleton RN

## 2018-04-26 DIAGNOSIS — L40.50 PSORIATIC ARTHRITIS (H): ICD-10-CM

## 2018-04-26 RX ORDER — VALACYCLOVIR HYDROCHLORIDE 500 MG/1
500 TABLET, FILM COATED ORAL DAILY
Qty: 90 TABLET | Refills: 1 | Status: SHIPPED | OUTPATIENT
Start: 2018-04-26 | End: 2019-04-22

## 2018-04-29 ENCOUNTER — MYC MEDICAL ADVICE (OUTPATIENT)
Dept: RHEUMATOLOGY | Facility: CLINIC | Age: 33
End: 2018-04-29

## 2018-04-29 DIAGNOSIS — M25.462 SWELLING OF BOTH KNEES: ICD-10-CM

## 2018-04-29 DIAGNOSIS — M25.461 SWELLING OF BOTH KNEES: ICD-10-CM

## 2018-04-29 DIAGNOSIS — L40.50 PSORIATIC ARTHRITIS (H): Primary | ICD-10-CM

## 2018-04-30 DIAGNOSIS — M25.462 SWELLING OF BOTH KNEES: ICD-10-CM

## 2018-04-30 DIAGNOSIS — L40.50 PSORIATIC ARTHRITIS (H): ICD-10-CM

## 2018-04-30 DIAGNOSIS — M25.461 SWELLING OF BOTH KNEES: ICD-10-CM

## 2018-04-30 LAB
BASOPHILS # BLD AUTO: 0 10E9/L (ref 0–0.2)
BASOPHILS NFR BLD AUTO: 0.1 %
DIFFERENTIAL METHOD BLD: NORMAL
EOSINOPHIL # BLD AUTO: 0.4 10E9/L (ref 0–0.7)
EOSINOPHIL NFR BLD AUTO: 5.6 %
ERYTHROCYTE [DISTWIDTH] IN BLOOD BY AUTOMATED COUNT: 14.7 % (ref 10–15)
ERYTHROCYTE [SEDIMENTATION RATE] IN BLOOD BY WESTERGREN METHOD: 10 MM/H (ref 0–20)
HCT VFR BLD AUTO: 36.1 % (ref 35–47)
HGB BLD-MCNC: 11.9 G/DL (ref 11.7–15.7)
LYMPHOCYTES # BLD AUTO: 1.9 10E9/L (ref 0.8–5.3)
LYMPHOCYTES NFR BLD AUTO: 25.4 %
MCH RBC QN AUTO: 26.7 PG (ref 26.5–33)
MCHC RBC AUTO-ENTMCNC: 33 G/DL (ref 31.5–36.5)
MCV RBC AUTO: 81 FL (ref 78–100)
MONOCYTES # BLD AUTO: 0.3 10E9/L (ref 0–1.3)
MONOCYTES NFR BLD AUTO: 4.1 %
NEUTROPHILS # BLD AUTO: 4.7 10E9/L (ref 1.6–8.3)
NEUTROPHILS NFR BLD AUTO: 64.8 %
PLATELET # BLD AUTO: 346 10E9/L (ref 150–450)
RBC # BLD AUTO: 4.46 10E12/L (ref 3.8–5.2)
WBC # BLD AUTO: 7.3 10E9/L (ref 4–11)

## 2018-04-30 PROCEDURE — 36415 COLL VENOUS BLD VENIPUNCTURE: CPT | Performed by: INTERNAL MEDICINE

## 2018-04-30 PROCEDURE — 86140 C-REACTIVE PROTEIN: CPT | Performed by: INTERNAL MEDICINE

## 2018-04-30 PROCEDURE — 82565 ASSAY OF CREATININE: CPT | Performed by: INTERNAL MEDICINE

## 2018-04-30 PROCEDURE — 84450 TRANSFERASE (AST) (SGOT): CPT | Performed by: INTERNAL MEDICINE

## 2018-04-30 PROCEDURE — 84460 ALANINE AMINO (ALT) (SGPT): CPT | Performed by: INTERNAL MEDICINE

## 2018-04-30 PROCEDURE — 85025 COMPLETE CBC W/AUTO DIFF WBC: CPT | Performed by: INTERNAL MEDICINE

## 2018-04-30 PROCEDURE — 85652 RBC SED RATE AUTOMATED: CPT | Performed by: INTERNAL MEDICINE

## 2018-04-30 NOTE — TELEPHONE ENCOUNTER
Called pt and relayed Dr Granados's recommendations. She is agreeable to get the labs done and will go to a [facility]. Chrissy thinks she has enough 5 mg prednisone tabs and won't need a script. She will call if she finds this isn't correct.     Staff will discuss appointment options with Dr Granados and get back to pt.    ERIC MoyaN RN  Rheumatology RN Coordinator  [facility]

## 2018-04-30 NOTE — TELEPHONE ENCOUNTER
I am sorry that the knees continue to trouble Ms. Dixon.  The continued swelling and pain in the left knee even after the gel injection suggests a possible inflammatory component that would not be treated by the gel.  I note that no laboratory measures of inflammation or blood counts have been done in our system for 2018.    I recommend obtaining C-reactive protein, sedimentation rate, CBC with differential, creatinine, AST, and ALT.  Is there a possibility that Ms. Dixon could be added onto 1 of the return slots in upcoming clinic so that we could discuss potential alteration in her immunomodulatory therapy?  In the meantime I do suggest a course of prednisone 20 mg daily for 4 days, 15 mg daily for 4 days and 10 mg daily for 4 days.  Thank you for assistance with entering these orders.

## 2018-05-01 ENCOUNTER — MYC REFILL (OUTPATIENT)
Dept: INTERNAL MEDICINE | Facility: CLINIC | Age: 33
End: 2018-05-01

## 2018-05-01 DIAGNOSIS — B00.1 RECURRENT COLD SORES: ICD-10-CM

## 2018-05-01 DIAGNOSIS — L40.50 PSORIATIC ARTHRITIS (H): ICD-10-CM

## 2018-05-01 LAB
ALT SERPL W P-5'-P-CCNC: 18 U/L (ref 0–50)
AST SERPL W P-5'-P-CCNC: 10 U/L (ref 0–45)
CREAT SERPL-MCNC: 0.52 MG/DL (ref 0.52–1.04)
CRP SERPL-MCNC: <2.9 MG/L (ref 0–8)
GFR SERPL CREATININE-BSD FRML MDRD: >90 ML/MIN/1.7M2

## 2018-05-01 RX ORDER — OXYCODONE AND ACETAMINOPHEN 5; 325 MG/1; MG/1
1 TABLET ORAL EVERY 4 HOURS PRN
Qty: 30 TABLET | Refills: 0 | Status: SHIPPED | OUTPATIENT
Start: 2018-05-10 | End: 2018-05-22

## 2018-05-01 NOTE — TELEPHONE ENCOUNTER
Reason For Call:   Chief Complaint   Patient presents with     Refill Request            Medication Name, Dose and Monthly Quantity:   Oxycodone with APAP (Percocet): Dose 5-325mg Schedule 1 tablet Q4H PRN Monthly Quantity 30     Diagnosis requiring opiates:   Psoriatic arthritis (H) [L40.50]       Recurrent cold sores [B00.1]           Problem List Updated:   Yes    Opioid Agreement On File - [facility] PAIN CONTRACT ID# [id number]:  No    Last Urine Drug Screen (at least once every 12 months) Date:   none  Unexpected Results:   N/A    [place]  Data Reviewed (at least once every 3 months) Date:   5/01/18   Unexpected Results:    No.    Last Fill Date:   4/10/18 for percocet    Last Visit with PCP:   10/06/17    Future Visits with PCP:   No.    Processing:   Mail to patient.     Tyra Singleton RN

## 2018-05-03 NOTE — TELEPHONE ENCOUNTER
Rene Granados MD   Adult Rheum Triage- 3 days ago                   Can I see her 5-4 at 0700? (Routing comment)

## 2018-05-04 NOTE — TELEPHONE ENCOUNTER
Discussed with Dr Granados. Happy to hear pt is improving. Recommend that she continue to monitor her blood sugars closely. Would be a good idea for pt to schedule an appointment for follow up.    Will send this pt a MyChart with the above information.    DAVIS Moya RN  Rheumatology RN Coordinator  [facility]

## 2018-05-10 ENCOUNTER — COMMUNICATION - HEALTHEAST (OUTPATIENT)
Dept: ENDOCRINOLOGY | Facility: CLINIC | Age: 33
End: 2018-05-10

## 2018-05-21 ENCOUNTER — MYC REFILL (OUTPATIENT)
Dept: INTERNAL MEDICINE | Facility: CLINIC | Age: 33
End: 2018-05-21

## 2018-05-21 DIAGNOSIS — B00.1 RECURRENT COLD SORES: ICD-10-CM

## 2018-05-21 DIAGNOSIS — L40.50 PSORIATIC ARTHRITIS (H): ICD-10-CM

## 2018-05-22 RX ORDER — OXYCODONE AND ACETAMINOPHEN 5; 325 MG/1; MG/1
1 TABLET ORAL EVERY 4 HOURS PRN
Qty: 30 TABLET | Refills: 0 | Status: SHIPPED | OUTPATIENT
Start: 2018-06-06 | End: 2018-06-21

## 2018-05-22 RX ORDER — TRAMADOL HYDROCHLORIDE 50 MG/1
50 TABLET ORAL EVERY 8 HOURS PRN
Qty: 60 TABLET | Refills: 0 | Status: SHIPPED | OUTPATIENT
Start: 2018-05-27 | End: 2018-06-20

## 2018-05-22 NOTE — TELEPHONE ENCOUNTER
Reason For Call:   Chief Complaint   Patient presents with     Refill Request            Medication Name, Dose and Monthly Quantity:   Tramadol (Ultram): Dose 50mg tablet Schedule 1 tablet Q8H PRN. Must last 30 days Monthly Quantity 60     Diagnosis requiring opiates:   Psoriatic arthritis (H) [L40.50]     Problem List Updated:   Yes    Opioid Agreement On File - [facility] PAIN CONTRACT ID# [id number]:  No    Last Urine Drug Screen (at least once every 12 months) Date:   none  Unexpected Results:   N/A    [place]  Data Reviewed (at least once every 3 months) Date:   5/22/18   Unexpected Results:    No.    Last Fill Date:   4/27/18-tramadol  5/07/18 - percocet    Last Visit with PCP:   10/06/17    Future Visits with PCP:   No.    Processing:   fax to patient's [facility] pharmacy.     Hold percocet rx in clinic until patient requests. Last fill 5/07/18.    Tyra Singleton RN

## 2018-05-22 NOTE — TELEPHONE ENCOUNTER
Message from Erica:  Nuria Viera, RN Mon May 21, 2018 12:44 PM        ----- Message -----   From: Chrissy Dixon   Sent: 5/21/2018 12:25 PM   To: [facility] Nursing Staff-  Subject: Medication Renewal Request     Original authorizing provider: MD Chrissy Vallecillo would like a refill of the following medications:  traMADol (ULTRAM) 50 MG tablet [Nando Haskins MD]    Preferred pharmacy: [facility] [address] [place] [address] [address] [facility] [address]    Comment:

## 2018-05-24 NOTE — TELEPHONE ENCOUNTER
Tramadol faxed to patient's [facility] pharmacy. Will send percocet prescription closer to due date.     Tyra Singleton RN

## 2018-05-28 DIAGNOSIS — L40.50 PSORIATIC ARTHRITIS (H): ICD-10-CM

## 2018-05-29 NOTE — TELEPHONE ENCOUNTER
apremilast (OTEZLA) 30 MG tablet      Last Written Prescription Date:  6/15/2017  Last Fill Quantity: 180,   # refills: 3  Last Office Visit : 08/04/2017  Future Office visit:  None    Routing refill request to provider for review/approval because: Pt overdue for appt  Scheduling has been notified to contact the pt for appointment.

## 2018-06-01 ENCOUNTER — MYC MEDICAL ADVICE (OUTPATIENT)
Dept: INTERNAL MEDICINE | Facility: CLINIC | Age: 33
End: 2018-06-01

## 2018-06-08 ENCOUNTER — MYC REFILL (OUTPATIENT)
Dept: INTERNAL MEDICINE | Facility: CLINIC | Age: 33
End: 2018-06-08

## 2018-06-08 DIAGNOSIS — E11.9 DIABETES MELLITUS, TYPE 2 (H): ICD-10-CM

## 2018-06-08 NOTE — TELEPHONE ENCOUNTER
metformin  Last Written Prescription Date:  4/2/18  Last Fill Quantity: 120   # refills: 0  Last Office Visit : 10/6/17  Future Office visit:  No future visit    Routing refill request to nurse for review/approval because:  Failed protocol criteria

## 2018-06-20 DIAGNOSIS — L40.50 PSORIATIC ARTHRITIS (H): ICD-10-CM

## 2018-06-20 DIAGNOSIS — B00.1 RECURRENT COLD SORES: ICD-10-CM

## 2018-06-20 NOTE — TELEPHONE ENCOUNTER
ultram  Last Written Prescription Date:  5/27/18  Last Fill Quantity: 60,   # refills: 0  Last Office Visit : 10/6/17  Future Office visit:  8/20/18    Routing refill request to provider for review/approval because:  Drug not on the [facility], [facility] or [facility] refill protocol or controlled substance

## 2018-06-21 RX ORDER — OXYCODONE AND ACETAMINOPHEN 5; 325 MG/1; MG/1
1 TABLET ORAL EVERY 4 HOURS PRN
Qty: 30 TABLET | Refills: 0 | Status: SHIPPED | OUTPATIENT
Start: 2018-07-03 | End: 2018-07-10

## 2018-06-21 RX ORDER — TRAMADOL HYDROCHLORIDE 50 MG/1
50 TABLET ORAL EVERY 8 HOURS PRN
Qty: 60 TABLET | Refills: 0 | Status: SHIPPED | OUTPATIENT
Start: 2018-06-26 | End: 2018-07-10

## 2018-06-21 NOTE — TELEPHONE ENCOUNTER
site verified 6/21/18. Tramadol last received 5/27/18. Percocet last received 6/05/18. Upcoming appointment with PCP on 8/20/18.    Tyra Singleton RN

## 2018-06-25 ENCOUNTER — MYC REFILL (OUTPATIENT)
Dept: INTERNAL MEDICINE | Facility: CLINIC | Age: 33
End: 2018-06-25

## 2018-06-25 DIAGNOSIS — E11.9 DIABETES MELLITUS, TYPE 2 (H): ICD-10-CM

## 2018-06-26 DIAGNOSIS — E11.9 DIABETES MELLITUS, TYPE 2 (H): ICD-10-CM

## 2018-06-26 LAB — HBA1C MFR BLD: 8.2 % (ref 0–5.6)

## 2018-06-26 PROCEDURE — 36415 COLL VENOUS BLD VENIPUNCTURE: CPT | Performed by: INTERNAL MEDICINE

## 2018-06-26 PROCEDURE — 82043 UR ALBUMIN QUANTITATIVE: CPT | Performed by: INTERNAL MEDICINE

## 2018-06-26 PROCEDURE — 83036 HEMOGLOBIN GLYCOSYLATED A1C: CPT | Performed by: INTERNAL MEDICINE

## 2018-06-26 NOTE — TELEPHONE ENCOUNTER
Tramadol rx faxed to [id number] on 6/25/18 at [phone]. Will hold percocet until patient requests.    Tyra Singleton RN

## 2018-06-27 ENCOUNTER — MYC MEDICAL ADVICE (OUTPATIENT)
Dept: INTERNAL MEDICINE | Facility: CLINIC | Age: 33
End: 2018-06-27

## 2018-06-27 LAB
CREAT UR-MCNC: 176 MG/DL
MICROALBUMIN UR-MCNC: 18 MG/L
MICROALBUMIN/CREAT UR: 10.23 MG/G CR (ref 0–25)

## 2018-06-27 NOTE — TELEPHONE ENCOUNTER
Last Clinic Visit: 10/6/2017  [facility]  Next appointment 8/20/18    B/P ANAHI

## 2018-06-27 NOTE — TELEPHONE ENCOUNTER
Message from Erica:  Nuria Viera RN Mon Jun 25, 2018 8:46 AM        ----- Message -----   From: Chrissy Dixon   Sent: 6/25/2018 6:37 AM   To: [facility] Nursing Staff-  Subject: Medication Renewal Request     Original authorizing provider: MD Chrissy Vallecillo would like a refill of the following medications:  metFORMIN (GLUCOPHAGE) 500 MG tablet [Nando Haskins MD]    Preferred pharmacy: [facility] [address] [place] [address] [address] AT [facility] [address]    Comment:  I was suppose to have labs this morning but my [age] year old woke up sick. I m going to try and reschedule for tommorow morning if she is feeling better.

## 2018-06-29 NOTE — TELEPHONE ENCOUNTER
Questions answered in 6/28 encounter. Rx was placed in the mail for patient's home on 6/28.    Tyra Singleton RN

## 2018-07-10 DIAGNOSIS — L40.50 PSORIATIC ARTHRITIS (H): ICD-10-CM

## 2018-07-10 DIAGNOSIS — B00.1 RECURRENT COLD SORES: ICD-10-CM

## 2018-07-10 NOTE — TELEPHONE ENCOUNTER
[facility] site verified 7/10/18. Percocet last filled 7/02/18. Tramadol last filled 6/26/18. Last office visit 7/07/17. Upcoming office visit 8/20/18.    Will hold in clinic until patient requests closer to due date.    Tyra Singleton RN

## 2018-07-11 RX ORDER — OXYCODONE AND ACETAMINOPHEN 5; 325 MG/1; MG/1
1 TABLET ORAL EVERY 4 HOURS PRN
Qty: 30 TABLET | Refills: 0 | Status: SHIPPED | OUTPATIENT
Start: 2018-08-01 | End: 2018-08-20

## 2018-07-11 RX ORDER — TRAMADOL HYDROCHLORIDE 50 MG/1
50 TABLET ORAL EVERY 8 HOURS PRN
Qty: 60 TABLET | Refills: 0 | Status: SHIPPED | OUTPATIENT
Start: 2018-07-26 | End: 2018-08-20

## 2018-07-20 ENCOUNTER — TELEPHONE (OUTPATIENT)
Dept: RHEUMATOLOGY | Facility: CLINIC | Age: 33
End: 2018-07-20

## 2018-07-20 DIAGNOSIS — L40.50 PSORIATIC ARTHRITIS (H): ICD-10-CM

## 2018-07-21 NOTE — TELEPHONE ENCOUNTER
apremilast (OTEZLA) 30 MG tablet   Last Written Prescription Date:  5/29/18  Last Fill Quantity: 60,   # refills: 0  Last Office Visit : 8/4/17  Future Office visit: none    Scheduling has been notified to contact the pt for appointment.    Routing refill request to provider for review/approval because:  lv 8/17, past due

## 2018-07-23 ENCOUNTER — MYC REFILL (OUTPATIENT)
Dept: INTERNAL MEDICINE | Facility: CLINIC | Age: 33
End: 2018-07-23

## 2018-07-23 DIAGNOSIS — R53.81 OTHER MALAISE AND FATIGUE: ICD-10-CM

## 2018-07-23 DIAGNOSIS — R53.83 OTHER MALAISE AND FATIGUE: ICD-10-CM

## 2018-07-23 DIAGNOSIS — E11.9 DIABETES MELLITUS, TYPE 2 (H): ICD-10-CM

## 2018-07-23 DIAGNOSIS — L40.50 PSORIATIC ARTHRITIS (H): ICD-10-CM

## 2018-07-23 DIAGNOSIS — E11.9 DIABETES MELLITUS (H): ICD-10-CM

## 2018-07-23 RX ORDER — TRAMADOL HYDROCHLORIDE 50 MG/1
50 TABLET ORAL EVERY 8 HOURS PRN
Qty: 60 TABLET | Refills: 0 | Status: CANCELLED | OUTPATIENT
Start: 2018-07-26

## 2018-07-23 NOTE — TELEPHONE ENCOUNTER
levemir     Last Written Prescription Date:  2/27/18  Last Fill Quantity: 30,   # refills: 1  tramadol      Last Written Prescription Date:  7/26/18  Last Fill Quantity: 60,   # refills: 0  Last Office Visit : 10/6/17  Future Office visit:  8/20/18    Routing refill request to nurse for review/approval because:  Insulin does not meet refill critieria  Pt would like tramadol mailed to her home

## 2018-07-24 NOTE — TELEPHONE ENCOUNTER
Tramadol rx faxed to [facility] pharmacy #[id number]. Percocet rx placed in mail for patient's home.    Tyra Singleton RN

## 2018-07-30 ENCOUNTER — MYC MEDICAL ADVICE (OUTPATIENT)
Dept: RHEUMATOLOGY | Facility: CLINIC | Age: 33
End: 2018-07-30

## 2018-07-31 ENCOUNTER — MYC MEDICAL ADVICE (OUTPATIENT)
Dept: RHEUMATOLOGY | Facility: CLINIC | Age: 33
End: 2018-07-31

## 2018-07-31 NOTE — TELEPHONE ENCOUNTER
Called pt and an appointment was offered and accepted on 8/2/18 at 10:45 am. Addressed in another encounter.    ERIC MoyaN RN  Rheumatology RN Coordinator  [facility]

## 2018-07-31 NOTE — TELEPHONE ENCOUNTER
[facility]    Phone Message    May a detailed message be left on voicemail: yes    Reason for Call: Other: The pt is returning Esa's call. The pt is avail until 1 pm then again after 4 today. Please call. Thanks     Action Taken: Message routed to:  [facility]

## 2018-07-31 NOTE — TELEPHONE ENCOUNTER
Received the following message from Dr Granados:     Rene Granados MD   Adult Rheum Triage-[facility] 17 hours ago (5:03 PM)           This Thursday at 10:45? (Routing comment)           Left message on pt's voice mail asking that she return call to discuss an appointment on Thursday. Contact information left within the message.    DAVIS oMya RN  Rheumatology RN Coordinator  [facility]

## 2018-07-31 NOTE — TELEPHONE ENCOUNTER
Pt returned call and accepted appointment on Thursday, 8/2/18 at 10:45 am. Pt is aware that we are booking her in a full clinic and there maybe a wait.    Staff message sent to Lyndon asking for assistance in scheduling this appointment.    ERIC MoyaN RN  Rheumatology RN Coordinator  [facility]

## 2018-08-01 NOTE — PROGRESS NOTES
[facility]  Rheumatology Clinic  Rene Granados MD  2018     Name: Chrissy Dixon  MRN: [id number]  Age: 32 year old  : 1985  Referring provider: Nando Haskins     Assessment and Plan:    # Psoriatic arthritis (polyarthritis, scalp and pustular psoriasis; failed Humira and leflunomide, dx'd post-partum in ): Overall, both skin and joint disease of psoriatic arthritis is well controlled. I recommend continuing otezla 60mg twice daily for psoriatic arthritis. she should not receive anti TNF therapy, because of erythematous rash that occurred in association with humira in 2016.    # L knee effusion: s/p aspiration/injection--see procedure note.I expect that swelling/pain should decrease over the next 7-10 days. Pt will call prn problems over the weekend.    Recurrent left knee tightness, pain, and intermittent locking: I think that mechanical symptoms are likely due to the torn meniscus identified on knee MRI performed in 2017. Intermittent swelling, pain,and posterior fullness are likely due to waxing and waning distention of previously established baker's cyst on the left. We discussed my impression that corticosteroid injection alone is associated with longstanding remission of symptoms from baker's cyst, approximately half the time. Not infrequently, surgical solutions need to be considered. We discussed these options and the patient agreed with a recommendation to undergo corticosteroid injection of the left knee today. We discussed potential disruption in blood sugar control induced by kenalog injection; patient will monitor blood sugar and adjust insulin dose accordingly.    Follow-up: Return in about 3 months (around 2018).     HPI:   Chrissy Dixon has a history of psoriatic arthritis who presents for follow up. She has had psoriasis since birth (presented with diffuse rash at birth), was diagnosed with psoriasis at age 11, and developed arthritis in her 20s. She was on  "enbrel at age 21 but developed infections such as pneumonia, sinusitis, and mono, and stopped it within 7 months. She was then on methotrexate from 2011 until February 2012, but did not tolerate this due to GI upset. She switched to sulfasalazine 2/2012 and tolerated it well at 1000mg thrice a day, but had decreasing efficacy as of 11/1/13. She started leflunomide in 10-13 but stopped in 1-14 due to GI intolerance. She started humira in 1-14, held in 7-14 during pregnancy, restarted in 7-15 for flaring disease but then discontinued due to worsened psoriasis and lack of efficacy. Leflunomide was then restarted but loose stools developed. Leflunomide was stopped and otezla was started on 9/11/15. She started stelara in fall 2015 but stopped due to cost and inconvenience in early 2016. She has continued with otezla monotherapy through present, and restarted stelara in 2015 with her last dose in 5-17. She was last seen on 8/4/17 at which time she continued to have minor flares of joint pain in fingers L>R hand. Her skin was well controlled with small breakouts lasting only 3-4 days. She had minimal low back pain and showed no symptoms of tenosynovitis, dactylitis, or iritis. Plan at that time was to continue otezla 30mg twice daily and hold ustekinumab every 3 months (will reconsider substitution of anti-IL-12 if she has a psoriatic or arthritic flare despite continues twice daily otezla). She was allowed to use prednisone 15mg for 1 week, and then 10mg for 1 week, once every 3-4 months to \"bridge\" treatment if needed.    Today, the patient reports having recurrent knee problems. She continued to have flares 1-2 times a month that last 2-5 days. The level of pain is affected by what activities she does, and resting and elevating her knees at night helps. When her flares occur, her swelling is severe to the point where she cannot see her knees, her entire calves harden and tingle, and she feels like her whole leg is cut " "off at the knee. She tries to get up and walk around every 10-15 minutes while at work. She notes that when lying down on on her stomach, she feels pressure most prominent behind her left knee. There is often visible swelling on the top of both knees, but she denies any noticeable warmth in her knees. She also complains of occasionally being unable to bend or straighten her leg when walking up stairs. She feels as if her left knee will buckle, but this only occurs when walking up stairs.     She mentions seeing Dr. Hightower from sports medicine on April 6th where she was diagnosed with left knee patellar chondromalacia and was given a left knee injection of \"gel one\" hyaluronate. She notes that her swelling did not go away but pain was reduced for a while. She also mentions having a baker's cyst on her left knee and previously getting a kenalog injection for it. She reports feeling extremely sick for 3 days after injection due to it disrupting her blood glucose levels, but states that it helped significantly and its effects lasted longer compared to other injections.     While her knees are her biggest issue, she notes that her fingers occasionally become slightly tender, but continue to have full mobility. She also notes that her psoriasis plaques have not returned, but she does occasionally have small, random patches that come up with knee flare ups and resolve quickly. She also notes that she takes ibuprofen 600mg 1-2 times daily.    Review of Systems:   Pertinent items are noted in HPI, remainder of complete ROS is negative.    No recent problems with hearing or vision. No swallowing problems.   No breathing difficulty, shortness of breath, coughing, or wheezing  No chest pain or palpitations  No heart burn, indigestion, abdominal pain, nausea, vomiting, diarrhea  No urination problems, no bloody, cloudy urine, no dysuria  No numbing, weakness  No headaches or confusion  No rashes. No easy bleeding or " bruising.  +calf tingling and stiffness    Active Medications:     Current Outpatient Prescriptions:      apremilast (OTEZLA) 30 MG tablet, Take 1 tablet (30 mg) by mouth 2 times daily, Disp: 60 tablet, Rfl: 0     blood glucose monitoring (ACCU-CHEK SMARTVIEW) test strip, Use to test blood sugar 6-8 times daily or as directed., Disp: 700 each, Rfl: 1     blood glucose monitoring (NO BRAND SPECIFIED) meter device kit, Use to test blood sugar 6 to 8 times daily or as directed. Okay to dispense One Touch Verio products per patient's insurance., Disp: 1 kit, Rfl: 0     blood glucose monitoring (NO BRAND SPECIFIED) test strip, Use to test blood sugars 6 to 8 times daily or as directed. Okay to dispense One Touch Verio per patient's insurance., Disp: 700 strip, Rfl: 1     cholecalciferol (VITAMIN D3) 1000 UNIT tablet, Take 1 tablet (1,000 Units) by mouth daily, Disp: 90 tablet, Rfl: 1     clobetasol (TEMOVATE) 0.05 % external solution, Apply topically 2 times daily, Disp: 50 mL, Rfl: 6     ferrous gluconate (FERGON) 324 (38 FE) MG tablet, Take 1 tablet (324 mg) by mouth every other day, Disp: 100 tablet, Rfl: 11     Fluocinolone Acetonide (DERMA-SMOOTHE/FS SCALP) 0.01 % OIL, Apply to scalp at bedtime, then wear showercap, rinse off in morning., Disp: 118 mL, Rfl: 3     fluticasone (FLONASE) 50 MCG/ACT spray, Spray 2 sprays into both nostrils daily, Disp: 48 g, Rfl: 3     ibuprofen (ADVIL/MOTRIN) 600 MG tablet, Take 1 tablet (600 mg) by mouth every 8 hours as needed for moderate pain, Disp: 270 tablet, Rfl: 0     insulin detemir (LEVEMIR FLEXTOUCH) 100 UNIT/ML injection, Inject 25 Units Subcutaneous At Bedtime, Disp: 30 mL, Rfl: 0     insulin lispro (HUMALOG KWIKPEN) 100 UNIT/ML injection, USE THREE TIMES DAILY WITH MEALS, CURRENTLY 30 UNITS DAILY, Disp: 30 mL, Rfl: 5     insulin pen needle 31G X 8 MM, Use  3-4 pen needles daily or as directed., Disp: 300 each, Rfl: 4     metFORMIN (GLUCOPHAGE) 500 MG tablet, Take 2  tablets (1,000 mg) by mouth 2 times daily (with meals) Labs due for further refills., Disp: 360 tablet, Rfl: 0     ondansetron (ZOFRAN-ODT) 4 MG ODT tab, Take 1 tablet (4 mg) by mouth every 8 hours as needed for nausea, Disp: 60 tablet, Rfl: 2     ONETOUCH LANCETS MISC, Use to test blood sugar 6 to 8 times daily or as directed. Okay to dispense One Touch Verio products per patient's insurance., Disp: 700 each, Rfl: 1     oxyCODONE-acetaminophen (PERCOCET) 5-325 MG per tablet, Take 1 tablet by mouth every 4 hours as needed (must last 30 days from dispense date), Disp: 30 tablet, Rfl: 0     predniSONE (DELTASONE) 10 MG tablet, Take 1 tablet (10 mg) by mouth daily As needed for flares only, Disp: 30 tablet, Rfl: 1     predniSONE (DELTASONE) 5 MG tablet, 20 mg per day x 2 days, 15 mg per day x 2 days, 10 mg per day x 2 days then stop. -, Disp: 18 tablet, Rfl: 0     traMADol (ULTRAM) 50 MG tablet, Take 1 tablet (50 mg) by mouth every 8 hours as needed for moderate pain . Must last 30 days, Disp: 60 tablet, Rfl: 0     triamcinolone (KENALOG) 0.1 % cream, Apply topically 2 times daily, Disp: 453.6 g, Rfl: 1     valACYclovir (VALTREX) 500 MG tablet, Take 1 tablet (500 mg) by mouth daily, Disp: 90 tablet, Rfl: 1     apremilast (OTEZLA) 30 MG tablet, Take 1 tablet (30 mg) by mouth 2 times daily (Patient not taking: Reported on 8/2/2018), Disp: 180 tablet, Rfl: 3     levonorgestrel (MIRENA) 20 MCG/24HR IUD, Use as directed (Patient not taking: Reported on 8/2/2018), Disp: 1 each, Rfl: 0     predniSONE (DELTASONE) 5 MG tablet, Take 3 tablets (15mg) by mouth for 4 days, then 2 tablets (10mg) by mouth for 4 days. (Patient not taking: Reported on 8/2/2018), Disp: 20 tablet, Rfl: 0     predniSONE (DELTASONE) 5 MG tablet, 3 tabs daily for 1 wk, then 2 tabs daily for 1 week. (Patient not taking: Reported on 8/2/2018), Disp: 50 tablet, Rfl: 2     ustekinumab (STELARA) 45 MG/0.5ML SOSY, Inject 0.5 mLs (45 mg) Subcutaneous every 3  months (Patient not taking: Reported on 8/2/2018), Disp: 3 Syringe, Rfl: 1    Current Facility-Administered Medications:      lidocaine (PF) (XYLOCAINE) 1 % injection 5 mL, 5 mL, Infiltration, Once, Rene Granados MD     lidocaine (PF) (XYLOCAINE) 1 % injection 50 mg, 5 mL, Infiltration, Once, Rene Granados MD     triamcinolone acetonide (KENALOG-40) injection 40 mg, 40 mg, INTRA-ARTICULAR, Once, Rene Granados MD     triamcinolone acetonide (KENALOG-40) injection 40 mg, 40 mg, INTRA-ARTICULAR, Once, Rene Granados MD      Allergies:   Diagnostic x-ray materials; Betadine [povidone iodine]; Levofloxacin; Penicillins; Pneumococcal vaccines; Prenatal vit-fe fumarate-fa [cavan-folate ob]; and Tdap [daptacel]      Past Medical History:  Diabetes mellitus type 2  Psoriatic arthritis  Polyarthritis  Iron deficiency anemia due to chronic blood loss     Past Surgical History:  Tonsillar abscess    Family History:   Mother - thyroid disease, diabetes, hypertension  Father - psoriasis  Maternal grandfather - diabetes, hypertension, prostate cancer  Maternal uncle - diabetes  Maternal grandmother - hypertension     Social History:   Former cigarette smoker, 1 pack/day, 5 years, quit 11/1/2007  No smokeless tobacco use  No alcohol use     Physical Exam:   /79 (BP Location: Right arm)  Pulse 89  Temp 98.7  F (37.1  C) (Oral)  Wt 51 kg (112 lb 6.4 oz)  SpO2 99%  BMI 19.69 kg/m2   Wt Readings from Last 4 Encounters:   08/02/18 51 kg (112 lb 6.4 oz)   04/06/18 50.4 kg (111 lb 1.6 oz)   10/06/17 51.6 kg (113 lb 11.2 oz)   09/08/17 49.9 kg (110 lb)   Constitutional: Well-developed, appearing stated age; cooperative  Eyes: Normal EOM, PERRLA, vision, conjunctiva, sclera  ENT: Normal external ears, nose, hearing, lips, teeth, gums, throat. No mucous membrane lesions, normal saliva pool  Neck: No mass or thyroid enlargement  Resp: Lungs clear to auscultation, nl to palpation  CV: RRR, no murmurs, rubs or  gallops, no edema  GI: No ABD mass or tenderness, no HSM  Lymph: No cervical, supraclavicular, inguinal or epitrochlear nodes  MS: Mild crepitus but fluid range of motion in left knee. More prominence behind left knee, reducible swelling at the popliteal fossa left>right. Tenderness at the left knee medial joint line. The TMJ, neck, shoulder, elbow, wrist, MCP/PIP/DIP, spine, hip, ankle, and foot MTP/IP joints were examined and found normal. No active synovitis or altered joint anatomy. Full joint ROM. Normal  strength. No dactylitis,  tenosynovitis, enthesopathy.  Skin: Livedoid appearance to the knees. Visible swelling right>left. Fluid wave on the right, smaller fluid wave on the left. No nail pitting, alopecia, rash, nodules or lesions  Neuro: Normal cranial nerves, strength, sensation, DTRs.   Psych: Normal judgement, orientation, memory, affect.    Laboratory:   MRI of left knee on August 23rd 2017 showed posterior horn medial meniscal tear, large joint effusion with multilobulated baker's cyst, and intact articular cartilage with the exception of the patellofemoral joint that showed some scalloping     Component      Latest Ref Rng & Units 4/30/2018   WBC      4.0 - 11.0 10e9/L 7.3   RBC Count      3.8 - 5.2 10e12/L 4.46   Hemoglobin      11.7 - 15.7 g/dL 11.9   Hematocrit      35.0 - 47.0 % 36.1   MCV      78 - 100 fl 81   MCH      26.5 - 33.0 pg 26.7   MCHC      31.5 - 36.5 g/dL 33.0   RDW      10.0 - 15.0 % 14.7   Platelet Count      150 - 450 10e9/L 346   Diff Method       Automated Method   % Neutrophils      % 64.8   % Lymphocytes      % 25.4   % Monocytes      % 4.1   % Eosinophils      % 5.6   % Basophils      % 0.1   Absolute Neutrophil      1.6 - 8.3 10e9/L 4.7   Absolute Lymphocytes      0.8 - 5.3 10e9/L 1.9   Absolute Monocytes      0.0 - 1.3 10e9/L 0.3   Absolute Eosinophils      0.0 - 0.7 10e9/L 0.4   Absolute Basophils      0.0 - 0.2 10e9/L 0.0   Creatinine      0.52 - 1.04 mg/dL 0.52   GFR  Estimate      >60 mL/min/1.7m2 >90   GFR Estimate If Black      >60 mL/min/1.7m2 >90   ALT      0 - 50 U/L 18   AST      0 - 45 U/L 10   CRP Inflammation      0.0 - 8.0 mg/L <2.9   Sed Rate      0 - 20 mm/h 10       Scribe Disclosure:   I, Andrew Sue, am serving as a scribe to document services personally performed by Rene Granados MD at this visit, based upon the provider's statements to me. All documentation has been reviewed by the aforementioned provider prior to being entered into the official medical record.     Portions of this medical record were completed by a scribe. UPON MY REVIEW AND AUTHENTICATION BY ELECTRONIC SIGNATURE, this confirms (a) I performed the applicable clinical services, and (b) the record is accurate.  Rene Granados MD  Staff Rheumatologist, [facility]        PROCEDURE:  JOINT ASPIRATION/INJECTION    After a discussion of risks, benefits and side effects of procedure, informed patient consent was obtained.  The left knee was prepped and draped in the usual clean fashion (sterile not required for this procedure).  1.5 cc of 1 % lidocaine was used for local analgesia.    ASPIRATION: Not performed.    INJECTION:  Using 1.0 cc of 1 % lidocaine mixed with 40 mg of kenalog, the L knee was successfully injected without complication.  Patient did not experience some pain relief following injection.    Invasive Procedure Safety Checklist completed by nurse? Yes

## 2018-08-02 ENCOUNTER — OFFICE VISIT (OUTPATIENT)
Dept: RHEUMATOLOGY | Facility: CLINIC | Age: 33
End: 2018-08-02
Attending: INTERNAL MEDICINE
Payer: COMMERCIAL

## 2018-08-02 VITALS
OXYGEN SATURATION: 99 % | WEIGHT: 112.4 LBS | HEART RATE: 89 BPM | DIASTOLIC BLOOD PRESSURE: 79 MMHG | TEMPERATURE: 98.7 F | SYSTOLIC BLOOD PRESSURE: 116 MMHG | BODY MASS INDEX: 19.69 KG/M2

## 2018-08-02 DIAGNOSIS — M71.22 BAKER'S CYST OF KNEE, LEFT: Primary | ICD-10-CM

## 2018-08-02 PROCEDURE — 20610 DRAIN/INJ JOINT/BURSA W/O US: CPT

## 2018-08-02 PROCEDURE — G0463 HOSPITAL OUTPT CLINIC VISIT: HCPCS | Mod: ZF

## 2018-08-02 RX ORDER — LIDOCAINE HYDROCHLORIDE 10 MG/ML
5 INJECTION, SOLUTION EPIDURAL; INFILTRATION; INTRACAUDAL; PERINEURAL ONCE
Status: DISCONTINUED | OUTPATIENT
Start: 2018-08-02 | End: 2019-04-22

## 2018-08-02 RX ORDER — TRIAMCINOLONE ACETONIDE 40 MG/ML
40 INJECTION, SUSPENSION INTRA-ARTICULAR; INTRAMUSCULAR ONCE
Status: DISCONTINUED | OUTPATIENT
Start: 2018-08-02 | End: 2019-04-22

## 2018-08-02 ASSESSMENT — PAIN SCALES - GENERAL
PAINLEVEL: MODERATE PAIN (4)
PAINLEVEL: EXTREME PAIN (8)

## 2018-08-02 NOTE — NURSING NOTE
Chief Complaint   Patient presents with     RECHECK     follow up kneew pain     /79 (BP Location: Right arm)  Pulse 89  Temp 98.7  F (37.1  C) (Oral)  Wt 51 kg (112 lb 6.4 oz)  SpO2 99%  BMI 19.69 kg/m2   Anette Conley

## 2018-08-02 NOTE — LETTER
2018      RE: Chrissy Dixon  [address] [facility] [address]       [facility]  Rheumatology Clinic  Rene Granados MD  2018     Name: Chrissy Dixon  MRN: [id number]  Age: 32 year old  : 1985  Referring provider: Nando Haskins     Assessment and Plan:    # Psoriatic arthritis (polyarthritis, scalp and pustular psoriasis; failed Humira and leflunomide, dx'd post-partum in ):  Overall, both skin and joint disease of psoriatic arthritis is well controlled. I recommend continuing otezla 60mg twice daily for psoriatic arthritis. she should not receive anti TNF therapy, because of erythematous rash that occurred in association with humira in 2016.    # L knee effusion: s/p aspiration/injection--see procedure note.I expect that swelling/pain should decrease over the next 7-10 days. Pt will call prn problems over the weekend.    Recurrent left knee tightness, pain, and intermittent locking: I think that mechanical symptoms are likely due to the torn meniscus identified on knee MRI performed in 2017. Intermittent swelling, pain,and posterior fullness are likely due to waxing and waning distention of previously established baker's cyst on the left. We discussed my impression that corticosteroid injection alone is associated with longstanding remission of symptoms from baker's cyst, approximately half the time. Not infrequently, surgical solutions need to be considered. We discussed these options and the patient agreed with a recommendation to undergo corticosteroid injection of the left knee today. We discussed potential disruption in blood sugar control induced by kenalog injection; patient will monitor blood sugar and adjust insulin dose accordingly.    Follow-up: Return in about 3 months (around 2018).     HPI:   Chrissy Dixon has a history of psoriatic arthritis who presents for follow up. She has had psoriasis since birth (presented with diffuse rash at birth), was  "diagnosed with psoriasis at age 11, and developed arthritis in her 20s. She was on enbrel at age 21 but developed infections such as pneumonia, sinusitis, and mono, and stopped it within 7 months. She was then on methotrexate from 2011 until February 2012, but did not tolerate this due to GI upset. She switched to sulfasalazine 2/2012 and tolerated it well at 1000mg thrice a day, but had decreasing efficacy as of 11/1/13. She started leflunomide in 10-13 but stopped in 1-14 due to GI intolerance. She started humira in 1-14, held in 7-14 during pregnancy, restarted in 7-15 for flaring disease but then discontinued due to worsened psoriasis and lack of efficacy. Leflunomide was then restarted but loose stools developed. Leflunomide was stopped and otezla was started on 9/11/15. She started stelara in fall 2015 but stopped due to cost and inconvenience in early 2016. She has continued with otezla monotherapy through present, and restarted stelara in 2015 with her last dose in 5-17. She was last seen on 8/4/17 at which time she continued to have minor flares of joint pain in fingers L>R hand. Her skin was well controlled with small breakouts lasting only 3-4 days. She had minimal low back pain and showed no symptoms of tenosynovitis, dactylitis, or iritis. Plan at that time was to continue otezla 30mg twice daily and hold ustekinumab every 3 months (will reconsider substitution of anti-IL-12 if she has a psoriatic or arthritic flare despite continues twice daily otezla). She was allowed to use prednisone 15mg for 1 week, and then 10mg for 1 week, once every 3-4 months to \"bridge\" treatment if needed.    Today, the patient reports having recurrent knee problems. She continued to have flares 1-2 times a month that last 2-5 days. The level of pain is affected by what activities she does, and resting and elevating her knees at night helps. When her flares occur, her swelling is severe to the point where she cannot see her " "knees, her entire calves harden and tingle, and she feels like her whole leg is cut off at the knee. She tries to get up and walk around every 10-15 minutes while at work. She notes that when lying down on on her stomach, she feels pressure most prominent behind her left knee. There is often visible swelling on the top of both knees, but she denies any noticeable warmth in her knees. She also complains of occasionally being unable to bend or straighten her leg when walking up stairs. She feels as if her left knee will buckle, but this only occurs when walking up stairs.     She mentions seeing Dr. Hightower from sports medicine on April 6th where she was diagnosed with left knee patellar chondromalacia and was given a left knee injection of \"gel one\" hyaluronate. She notes that her swelling did not go away but pain was reduced for a while. She also mentions having a baker's cyst on her left knee and previously getting a kenalog injection for it. She reports feeling extremely sick for 3 days after injection due to it disrupting her blood glucose levels, but states that it helped significantly and its effects lasted longer compared to other injections.     While her knees are her biggest issue, she notes that her fingers occasionally become slightly tender, but continue to have full mobility. She also notes that her psoriasis plaques have not returned, but she does occasionally have small, random patches that come up with knee flare ups and resolve quickly. She also notes that she takes ibuprofen 600mg 1-2 times daily.    Review of Systems:   Pertinent items are noted in HPI, remainder of complete ROS is negative.    No recent problems with hearing or vision. No swallowing problems.   No breathing difficulty, shortness of breath, coughing, or wheezing  No chest pain or palpitations  No heart burn, indigestion, abdominal pain, nausea, vomiting, diarrhea  No urination problems, no bloody, cloudy urine, no dysuria  No " numbing, weakness  No headaches or confusion  No rashes. No easy bleeding or bruising.  +calf tingling and stiffness    Active Medications:     Current Outpatient Prescriptions:      apremilast (OTEZLA) 30 MG tablet, Take 1 tablet (30 mg) by mouth 2 times daily, Disp: 60 tablet, Rfl: 0     blood glucose monitoring (ACCU-CHEK SMARTVIEW) test strip, Use to test blood sugar 6-8 times daily or as directed., Disp: 700 each, Rfl: 1     blood glucose monitoring (NO BRAND SPECIFIED) meter device kit, Use to test blood sugar 6 to 8 times daily or as directed. Okay to dispense One Touch Verio products per patient's insurance., Disp: 1 kit, Rfl: 0     blood glucose monitoring (NO BRAND SPECIFIED) test strip, Use to test blood sugars 6 to 8 times daily or as directed. Okay to dispense One Touch Verio per patient's insurance., Disp: 700 strip, Rfl: 1     cholecalciferol (VITAMIN D3) 1000 UNIT tablet, Take 1 tablet (1,000 Units) by mouth daily, Disp: 90 tablet, Rfl: 1     clobetasol (TEMOVATE) 0.05 % external solution, Apply topically 2 times daily, Disp: 50 mL, Rfl: 6     ferrous gluconate (FERGON) 324 (38 FE) MG tablet, Take 1 tablet (324 mg) by mouth every other day, Disp: 100 tablet, Rfl: 11     Fluocinolone Acetonide (DERMA-SMOOTHE/FS SCALP) 0.01 % OIL, Apply to scalp at bedtime, then wear showercap, rinse off in morning., Disp: 118 mL, Rfl: 3     fluticasone (FLONASE) 50 MCG/ACT spray, Spray 2 sprays into both nostrils daily, Disp: 48 g, Rfl: 3     ibuprofen (ADVIL/MOTRIN) 600 MG tablet, Take 1 tablet (600 mg) by mouth every 8 hours as needed for moderate pain, Disp: 270 tablet, Rfl: 0     insulin detemir (LEVEMIR FLEXTOUCH) 100 UNIT/ML injection, Inject 25 Units Subcutaneous At Bedtime, Disp: 30 mL, Rfl: 0     insulin lispro (HUMALOG KWIKPEN) 100 UNIT/ML injection, USE THREE TIMES DAILY WITH MEALS, CURRENTLY 30 UNITS DAILY, Disp: 30 mL, Rfl: 5     insulin pen needle 31G X 8 MM, Use  3-4 pen needles daily or as directed.,  Disp: 300 each, Rfl: 4     metFORMIN (GLUCOPHAGE) 500 MG tablet, Take 2 tablets (1,000 mg) by mouth 2 times daily (with meals) Labs due for further refills., Disp: 360 tablet, Rfl: 0     ondansetron (ZOFRAN-ODT) 4 MG ODT tab, Take 1 tablet (4 mg) by mouth every 8 hours as needed for nausea, Disp: 60 tablet, Rfl: 2     ONETOUCH LANCETS MISC, Use to test blood sugar 6 to 8 times daily or as directed. Okay to dispense One Touch Verio products per patient's insurance., Disp: 700 each, Rfl: 1     oxyCODONE-acetaminophen (PERCOCET) 5-325 MG per tablet, Take 1 tablet by mouth every 4 hours as needed (must last 30 days from dispense date), Disp: 30 tablet, Rfl: 0     predniSONE (DELTASONE) 10 MG tablet, Take 1 tablet (10 mg) by mouth daily As needed for flares only, Disp: 30 tablet, Rfl: 1     predniSONE (DELTASONE) 5 MG tablet, 20 mg per day x 2 days, 15 mg per day x 2 days, 10 mg per day x 2 days then stop. -, Disp: 18 tablet, Rfl: 0     traMADol (ULTRAM) 50 MG tablet, Take 1 tablet (50 mg) by mouth every 8 hours as needed for moderate pain . Must last 30 days, Disp: 60 tablet, Rfl: 0     triamcinolone (KENALOG) 0.1 % cream, Apply topically 2 times daily, Disp: 453.6 g, Rfl: 1     valACYclovir (VALTREX) 500 MG tablet, Take 1 tablet (500 mg) by mouth daily, Disp: 90 tablet, Rfl: 1     apremilast (OTEZLA) 30 MG tablet, Take 1 tablet (30 mg) by mouth 2 times daily (Patient not taking: Reported on 8/2/2018), Disp: 180 tablet, Rfl: 3     levonorgestrel (MIRENA) 20 MCG/24HR IUD, Use as directed (Patient not taking: Reported on 8/2/2018), Disp: 1 each, Rfl: 0     predniSONE (DELTASONE) 5 MG tablet, Take 3 tablets (15mg) by mouth for 4 days, then 2 tablets (10mg) by mouth for 4 days. (Patient not taking: Reported on 8/2/2018), Disp: 20 tablet, Rfl: 0     predniSONE (DELTASONE) 5 MG tablet, 3 tabs daily for 1 wk, then 2 tabs daily for 1 week. (Patient not taking: Reported on 8/2/2018), Disp: 50 tablet, Rfl: 2     ustekinumab  (STELARA) 45 MG/0.5ML SOSY, Inject 0.5 mLs (45 mg) Subcutaneous every 3 months (Patient not taking: Reported on 8/2/2018), Disp: 3 Syringe, Rfl: 1    Current Facility-Administered Medications:      lidocaine (PF) (XYLOCAINE) 1 % injection 5 mL, 5 mL, Infiltration, Once, Rene Granados MD     lidocaine (PF) (XYLOCAINE) 1 % injection 50 mg, 5 mL, Infiltration, Once, Rene Granados MD     triamcinolone acetonide (KENALOG-40) injection 40 mg, 40 mg, INTRA-ARTICULAR, Once, Rene Granados MD     triamcinolone acetonide (KENALOG-40) injection 40 mg, 40 mg, INTRA-ARTICULAR, Once, Rene Granados MD      Allergies:   Diagnostic x-ray materials; Betadine [povidone iodine]; Levofloxacin; Penicillins; Pneumococcal vaccines; Prenatal vit-fe fumarate-fa [cavan-folate ob]; and Tdap [daptacel]      Past Medical History:  Diabetes mellitus type 2  Psoriatic arthritis  Polyarthritis  Iron deficiency anemia due to chronic blood loss     Past Surgical History:  Tonsillar abscess    Family History:   Mother - thyroid disease, diabetes, hypertension  Father - psoriasis  Maternal grandfather - diabetes, hypertension, prostate cancer  Maternal uncle - diabetes  Maternal grandmother - hypertension     Social History:   Former cigarette smoker, 1 pack/day, 5 years, quit 11/1/2007  No smokeless tobacco use  No alcohol use     Physical Exam:   /79 (BP Location: Right arm)  Pulse 89  Temp 98.7  F (37.1  C) (Oral)  Wt 51 kg (112 lb 6.4 oz)  SpO2 99%  BMI 19.69 kg/m2   Wt Readings from Last 4 Encounters:   08/02/18 51 kg (112 lb 6.4 oz)   04/06/18 50.4 kg (111 lb 1.6 oz)   10/06/17 51.6 kg (113 lb 11.2 oz)   09/08/17 49.9 kg (110 lb)   Constitutional: Well-developed, appearing stated age; cooperative  Eyes: Normal EOM, PERRLA, vision, conjunctiva, sclera  ENT: Normal external ears, nose, hearing, lips, teeth, gums, throat. No mucous membrane lesions, normal saliva pool  Neck: No mass or thyroid enlargement  Resp:  Lungs clear to auscultation, nl to palpation  CV: RRR, no murmurs, rubs or gallops, no edema  GI: No ABD mass or tenderness, no HSM  Lymph: No cervical, supraclavicular, inguinal or epitrochlear nodes  MS: Mild crepitus but fluid range of motion in left knee. More prominence behind left knee, reducible swelling at the popliteal fossa left>right. Tenderness at the left knee medial joint line. The TMJ, neck, shoulder, elbow, wrist, MCP/PIP/DIP, spine, hip, ankle, and foot MTP/IP joints were examined and found normal. No active synovitis or altered joint anatomy. Full joint ROM. Normal  strength. No dactylitis,  tenosynovitis, enthesopathy.  Skin: Livedoid appearance to the knees. Visible swelling right>left. Fluid wave on the right, smaller fluid wave on the left. No nail pitting, alopecia, rash, nodules or lesions  Neuro: Normal cranial nerves, strength, sensation, DTRs.   Psych: Normal judgement, orientation, memory, affect.    Laboratory:   MRI of left knee on August 23rd 2017 showed posterior horn medial meniscal tear, large joint effusion with multilobulated baker's cyst, and intact articular cartilage with the exception of the patellofemoral joint that showed some scalloping     Component      Latest Ref Rng & Units 4/30/2018   WBC      4.0 - 11.0 10e9/L 7.3   RBC Count      3.8 - 5.2 10e12/L 4.46   Hemoglobin      11.7 - 15.7 g/dL 11.9   Hematocrit      35.0 - 47.0 % 36.1   MCV      78 - 100 fl 81   MCH      26.5 - 33.0 pg 26.7   MCHC      31.5 - 36.5 g/dL 33.0   RDW      10.0 - 15.0 % 14.7   Platelet Count      150 - 450 10e9/L 346   Diff Method       Automated Method   % Neutrophils      % 64.8   % Lymphocytes      % 25.4   % Monocytes      % 4.1   % Eosinophils      % 5.6   % Basophils      % 0.1   Absolute Neutrophil      1.6 - 8.3 10e9/L 4.7   Absolute Lymphocytes      0.8 - 5.3 10e9/L 1.9   Absolute Monocytes      0.0 - 1.3 10e9/L 0.3   Absolute Eosinophils      0.0 - 0.7 10e9/L 0.4   Absolute  Basophils      0.0 - 0.2 10e9/L 0.0   Creatinine      0.52 - 1.04 mg/dL 0.52   GFR Estimate      >60 mL/min/1.7m2 >90   GFR Estimate If Black      >60 mL/min/1.7m2 >90   ALT      0 - 50 U/L 18   AST      0 - 45 U/L 10   CRP Inflammation      0.0 - 8.0 mg/L <2.9   Sed Rate      0 - 20 mm/h 10       Scribe Disclosure:   I, Andrew Rogers, am serving as a scribe to document services personally performed by Rene Granados MD at this visit, based upon the provider's statements to me. All documentation has been reviewed by the aforementioned provider prior to being entered into the official medical record.     Portions of this medical record were completed by a scribe. UPON MY REVIEW AND AUTHENTICATION BY ELECTRONIC SIGNATURE, this confirms (a) I performed the applicable clinical services, and (b) the record is accurate.  Rene Granados MD  [facility]        PROCEDURE:  JOINT ASPIRATION/INJECTION    After a discussion of risks, benefits and side effects of procedure, informed patient consent was obtained.  The left knee was prepped and draped in the usual clean fashion (sterile not required for this procedure).  1.5 cc of 1 % lidocaine was used for local analgesia.    ASPIRATION: Not performed.    INJECTION:  Using 1.0 cc of 1 % lidocaine mixed with 40 mg of kenalog, the L knee was successfully injected without complication.  Patient did not experience some pain relief following injection.    Invasive Procedure Safety Checklist completed by nurse? Yes      Rene Granados MD

## 2018-08-02 NOTE — NURSING NOTE
Procedure completed per organizational policy, [place] Protocol Safety Checklist for Non-OR Invasive Procedures completed and sent for scan.   Pain level at [address] [place] [address]

## 2018-08-02 NOTE — TELEPHONE ENCOUNTER
[facility] Call Center    Phone Message    May a detailed message be left on voicemail: yes    Reason for Call: Medication Refill Request    Has the patient contacted the pharmacy for the refill? Yes --pt states this was mistakenly sent to a [facility] pharmacy, but it has to go through [facility] first. She states she is due for a refill at the end of the week, and [facility] can overnight it to her as soon as they get authorization. Please advise.  Name of medication being requested: apremilast (OTEZLA) 30 MG tablet  Provider who prescribed the medication: Dr. Vinod Dash (Dr. Rene Granados)  Pharmacy: [facility]  Date medication is needed: ASAP    Action Taken: Message routed to:  [facility] ([facility]): Med Refills

## 2018-08-02 NOTE — MR AVS SNAPSHOT
After Visit Summary   8/2/2018    Chrissy Dixon    MRN: [id number]           Patient Information     Date Of Birth          1985        Visit Information        Provider Department      8/2/2018 10:45 AM Rene Granados MD [facility] Rhe[facility]ology        Today's Diagnoses     Baker's cyst of knee, left    -  1      Care Instructions    Dx: Baker's cyst          Follow-ups after your visit        Follow-up notes from your care team     Return in about 3 months (around 11/2/2018).      Your next 10 appointments already scheduled     Aug 20, 2018 10:20 AM CDT   (Arrive by 10:05 AM)   PHYSICAL with Nando Haskins MD   [facility] ([facility] [place])    [address]  4th Floor  [facility] [address] [phone]            Nov 30, 2018  7:00 AM CST   (Arrive by 6:45 AM)   Return Visit with Rene Granados MD   [facility] ([facility] [place])    [address] [place] [address]  [address]  [facility] [address] [phone]              Who to contact     If you have questions or need follow up information about today's clinic visit or your schedule please contact [facility] RHEUMATOLOGY directly at [phone].  Normal or non-critical lab and imaging results will be communicated to you by DroneCasthart, letter or phone within 4 business days after the clinic has received the results. If you do not hear from us within 7 days, please contact the clinic through [employer]oneCasthart or phone. If you have a critical or abnormal lab result, we will notify you by phone as soon as possible.  Submit refill requests through [employer] or call your pharmacy and they will forward the refill request to us. Please allow 3 business days for your refill to be completed.          Additional Information About Your Visit        [employer] Information     [employer] gives you secure access to your electronic health record. If you see a primary care provider, you  can also send messages to your care team and make appointments. If you have questions, please call your primary care clinic.  If you do not have a primary care provider, please call [phone] and they will assist you.        Care EveryWhere ID     This is your Care EveryWhere ID. This could be used by other organizations to access your [facility] medical records  [id number]        Your Vitals Were     Pulse Temperature Pulse Oximetry BMI (Body Mass Index)          89 98.7  F (37.1  C) (Oral) 99% 19.69 kg/m2         Blood Pressure from Last 3 Encounters:   08/02/18 116/79   10/06/17 111/72   08/04/17 114/74    Weight from Last 3 Encounters:   08/02/18 51 kg (112 lb 6.4 oz)   04/06/18 50.4 kg (111 lb 1.6 oz)   10/06/17 51.6 kg (113 lb 11.2 oz)              Today, you had the following     No orders found for display       Primary Care Provider Office Phone # Fax #    Nando Haskins [phone]       [address] [place] [id number]        Equal Access to Services     [facility] : Hadii vick salguero hadbennyo Sobriana, waaxda luqadaha, qaybta kaalmada bk, stanley oropeza . So [facility] [phone].    ATENCIÓN: Si habla español, tiene a hameed disposición servicios gratuitos de asistencia lingüística. [facility] [phone].    We comply with applicable federal civil rights laws and [place] laws. We do not discriminate on the basis of race, color, national origin, age, disability, sex, sexual orientation, or gender identity.            Thank you!     Thank you for choosing [facility] RHEUMATOLOGY  for your care. Our goal is always to provide you with excellent care. Hearing back from our patients is one way we can continue to improve our services. Please take a few minutes to complete the written survey that you may receive in the mail after your visit with us. Thank you!             Your Updated Medication List - Protect others around you: Learn how to safely  use, store and throw away your medicines at www.disposemymeds.org.          This list is accurate as of 8/2/18 11:47 AM.  Always use your most recent med list.                   Brand Name Dispense Instructions for use Diagnosis    * apremilast 30 MG tablet    OTEZLA    180 tablet    Take 1 tablet (30 mg) by mouth 2 times daily    Psoriatic arthritis (H)       * apremilast 30 MG tablet    OTEZLA    60 tablet    Take 1 tablet (30 mg) by mouth 2 times daily    Psoriatic arthritis (H)       blood glucose monitoring meter device kit    no brand specified    1 kit    Use to test blood sugar 6 to 8 times daily or as directed. Okay to dispense One Touch Verio products per patient's insurance.    Diabetes mellitus, type 2 (H)       * blood glucose monitoring test strip    no brand specified    700 strip    Use to test blood sugars 6 to 8 times daily or as directed. Okay to dispense One Touch Verio per patient's insurance.    Diabetes mellitus, type 2 (H)       * blood glucose monitoring test strip    ACCU-CHEK SMARTVIEW    700 each    Use to test blood sugar 6-8 times daily or as directed.    Diabetes mellitus, type 2 (H)       cholecalciferol 1000 UNIT tablet    vitamin D3    90 tablet    Take 1 tablet (1,000 Units) by mouth daily    Vitamin D deficiency       clobetasol 0.05 % external solution    TEMOVATE    50 mL    Apply topically 2 times daily    Psoriasis       DERMA-SMOOTHE/FS SCALP 0.01 % Oil oil   Generic drug:  Fluocinolone Acetonide Scalp     118 mL    Apply to scalp at bedtime, then wear showercap, rinse off in morning.    Other psoriasis       ferrous gluconate 324 (38 Fe) MG tablet    FERGON    100 tablet    Take 1 tablet (324 mg) by mouth every other day    Iron deficiency anemia due to chronic blood loss, Iron deficiency anemia, unspecified iron deficiency anemia type       fluticasone 50 MCG/ACT spray    FLONASE    48 g    Spray 2 sprays into both nostrils daily    Rhinitis medicamentosa       ibuprofen  600 MG tablet    ADVIL/MOTRIN    270 tablet    Take 1 tablet (600 mg) by mouth every 8 hours as needed for moderate pain    Psoriasis arthropathica (H)       insulin detemir 100 UNIT/ML injection    LEVEMIR FLEXTOUCH    30 mL    Inject 25 Units Subcutaneous At Bedtime    Diabetes mellitus (H)       insulin lispro 100 UNIT/ML injection    HumaLOG KWIKpen    30 mL    USE THREE TIMES DAILY WITH MEALS, CURRENTLY 30 UNITS DAILY    Diabetes mellitus, type 2 (H)       insulin pen needle 31G X 8 MM     300 each    Use  3-4 pen needles daily or as directed.    Diabetes mellitus, type 2 (H)       levonorgestrel 20 MCG/24HR IUD    MIRENA (52 MG)    1 each    Use as directed    Family planning, IUD (intrauterine device) check/reinsertion/removal       metFORMIN 500 MG tablet    GLUCOPHAGE    360 tablet    Take 2 tablets (1,000 mg) by mouth 2 times daily (with meals) Labs due for further refills.    Diabetes mellitus, type 2 (H)       ondansetron 4 MG ODT tab    ZOFRAN-ODT    60 tablet    Take 1 tablet (4 mg) by mouth every 8 hours as needed for nausea    Nausea       ONETOUCH LANCETS Misc     700 each    Use to test blood sugar 6 to 8 times daily or as directed. Okay to dispense One Touch Verio products per patient's insurance.    Diabetes mellitus, type 2 (H)       oxyCODONE-acetaminophen 5-325 MG per tablet    PERCOCET    30 tablet    Take 1 tablet by mouth every 4 hours as needed (must last 30 days from dispense date)    Psoriatic arthritis (H), Recurrent cold sores       * predniSONE 10 MG tablet    DELTASONE    30 tablet    Take 1 tablet (10 mg) by mouth daily As needed for flares only    Psoriatic arthropathy (H)       * predniSONE 5 MG tablet    DELTASONE    50 tablet    3 tabs daily for 1 wk, then 2 tabs daily for 1 week.    Psoriatic arthritis (H)       * predniSONE 5 MG tablet    DELTASONE    20 tablet    Take 3 tablets (15mg) by mouth for 4 days, then 2 tablets (10mg) by mouth for 4 days.    Psoriatic arthritis (H)        * predniSONE 5 MG tablet    DELTASONE    18 tablet    20 mg per day x 2 days, 15 mg per day x 2 days, 10 mg per day x 2 days then stop. -    Rheumatoid arthritis involving both hands with positive rheumatoid factor (H)       traMADol 50 MG tablet    ULTRAM    60 tablet    Take 1 tablet (50 mg) by mouth every 8 hours as needed for moderate pain . Must last 30 days    Psoriatic arthritis (H)       triamcinolone 0.1 % cream    KENALOG    453.6 g    Apply topically 2 times daily    Guttate psoriasis       ustekinumab 45 MG/0.5ML Sosy    STELARA    3 Syringe    Inject 0.5 mLs (45 mg) Subcutaneous every 3 months    Psoriatic arthritis (H)       valACYclovir 500 MG tablet    VALTREX    90 tablet    Take 1 tablet (500 mg) by mouth daily    Psoriatic arthritis (H)       * Notice:  This list has 8 medication(s) that are the same as other medications prescribed for you. Read the directions carefully, and ask your doctor or other care provider to review them with you.

## 2018-08-06 ENCOUNTER — MYC MEDICAL ADVICE (OUTPATIENT)
Dept: RHEUMATOLOGY | Facility: CLINIC | Age: 33
End: 2018-08-06

## 2018-08-06 DIAGNOSIS — M71.22 BAKER'S CYST OF KNEE, LEFT: Primary | ICD-10-CM

## 2018-08-06 NOTE — TELEPHONE ENCOUNTER
Left message on pt's voice mail asking for pt to return call to discuss her symptoms. Also sent MyChart message. Will forward this on to Dr Granados for additional recommendations?    ERIC MoyaN RN  Rheumatology RN Coordinator  [facility]      .

## 2018-08-07 ASSESSMENT — ENCOUNTER SYMPTOMS
NECK PAIN: 0
POLYDIPSIA: 0
SORE THROAT: 0
MEMORY LOSS: 0
POLYPHAGIA: 0
PARALYSIS: 0
NERVOUS/ANXIOUS: 1
TINGLING: 1
JOINT SWELLING: 1
DISTURBANCES IN COORDINATION: 0
TREMORS: 0
WEIGHT LOSS: 0
CHILLS: 0
SWOLLEN GLANDS: 0
WEAKNESS: 1
PARALYSIS: 0
DIZZINESS: 0
HALLUCINATIONS: 0
SWOLLEN GLANDS: 0
DIZZINESS: 0
DECREASED CONCENTRATION: 1
BRUISES/BLEEDS EASILY: 1
INSOMNIA: 1
BRUISES/BLEEDS EASILY: 1
TROUBLE SWALLOWING: 0
TREMORS: 0
NERVOUS/ANXIOUS: 1
TROUBLE SWALLOWING: 0
DISTURBANCES IN COORDINATION: 0
FEVER: 0
SINUS PAIN: 0
NECK PAIN: 0
WEIGHT GAIN: 0
DECREASED APPETITE: 0
DECREASED CONCENTRATION: 1
SPEECH CHANGE: 0
JOINT SWELLING: 1
SINUS PAIN: 0
SEIZURES: 0
ALTERED TEMPERATURE REGULATION: 0
SINUS CONGESTION: 1
STIFFNESS: 1
POLYDIPSIA: 0
NECK MASS: 0
MEMORY LOSS: 0
SPEECH CHANGE: 0
HOARSE VOICE: 0
SMELL DISTURBANCE: 0
WEIGHT GAIN: 0
NUMBNESS: 1
BACK PAIN: 0
STIFFNESS: 1
MUSCLE CRAMPS: 1
INCREASED ENERGY: 1
INCREASED ENERGY: 1
PANIC: 0
ALTERED TEMPERATURE REGULATION: 0
FATIGUE: 1
BACK PAIN: 0
NIGHT SWEATS: 1
MUSCLE CRAMPS: 1
DEPRESSION: 1
WEAKNESS: 1
SEIZURES: 0
DEPRESSION: 1
SMELL DISTURBANCE: 0
LOSS OF CONSCIOUSNESS: 0
MUSCLE WEAKNESS: 1
TASTE DISTURBANCE: 0
MYALGIAS: 1
HEADACHES: 1
DECREASED APPETITE: 0
TINGLING: 1
ARTHRALGIAS: 1
POLYPHAGIA: 0
NIGHT SWEATS: 1
HALLUCINATIONS: 0
INSOMNIA: 1
SINUS CONGESTION: 1
FATIGUE: 1
MUSCLE WEAKNESS: 1
SORE THROAT: 0
FEVER: 0
HOARSE VOICE: 0
MYALGIAS: 1
CHILLS: 0
LOSS OF CONSCIOUSNESS: 0
TASTE DISTURBANCE: 0
HEADACHES: 1
NUMBNESS: 1
ARTHRALGIAS: 1
WEIGHT LOSS: 0
NECK MASS: 0
PANIC: 0

## 2018-08-07 NOTE — TELEPHONE ENCOUNTER
Received the following staff messages from Dr Granados:    Rene Granados MD   Adult Rheum Triage-Uc 3 hours ago (7:12 AM)           Thank you for the followup. I am sorry that the injection was not associated with substantial improvement. I suggest alternating heat and ice on the knee in the evenings, use of acetaminophen 1000 mg three times daily, and continue twice daily stretching. Further imaging may be needed to get a better handle on the cause of continued pain. (Routing comment)         Rene Granados MD   Adult Rheum Triage-Uc 3 hours ago (7:18 AM)           I am concerned that the large L knee Barnett's cyst noted in 8-17 MRI is not responding to repeated steroid and gel one injections. Overall the psoriatic arthritis is well-controlled, so I share the patient's concern that a non-inflammatory cause is contributing strongly to ongoing symptoms.   I recommend that patient get a 2nd opinion from Dr. Sean Pate in Orthopedics for management of Baker's cyst.   Thanks. (Routing comment)         The above recommendations were forwarded to pt via [employer]. Included the phone number to contact Ortho so that she can call to schedule. Asked pt to get back to us with any questions she may have.    ERIC MoyaN RN  Rheumatology RN Coordinator  [facility]

## 2018-08-11 ENCOUNTER — PRE VISIT (OUTPATIENT)
Dept: ORTHOPEDICS | Facility: CLINIC | Age: 33
End: 2018-08-11

## 2018-08-11 NOTE — TELEPHONE ENCOUNTER
FUTURE VISIT INFORMATION      FUTURE VISIT INFORMATION:    Date: 8/20    Time: 9:10    [place] [facility] [place]  REFERRAL INFORMATION:    Referring provider:  Rene Granados    Referring providers clinic:  [facility] Rheumatology    Reason for visit/diagnosis  Baker's cyst of left knee    RECORDS REQUESTED FROM:       Clinic name Comments Records Status Imaging Status                                         RECORDS STATUS      All records internal

## 2018-08-20 ENCOUNTER — OFFICE VISIT (OUTPATIENT)
Dept: INTERNAL MEDICINE | Facility: CLINIC | Age: 33
End: 2018-08-20
Payer: COMMERCIAL

## 2018-08-20 ENCOUNTER — OFFICE VISIT (OUTPATIENT)
Dept: ORTHOPEDICS | Facility: CLINIC | Age: 33
End: 2018-08-20
Payer: COMMERCIAL

## 2018-08-20 ENCOUNTER — RADIANT APPOINTMENT (OUTPATIENT)
Dept: GENERAL RADIOLOGY | Facility: CLINIC | Age: 33
End: 2018-08-20
Attending: ORTHOPAEDIC SURGERY
Payer: COMMERCIAL

## 2018-08-20 VITALS
HEIGHT: 64 IN | BODY MASS INDEX: 18.54 KG/M2 | SYSTOLIC BLOOD PRESSURE: 103 MMHG | WEIGHT: 108.6 LBS | TEMPERATURE: 98.5 F | DIASTOLIC BLOOD PRESSURE: 68 MMHG | OXYGEN SATURATION: 98 % | HEART RATE: 91 BPM

## 2018-08-20 VITALS — BODY MASS INDEX: 19.84 KG/M2 | WEIGHT: 112 LBS | HEIGHT: 63 IN

## 2018-08-20 DIAGNOSIS — M25.561 PAIN IN BOTH KNEES, UNSPECIFIED CHRONICITY: Primary | ICD-10-CM

## 2018-08-20 DIAGNOSIS — E11.9 CONTROLLED TYPE 2 DIABETES MELLITUS WITHOUT COMPLICATION, WITH LONG-TERM CURRENT USE OF INSULIN (H): Primary | ICD-10-CM

## 2018-08-20 DIAGNOSIS — M71.30 SYNOVIAL CYST: ICD-10-CM

## 2018-08-20 DIAGNOSIS — E11.9 TYPE 2 DIABETES MELLITUS WITHOUT COMPLICATION, WITH LONG-TERM CURRENT USE OF INSULIN (H): ICD-10-CM

## 2018-08-20 DIAGNOSIS — M25.561 PAIN IN BOTH KNEES, UNSPECIFIED CHRONICITY: ICD-10-CM

## 2018-08-20 DIAGNOSIS — B00.1 RECURRENT COLD SORES: ICD-10-CM

## 2018-08-20 DIAGNOSIS — Z79.4 TYPE 2 DIABETES MELLITUS WITHOUT COMPLICATION, WITH LONG-TERM CURRENT USE OF INSULIN (H): ICD-10-CM

## 2018-08-20 DIAGNOSIS — L40.50 PSORIATIC ARTHRITIS (H): ICD-10-CM

## 2018-08-20 DIAGNOSIS — Z79.4 CONTROLLED TYPE 2 DIABETES MELLITUS WITHOUT COMPLICATION, WITH LONG-TERM CURRENT USE OF INSULIN (H): Primary | ICD-10-CM

## 2018-08-20 DIAGNOSIS — S83.242A TEAR OF MEDIAL MENISCUS OF LEFT KNEE, CURRENT, UNSPECIFIED TEAR TYPE, INITIAL ENCOUNTER: ICD-10-CM

## 2018-08-20 DIAGNOSIS — M25.562 PAIN IN BOTH KNEES, UNSPECIFIED CHRONICITY: ICD-10-CM

## 2018-08-20 DIAGNOSIS — M25.562 PAIN IN BOTH KNEES, UNSPECIFIED CHRONICITY: Primary | ICD-10-CM

## 2018-08-20 LAB
ALBUMIN SERPL-MCNC: 4.1 G/DL (ref 3.4–5)
ALP SERPL-CCNC: 61 U/L (ref 40–150)
ALT SERPL W P-5'-P-CCNC: 17 U/L (ref 0–50)
ANION GAP SERPL CALCULATED.3IONS-SCNC: 10 MMOL/L (ref 3–14)
AST SERPL W P-5'-P-CCNC: 5 U/L (ref 0–45)
BILIRUB SERPL-MCNC: 0.4 MG/DL (ref 0.2–1.3)
BUN SERPL-MCNC: 9 MG/DL (ref 7–30)
CALCIUM SERPL-MCNC: 9 MG/DL (ref 8.5–10.1)
CHLORIDE SERPL-SCNC: 101 MMOL/L (ref 94–109)
CHOLEST SERPL-MCNC: 158 MG/DL
CO2 SERPL-SCNC: 24 MMOL/L (ref 20–32)
CREAT SERPL-MCNC: 0.54 MG/DL (ref 0.52–1.04)
GFR SERPL CREATININE-BSD FRML MDRD: >90 ML/MIN/1.7M2
GLUCOSE SERPL-MCNC: 238 MG/DL (ref 70–99)
HDLC SERPL-MCNC: 71 MG/DL
LDLC SERPL CALC-MCNC: 67 MG/DL
NONHDLC SERPL-MCNC: 87 MG/DL
POTASSIUM SERPL-SCNC: 4 MMOL/L (ref 3.4–5.3)
PROT SERPL-MCNC: 7.5 G/DL (ref 6.8–8.8)
SODIUM SERPL-SCNC: 135 MMOL/L (ref 133–144)
TRIGL SERPL-MCNC: 100 MG/DL
TSH SERPL DL<=0.005 MIU/L-ACNC: 0.84 MU/L (ref 0.4–4)

## 2018-08-20 RX ORDER — OXYCODONE AND ACETAMINOPHEN 5; 325 MG/1; MG/1
1 TABLET ORAL EVERY 4 HOURS PRN
Qty: 30 TABLET | Refills: 0 | Status: SHIPPED | OUTPATIENT
Start: 2018-08-20 | End: 2018-09-16

## 2018-08-20 RX ORDER — TRAMADOL HYDROCHLORIDE 50 MG/1
50 TABLET ORAL EVERY 8 HOURS PRN
Qty: 60 TABLET | Refills: 0 | Status: SHIPPED | OUTPATIENT
Start: 2018-08-20 | End: 2018-09-13

## 2018-08-20 ASSESSMENT — PAIN SCALES - GENERAL: PAINLEVEL: SEVERE PAIN (7)

## 2018-08-20 NOTE — PROGRESS NOTES
CHIEF CONCERN: Bilateral knee pain, chronic    HISTORY:   32-year-old female with history of psoriatic arthritis on otezla and type 1 diabetes presents for evaluation of bilateral knee pain, presently right worse than left.  With regard to her knee pain, she initially noted onset of left knee pain approximately 7 years ago.  She denies any antecedent trauma.  She notes undergoing approximately 12 corticosteroid injections in 3-4 left knee aspirations in the interim since onset of her pain and present.  She describes the pain is primarily posteriorly based, aching with intermittent pinch sensations.  Most recently, she underwent corticosteroid injection approximately 2 weeks ago by Dr. Granados and rheumatology.  She states this significantly improved her knee pain.  However, based on prior experience, she gets approximately 3-4 weeks of relief with each injection.  Due to the short-lived effect of injections on her right knee, MRI was ordered which revealed a complex synovial cyst as well as a medial meniscus tear for which she was referred to Dr. Singh.    Recently, she does note onset of similar right knee pain.  However, the right knee pain is primarily anteromedial/anteriorly based she describes her right knee pain as aching and fullness.  Her bilateral knee pain is worse with any prolonged standing or sitting in one position.  She notes that the pain is so severe sometimes that it wakes her from sleep at night.  She has not undergone any corticosteroid injections or aspirations at her right knee.  She denies any history of surgery to her bilateral knees.  She denies any history of trauma to her knees.  Outside of her bilateral knee pain, she has no other questions or concerns.  denies new numbness/tingling/weakness, denies fevers, chills, sob, cp.          PAST MEDICAL HISTORY: (Reviewed with the patient and in the [facility] medical record)  1. Psoriatic arthritis on otezla  2. Type 1 diabetes    PAST SURGICAL  HISTORY: (Reviewed with the patient and in the [facility] medical record)  1. Reviewed, tonisllar abscess    MEDICATIONS: (Reviewed with the patient and in the [facility] medical record)    Notable medications include: otezla     ALLERGIES: (Reviewed with the patient and in the [facility] medical record)  1. Betadine, levofloxacin, penicillin, Tdap, prenatal vitamins      SOCIAL HISTORY: (Reviewed with the patient and in the medical record)  Former cigarette smoker, 1 pack/day, 5 years, quit 11/1/2007  No smokeless tobacco use  No alcohol use    FAMILY HISTORY: (Reviewed with the patient and in the medical record)  Mother - thyroid disease, diabetes, hypertension  Father - psoriasis  Maternal grandfather - diabetes, hypertension, prostate cancer  Maternal uncle - diabetes  Maternal grandmother - hypertension    REVIEW OF SYSTEMS: (Reviewed with the patient and on the health intake form)  -- A comprehensive 10 point review of systems was conducted and is negative except as noted in the HPI    EXAM:     General: Awake, Alert and Oriented, No acute Distress. Articulate and Interactive      Body mass index is 19.62 kg/(m^2).    NAD, HEENT nml, nml mood and affect, no bl LE lymphedema, EOM grossly intact    Right Lower extremity :    Skin is Warm and Well perfused, no suggestion of infection    No prior incisions    No effusion, but palpable tender cyst posteromedially    Mild tenderness to palpation posterior medially and posterior laterally, no tenderness to palpation at medial lateral joint line, no tenderness to palpation about the patella    0-140, painless    Stable to varus valgus stress, grade 0 Lachman, - posterior drawer    EHL/FHL/TA/GS 5/5    Sensation intact L3-S1    2+ Dorsalis Pedis Pulse     Left Lower extremity :    Skin is Warm and Well perfused, no suggestion of infection    No prior incisions    No effusion, but palpable tender cyst posteromedially    Mild tenderness to palpation posterior medially, no tenderness to  palpation at medial lateral joint line, no tenderness to palpation about the patella    0-140, painless    Stable to varus valgus stress, grade 0 Lachman, - posterior drawer    EHL/FHL/TA/GS 5/5    Sensation intact L3-S1    2+ Dorsalis Pedis Pulse    IMAGING:    Plain Radiographs: No acute fracture dislocation on imaging, no advanced degenerative change    MRI: MRI left knee reviewed, notable for large complex synovial cyst arising from posterior medial aspect of left knee, extending proximally and distally for extent of roughly 9 cm in either direction, degenerative appearing medial meniscus tear, otherwise ligamentously intact, no areas of bony edema, no areas of full-thickness cartilage loss    ASSESSMENT:  1. Bilateral complex synovial cyst  2. Chronic knee pain  3. Medial meniscus tear, left knee  4. psoratic arthritis  5. DMI    PLAN:  1. We had an extensive discussion with this patient regarding her bilateral complex synovial cyst.  At present, we have imaging available for her left knee.  However, given that she is recently undergone an injection to her left knee, her right knee is now more symptomatic, and wishes to address the right side surgically first.  Therefore, our plan will be to obtain an MRI of the right knee to evaluate the extent of her synovial cyst as well as any other intra-articular pathology.  Once her MRI is complete, she will return to clinic for surgical discussion.  With regard to her left knee, she will continue to monitor it symptomatically.  We did explain to her that we would like her to avoid corticosteroid injections within 6 weeks prior to surgery.  Given her physical exam is consistent with a right knee synovial cyst, similar to what is evident on MRI of the left knee, we did discuss surgical intervention at the right knee.  We discussed diagnostic arthroscopy, cyst decompression, and intervention on her right meniscus should there be pathology.  We discussed avoidance of  runnning/contact activity x 6 wks postop.  We would similarly offer the same surgery to her left knee, once she is further from her corticosteroid injection and should it become symptomatic again.  Otherwise, she will continue activity as tolerated, weightbearing as tolerated, and follow-up in our clinic on an as-needed basis prior to undergoing right knee MRI.    Imaging is reviewed with the patient, all questions were answered, she is happy with the plan.      This patient seen and discussed with Dr. Owen agrees          Answers for HPI/ROS submitted by the patient on 8/7/2018   General Symptoms: Yes  Skin Symptoms: No  HENT Symptoms: Yes  EYE SYMPTOMS: No  HEART SYMPTOMS: No  LUNG SYMPTOMS: No  INTESTINAL SYMPTOMS: No  URINARY SYMPTOMS: No  GYNECOLOGIC SYMPTOMS: No  BREAST SYMPTOMS: No  SKELETAL SYMPTOMS: Yes  BLOOD SYMPTOMS: Yes  NERVOUS SYSTEM SYMPTOMS: Yes  MENTAL HEALTH SYMPTOMS: Yes  Fever: No  Loss of appetite: No  Weight loss: No  Weight gain: No  Fatigue: Yes  Night sweats: Yes  Chills: No  Increased stress: No  Excessive hunger: No  Excessive thirst: No  Feeling hot or cold when others believe the temperature is normal: No  Loss of height: No  Post-operative complications: No  Surgical site pain: No  Hallucinations: No  Change in or Loss of Energy: Yes  Hyperactivity: No  Confusion: No  Ear pain: No  Ear discharge: No  Hearing loss: No  Tinnitus: No  Nosebleeds: No  Congestion: Yes  Sinus pain: No  Trouble swallowing: No   Voice hoarseness: No  Mouth sores: Yes  Sore throat: No  Tooth pain: No  Gum tenderness: No  Bleeding gums: No  Change in taste: No  Change in sense of smell: No  Dry mouth: No  Hearing aid used: No  Neck lump: No  Back pain: No  Muscle aches: Yes  Neck pain: No  Swollen joints: Yes  Joint pain: Yes  Bone pain: Yes  Muscle cramps: Yes  Muscle weakness: Yes  Joint stiffness: Yes  Bone fracture: No  Anemia: Yes  Swollen glands: No  Easy bleeding or bruising: Yes  Edema or  swelling: Yes  Trouble with coordination: No  Dizziness or trouble with balance: No  Fainting or black-out spells: No  Memory loss: No  Headache: Yes  Seizures: No  Speech problems: No  Tingling: Yes  Tremor: No  Weakness: Yes  Difficulty walking: Yes  Paralysis: No  Numbness: Yes  Nervous or Anxious: Yes  Depression: Yes  Trouble sleeping: Yes  Trouble thinking or concentrating: Yes  Mood changes: Yes  Panic attacks: No

## 2018-08-20 NOTE — PROGRESS NOTES
HPI  32-year-old with history of psoriatic arthritis and diabetes presents today for physical examination.  She is been doing very well on her rheumatologic therapy with the exception of both knees.  She continues to have pleural effusions in both knees tenderness and pain requiring intermittent use of both oxycodone and tramadol.  Otherwise her joints have been under good control.  Her blood sugars are fluctuating but control is improving with her new subcutaneous glucose sensor.  She anticipates that when we recheck her A1c it will show significant improvement.  She is otherwise not had any hypoglycemic episodes she has had to restrict her physical activity because of the knee pain.  She is able to tolerate some elliptical work and school work.  Her diet has been healthy by her report.  She recently underwent an endometrial ablation and follows with OB/GYN regarding Pap smear and other issues.    Past and Family hx reviewed and updated    Past Medical History:   Diagnosis Date     Diabetes      Other psoriasis      Polyarthritis     probable psoriatic arthritis     Past Surgical History:   Procedure Laterality Date     AS HYSTEROSCOPY, SURGICAL; W/ ENDOMETRIAL ABLATION, ANY METHOD Bilateral 2017    endometrial ablation with bilater TL     SURGICAL HISTORY OF -       .  Tonsillar abscess     Family History   Problem Relation Age of Onset     Thyroid Disease Mother      Diabetes Mother      Diabetes Maternal Grandfather      Diabetes Maternal Uncle      Diabetes Maternal Uncle      Asthma No family hx of      Hypertension Mother      Hypertension Maternal Grandmother      Hypertension Maternal Grandfather      Prostate Cancer Maternal Grandfather      Connective Tissue Disorder Father      Psoriasis     Social History     Social History     Marital status:      Spouse name: N/A     Number of children: N/A     Years of education: N/A     Occupational History     Dental Assist. Unknown     Social History Main  Topics     Smoking status: Former Smoker     Packs/day: 1.00     Years: 5.00     Types: Cigarettes     Quit date: 11/1/2007     Smokeless tobacco: Never Used     Alcohol use No     Drug use: No     Sexual activity: Yes     Partners: Male     Birth control/ protection: IUD      Comment: Mirena     Other Topics Concern     Exercise No     Social History Narrative    How much exercise per week? No    How much calcium per day? Diet      How much caffeine per day? Coffee  2    How much vitamin D per day? Diet    Do you/your family wear seatbelts?  Yes    Do you/your family use safety helmets? No    Do you/your family use sunscreen? Yes    Do you/your family keep firearms in the home? No    Do you/your family have a smoke detector(s)? Yes        Do you feel safe in your home? Yes    Has anyone ever touched you in an unwanted manner? No     Explain              Answers for HPI/ROS submitted by the patient on 8/7/2018   General Symptoms: Yes  Skin Symptoms: No  HENT Symptoms: Yes  EYE SYMPTOMS: No  HEART SYMPTOMS: No  LUNG SYMPTOMS: No  INTESTINAL SYMPTOMS: No  URINARY SYMPTOMS: No  GYNECOLOGIC SYMPTOMS: No  BREAST SYMPTOMS: No  SKELETAL SYMPTOMS: Yes  BLOOD SYMPTOMS: Yes  NERVOUS SYSTEM SYMPTOMS: Yes  MENTAL HEALTH SYMPTOMS: Yes  Fever: No  Loss of appetite: No  Weight loss: No  Weight gain: No  Fatigue: Yes  Night sweats: Yes  Chills: No  Increased stress: No  Excessive hunger: No  Excessive thirst: No  Feeling hot or cold when others believe the temperature is normal: No  Loss of height: No  Post-operative complications: No  Surgical site pain: No  Hallucinations: No  Change in or Loss of Energy: Yes  Hyperactivity: No  Confusion: No  Ear pain: No  Ear discharge: No  Hearing loss: No  Tinnitus: No  Nosebleeds: No  Congestion: Yes  Sinus pain: No  Trouble swallowing: No   Voice hoarseness: No  Mouth sores: Yes  Sore throat: No  Tooth pain: No  Gum tenderness: No  Bleeding gums: No  Change in taste: No  Change in sense of  "smell: No  Dry mouth: No  Hearing aid used: No  Neck lump: No  Back pain: No  Muscle aches: Yes  Neck pain: No  Swollen joints: Yes  Joint pain: Yes  Bone pain: Yes  Muscle cramps: Yes  Muscle weakness: Yes  Joint stiffness: Yes  Bone fracture: No  Anemia: Yes  Swollen glands: No  Easy bleeding or bruising: Yes  Edema or swelling: Yes  Trouble with coordination: No  Dizziness or trouble with balance: No  Fainting or black-out spells: No  Memory loss: No  Headache: Yes  Seizures: No  Speech problems: No  Tingling: Yes  Tremor: No  Weakness: Yes  Difficulty walking: Yes  Paralysis: No  Numbness: Yes  Nervous or Anxious: Yes  Depression: Yes  Trouble sleeping: Yes  Trouble thinking or concentrating: Yes  Mood changes: Yes  Panic attacks: No      Exam:  /68  Pulse 91  Temp 98.5  F (36.9  C) (Oral)  Ht 1.626 m (5' 4\")  Wt 49.3 kg (108 lb 9.6 oz)  SpO2 98%  BMI 18.64 kg/m2  108 lbs 9.6 oz  PHYSICAL EXAMINATION:   The patient is alert, oriented with a clear sensorium.   Skin shows no lesions or rashes and good turgor.   Head is normocephalic and atraumatic.   Eyes show PERRLA. Fundi are benign.   Ears show normal TMs bilaterally.   Mouth shows clear oral mucosa.   Neck shows no nodes, thyromegaly or bruits.   Back is nontender.   Lungs are clear to percussion and auscultation.   Heart shows normal S1 and S2 without murmur or gallop.   Abdomen is soft, nontender without masses or organomegaly.   Extremities show no edema and evidence of .   Neurologic examination shows cranial nerves II-XII intact. Motor shows normal strength. Reflexes are full and symmetrical.     Labs reviewed with her:  Results for ADI CHANDLER (MRN [id number]) as of 8/20/2018 11:02   Ref. Range 6/26/2018 09:19 6/26/2018 09:20   Hemoglobin A1C Latest Ref Range: 0 - 5.6 % 8.2 (H)    Albumin Urine mg/g Cr Latest Ref Range: 0 - 25 mg/g Cr  10.23   Albumin Urine mg/L Latest Units: mg/L  18   Creatinine Urine Latest Units: mg/dL  176 "     ASSESSMENT  1 diabetes mellitus type 2 insulin-dependent  2 psoriatic arthritis  3 psoriasis in remission  4 weight loss    Plan I gave her some samples of boost and we discussed using nutritional supplementation and to use the protein supplements after she exercises.  When I have her continue on her present dose of insulin will reassess her A1c next month check her lipids CMP today along with her thyroid in light of her weight loss refilled her tramadol and oxycodone for the next month we will plan to have her reassessed in another 6 months    This note was completed using Dragon voice recognition software.  Although reviewed after completion, some word and grammatical errors may occur.    Nando Haskins MD  General Internal Medicine  [facility]  [phone]

## 2018-08-20 NOTE — NURSING NOTE
Chief Complaint   Patient presents with     Physical     Pt is here for annual physical.      Soo Carlos LPN at 10:09 AM on 8/20/2018.

## 2018-08-20 NOTE — MR AVS SNAPSHOT
After Visit Summary   8/20/2018    Chrissy Dixon    MRN: [id number]           Patient Information     Date Of Birth          1985        Visit Information        Provider Department      8/20/2018 9:10 AM Sean Pate MD [facility]        Today's Diagnoses     Pain in both knees, unspecified chronicity    -  1       Follow-ups after your visit        Your next 10 appointments already scheduled     Aug 20, 2018 10:20 AM CDT   (Arrive by 10:05 AM)   PHYSICAL with Nando Haskins MD   [facility] ([facility] [place])    [address]  4th Floor  [facility] [id number] [phone]            Aug 27, 2018 12:15 PM CDT   MR KNEE RIGHT W/O CONTRAST with [id number]   [facility] [place] [facility] ([facility])    [address]  1st Floor  [facility] [id number] [phone]           Take your medicines as usual, unless your doctor tells you not to. Bring a list of your current medicines to your exam (including vitamins, minerals and over-the-counter drugs). Also bring the results of similar scans you may have had.  Please remove any body piercings and hair extensions before you arrive.  Follow your doctor s orders. If you do not, we may have to postpone your exam.  You may or may not receive IV contrast for this exam pending the discretion of the Radiologist.  You do not need to do anything special to prepare.  The MRI machine uses a strong magnet. Please wear clothes without metal (snaps, zippers). A sweatsuit works well, or we may give you a hospital gown.   **IMPORTANT** THE INSTRUCTIONS BELOW ARE ONLY FOR THOSE PATIENTS WHO HAVE BEEN PRESCRIBED SEDATION OR GENERAL ANESTHESIA DURING THEIR MRI PROCEDURE:  IF YOUR DOCTOR PRESCRIBED ORAL SEDATION (take medicine to help you relax during your exam):   You must get the medicine from your doctor (oral medication) before you arrive. Bring  the medicine to the exam. Do not take it at home. You ll be told when to take it upon arriving for your exam.   Arrive one hour early. Bring someone who can take you home after the test. Your medicine will make you sleepy. After the exam, you may not drive, take a bus or take a taxi by yourself.  IF YOUR DOCTOR PRESCRIBED IV SEDATION:   Arrive one hour early. Bring someone who can take you home after the test. Your medicine will make you sleepy. After the exam, you may not drive, take a bus or take a taxi by yourself.   No eating 6 hours before your exam. You may have clear liquids up until 4 hours before your exam. (Clear liquids include water, clear tea, black coffee and fruit juice without pulp.)  IF YOUR DOCTOR PRESCRIBED ANESTHESIA (be asleep for your exam):   Arrive 1 1/2 hours early. Bring someone who can take you home after the test. You may not drive, take a bus or take a taxi by yourself.   No eating 8 hours before your exam. You may have clear liquids up until 4 hours before your exam. (Clear liquids include water, clear tea, black coffee and fruit juice without pulp.)   You will spend four to five hours in the recovery room.  Please call the Imaging Department at your exam site with any questions.            Aug 27, 2018  1:30 PM CDT   (Arrive by 1:15 PM)   RETURN KNEE with Sean Pate MD   [facility] ([facility])    [address]  4th Floor  [facility] [id number] [phone]            Nov 30, 2018  7:00 AM CST   (Arrive by 6:45 AM)   Return Visit with Rene Granados MD   [facility] ([facility])    [address] [place] [address]  [address]  [facility] [id number] [phone]              Future tests that were ordered for you today     Open Future Orders        Priority Expected Expires Ordered    MR Knee Right w/o Contrast Routine  8/20/2019 8/20/2018            Who to contact     Please call your  "clinic at [phone] to:    Ask questions about your health    Make or cancel appointments    Discuss your medicines    Learn about your test results    Speak to your doctor            Additional Information About Your Visit        [employer] Information     [employer] gives you secure access to your electronic health record. If you see a primary care provider, you can also send messages to your care team and make appointments. If you have questions, please call your primary care clinic.  If you do not have a primary care provider, please call [phone] and they will assist you.      In[employer]Writer is an electronic gateway that provides easy, online access to your medical records. With [employer], you can request a clinic appointment, read your test results, renew a prescription or communicate with your care team.     To access your existing account, please contact your [facility] or call [phone] for assistance.        Care EveryWhere ID     This is your Care EveryWhere ID. This could be used by other organizations to access your [facility] medical records  [id number]        Your Vitals Were     Height BMI (Body Mass Index)                1.609 m (5' 3.35\") 19.62 kg/m2           Blood Pressure from Last 3 Encounters:   08/02/18 116/79   10/06/17 111/72   08/04/17 114/74    Weight from Last 3 Encounters:   08/20/18 50.8 kg (112 lb)   08/02/18 51 kg (112 lb 6.4 oz)   04/06/18 50.4 kg (111 lb 1.6 oz)               Primary Care Provider Office Phone # Fax #    Nando David Haskins [phone]       [address] [place] [address]        Equal Access to Services     [facility] Center[facility]ATHLEEN : Hadii vick salguero hadasho Soomaali, waaxda luqadaha, qaybta kaalmada stanley bourgeois. So [facility] [phone].    ATENCIÓN: Si habla español, tiene a hameed disposición servicios gratuitos de asistencia lingüística. Llame al [phone].    We comply with " applicable federal civil rights laws and [place] laws. We do not discriminate on the basis of race, color, national origin, age, disability, sex, sexual orientation, or gender identity.            Thank you!     Thank you for choosing [facility]  for your care. Our goal is always to provide you with excellent care. Hearing back from our patients is one way we can continue to improve our services. Please take a few minutes to complete the written survey that you may receive in the mail after your visit with us. Thank you!             Your Updated Medication List - Protect others around you: Learn how to safely use, store and throw away your medicines at www.disposemymeds.org.          This list is accurate as of 8/20/18  9:51 AM.  Always use your most recent med list.                   Brand Name Dispense Instructions for use Diagnosis    * apremilast 30 MG tablet    OTEZLA    180 tablet    Take 1 tablet (30 mg) by mouth 2 times daily    Psoriatic arthritis (H)       * apremilast 30 MG tablet    OTEZLA    60 tablet    Take 1 tablet (30 mg) by mouth 2 times daily    Psoriatic arthritis (H)       blood glucose monitoring meter device kit    no brand specified    1 kit    Use to test blood sugar 6 to 8 times daily or as directed. Okay to dispense One Touch Verio products per patient's insurance.    Diabetes mellitus, type 2 (H)       * blood glucose monitoring test strip    no brand specified    700 strip    Use to test blood sugars 6 to 8 times daily or as directed. Okay to dispense One Touch Verio per patient's insurance.    Diabetes mellitus, type 2 (H)       * blood glucose monitoring test strip    ACCU-CHEK SMARTVIEW    700 each    Use to test blood sugar 6-8 times daily or as directed.    Diabetes mellitus, type 2 (H)       cholecalciferol 1000 UNIT tablet    vitamin D3    90 tablet    Take 1 tablet (1,000 Units) by mouth daily    Vitamin D deficiency       clobetasol 0.05 % external solution     TEMOVATE    50 mL    Apply topically 2 times daily    Psoriasis       DERMA-SMOOTHE/FS SCALP 0.01 % Oil oil   Generic drug:  Fluocinolone Acetonide Scalp     118 mL    Apply to scalp at bedtime, then wear showercap, rinse off in morning.    Other psoriasis       ferrous gluconate 324 (38 Fe) MG tablet    FERGON    100 tablet    Take 1 tablet (324 mg) by mouth every other day    Iron deficiency anemia due to chronic blood loss, Iron deficiency anemia, unspecified iron deficiency anemia type       fluticasone 50 MCG/ACT spray    FLONASE    48 g    Spray 2 sprays into both nostrils daily    Rhinitis medicamentosa       ibuprofen 600 MG tablet    ADVIL/MOTRIN    270 tablet    Take 1 tablet (600 mg) by mouth every 8 hours as needed for moderate pain    Psoriasis arthropathica (H)       insulin detemir 100 UNIT/ML injection    LEVEMIR FLEXTOUCH    30 mL    Inject 25 Units Subcutaneous At Bedtime    Diabetes mellitus (H)       insulin lispro 100 UNIT/ML injection    HumaLOG KWIKpen    30 mL    USE THREE TIMES DAILY WITH MEALS, CURRENTLY 30 UNITS DAILY    Diabetes mellitus, type 2 (H)       insulin pen needle 31G X 8 MM     300 each    Use  3-4 pen needles daily or as directed.    Diabetes mellitus, type 2 (H)       levonorgestrel 20 MCG/24HR IUD    MIRENA (52 MG)    1 each    Use as directed    Family planning, IUD (intrauterine device) check/reinsertion/removal       metFORMIN 500 MG tablet    GLUCOPHAGE    360 tablet    Take 2 tablets (1,000 mg) by mouth 2 times daily (with meals) Labs due for further refills.    Diabetes mellitus, type 2 (H)       ondansetron 4 MG ODT tab    ZOFRAN-ODT    60 tablet    Take 1 tablet (4 mg) by mouth every 8 hours as needed for nausea    Nausea       ONETOUCH LANCETS Misc     700 each    Use to test blood sugar 6 to 8 times daily or as directed. Okay to dispense One Touch Verio products per patient's insurance.    Diabetes mellitus, type 2 (H)       oxyCODONE-acetaminophen 5-325 MG per  tablet    PERCOCET    30 tablet    Take 1 tablet by mouth every 4 hours as needed (must last 30 days from dispense date)    Psoriatic arthritis (H), Recurrent cold sores       * predniSONE 10 MG tablet    DELTASONE    30 tablet    Take 1 tablet (10 mg) by mouth daily As needed for flares only    Psoriatic arthropathy (H)       * predniSONE 5 MG tablet    DELTASONE    50 tablet    3 tabs daily for 1 wk, then 2 tabs daily for 1 week.    Psoriatic arthritis (H)       * predniSONE 5 MG tablet    DELTASONE    20 tablet    Take 3 tablets (15mg) by mouth for 4 days, then 2 tablets (10mg) by mouth for 4 days.    Psoriatic arthritis (H)       * predniSONE 5 MG tablet    DELTASONE    18 tablet    20 mg per day x 2 days, 15 mg per day x 2 days, 10 mg per day x 2 days then stop. -    Rheumatoid arthritis involving both hands with positive rheumatoid factor (H)       traMADol 50 MG tablet    ULTRAM    60 tablet    Take 1 tablet (50 mg) by mouth every 8 hours as needed for moderate pain . Must last 30 days    Psoriatic arthritis (H)       triamcinolone 0.1 % cream    KENALOG    453.6 g    Apply topically 2 times daily    Guttate psoriasis       ustekinumab 45 MG/0.5ML Sosy    STELARA    3 Syringe    Inject 0.5 mLs (45 mg) Subcutaneous every 3 months    Psoriatic arthritis (H)       valACYclovir 500 MG tablet    VALTREX    90 tablet    Take 1 tablet (500 mg) by mouth daily    Psoriatic arthritis (H)       * Notice:  This list has 8 medication(s) that are the same as other medications prescribed for you. Read the directions carefully, and ask your doctor or other care provider to review them with you.

## 2018-08-20 NOTE — MR AVS SNAPSHOT
After Visit Summary   8/20/2018    Chrissy Dixon    MRN: [id number]           Patient Information     Date Of Birth          1985        Visit Information        Provider Department      8/20/2018 10:20 AM Nando Haskins MD [facility]        Today's Diagnoses     Controlled type 2 diabetes mellitus without complication, with long-term current use of insulin (H)    -  1    Type 2 diabetes mellitus without complication, with long-term current use of insulin (H)          Care Instructions    Primary Care Center Medication Refill Request Information:  * Please contact your pharmacy regarding ANY request for medication refills.  ** [facility] Prescription Fax = [phone]  * Please allow 3 business days for routine medication refills.  * Please allow 5 business days for controlled substance medication refills.     Primary Care Center Test Result notification information:  *You will be notified with in 7-10 days of your appointment day regarding the results of your test.  If you are on MyChart you will be notified as soon as the provider has reviewed the results and signed off on them.    [facility]: [phone]           Follow-ups after your visit        Your next 10 appointments already scheduled     Aug 27, 2018 12:15 PM CDT   MR KNEE RIGHT W/O CONTRAST with [id number]   [facility] MRI ([facility])    [address] [address] [phone]           Take your medicines as usual, unless your doctor tells you not to. Bring a list of your current medicines to your exam (including vitamins, minerals and over-the-counter drugs). Also bring the results of similar scans you may have had.  Please remove any body piercings and hair extensions before you arrive.  Follow your doctor s orders. If you do not, we may have to postpone your exam.  You may or may not receive IV contrast for this exam pending  the discretion of the Radiologist.  You do not need to do anything special to prepare.  The MRI machine uses a strong magnet. Please wear clothes without metal (snaps, zippers). A sweatsuit works well, or we may give you a hospital gown.   **IMPORTANT** THE INSTRUCTIONS BELOW ARE ONLY FOR THOSE PATIENTS WHO HAVE BEEN PRESCRIBED SEDATION OR GENERAL ANESTHESIA DURING THEIR MRI PROCEDURE:  IF YOUR DOCTOR PRESCRIBED ORAL SEDATION (take medicine to help you relax during your exam):   You must get the medicine from your doctor (oral medication) before you arrive. Bring the medicine to the exam. Do not take it at home. You ll be told when to take it upon arriving for your exam.   Arrive one hour early. Bring someone who can take you home after the test. Your medicine will make you sleepy. After the exam, you may not drive, take a bus or take a taxi by yourself.  IF YOUR DOCTOR PRESCRIBED IV SEDATION:   Arrive one hour early. Bring someone who can take you home after the test. Your medicine will make you sleepy. After the exam, you may not drive, take a bus or take a taxi by yourself.   No eating 6 hours before your exam. You may have clear liquids up until 4 hours before your exam. (Clear liquids include water, clear tea, black coffee and fruit juice without pulp.)  IF YOUR DOCTOR PRESCRIBED ANESTHESIA (be asleep for your exam):   Arrive 1 1/2 hours early. Bring someone who can take you home after the test. You may not drive, take a bus or take a taxi by yourself.   No eating 8 hours before your exam. You may have clear liquids up until 4 hours before your exam. (Clear liquids include water, clear tea, black coffee and fruit juice without pulp.)   You will spend four to five hours in the recovery room.  Please call the Imaging Department at your exam site with any questions.            Aug 27, 2018  1:30 PM CDT   (Arrive by 1:15 PM)   RETURN KNEE with Sean Pate MD   [facility] ([facility]  [facility] [place])    [address]  4th Floor  [facility] [id number] [phone]            Nov 30, 2018  7:00 AM CST   (Arrive by 6:45 AM)   Return Visit with Rene Granados MD   [facility] ([facility])    [address]  [address]  [facility] [id number] [phone]              Future tests that were ordered for you today     Open Standing Orders        Priority Remaining Interval Expires Ordered    Hemoglobin A1c Routine 3/3 3 months 8/20/2018 8/20/2018          Open Future Orders        Priority Expected Expires Ordered    Comprehensive metabolic panel Routine  8/20/2019 8/20/2018    Lipid Profile Routine  8/20/2019 8/20/2018    TSH with free T4 reflex Routine  8/20/2019 8/20/2018    MR Knee Right w/o Contrast Routine  8/20/2019 8/20/2018            Who to contact     Please call your clinic at [phone] to:    Ask questions about your health    Make or cancel appointments    Discuss your medicines    Learn about your test results    Speak to your doctor            Additional Information About Your Visit        Top Hat Information     Top Hat gives you secure access to your electronic health record. If you see a primary care provider, you can also send messages to your care team and make appointments. If you have questions, please call your primary care clinic.  If you do not have a primary care provider, please call [phone] and they will assist you.      Top Hat is an electronic gateway that provides easy, online access to your medical records. With Top Hat, you can request a clinic appointment, read your test results, renew a prescription or communicate with your care team.     To access your existing account, please contact your [facility] or call [phone] for assistance.        Care EveryWhere ID     This is your Care EveryWhere ID. This could be used by other organizations to access your  "[place] medical records  [id number]        Your Vitals Were     Pulse Temperature Height Pulse Oximetry BMI (Body Mass Index)       91 98.5  F (36.9  C) (Oral) 1.626 m (5' 4\") 98% 18.64 kg/m2        Blood Pressure from Last 3 Encounters:   08/20/18 103/68   08/02/18 116/79   10/06/17 111/72    Weight from Last 3 Encounters:   08/20/18 49.3 kg (108 lb 9.6 oz)   08/20/18 50.8 kg (112 lb)   08/02/18 51 kg (112 lb 6.4 oz)              We Performed the Following     ADMIN: Vaccine, Initial (03887)     Pneumococcal vaccine 13 valent PCV13 IM (Prevnar) [98739]          Today's Medication Changes          These changes are accurate as of 8/20/18 10:55 AM.  If you have any questions, ask your nurse or doctor.               These medicines have changed or have updated prescriptions.        Dose/Directions    insulin detemir 100 UNIT/ML injection   Commonly known as:  LEVEMIR FLEXTOUCH   This may have changed:    - how much to take  - when to take this   Used for:  Type 2 diabetes mellitus without complication, with long-term current use of insulin (H)   Changed by:  Nando Haskins MD        Dose:  18 Units   Inject 18 Units Subcutaneous 2 times daily   Quantity:  60 mL   Refills:  3            Where to get your medicines      These medications were sent to [facility] [address] [place] [address] [address] AT [facility] [address], [facility] [address]    Hours:  called pharm.  they do accept faxes Phone:  [phone]     insulin detemir 100 UNIT/ML injection    metFORMIN 500 MG tablet                Primary Care Provider Office Phone # Fax #    Nando Haskins [phone]       [address] [place] [address]        Equal Access to Services     SHERINE MARTINEZ AH: Hadii vick ku hadasho Soomaali, waaxda luqadaha, qaybta kaalmada adeegyada, waxay juan oropeza ah. So [facility] " [phone].    ATENCIÓN: Si jose ventura, tiene a hameed disposición servicios gratuitos de asistencia lingüística. Nestor coelho [phone].    We comply with applicable federal civil rights laws and [place] laws. We do not discriminate on the basis of race, color, national origin, age, disability, sex, sexual orientation, or gender identity.            Thank you!     Thank you for choosing [facility]  for your care. Our goal is always to provide you with excellent care. Hearing back from our patients is one way we can continue to improve our services. Please take a few minutes to complete the written survey that you may receive in the mail after your visit with us. Thank you!             Your Updated Medication List - Protect others around you: Learn how to safely use, store and throw away your medicines at www.disposemymeds.org.          This list is accurate as of 8/20/18 10:55 AM.  Always use your most recent med list.                   Brand Name Dispense Instructions for use Diagnosis    * apremilast 30 MG tablet    OTEZLA    180 tablet    Take 1 tablet (30 mg) by mouth 2 times daily    Psoriatic arthritis (H)       * apremilast 30 MG tablet    OTEZLA    60 tablet    Take 1 tablet (30 mg) by mouth 2 times daily    Psoriatic arthritis (H)       blood glucose monitoring meter device kit    no brand specified    1 kit    Use to test blood sugar 6 to 8 times daily or as directed. Okay to dispense One Touch Verio products per patient's insurance.    Diabetes mellitus, type 2 (H)       * blood glucose monitoring test strip    no brand specified    700 strip    Use to test blood sugars 6 to 8 times daily or as directed. Okay to dispense One Touch Verio per patient's insurance.    Diabetes mellitus, type 2 (H)       * blood glucose monitoring test strip    ACCU-CHEK SMARTVIEW    700 each    Use to test blood sugar 6-8 times daily or as directed.    Diabetes mellitus, type 2 (H)       cholecalciferol  1000 UNIT tablet    vitamin D3    90 tablet    Take 1 tablet (1,000 Units) by mouth daily    Vitamin D deficiency       clobetasol 0.05 % external solution    TEMOVATE    50 mL    Apply topically 2 times daily    Psoriasis       DERMA-SMOOTHE/FS SCALP 0.01 % Oil oil   Generic drug:  Fluocinolone Acetonide Scalp     118 mL    Apply to scalp at bedtime, then wear showercap, rinse off in morning.    Other psoriasis       ferrous gluconate 324 (38 Fe) MG tablet    FERGON    100 tablet    Take 1 tablet (324 mg) by mouth every other day    Iron deficiency anemia due to chronic blood loss, Iron deficiency anemia, unspecified iron deficiency anemia type       fluticasone 50 MCG/ACT spray    FLONASE    48 g    Spray 2 sprays into both nostrils daily    Rhinitis medicamentosa       ibuprofen 600 MG tablet    ADVIL/MOTRIN    270 tablet    Take 1 tablet (600 mg) by mouth every 8 hours as needed for moderate pain    Psoriasis arthropathica (H)       insulin detemir 100 UNIT/ML injection    LEVEMIR FLEXTOUCH    60 mL    Inject 18 Units Subcutaneous 2 times daily    Type 2 diabetes mellitus without complication, with long-term current use of insulin (H)       insulin lispro 100 UNIT/ML injection    HumaLOG KWIKpen    30 mL    USE THREE TIMES DAILY WITH MEALS, CURRENTLY 30 UNITS DAILY    Diabetes mellitus, type 2 (H)       insulin pen needle 31G X 8 MM     300 each    Use  3-4 pen needles daily or as directed.    Diabetes mellitus, type 2 (H)       levonorgestrel 20 MCG/24HR IUD    MIRENA (52 MG)    1 each    Use as directed    Family planning, IUD (intrauterine device) check/reinsertion/removal       metFORMIN 500 MG tablet    GLUCOPHAGE    360 tablet    Take 2 tablets (1,000 mg) by mouth 2 times daily (with meals) Labs due for further refills.    Type 2 diabetes mellitus without complication, with long-term current use of insulin (H)       ondansetron 4 MG ODT tab    ZOFRAN-ODT    60 tablet    Take 1 tablet (4 mg) by mouth every  8 hours as needed for nausea    Nausea       ONETOUCH LANCETS Misc     700 each    Use to test blood sugar 6 to 8 times daily or as directed. Okay to dispense One Touch Verio products per patient's insurance.    Diabetes mellitus, type 2 (H)       oxyCODONE-acetaminophen 5-325 MG per tablet    PERCOCET    30 tablet    Take 1 tablet by mouth every 4 hours as needed (must last 30 days from dispense date)    Psoriatic arthritis (H), Recurrent cold sores       * predniSONE 10 MG tablet    DELTASONE    30 tablet    Take 1 tablet (10 mg) by mouth daily As needed for flares only    Psoriatic arthropathy (H)       * predniSONE 5 MG tablet    DELTASONE    50 tablet    3 tabs daily for 1 wk, then 2 tabs daily for 1 week.    Psoriatic arthritis (H)       * predniSONE 5 MG tablet    DELTASONE    20 tablet    Take 3 tablets (15mg) by mouth for 4 days, then 2 tablets (10mg) by mouth for 4 days.    Psoriatic arthritis (H)       * predniSONE 5 MG tablet    DELTASONE    18 tablet    20 mg per day x 2 days, 15 mg per day x 2 days, 10 mg per day x 2 days then stop. -    Rheumatoid arthritis involving both hands with positive rheumatoid factor (H)       traMADol 50 MG tablet    ULTRAM    60 tablet    Take 1 tablet (50 mg) by mouth every 8 hours as needed for moderate pain . Must last 30 days    Psoriatic arthritis (H)       triamcinolone 0.1 % cream    KENALOG    453.6 g    Apply topically 2 times daily    Guttate psoriasis       ustekinumab 45 MG/0.5ML Sosy    STELARA    3 Syringe    Inject 0.5 mLs (45 mg) Subcutaneous every 3 months    Psoriatic arthritis (H)       valACYclovir 500 MG tablet    VALTREX    90 tablet    Take 1 tablet (500 mg) by mouth daily    Psoriatic arthritis (H)       * Notice:  This list has 8 medication(s) that are the same as other medications prescribed for you. Read the directions carefully, and ask your doctor or other care provider to review them with you.

## 2018-08-20 NOTE — PATIENT INSTRUCTIONS
[facility] Medication Refill Request Information:  * Please contact your pharmacy regarding ANY request for medication refills.  ** [facility] Prescription Fax = [phone]  * Please allow 3 business days for routine medication refills.  * Please allow 5 business days for controlled substance medication refills.     [facility] Test Result notification information:  *You will be notified with in 7-10 days of your appointment day regarding the results of your test.  If you are on MyChart you will be notified as soon as the provider has reviewed the results and signed off on them.    [facility]: [phone]

## 2018-08-20 NOTE — LETTER
8/20/2018       RE: Chrissy Dixon  [address] [facility] [address]     Dear Colleague,    Thank you for referring your patient, Chrissy Dixon, to the [facility]. Please see a copy of my visit note below.    CHIEF CONCERN: Bilateral knee pain, chronic    HISTORY:   32-year-old female with history of psoriatic arthritis on otezla and type 1 diabetes presents for evaluation of bilateral knee pain, presently right worse than left.  With regard to her knee pain, she initially noted onset of left knee pain approximately 7 years ago.  She denies any antecedent trauma.  She notes undergoing approximately 12 corticosteroid injections in 3-4 left knee aspirations in the interim since onset of her pain and present.  She describes the pain is primarily posteriorly based, aching with intermittent pinch sensations.  Most recently, she underwent corticosteroid injection approximately 2 weeks ago by Dr. Granados and rheumatology.  She states this significantly improved her knee pain.  However, based on prior experience, she gets approximately 3-4 weeks of relief with each injection.  Due to the short-lived effect of injections on her right knee, MRI was ordered which revealed a complex synovial cyst as well as a medial meniscus tear for which she was referred to Dr. Singh.    Recently, she does note onset of similar right knee pain.  However, the right knee pain is primarily anteromedial/anteriorly based she describes her right knee pain as aching and fullness.  Her bilateral knee pain is worse with any prolonged standing or sitting in one position.  She notes that the pain is so severe sometimes that it wakes her from sleep at night.  She has not undergone any corticosteroid injections or aspirations at her right knee.  She denies any history of surgery to her bilateral knees.  She denies any history of trauma to her knees.  Outside of her  bilateral knee pain, she has no other questions or concerns.  denies new numbness/tingling/weakness, denies fevers, chills, sob, cp.          PAST MEDICAL HISTORY: (Reviewed with the patient and in the EPIC medical record)  1. Psoriatic arthritis on otezla  2. Type 1 diabetes    PAST SURGICAL HISTORY: (Reviewed with the patient and in the EPIC medical record)  1. Reviewed, tonisllar abscess    MEDICATIONS: (Reviewed with the patient and in the EPIC medical record)    Notable medications include: otezla     ALLERGIES: (Reviewed with the patient and in the [facility] medical record)  1. Betadine, levofloxacin, penicillin, Tdap, prenatal vitamins      SOCIAL HISTORY: (Reviewed with the patient and in the medical record)  Former cigarette smoker, 1 pack/day, 5 years, quit 11/1/2007  No smokeless tobacco use  No alcohol use    FAMILY HISTORY: (Reviewed with the patient and in the medical record)  Mother - thyroid disease, diabetes, hypertension  Father - psoriasis  Maternal grandfather - diabetes, hypertension, prostate cancer  Maternal uncle - diabetes  Maternal grandmother - hypertension    REVIEW OF SYSTEMS: (Reviewed with the patient and on the health intake form)  -- A comprehensive 10 point review of systems was conducted and is negative except as noted in the HPI    EXAM:     General: Awake, Alert and Oriented, No acute Distress. Articulate and Interactive      Body mass index is 19.62 kg/(m^2).    NAD, HEENT nml, nml mood and affect, no bl LE lymphedema, EOM grossly intact    Right Lower extremity :    Skin is Warm and Well perfused, no suggestion of infection    No prior incisions    No effusion, but palpable tender cyst posteromedially    Mild tenderness to palpation posterior medially and posterior laterally, no tenderness to palpation at medial lateral joint line, no tenderness to palpation about the patella    0-140, painless    Stable to varus valgus stress, grade 0 Lachman, - posterior drawer    EHL/FHL/TA/GS  5/5    Sensation intact L3-S1    2+ Dorsalis Pedis Pulse     Left Lower extremity :    Skin is Warm and Well perfused, no suggestion of infection    No prior incisions    No effusion, but palpable tender cyst posteromedially    Mild tenderness to palpation posterior medially, no tenderness to palpation at medial lateral joint line, no tenderness to palpation about the patella    0-140, painless    Stable to varus valgus stress, grade 0 Lachman, - posterior drawer    EHL/FHL/TA/GS 5/5    Sensation intact L3-S1    2+ Dorsalis Pedis Pulse    IMAGING:    Plain Radiographs: No acute fracture dislocation on imaging, no advanced degenerative change    MRI: MRI left knee reviewed, notable for large complex synovial cyst arising from posterior medial aspect of left knee, extending proximally and distally for extent of roughly 9 cm in either direction, degenerative appearing medial meniscus tear, otherwise ligamentously intact, no areas of bony edema, no areas of full-thickness cartilage loss    ASSESSMENT:  1. Bilateral complex synovial cyst  2. Chronic knee pain  3. Medial meniscus tear, left knee  4. psoratic arthritis  5. DMI    PLAN:  1. We had an extensive discussion with this patient regarding her bilateral complex synovial cyst.  At present, we have imaging available for her left knee.  However, given that she is recently undergone an injection to her left knee, her right knee is now more symptomatic, and wishes to address the right side surgically first.  Therefore, our plan will be to obtain an MRI of the right knee to evaluate the extent of her synovial cyst as well as any other intra-articular pathology.  Once her MRI is complete, she will return to clinic for surgical discussion.  With regard to her left knee, she will continue to monitor it symptomatically.  We did explain to her that we would like her to avoid corticosteroid injections within 6 weeks prior to surgery.  Given her physical exam is consistent with  a right knee synovial cyst, similar to what is evident on MRI of the left knee, we did discuss surgical intervention at the right knee.  We discussed diagnostic arthroscopy, cyst decompression, and intervention on her right meniscus should there be pathology.  We discussed avoidance of runnning/contact activity x 6 wks postop.  We would similarly offer the same surgery to her left knee, once she is further from her corticosteroid injection and should it become symptomatic again.  Otherwise, she will continue activity as tolerated, weightbearing as tolerated, and follow-up in our clinic on an as-needed basis prior to undergoing right knee MRI.    Imaging is reviewed with the patient, all questions were answered, she is happy with the plan.      This patient seen and discussed with Dr. Owen agrees          Answers for HPI/ROS submitted by the patient on 8/7/2018   General Symptoms: Yes  Skin Symptoms: No  HENT Symptoms: Yes  EYE SYMPTOMS: No  HEART SYMPTOMS: No  LUNG SYMPTOMS: No  INTESTINAL SYMPTOMS: No  URINARY SYMPTOMS: No  GYNECOLOGIC SYMPTOMS: No  BREAST SYMPTOMS: No  SKELETAL SYMPTOMS: Yes  BLOOD SYMPTOMS: Yes  NERVOUS SYSTEM SYMPTOMS: Yes  MENTAL HEALTH SYMPTOMS: Yes  Fever: No  Loss of appetite: No  Weight loss: No  Weight gain: No  Fatigue: Yes  Night sweats: Yes  Chills: No  Increased stress: No  Excessive hunger: No  Excessive thirst: No  Feeling hot or cold when others believe the temperature is normal: No  Loss of height: No  Post-operative complications: No  Surgical site pain: No  Hallucinations: No  Change in or Loss of Energy: Yes  Hyperactivity: No  Confusion: No  Ear pain: No  Ear discharge: No  Hearing loss: No  Tinnitus: No  Nosebleeds: No  Congestion: Yes  Sinus pain: No  Trouble swallowing: No   Voice hoarseness: No  Mouth sores: Yes  Sore throat: No  Tooth pain: No  Gum tenderness: No  Bleeding gums: No  Change in taste: No  Change in sense of smell: No  Dry mouth: No  Hearing aid used:  No  Neck lump: No  Back pain: No  Muscle aches: Yes  Neck pain: No  Swollen joints: Yes  Joint pain: Yes  Bone pain: Yes  Muscle cramps: Yes  Muscle weakness: Yes  Joint stiffness: Yes  Bone fracture: No  Anemia: Yes  Swollen glands: No  Easy bleeding or bruising: Yes  Edema or swelling: Yes  Trouble with coordination: No  Dizziness or trouble with balance: No  Fainting or black-out spells: No  Memory loss: No  Headache: Yes  Seizures: No  Speech problems: No  Tingling: Yes  Tremor: No  Weakness: Yes  Difficulty walking: Yes  Paralysis: No  Numbness: Yes  Nervous or Anxious: Yes  Depression: Yes  Trouble sleeping: Yes  Trouble thinking or concentrating: Yes  Mood changes: Yes  Panic attacks: No      Patient seen and examined with the resident.     Assesment:  Symptomatic left popliteal cyst, multiple aspirations and injections, last steroid injection 2 weeks prior    Symptomatic right popliteal cyst, no injections, aspirations or imaging    Plan:  mri right knee as currently more bothersome.   Injection recently completed to left knee   F/u after mri   I would consider arthroscopic meniscectomy of left knee and cyst decompression (prior to open cyst excision)    I agree with history, physical and imaging as well as the assessment and plan as detailed by Dr. Fairbanks.       Again, thank you for allowing me to participate in the care of your patient.      Sincerely,    Sean Pate MD

## 2018-08-20 NOTE — PROGRESS NOTES
Patient seen and examined with the resident.     Assesment:  Symptomatic left popliteal cyst, multiple aspirations and injections, last steroid injection 2 weeks prior    Symptomatic right popliteal cyst, no injections, aspirations or imaging    Plan:  mri right knee as currently more bothersome.   Injection recently completed to left knee   F/u after mri   I would consider arthroscopic meniscectomy of left knee and cyst decompression (prior to open cyst excision)    I agree with history, physical and imaging as well as the assessment and plan as detailed by Dr. Fairbanks.

## 2018-08-20 NOTE — NURSING NOTE
Administered Pneumococcal Prevnar 13 vaccine (see Immunizations in Chart Review). Patient tolerated well.  Sage Rashid CMA at 1:37 PM on 8/20/2018

## 2018-08-21 ASSESSMENT — ENCOUNTER SYMPTOMS
MUSCLE CRAMPS: 0
CONSTIPATION: 0
MUSCLE WEAKNESS: 1
BLOOD IN STOOL: 0
PANIC: 0
HEARTBURN: 0
INCREASED ENERGY: 1
NERVOUS/ANXIOUS: 0
VOMITING: 0
CHILLS: 0
WEIGHT LOSS: 0
NECK PAIN: 0
ARTHRALGIAS: 1
DEPRESSION: 0
FATIGUE: 1
BLOATING: 0
JOINT SWELLING: 1
ALTERED TEMPERATURE REGULATION: 0
DECREASED CONCENTRATION: 1
NAUSEA: 1
POLYDIPSIA: 0
NIGHT SWEATS: 1
DECREASED APPETITE: 1
DIARRHEA: 1
JAUNDICE: 0
STIFFNESS: 1
INSOMNIA: 1
FEVER: 0
HALLUCINATIONS: 0
POLYPHAGIA: 0
BACK PAIN: 0
MYALGIAS: 0
WEIGHT GAIN: 0
ABDOMINAL PAIN: 0
BOWEL INCONTINENCE: 0
RECTAL PAIN: 0

## 2018-08-27 ENCOUNTER — RADIANT APPOINTMENT (OUTPATIENT)
Dept: MRI IMAGING | Facility: CLINIC | Age: 33
End: 2018-08-27
Attending: ORTHOPAEDIC SURGERY
Payer: COMMERCIAL

## 2018-08-27 ENCOUNTER — OFFICE VISIT (OUTPATIENT)
Dept: ORTHOPEDICS | Facility: CLINIC | Age: 33
End: 2018-08-27
Payer: COMMERCIAL

## 2018-08-27 ENCOUNTER — DOCUMENTATION ONLY (OUTPATIENT)
Dept: ORTHOPEDICS | Facility: CLINIC | Age: 33
End: 2018-08-27

## 2018-08-27 DIAGNOSIS — M25.561 PAIN IN BOTH KNEES, UNSPECIFIED CHRONICITY: ICD-10-CM

## 2018-08-27 DIAGNOSIS — M25.562 PAIN IN BOTH KNEES, UNSPECIFIED CHRONICITY: Primary | ICD-10-CM

## 2018-08-27 DIAGNOSIS — M25.562 PAIN IN BOTH KNEES, UNSPECIFIED CHRONICITY: ICD-10-CM

## 2018-08-27 DIAGNOSIS — M25.561 PAIN IN BOTH KNEES, UNSPECIFIED CHRONICITY: Primary | ICD-10-CM

## 2018-08-27 NOTE — PROGRESS NOTES
Patient is scheduled for surgery with Dr. Pate      Spoke or left message with: Patient    Date of Surgery: 11/20/18    Location: [facility]    Pre-op with surgeon (if applicable): Complete    H&P: Patient to schedule with PCP    Post op: 1 week     Additional imaging/appointments: N/A    Surgery packet: Received in clinic     Additional comments: Scheduled as 1st case - type 1 diabetic

## 2018-08-27 NOTE — LETTER
8/27/2018       RE: Chrissy Dixon  [address] [facility] [address]     Dear Colleague,    Thank you for referring your patient, Chrissy Dixon, to the [facility]. Please see a copy of my visit note below.    [facility]    DIAGNOSIS:   1. Bilateral complex synovial cyst  2. Chronic knee pain  3. Medial meniscus tear, left knee  4. psoratic arthritis  5. DMI    PROCEDURES:  none    HISTORY:  Presents for review of MRI obtained at the last visit for evaluation of right knee complex synovial cyst.  Symptoms unchanged, fullness + pain in bl knees, right more symptomatic given recent left knee CSI.  Outside of review of MRI, treatment/surgical planning, no other concerns.  denies new numbness/tingling/weakness, denies fevers, chills, sob, cp.      EXAM:     General: Awake, Alert, and oriented. Articulates and communicates with a normal affect  NAD, AO, NLB on RA, nml mood and affect, HEENT grossly nml, EOM grossly intact     Right Lower Extremity:    Incisions well healed without evidence of infection    No sig effusion and ecchymosis    Range of motion 0-125, full at terminal flexion, palpable boggy mas posteriorly    Neurovascularly intact    IMAGING:  MRI shows large complex synovial cyst arising from posteromedial aspect of right knee joint    ASSESSMENT:  1. Bilateral knee, large, complex synovial cysts, symptomatic, R>L  2. IDDM  3. Psoriatic arthritis on prednisone  4. Medial mensicus tear left knee    PLAN:   We had an extensive discussion with Ms. Dixon regarding her bilateral knee complex synovial cysts.  At present, she continues to endorse that the right side is more symptomatic given that she recently had a cortisone injection to her left side.  Therefore, in regard to surgical intervention, we would approach her right side first.  We discussed diagnostic arthroscopy and arthroscopic cyst decompression to her  right knee complex synovial cyst.  We discussed with her that the role of surgery would be to attempt to alleviate symptoms.  We discussed that postoperatively, where her symptoms relieved, however she still had a cyst, we consider that a success.  We explained that even with surgery, there is a high rate of recurrence of cysts however, may or may not be symptomatic.  Risks benefits alternatives of diagnostic arthroscopy and arthroscopic right knee cyst decompression are reviewed.  Imaging was reviewed with patient.  She was given opportunity to ask questions.  Her postoperative course was reviewed.  Afterwards, she wish to proceed with right knee surgery.  Therefore, she will meet with our schedulers today regarding earliest possible timing of right knee arthroscopic cyst decompression.  Otherwise, she can continue activity as tolerated, weightbearing as tolerated, and follow-up on an as-needed basis prior to her surgery.    This patient was seen and discussed with Dr. Pate who agrees.        Answers for HPI/ROS submitted by the patient on 8/21/2018   General Symptoms: Yes  Skin Symptoms: No  HENT Symptoms: No  EYE SYMPTOMS: No  HEART SYMPTOMS: No  LUNG SYMPTOMS: No  INTESTINAL SYMPTOMS: Yes  URINARY SYMPTOMS: No  GYNECOLOGIC SYMPTOMS: No  BREAST SYMPTOMS: No  SKELETAL SYMPTOMS: Yes  BLOOD SYMPTOMS: No  NERVOUS SYSTEM SYMPTOMS: No  MENTAL HEALTH SYMPTOMS: Yes  Fever: No  Loss of appetite: Yes  Weight loss: No  Weight gain: No  Fatigue: Yes  Night sweats: Yes  Chills: No  Increased stress: Yes  Excessive hunger: No  Excessive thirst: No  Feeling hot or cold when others believe the temperature is normal: No  Loss of height: No  Post-operative complications: No  Surgical site pain: No  Hallucinations: No  Change in or Loss of Energy: Yes  Hyperactivity: No  Confusion: No  Heart burn or indigestion: No  Nausea: Yes  Vomiting: No  Abdominal pain: No  Bloating: No  Constipation: No  Diarrhea: Yes  Blood in stool:  No  Black stools: No  Rectal or Anal pain: No  Fecal incontinence: No  Yellowing of skin or eyes: No  Vomit with blood: No  Change in stools: No  Back pain: No  Muscle aches: No  Neck pain: No  Swollen joints: Yes  Joint pain: Yes  Bone pain: No  Muscle cramps: No  Muscle weakness: Yes  Joint stiffness: Yes  Bone fracture: No  Nervous or Anxious: No  Depression: No  Trouble sleeping: Yes  Trouble thinking or concentrating: Yes  Mood changes: No  Panic attacks: No      Patient seen and examined with the resident.     Assesment: Symptomatic popliteal cyst bilateral knees    Right worse than left     Plan: I long discussion today with Chrissy ariane regarding her right knee.  At this time the MRI confirms she is a symptomatic popliteal cyst.  Presumed meniscus tear as well.    She is well-informed my thoughts regarding open cyst excisions and I do not think it is is a particularly satisfying procedure in light of this I like to offer a more reserve procedure first.    This will be an examination under anesthesia right knee, right knee arthroscopy, cyst decompression, meniscectomy, chondroplasty as needed.  We will try to perform a cyst decompression but we will not do an open cyst excision.    I discussed this case as well with 1 of the tumor surgeons in our group with some experience during opens popliteal cyst excisions and he concurred with the plan to try to exhaust all means besides open cyst excision means prior to considering an open posterior procedure.    I discussed with the patient the risks, benefits, complications and techniques of surgery as well as the natural history of popliteal cyst and the alternative treatment options.    The risks include, but are not limited to the risk of death and risk of a myocardial infarction, risk of bleeding and a risk of infection, risk of nerve damage and a risk of muscle damage, stiffness, instability, continued pain or worsening pain and recurrent, continued symptoms,  failure to improve, need for future surgery including open posterior cyst excision.    The patient was provided an opportunity to ask questions and these were answered.     I agree with history, physical and imaging as well as the assessment and plan as detailed by Dr. Fairbanks.       Again, thank you for allowing me to participate in the care of your patient.      Sincerely,    Sean Pate MD

## 2018-08-27 NOTE — PROGRESS NOTES
Patient seen and examined with the resident.     Assesment: Symptomatic popliteal cyst bilateral knees    Right worse than left     Plan: I long discussion today with Chrissy today regarding her right knee.  At this time the MRI confirms she is a symptomatic popliteal cyst.  Presumed meniscus tear as well.    She is well-informed my thoughts regarding open cyst excisions and I do not think it is is a particularly satisfying procedure in light of this I like to offer a more reserve procedure first.    This will be an examination under anesthesia right knee, right knee arthroscopy, cyst decompression, meniscectomy, chondroplasty as needed.  We will try to perform a cyst decompression but we will not do an open cyst excision.    I discussed this case as well with 1 of the tumor surgeons in our group with some experience during opens popliteal cyst excisions and he concurred with the plan to try to exhaust all means besides open cyst excision means prior to considering an open posterior procedure.    I discussed with the patient the risks, benefits, complications and techniques of surgery as well as the natural history of popliteal cyst and the alternative treatment options.    The risks include, but are not limited to the risk of death and risk of a myocardial infarction, risk of bleeding and a risk of infection, risk of nerve damage and a risk of muscle damage, stiffness, instability, continued pain or worsening pain and recurrent, continued symptoms, failure to improve, need for future surgery including open posterior cyst excision.    The patient was provided an opportunity to ask questions and these were answered.     I agree with history, physical and imaging as well as the assessment and plan as detailed by Dr. Fairbanks.

## 2018-08-27 NOTE — NURSING NOTE
Teaching Flowsheet   Relevant Diagnosis: Right knee cyst  Teaching Topic: Right knee arthroscopy, cyst decompression     Person(s) involved in teaching:   Patient     Motivation Level:  Asks Questions: Yes  Eager to Learn: Yes  Cooperative: Yes  Receptive (willing/able to accept information): Yes  Any cultural factors/[employer] beliefs that may influence understanding or compliance? No     Patient demonstrates understanding of the following:  Reason for the appointment, diagnosis and treatment plan: Yes  Knowledge of proper use of medications and conditions for which they are ordered (with special attention to potential side effects or drug interactions): Yes  Which situations necessitate calling provider and whom to contact: Yes     Teaching Concerns Addressed: Patient understands that she will need a preoperative H&P within 30 days of the date of surgery.      Proper use and care of assistive devices, crutches. (medical equip, care aids, etc.): Yes  Nutritional needs and diet plan: NA  Pain management techniques: Yes  Wound Care: Yes  How and/when to access community resources: Yes     Instructional Materials Used/Given: Preoperative teaching packet, surgical soap.     Health history positive for diabetes mellitus type 1, insulin and metformin dependent.     a. Does the patient take five or more prescription medications? Yes  b. Does patient have difficulty walking up two flights of stairs? No  c. Is patient s BMI 35 or greater? No  d. Is patient an insulin dependent diabetic? Yes  e. Does patient have a history of having a heart attack or a heart surgery of any kind? No  f. Does patient have a history of heart failure? No  g. Does the patient have a history of any type of transplant? No  h. Does the patient use inhalers on a daily basis? No  i. Does the patient have a history of pulmonary hypertension? No  Once the patient is evaluated by PAC contact (ex: Surgery schedulers name and number, RN's name and number,  etc..);  [phone]  Name of planned surgery______________________________

## 2018-08-27 NOTE — PROGRESS NOTES
[facility]    DIAGNOSIS:   1. Bilateral complex synovial cyst  2. Chronic knee pain  3. Medial meniscus tear, left knee  4. psoratic arthritis  5. DMI    PROCEDURES:  none    HISTORY:  Presents for review of MRI obtained at the last visit for evaluation of right knee complex synovial cyst.  Symptoms unchanged, fullness + pain in bl knees, right more symptomatic given recent left knee CSI.  Outside of review of MRI, treatment/surgical planning, no other concerns.  denies new numbness/tingling/weakness, denies fevers, chills, sob, cp.      EXAM:     General: Awake, Alert, and oriented. Articulates and communicates with a normal affect  NAD, AO, NLB on RA, nml mood and affect, HEENT grossly nml, EOM grossly intact     Right Lower Extremity:    Incisions well healed without evidence of infection    No sig effusion and ecchymosis    Range of motion 0-125, full at terminal flexion, palpable boggy mas posteriorly    Neurovascularly intact    IMAGING:  MRI shows large complex synovial cyst arising from posteromedial aspect of right knee joint    ASSESSMENT:  1. Bilateral knee, large, complex synovial cysts, symptomatic, R>L  2. IDDM  3. Psoriatic arthritis on prednisone  4. Medial mensicus tear left knee    PLAN:   We had an extensive discussion with Ms. Dixon regarding her bilateral knee complex synovial cysts.  At present, she continues to endorse that the right side is more symptomatic given that she recently had a cortisone injection to her left side.  Therefore, in regard to surgical intervention, we would approach her right side first.  We discussed diagnostic arthroscopy and arthroscopic cyst decompression to her right knee complex synovial cyst.  We discussed with her that the role of surgery would be to attempt to alleviate symptoms.  We discussed that postoperatively, where her symptoms relieved, however she still had a cyst, we consider that a success.  We explained that even with surgery,  there is a high rate of recurrence of cysts however, may or may not be symptomatic.  Risks benefits alternatives of diagnostic arthroscopy and arthroscopic right knee cyst decompression are reviewed.  Imaging was reviewed with patient.  She was given opportunity to ask questions.  Her postoperative course was reviewed.  Afterwards, she wish to proceed with right knee surgery.  Therefore, she will meet with our schedulers today regarding earliest possible timing of right knee arthroscopic cyst decompression.  Otherwise, she can continue activity as tolerated, weightbearing as tolerated, and follow-up on an as-needed basis prior to her surgery.    This patient was seen and discussed with Dr. Pate who agrees.        Answers for HPI/ROS submitted by the patient on 8/21/2018   General Symptoms: Yes  Skin Symptoms: No  HENT Symptoms: No  EYE SYMPTOMS: No  HEART SYMPTOMS: No  LUNG SYMPTOMS: No  INTESTINAL SYMPTOMS: Yes  URINARY SYMPTOMS: No  GYNECOLOGIC SYMPTOMS: No  BREAST SYMPTOMS: No  SKELETAL SYMPTOMS: Yes  BLOOD SYMPTOMS: No  NERVOUS SYSTEM SYMPTOMS: No  MENTAL HEALTH SYMPTOMS: Yes  Fever: No  Loss of appetite: Yes  Weight loss: No  Weight gain: No  Fatigue: Yes  Night sweats: Yes  Chills: No  Increased stress: Yes  Excessive hunger: No  Excessive thirst: No  Feeling hot or cold when others believe the temperature is normal: No  Loss of height: No  Post-operative complications: No  Surgical site pain: No  Hallucinations: No  Change in or Loss of Energy: Yes  Hyperactivity: No  Confusion: No  Heart burn or indigestion: No  Nausea: Yes  Vomiting: No  Abdominal pain: No  Bloating: No  Constipation: No  Diarrhea: Yes  Blood in stool: No  Black stools: No  Rectal or Anal pain: No  Fecal incontinence: No  Yellowing of skin or eyes: No  Vomit with blood: No  Change in stools: No  Back pain: No  Muscle aches: No  Neck pain: No  Swollen joints: Yes  Joint pain: Yes  Bone pain: No  Muscle cramps: No  Muscle weakness:  Yes  Joint stiffness: Yes  Bone fracture: No  Nervous or Anxious: No  Depression: No  Trouble sleeping: Yes  Trouble thinking or concentrating: Yes  Mood changes: No  Panic attacks: No

## 2018-08-27 NOTE — MR AVS SNAPSHOT
After Visit Summary   8/27/2018    Chrissy Dixon    MRN: [id number]           Patient Information     Date Of Birth          1985        Visit Information        Provider Department      8/27/2018 1:30 PM Sean Pate MD [facility]        Today's Diagnoses     Pain in both knees, unspecified chronicity    -  1       Follow-ups after your visit        Follow-up notes from your care team     Return if symptoms worsen or fail to improve.      Your next 10 appointments already scheduled     Nov 20, 2018   Procedure with Sean Pate MD   [facility] ([facility])    [address] [place] [address]  5th Floor  [facility] [id number] [phone]           Located in the Clinics and Surgery Center at [address] [place] [address].   parking is very convenient and highly recommended.  is a $6 flat rate fee.  Both  and self parkers should enter the main arrival plaza from [facility]; parking attendants will direct you based on your parking preference.            Nov 26, 2018 10:00 AM CST   (Arrive by 9:45 AM)   RETURN KNEE with Sean Pate MD   [facility] ([facility])    [address] [place] [address]  4th Floor  [facility] [address] [phone]            Nov 30, 2018  7:00 AM CST   (Arrive by 6:45 AM)   Return Visit with Rene Granados MD   [facility] ([facility])    [address] [place] [address]  [address]  [facility] [address] [phone]              Who to contact     Please call your clinic at [phone] to:    Ask questions about your health    Make or cancel appointments    Discuss your medicines    Learn about your test results    Speak to your doctor            Additional Information About Your Visit        MyChart Information     "[employer] gives you secure access to your electronic  health record. If you see a primary care provider, you can also send messages to your care team and make appointments. If you have questions, please call your primary care clinic.  If you do not have a primary care provider, please call [phone] and they will assist you.      [employer] is an electronic gateway that provides easy, online access to your medical records. With [employer], you can request a clinic appointment, read your test results, renew a prescription or communicate with your care team.     To access your existing account, please contact your [facility] or call [phone] for assistance.        Care EveryWhere ID     This is your Care EveryWhere ID. This could be used by other organizations to access your [place] medical records  [id number]         Blood Pressure from Last 3 Encounters:   08/20/18 103/68   08/02/18 116/79   10/06/17 111/72    Weight from Last 3 Encounters:   08/20/18 49.3 kg (108 lb 9.6 oz)   08/20/18 50.8 kg (112 lb)   08/02/18 51 kg (112 lb 6.4 oz)              We Performed the Following     [facility]-Operative Worksheet        Primary Care Provider Office Phone # Fax #    Nando Haskins [phone]       [address] [place] [address]        Equal Access to Services     SHERINE MARTINEZ : Hadii vick ku hadasho Soomaali, waaxda luqadaha, qaybta kaalmada adeegyada, stanley martinez hayarthur oropeza . So [facility] [phone].    ATENCIÓN: Si habla español, tiene a hameed disposición servicios gratuitos de asistencia lingüística. Llame al [phone].    We comply with applicable federal civil rights laws and [place] laws. We do not discriminate on the basis of race, color, national origin, age, disability, sex, sexual orientation, or gender identity.            Thank you!     Thank you for choosing [facility]  for your care. Our goal is always to provide you with excellent care. Hearing back from our  patients is one way we can continue to improve our services. Please take a few minutes to complete the written survey that you may receive in the mail after your visit with us. Thank you!             Your Updated Medication List - Protect others around you: Learn how to safely use, store and throw away your medicines at www.disposemymeds.org.          This list is accurate as of 8/27/18  2:25 PM.  Always use your most recent med list.                   Brand Name Dispense Instructions for use Diagnosis    * apremilast 30 MG tablet    OTEZLA    180 tablet    Take 1 tablet (30 mg) by mouth 2 times daily    Psoriatic arthritis (H)       * apremilast 30 MG tablet    OTEZLA    60 tablet    Take 1 tablet (30 mg) by mouth 2 times daily    Psoriatic arthritis (H)       blood glucose monitoring meter device kit    no brand specified    1 kit    Use to test blood sugar 6 to 8 times daily or as directed. Okay to dispense One Touch Verio products per patient's insurance.    Diabetes mellitus, type 2 (H)       * blood glucose monitoring test strip    no brand specified    700 strip    Use to test blood sugars 6 to 8 times daily or as directed. Okay to dispense One Touch Verio per patient's insurance.    Diabetes mellitus, type 2 (H)       * blood glucose monitoring test strip    ACCU-CHEK SMARTVIEW    700 each    Use to test blood sugar 6-8 times daily or as directed.    Diabetes mellitus, type 2 (H)       cholecalciferol 1000 UNIT tablet    vitamin D3    90 tablet    Take 1 tablet (1,000 Units) by mouth daily    Vitamin D deficiency       clobetasol 0.05 % external solution    TEMOVATE    50 mL    Apply topically 2 times daily    Psoriasis       DERMA-SMOOTHE/FS SCALP 0.01 % Oil oil   Generic drug:  Fluocinolone Acetonide Scalp     118 mL    Apply to scalp at bedtime, then wear showercap, rinse off in morning.    Other psoriasis       ferrous gluconate 324 (38 Fe) MG tablet    FERGON    100 tablet    Take 1 tablet (324 mg) by  mouth every other day    Iron deficiency anemia due to chronic blood loss, Iron deficiency anemia, unspecified iron deficiency anemia type       fluticasone 50 MCG/ACT spray    FLONASE    48 g    Spray 2 sprays into both nostrils daily    Rhinitis medicamentosa       ibuprofen 600 MG tablet    ADVIL/MOTRIN    270 tablet    Take 1 tablet (600 mg) by mouth every 8 hours as needed for moderate pain    Psoriasis arthropathica (H)       insulin detemir 100 UNIT/ML injection    LEVEMIR FLEXTOUCH    60 mL    Inject 18 Units Subcutaneous 2 times daily    Type 2 diabetes mellitus without complication, with long-term current use of insulin (H)       insulin lispro 100 UNIT/ML injection    HumaLOG KWIKpen    30 mL    USE THREE TIMES DAILY WITH MEALS, CURRENTLY 30 UNITS DAILY    Diabetes mellitus, type 2 (H)       insulin pen needle 31G X 8 MM     300 each    Use  3-4 pen needles daily or as directed.    Diabetes mellitus, type 2 (H)       levonorgestrel 20 MCG/24HR IUD    MIRENA (52 MG)    1 each    Use as directed    Family planning, IUD (intrauterine device) check/reinsertion/removal       metFORMIN 500 MG tablet    GLUCOPHAGE    360 tablet    Take 2 tablets (1,000 mg) by mouth 2 times daily (with meals) Labs due for further refills.    Type 2 diabetes mellitus without complication, with long-term current use of insulin (H)       ondansetron 4 MG ODT tab    ZOFRAN-ODT    60 tablet    Take 1 tablet (4 mg) by mouth every 8 hours as needed for nausea    Nausea       ONETOUCH LANCETS Misc     700 each    Use to test blood sugar 6 to 8 times daily or as directed. Okay to dispense One Touch Verio products per patient's insurance.    Diabetes mellitus, type 2 (H)       oxyCODONE-acetaminophen 5-325 MG per tablet    PERCOCET    30 tablet    Take 1 tablet by mouth every 4 hours as needed (must last 30 days from dispense date)    Psoriatic arthritis (H), Recurrent cold sores       * predniSONE 10 MG tablet    DELTASONE    30 tablet     Take 1 tablet (10 mg) by mouth daily As needed for flares only    Psoriatic arthropathy (H)       * predniSONE 5 MG tablet    DELTASONE    50 tablet    3 tabs daily for 1 wk, then 2 tabs daily for 1 week.    Psoriatic arthritis (H)       * predniSONE 5 MG tablet    DELTASONE    20 tablet    Take 3 tablets (15mg) by mouth for 4 days, then 2 tablets (10mg) by mouth for 4 days.    Psoriatic arthritis (H)       * predniSONE 5 MG tablet    DELTASONE    18 tablet    20 mg per day x 2 days, 15 mg per day x 2 days, 10 mg per day x 2 days then stop. -    Rheumatoid arthritis involving both hands with positive rheumatoid factor (H)       traMADol 50 MG tablet    ULTRAM    60 tablet    Take 1 tablet (50 mg) by mouth every 8 hours as needed for moderate pain . Must last 30 days    Psoriatic arthritis (H)       triamcinolone 0.1 % cream    KENALOG    453.6 g    Apply topically 2 times daily    Guttate psoriasis       ustekinumab 45 MG/0.5ML Sosy    STELARA    3 Syringe    Inject 0.5 mLs (45 mg) Subcutaneous every 3 months    Psoriatic arthritis (H)       valACYclovir 500 MG tablet    VALTREX    90 tablet    Take 1 tablet (500 mg) by mouth daily    Psoriatic arthritis (H)       * Notice:  This list has 8 medication(s) that are the same as other medications prescribed for you. Read the directions carefully, and ask your doctor or other care provider to review them with you.

## 2018-09-13 ENCOUNTER — MYC REFILL (OUTPATIENT)
Dept: INTERNAL MEDICINE | Facility: CLINIC | Age: 33
End: 2018-09-13

## 2018-09-13 DIAGNOSIS — L40.50 PSORIATIC ARTHRITIS (H): ICD-10-CM

## 2018-09-14 NOTE — TELEPHONE ENCOUNTER
Message from MyChart:  Original authorizing provider: Nando Haskins MD    Chrissy Zack would like a refill of the following medications:  traMADol (ULTRAM) 50 MG tablet [Nando Haskins MD]    Preferred pharmacy: [facility] [address] [place] [address] [address] AT [facility] OF [address]    Comment:

## 2018-09-14 NOTE — TELEPHONE ENCOUNTER
Last office visit 8/20/18. [facility] site verified 9/14/18. Last tramadol refill received 8/20/18 which was an early refill. Previous prescription filled 7/25/18. Will date next rx for 9/23.    Tyra Singleton RN    -----------------    Rx faxed to [facility] #[id number] 9/18/18 at 6242.    Tyra Singleton RN

## 2018-09-16 ENCOUNTER — MYC REFILL (OUTPATIENT)
Dept: INTERNAL MEDICINE | Facility: CLINIC | Age: 33
End: 2018-09-16

## 2018-09-16 DIAGNOSIS — L40.50 PSORIATIC ARTHRITIS (H): ICD-10-CM

## 2018-09-16 DIAGNOSIS — B00.1 RECURRENT COLD SORES: ICD-10-CM

## 2018-09-17 RX ORDER — TRAMADOL HYDROCHLORIDE 50 MG/1
50 TABLET ORAL EVERY 8 HOURS PRN
Qty: 60 TABLET | Refills: 0 | Status: SHIPPED | OUTPATIENT
Start: 2018-09-20 | End: 2018-10-15

## 2018-09-17 RX ORDER — OXYCODONE AND ACETAMINOPHEN 5; 325 MG/1; MG/1
1 TABLET ORAL EVERY 4 HOURS PRN
Qty: 30 TABLET | Refills: 0 | Status: SHIPPED | OUTPATIENT
Start: 2018-09-30 | End: 2018-10-15

## 2018-09-17 NOTE — TELEPHONE ENCOUNTER
Message from MyChart:  Original authorizing provider: Nando Haskins MD    Chrissy Zack would like a refill of the following medications:  oxyCODONE-acetaminophen (PERCOCET) 5-325 MG per tablet [Nando Haskins MD]    Preferred pharmacy: Other - Could you please mail to my house.     Comment:  Could you please mail to my house. [address] [facility] [address]

## 2018-09-17 NOTE — TELEPHONE ENCOUNTER
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    Last Fill Date:   8/31/18    Last Visit with PCP:   8/20/18    Future Visits with PCP:   No.    Processing:   Mail to patient.     Tyra Singleton RN    ---------------------    Rx placed in mail for patient's home address on 9/18/18.    Tyra Singleton RN

## 2018-10-08 ENCOUNTER — MYC REFILL (OUTPATIENT)
Dept: RHEUMATOLOGY | Facility: CLINIC | Age: 33
End: 2018-10-08

## 2018-10-08 DIAGNOSIS — L40.50 PSORIATIC ARTHRITIS (H): ICD-10-CM

## 2018-10-09 NOTE — TELEPHONE ENCOUNTER
"otezla 30 mg   Last Written Prescription Date:  7/22/18  Last Fill Quantity: 60  # refills: 0  Last Office Visit : 8/2/18  Future Office visit:  11/30/18    Routing refill request to provider for review/approval because:  New rx needed? Is this a dose change?    \"I recommend continuing otezla 60mg twice daily for psoriatic arthritis.\"      "

## 2018-10-09 NOTE — TELEPHONE ENCOUNTER
Message from Erica:  Page Oliveros RN Mon Oct 8, 2018 12:35 PM        ----- Message -----   From: Chrissy Dixon   Sent: 10/8/2018 8:47 AM   To: Adult Rheum Triage-  Subject: Medication Renewal Request     Original authorizing provider: MD Chrissy Horn would like a refill of the following medications:  apremilast (OTEZLA) 30 MG tablet [Rene Granados MD]    Preferred pharmacy: [address]    Comment:  I called [facility] pharmacy and they said I do not have refills on his script. It was last filled under Dr. Dash and I think that s why I have had problems refilling the last few months? They are faxing it to [phone]? Last month I had to call 4 times and this month they said two requests have already been sent With no response. I m hoping Dr. Granados could fax a refill to [phone]? Thank you.

## 2018-10-15 ENCOUNTER — MYC REFILL (OUTPATIENT)
Dept: INTERNAL MEDICINE | Facility: CLINIC | Age: 33
End: 2018-10-15

## 2018-10-15 DIAGNOSIS — L40.50 PSORIATIC ARTHRITIS (H): ICD-10-CM

## 2018-10-15 DIAGNOSIS — B00.1 RECURRENT COLD SORES: ICD-10-CM

## 2018-10-15 NOTE — TELEPHONE ENCOUNTER
Message from MyChart:  Original authorizing provider: MD Roxane Vallecillofer Zack would like a refill of the following medications:  oxyCODONE-acetaminophen (PERCOCET) 5-325 MG per tablet [Nando Haskins MD]    Preferred pharmacy: [facility] [address] [place] [phone]    Comment:  Could you please mail to my house

## 2018-10-15 NOTE — TELEPHONE ENCOUNTER
Message from MyChart:  Original authorizing provider: Nando Haskins MD    Chrissy Zack would like a refill of the following medications:  traMADol (ULTRAM) 50 MG tablet [Nando Haskins MD]    Preferred pharmacy: [facility] [address] [place] [address] [address] AT [facility] OF [address]    Comment:

## 2018-10-16 RX ORDER — TRAMADOL HYDROCHLORIDE 50 MG/1
50 TABLET ORAL EVERY 8 HOURS PRN
Qty: 60 TABLET | Refills: 0 | Status: SHIPPED | OUTPATIENT
Start: 2018-10-19 | End: 2018-11-12

## 2018-10-16 NOTE — TELEPHONE ENCOUNTER
Last office visit 8/20/18. [facility] site reviewed 10/16/18. Tramadol last filled 9/19/18.    Tyra Singleton RN    -------------------    Rx faxed to patient's [facility] pharmacy #[id number].    Tyra Singleton RN  10/16/2018 11:55 AM

## 2018-10-16 NOTE — TELEPHONE ENCOUNTER
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    Last Fill Date:   9/30/18    Last Visit with PCP:   8/20/18    Future Visits with PCP:   No.    Processing:   Mail to patient.     Tyra Singleton RN    ----------------------    Rx placed in mail for patient's home address.    Tyra Singleton RN  10/22/2018 3:34 PM

## 2018-10-18 DIAGNOSIS — L40.50 PSORIATIC ARTHRITIS (H): ICD-10-CM

## 2018-10-19 RX ORDER — TRAMADOL HYDROCHLORIDE 50 MG/1
TABLET ORAL
Qty: 60 TABLET | Refills: 0 | OUTPATIENT
Start: 2018-10-19

## 2018-10-19 NOTE — TELEPHONE ENCOUNTER
traMADol (ULTRAM) 50 MG tablet       Per note of 10-16 order has already been sent to the pharmacy for fill date of 10/19/18.      Kathleen M Doege RN

## 2018-10-21 RX ORDER — OXYCODONE AND ACETAMINOPHEN 5; 325 MG/1; MG/1
1 TABLET ORAL EVERY 4 HOURS PRN
Qty: 30 TABLET | Refills: 0 | Status: SHIPPED | OUTPATIENT
Start: 2018-10-30 | End: 2018-10-22

## 2018-10-22 RX ORDER — OXYCODONE AND ACETAMINOPHEN 5; 325 MG/1; MG/1
1 TABLET ORAL EVERY 4 HOURS PRN
Qty: 30 TABLET | Refills: 0 | Status: SHIPPED | OUTPATIENT
Start: 2018-10-30 | End: 2018-12-19

## 2018-11-01 ASSESSMENT — ENCOUNTER SYMPTOMS
VOMITING: 0
NAUSEA: 0
STIFFNESS: 1
ARTHRALGIAS: 1
HEARTBURN: 0
DEPRESSION: 0
MUSCLE CRAMPS: 1
MYALGIAS: 0
JOINT SWELLING: 1
DECREASED LIBIDO: 0
ABDOMINAL PAIN: 1
NECK PAIN: 0
BACK PAIN: 0
PANIC: 0
RECTAL PAIN: 0
JAUNDICE: 0
BLOOD IN STOOL: 0
BLOATING: 0
DECREASED CONCENTRATION: 1
INSOMNIA: 1
BOWEL INCONTINENCE: 0
CONSTIPATION: 0
NERVOUS/ANXIOUS: 0
DIARRHEA: 0
HOT FLASHES: 0
MUSCLE WEAKNESS: 0

## 2018-11-02 ENCOUNTER — OFFICE VISIT (OUTPATIENT)
Dept: INTERNAL MEDICINE | Facility: CLINIC | Age: 33
End: 2018-11-02
Payer: COMMERCIAL

## 2018-11-02 VITALS
WEIGHT: 112.2 LBS | BODY MASS INDEX: 19.15 KG/M2 | TEMPERATURE: 98.7 F | OXYGEN SATURATION: 100 % | HEIGHT: 64 IN | SYSTOLIC BLOOD PRESSURE: 112 MMHG | DIASTOLIC BLOOD PRESSURE: 75 MMHG | HEART RATE: 98 BPM

## 2018-11-02 DIAGNOSIS — Z23 NEED FOR PROPHYLACTIC VACCINATION AND INOCULATION AGAINST INFLUENZA: Primary | ICD-10-CM

## 2018-11-02 ASSESSMENT — PAIN SCALES - GENERAL: PAINLEVEL: SEVERE PAIN (7)

## 2018-11-02 NOTE — MR AVS SNAPSHOT
After Visit Summary   11/2/2018    Chrissy Dixon    MRN: [id number]           Patient Information     Date Of Birth          1985        Visit Information        Provider Department      11/2/2018 2:00 PM Gemma Nelson MD [facility]        Care Instructions    Hold metformin 48 hours prior to surgery.    Reduce long acting insulin by 2 units the day before surgery.    Hold all short acting insulin the day of surgery.    You may have driving limitations for at least several days after the operation; address with your surgeon at the post op visit.    Continue all other current medications as prescribed.            Follow-ups after your visit        Your next 10 appointments already scheduled     Nov 20, 2018   Procedure with Sean Pate MD   [facility] ([facility])    [address] [place] [id number] [phone]           Located in the Clinics and Surgery Center at [address] [place] [address].   parking is very convenient and highly recommended.  is a $6 flat rate fee.  Both  and self parkers should enter the main arrival plaza from [facility]; parking attendants will direct you based on your parking preference.            Nov 26, 2018 10:00 AM CST   (Arrive by 9:45 AM)   RETURN KNEE with Sean Pate MD   [facility] ([facility])    [address] [place], [place] [id number] [phone]              Who to contact     Please call your clinic at [phone] to:    Ask questions about your health    Make or cancel appointments    Discuss your medicines    Learn about your test results    Speak to your doctor            Additional Information About Your Visit        MyChart Information     [employer] gives you secure access to your electronic health record. If you see a  "primary care provider, you can also send messages to your care team and make appointments. If you have questions, please call your primary care clinic.  If you do not have a primary care provider, please call [phone] and they will assist you.      [employer] is an electronic gateway that provides easy, online access to your medical records. With [employer], you can request a clinic appointment, read your test results, renew a prescription or communicate with your care team.     To access your existing account, please contact your [facility] or call [phone] for assistance.        Care EveryWhere ID     This is your Care EveryWhere ID. This could be used by other organizations to access your [facility] medical records  [id number]        Your Vitals Were     Pulse Temperature Height Last Period Pulse Oximetry Breastfeeding?    98 98.7  F (37.1  C) 1.626 m (5' 4\") 10/05/2018 (Approximate) 100% No    BMI (Body Mass Index)                   19.26 kg/m2            Blood Pressure from Last 3 Encounters:   11/02/18 112/75   08/20/18 103/68   08/02/18 116/79    Weight from Last 3 Encounters:   11/02/18 50.9 kg (112 lb 3.2 oz)   08/20/18 49.3 kg (108 lb 9.6 oz)   08/20/18 50.8 kg (112 lb)              Today, you had the following     No orders found for display         Today's Medication Changes          These changes are accurate as of 11/2/18  2:25 PM.  If you have any questions, ask your nurse or doctor.               Stop taking these medicines if you haven't already. Please contact your care team if you have questions.     levonorgestrel 20 MCG/24HR IUD   Commonly known as:  MIRENA (52 MG)   Stopped by:  Gemma Nelson MD                    Primary Care Provider Office Phone # Fax #    Nando Haskins [phone]       [address] [place] [address]        Equal Access to Services     SHERINE MARTINEZ AH: Hemanth Ta, " wakirtda amanrandy, qaybta kasirisha bourgeois, stanley fariasaan ah. So [facility] [phone].    ATENCIÓN: Si jose ventura, tiene a hameed disposición servicios gratuitos de asistencia lingüística. Nestor al [phone].    We comply with applicable federal civil rights laws and [place] laws. We do not discriminate on the basis of race, color, national origin, age, disability, sex, sexual orientation, or gender identity.            Thank you!     Thank you for choosing [facility]  for your care. Our goal is always to provide you with excellent care. Hearing back from our patients is one way we can continue to improve our services. Please take a few minutes to complete the written survey that you may receive in the mail after your visit with us. Thank you!             Your Updated Medication List - Protect others around you: Learn how to safely use, store and throw away your medicines at www.disposemymeds.org.          This list is accurate as of 11/2/18  2:25 PM.  Always use your most recent med list.                   Brand Name Dispense Instructions for use Diagnosis    * apremilast 30 MG tablet    OTEZLA    60 tablet    Take 1 tablet (30 mg) by mouth 2 times daily    Psoriatic arthritis (H)       * apremilast 30 MG tablet    OTEZLA    180 tablet    Take 1 tablet (30 mg) by mouth 2 times daily    Psoriatic arthritis (H)       blood glucose monitoring meter device kit    no brand specified    1 kit    Use to test blood sugar 6 to 8 times daily or as directed. Okay to dispense One Touch Verio products per patient's insurance.    Diabetes mellitus, type 2 (H)       * blood glucose monitoring test strip    no brand specified    700 strip    Use to test blood sugars 6 to 8 times daily or as directed. Okay to dispense One Touch Verio per patient's insurance.    Diabetes mellitus, type 2 (H)       * blood glucose monitoring test strip    ACCU-CHEK SMARTVIEW    700 each    Use to test blood  sugar 6-8 times daily or as directed.    Diabetes mellitus, type 2 (H)       cholecalciferol 1000 UNIT tablet    vitamin D3    90 tablet    Take 1 tablet (1,000 Units) by mouth daily    Vitamin D deficiency       clobetasol 0.05 % external solution    TEMOVATE    50 mL    Apply topically 2 times daily    Psoriasis       DERMA-SMOOTHE/FS SCALP 0.01 % Oil oil   Generic drug:  Fluocinolone Acetonide Scalp     118 mL    Apply to scalp at bedtime, then wear showercap, rinse off in morning.    Other psoriasis       ferrous gluconate 324 (38 Fe) MG tablet    FERGON    100 tablet    Take 1 tablet (324 mg) by mouth every other day    Iron deficiency anemia due to chronic blood loss, Iron deficiency anemia, unspecified iron deficiency anemia type       fluticasone 50 MCG/ACT spray    FLONASE    48 g    Spray 2 sprays into both nostrils daily    Rhinitis medicamentosa       ibuprofen 600 MG tablet    ADVIL/MOTRIN    270 tablet    Take 1 tablet (600 mg) by mouth every 8 hours as needed for moderate pain    Psoriasis arthropathica (H)       insulin detemir 100 UNIT/ML injection    LEVEMIR FLEXTOUCH    60 mL    Inject 18 Units Subcutaneous 2 times daily    Type 2 diabetes mellitus without complication, with long-term current use of insulin (H)       insulin lispro 100 UNIT/ML injection    HumaLOG KWIKpen    30 mL    USE THREE TIMES DAILY WITH MEALS, CURRENTLY 30 UNITS DAILY    Diabetes mellitus, type 2 (H)       insulin pen needle 31G X 8 MM     300 each    Use  3-4 pen needles daily or as directed.    Diabetes mellitus, type 2 (H)       metFORMIN 500 MG tablet    GLUCOPHAGE    360 tablet    Take 2 tablets (1,000 mg) by mouth 2 times daily (with meals) Labs due for further refills.    Type 2 diabetes mellitus without complication, with long-term current use of insulin (H)       ondansetron 4 MG ODT tab    ZOFRAN-ODT    60 tablet    Take 1 tablet (4 mg) by mouth every 8 hours as needed for nausea    Nausea       ONETOUCH LANCETS  Misc     700 each    Use to test blood sugar 6 to 8 times daily or as directed. Okay to dispense One Touch Verio products per patient's insurance.    Diabetes mellitus, type 2 (H)       oxyCODONE-acetaminophen 5-325 MG per tablet    PERCOCET    30 tablet    Take 1 tablet by mouth every 4 hours as needed (must last 30 days from dispense date)    Psoriatic arthritis (H), Recurrent cold sores       * predniSONE 10 MG tablet    DELTASONE    30 tablet    Take 1 tablet (10 mg) by mouth daily As needed for flares only    Psoriatic arthropathy (H)       * predniSONE 5 MG tablet    DELTASONE    50 tablet    3 tabs daily for 1 wk, then 2 tabs daily for 1 week.    Psoriatic arthritis (H)       * predniSONE 5 MG tablet    DELTASONE    20 tablet    Take 3 tablets (15mg) by mouth for 4 days, then 2 tablets (10mg) by mouth for 4 days.    Psoriatic arthritis (H)       * predniSONE 5 MG tablet    DELTASONE    18 tablet    20 mg per day x 2 days, 15 mg per day x 2 days, 10 mg per day x 2 days then stop. -    Rheumatoid arthritis involving both hands with positive rheumatoid factor (H)       traMADol 50 MG tablet    ULTRAM    60 tablet    Take 1 tablet (50 mg) by mouth every 8 hours as needed for moderate pain . Must last 30 days    Psoriatic arthritis (H)       triamcinolone 0.1 % cream    KENALOG    453.6 g    Apply topically 2 times daily    Guttate psoriasis       ustekinumab 45 MG/0.5ML Sosy    STELARA    3 Syringe    Inject 0.5 mLs (45 mg) Subcutaneous every 3 months    Psoriatic arthritis (H)       valACYclovir 500 MG tablet    VALTREX    90 tablet    Take 1 tablet (500 mg) by mouth daily    Psoriatic arthritis (H)       * Notice:  This list has 8 medication(s) that are the same as other medications prescribed for you. Read the directions carefully, and ask your doctor or other care provider to review them with you.

## 2018-11-02 NOTE — PATIENT INSTRUCTIONS
Hold metformin 48 hours prior to surgery.    Reduce long acting insulin by 2 units the day before surgery.    Hold all short acting insulin the day of surgery.    You may have driving limitations for at least several days after the operation; address with your surgeon at the post op visit.    Continue all other current medications as prescribed.

## 2018-11-02 NOTE — NURSING NOTE
Chief Complaint   Patient presents with     Pre-Op Exam     Patient is here for Pre-Op Exam,. DOS 11/20/18 for Examination Under Anesthesia Right Knee, Right Knee Arthroscopy, Partial Meniscectomy, Chondroplasty, Cyst Decompression by Dr. Stalin Pate at [facility]       1. Has the patient received the information for the influenza vaccine? YES    2.  Does the patient have any of the following contraindications?  Allergy to to eggs? No  Allergic reaction to previous influenza vaccines?  No  Any other problems to previous influenza vaccines? No  Paralyzed by Guillain-Arrey syndrome? No  Currently pregnant? NO  Current moderate or severe illness?  No  Allergy to contact lens solution?  No    3. The vaccine has been administered in the usual fashion and the patient was instructed to wait 20 minutes before leaving the building in the even of an allergic reaction: YES    Zeenat Flores, Clinic EMT at 1:47 PM on 11/2/2018

## 2018-11-02 NOTE — PROGRESS NOTES
Pre-Operative Information  Surgery:  Examination Under Anesthesia Right Knee, Right Knee Arthroscopy, Partial Meniscectomy, Chondroplasty, Cyst Decompression   Date of Surgery: 11/20/18  Surgeon: Dr. Stalin Pate  Location: [facility]  Any bad reaction to anesthesia: JOANN Lowe is a 33 year old female with a past medical history of Diabetes; Other psoriasis; and Polyarthritis. She also has no past medical history of Atypical fibroxanthoma; Basal cell carcinoma; Cutaneous T-cell lymphoma (H); Malignant melanoma (H); Other specified malignant neoplasm of skin of trunk, except scrotum; or Squamous cell carcinoma.   Here for a preop for the above named surgery due to persistent knee pain despite usual conservative measures.    No changes to past, family or social history and ROS: 10 point ROS neg other than the symptoms noted above in the HPI.  Specifically denies cardiac symptoms such as chest pain, shortness of breath.    PHYSICAL EXAM:  B/P: 112/75, T: 98.7, P: 98  Constitutional: no distress, comfortable, pleasant   Eyes: anicteric, normal extra-ocular movements   Ears, Nose and Throat: tympanic membranes clear, nose clear and free of lesions, throat clear, neck supple with full range of motion, no thyromegaly.   Cardiovascular: regular rate and rhythm, normal S1 and S2, no murmurs, rubs or gallops,  peripheral pulses full and symmetric   Respiratory: clear to auscultation, no wheezes or crackles, normal breath sounds   Gastrointestinal: positive bowel sounds, nontender, no hepatosplenomegaly, no masses   Musculoskeletal: full range of motion, no edema   Skin: no concerning lesions, no jaundice   Neurological: cranial nerves intact, normal strength and sensation, reflexes at patella and biceps normal, normal gait, no tremor   Psychological: appropriate mood   Lymphatic: no cervical, axillary or inguinal lymphadenopathy    A/P:  Low risk for perioperative events; proceed  CT OF THE ABDOMEN AND PELVIS WITHOUT CONTRAST



CLINICAL HISTORY: Gastroesophageal cancer.    



COMPARISON STUDY:  CT of the abdomen and pelvis June 22, 2018. 



TECHNIQUE: Axial images of the abdomen and pelvis were obtained without IV

contrast. Images were reviewed in the axial, sagittal, and coronal planes.  A

dose lowering technique was utilized adhering to the principles of ALARA.





FINDINGS: Please note that the chest CT will be reported separately. The patient

is status post esophagectomy with gastric pull-up. A trace right pleural

effusion with pleural thickening is noted. Right lower lobe opacity favors round

atelectasis. A right middle lobe nodule is better depicted on the chest CT. A

1.5 cm water attenuation right hepatic lobe lesion is unchanged. There are no

suspicious hepatic lesions. Evaluation is suboptimal on this unenhanced exam. No

abdominal or pelvic lymphadenopathy is present. A 6 cm water attenuation right

renal lesion is suboptimally assessed on this unenhanced exam but favors a cyst.

There is colonic diverticulosis. There is mild pericolonic infiltration, most

evident at the level of the distal descending and proximal sigmoid colon. This

has improved since exam of June 22, 2018. No pneumatosis, free air or portal

venous gas is present. There are no suspicious osseous lesions. There is no

lymphadenopathy. The prostate gland is markedly enlarged.



IMPRESSION:  



1. No evidence for metastatic disease within the abdomen or pelvis on unenhanced

exam.



2. Mild pericolonic infiltration which is diminished when compared to exam of

June 22, 2018. This favors a mild colitis.







Electronically signed by:  Freddy Arredondo M.D.

8/21/2018 3:16 PM



Dictated Date/Time:  8/21/2018 3:03 PM with surgery as planned.  No further testing indicated at this time.  Continue all usual medications with the following exceptions/recommendations:      Hold metformin 48 hours prior to surgery.    Reduce long acting insulin by 2 units the day before surgery.    Hold all short acting insulin the day of surgery.    You may have driving limitations for at least several days after the operation; address with your surgeon at the post op visit.    Continue all other current medications as prescribed.      RTC prn    Mel Caba MD

## 2018-11-02 NOTE — LETTER
11/2/2018      RE: Chrissy Dixon  [address] [facility] [address]         Pre-Operative Information  Surgery:  Examination Under Anesthesia Right Knee, Right Knee Arthroscopy, Partial Meniscectomy, Chondroplasty, Cyst Decompression   Date of Surgery: 11/20/18  Surgeon: Dr. Stalin Pate  Location: [facility]  Any bad reaction to anesthesia: JOANN Lowe is a 33 year old female with a past medical history of Diabetes; Other psoriasis; and Polyarthritis. She also has no past medical history of Atypical fibroxanthoma; Basal cell carcinoma; Cutaneous T-cell lymphoma (H); Malignant melanoma (H); Other specified malignant neoplasm of skin of trunk, except scrotum; or Squamous cell carcinoma.   Here for a preop for the above named surgery due to persistent knee pain despite usual conservative measures.    No changes to past, family or social history and ROS: 10 point ROS neg other than the symptoms noted above in the HPI.  Specifically denies cardiac symptoms such as chest pain, shortness of breath.    PHYSICAL EXAM:  B/P: 112/75, T: 98.7, P: 98  Constitutional: no distress, comfortable, pleasant   Eyes: anicteric, normal extra-ocular movements   Ears, Nose and Throat: tympanic membranes clear, nose clear and free of lesions, throat clear, neck supple with full range of motion, no thyromegaly.   Cardiovascular: regular rate and rhythm, normal S1 and S2, no murmurs, rubs or gallops,  peripheral pulses full and symmetric   Respiratory: clear to auscultation, no wheezes or crackles, normal breath sounds   Gastrointestinal: positive bowel sounds, nontender, no hepatosplenomegaly, no masses   Musculoskeletal: full range of motion, no edema   Skin: no concerning lesions, no jaundice   Neurological: cranial nerves intact, normal strength and sensation, reflexes at patella and biceps normal, normal gait, no tremor   Psychological: appropriate mood   Lymphatic: no cervical, axillary  or inguinal lymphadenopathy    A/P:  Low risk for perioperative events; proceed with surgery as planned.  No further testing indicated at this time.  Continue all usual medications with the following exceptions/recommendations:      Hold metformin 48 hours prior to surgery.    Reduce long acting insulin by 2 units the day before surgery.    Hold all short acting insulin the day of surgery.    You may have driving limitations for at least several days after the operation; address with your surgeon at the post op visit.    Continue all other current medications as prescribed.      RTC prn    Mel Caba MD

## 2018-11-08 DIAGNOSIS — E11.9 DIABETES MELLITUS, TYPE 2 (H): ICD-10-CM

## 2018-11-12 ENCOUNTER — MYC REFILL (OUTPATIENT)
Dept: INTERNAL MEDICINE | Facility: CLINIC | Age: 33
End: 2018-11-12

## 2018-11-12 DIAGNOSIS — L40.50 PSORIATIC ARTHRITIS (H): ICD-10-CM

## 2018-11-12 RX ORDER — TRAMADOL HYDROCHLORIDE 50 MG/1
50 TABLET ORAL EVERY 8 HOURS PRN
Qty: 60 TABLET | Refills: 0 | Status: SHIPPED | OUTPATIENT
Start: 2018-11-17 | End: 2018-12-11

## 2018-11-16 NOTE — OR NURSING
During preop call, patient expressed concern about recent elevated blood sugar readings, especially in the middle of the night( has been 280-300). States this is probably due to swelling in her knee and stress. Has been taking humalog to regulate this. Has an implanted sensor that checks her blood sugar and alarms if it goes too low. Patient will take humalog if needed in am prior to arrival for surgery and is aiming to have her blood sugar at around 160 if possible.

## 2018-11-19 ENCOUNTER — ANESTHESIA EVENT (OUTPATIENT)
Dept: SURGERY | Facility: AMBULATORY SURGERY CENTER | Age: 33
End: 2018-11-19

## 2018-11-19 ASSESSMENT — ENCOUNTER SYMPTOMS
MYALGIAS: 0
BACK PAIN: 0
ARTHRALGIAS: 1
NECK PAIN: 0
JOINT SWELLING: 1
MUSCLE WEAKNESS: 1
MUSCLE CRAMPS: 1
STIFFNESS: 1

## 2018-11-20 ENCOUNTER — SURGERY (OUTPATIENT)
Age: 33
End: 2018-11-20

## 2018-11-20 ENCOUNTER — HOSPITAL ENCOUNTER (OUTPATIENT)
Facility: AMBULATORY SURGERY CENTER | Age: 33
End: 2018-11-20
Attending: ORTHOPAEDIC SURGERY
Payer: COMMERCIAL

## 2018-11-20 ENCOUNTER — ANESTHESIA (OUTPATIENT)
Dept: SURGERY | Facility: AMBULATORY SURGERY CENTER | Age: 33
End: 2018-11-20

## 2018-11-20 VITALS
SYSTOLIC BLOOD PRESSURE: 105 MMHG | WEIGHT: 112.3 LBS | RESPIRATION RATE: 16 BRPM | TEMPERATURE: 98.5 F | BODY MASS INDEX: 19.17 KG/M2 | HEIGHT: 64 IN | DIASTOLIC BLOOD PRESSURE: 72 MMHG | OXYGEN SATURATION: 98 %

## 2018-11-20 DIAGNOSIS — S83.231D COMPLEX TEAR OF MEDIAL MENISCUS OF RIGHT KNEE AS CURRENT INJURY, SUBSEQUENT ENCOUNTER: Primary | ICD-10-CM

## 2018-11-20 LAB
GLUCOSE BLDC GLUCOMTR-MCNC: 144 MG/DL (ref 70–99)
GLUCOSE BLDC GLUCOMTR-MCNC: 175 MG/DL (ref 70–99)
HCG UR QL: NEGATIVE
INTERNAL QC OK POCT: NO

## 2018-11-20 RX ORDER — FENTANYL CITRATE 50 UG/ML
25-50 INJECTION, SOLUTION INTRAMUSCULAR; INTRAVENOUS
Status: DISCONTINUED | OUTPATIENT
Start: 2018-11-20 | End: 2018-11-20 | Stop reason: HOSPADM

## 2018-11-20 RX ORDER — ONDANSETRON 4 MG/1
4-8 TABLET, ORALLY DISINTEGRATING ORAL EVERY 8 HOURS PRN
Qty: 4 TABLET | Refills: 0 | Status: SHIPPED | OUTPATIENT
Start: 2018-11-20 | End: 2020-09-15

## 2018-11-20 RX ORDER — ONDANSETRON 4 MG/1
4 TABLET, ORALLY DISINTEGRATING ORAL EVERY 30 MIN PRN
Status: DISCONTINUED | OUTPATIENT
Start: 2018-11-20 | End: 2018-11-21 | Stop reason: HOSPADM

## 2018-11-20 RX ORDER — ONDANSETRON 2 MG/ML
4 INJECTION INTRAMUSCULAR; INTRAVENOUS EVERY 30 MIN PRN
Status: DISCONTINUED | OUTPATIENT
Start: 2018-11-20 | End: 2018-11-21 | Stop reason: HOSPADM

## 2018-11-20 RX ORDER — ONDANSETRON 4 MG/1
4 TABLET, ORALLY DISINTEGRATING ORAL
Status: DISCONTINUED | OUTPATIENT
Start: 2018-11-20 | End: 2018-11-21 | Stop reason: HOSPADM

## 2018-11-20 RX ORDER — DEXAMETHASONE SODIUM PHOSPHATE 4 MG/ML
INJECTION, SOLUTION INTRA-ARTICULAR; INTRALESIONAL; INTRAMUSCULAR; INTRAVENOUS; SOFT TISSUE PRN
Status: DISCONTINUED | OUTPATIENT
Start: 2018-11-20 | End: 2018-11-20

## 2018-11-20 RX ORDER — ACETAMINOPHEN 325 MG/1
975 TABLET ORAL ONCE
Status: COMPLETED | OUTPATIENT
Start: 2018-11-20 | End: 2018-11-20

## 2018-11-20 RX ORDER — SODIUM CHLORIDE 9 MG/ML
INJECTION, SOLUTION INTRAVENOUS CONTINUOUS
Status: DISCONTINUED | OUTPATIENT
Start: 2018-11-20 | End: 2018-11-20 | Stop reason: HOSPADM

## 2018-11-20 RX ORDER — ACETAMINOPHEN 325 MG/1
650 TABLET ORAL
Status: DISCONTINUED | OUTPATIENT
Start: 2018-11-20 | End: 2018-11-21 | Stop reason: HOSPADM

## 2018-11-20 RX ORDER — PROPOFOL 10 MG/ML
INJECTION, EMULSION INTRAVENOUS PRN
Status: DISCONTINUED | OUTPATIENT
Start: 2018-11-20 | End: 2018-11-20

## 2018-11-20 RX ORDER — GABAPENTIN 300 MG/1
300 CAPSULE ORAL ONCE
Status: COMPLETED | OUTPATIENT
Start: 2018-11-20 | End: 2018-11-20

## 2018-11-20 RX ORDER — NALOXONE HYDROCHLORIDE 0.4 MG/ML
.1-.4 INJECTION, SOLUTION INTRAMUSCULAR; INTRAVENOUS; SUBCUTANEOUS
Status: DISCONTINUED | OUTPATIENT
Start: 2018-11-20 | End: 2018-11-21 | Stop reason: HOSPADM

## 2018-11-20 RX ORDER — SODIUM CHLORIDE, SODIUM LACTATE, POTASSIUM CHLORIDE, CALCIUM CHLORIDE 600; 310; 30; 20 MG/100ML; MG/100ML; MG/100ML; MG/100ML
INJECTION, SOLUTION INTRAVENOUS CONTINUOUS PRN
Status: DISCONTINUED | OUTPATIENT
Start: 2018-11-20 | End: 2018-11-20

## 2018-11-20 RX ORDER — MEPERIDINE HYDROCHLORIDE 25 MG/ML
12.5 INJECTION INTRAMUSCULAR; INTRAVENOUS; SUBCUTANEOUS
Status: DISCONTINUED | OUTPATIENT
Start: 2018-11-20 | End: 2018-11-21 | Stop reason: HOSPADM

## 2018-11-20 RX ORDER — BUPIVACAINE HYDROCHLORIDE AND EPINEPHRINE 2.5; 5 MG/ML; UG/ML
INJECTION, SOLUTION INFILTRATION; PERINEURAL PRN
Status: DISCONTINUED | OUTPATIENT
Start: 2018-11-20 | End: 2018-11-20 | Stop reason: HOSPADM

## 2018-11-20 RX ORDER — OXYCODONE HYDROCHLORIDE 5 MG/1
5 TABLET ORAL EVERY 6 HOURS PRN
Qty: 30 TABLET | Refills: 0 | Status: SHIPPED | OUTPATIENT
Start: 2018-11-20 | End: 2018-11-26

## 2018-11-20 RX ORDER — AMOXICILLIN 250 MG
1-2 CAPSULE ORAL 2 TIMES DAILY
Qty: 16 TABLET | Refills: 0 | Status: SHIPPED | OUTPATIENT
Start: 2018-11-20 | End: 2019-04-22

## 2018-11-20 RX ORDER — LIDOCAINE 40 MG/G
CREAM TOPICAL
Status: DISCONTINUED | OUTPATIENT
Start: 2018-11-20 | End: 2018-11-20 | Stop reason: HOSPADM

## 2018-11-20 RX ORDER — HYDROXYZINE HYDROCHLORIDE 25 MG/1
25 TABLET, FILM COATED ORAL
Status: DISCONTINUED | OUTPATIENT
Start: 2018-11-20 | End: 2018-11-21 | Stop reason: HOSPADM

## 2018-11-20 RX ORDER — CLINDAMYCIN PHOSPHATE 900 MG/50ML
900 INJECTION, SOLUTION INTRAVENOUS SEE ADMIN INSTRUCTIONS
Status: DISCONTINUED | OUTPATIENT
Start: 2018-11-20 | End: 2018-11-20 | Stop reason: HOSPADM

## 2018-11-20 RX ORDER — FENTANYL CITRATE 50 UG/ML
INJECTION, SOLUTION INTRAMUSCULAR; INTRAVENOUS PRN
Status: DISCONTINUED | OUTPATIENT
Start: 2018-11-20 | End: 2018-11-20

## 2018-11-20 RX ORDER — ONDANSETRON 2 MG/ML
INJECTION INTRAMUSCULAR; INTRAVENOUS PRN
Status: DISCONTINUED | OUTPATIENT
Start: 2018-11-20 | End: 2018-11-20

## 2018-11-20 RX ORDER — KETOROLAC TROMETHAMINE 30 MG/ML
30 INJECTION, SOLUTION INTRAMUSCULAR; INTRAVENOUS EVERY 6 HOURS PRN
Status: DISCONTINUED | OUTPATIENT
Start: 2018-11-20 | End: 2018-11-21 | Stop reason: HOSPADM

## 2018-11-20 RX ORDER — OXYCODONE HYDROCHLORIDE 5 MG/1
5 TABLET ORAL
Status: DISCONTINUED | OUTPATIENT
Start: 2018-11-20 | End: 2018-11-21 | Stop reason: HOSPADM

## 2018-11-20 RX ORDER — CLINDAMYCIN PHOSPHATE 900 MG/50ML
900 INJECTION, SOLUTION INTRAVENOUS
Status: DISCONTINUED | OUTPATIENT
Start: 2018-11-20 | End: 2018-11-20 | Stop reason: HOSPADM

## 2018-11-20 RX ORDER — LIDOCAINE HYDROCHLORIDE 20 MG/ML
INJECTION, SOLUTION INFILTRATION; PERINEURAL PRN
Status: DISCONTINUED | OUTPATIENT
Start: 2018-11-20 | End: 2018-11-20

## 2018-11-20 RX ORDER — KETOROLAC TROMETHAMINE 30 MG/ML
INJECTION, SOLUTION INTRAMUSCULAR; INTRAVENOUS PRN
Status: DISCONTINUED | OUTPATIENT
Start: 2018-11-20 | End: 2018-11-20

## 2018-11-20 RX ORDER — PROPOFOL 10 MG/ML
INJECTION, EMULSION INTRAVENOUS CONTINUOUS PRN
Status: DISCONTINUED | OUTPATIENT
Start: 2018-11-20 | End: 2018-11-20

## 2018-11-20 RX ORDER — OXYCODONE HYDROCHLORIDE 5 MG/1
5 TABLET ORAL EVERY 4 HOURS PRN
Status: DISCONTINUED | OUTPATIENT
Start: 2018-11-20 | End: 2018-11-21 | Stop reason: HOSPADM

## 2018-11-20 RX ADMIN — PROPOFOL 150 MG: 10 INJECTION, EMULSION INTRAVENOUS at 08:20

## 2018-11-20 RX ADMIN — DEXAMETHASONE SODIUM PHOSPHATE 4 MG: 4 INJECTION, SOLUTION INTRA-ARTICULAR; INTRALESIONAL; INTRAMUSCULAR; INTRAVENOUS; SOFT TISSUE at 08:20

## 2018-11-20 RX ADMIN — Medication 0.5 MG: at 10:21

## 2018-11-20 RX ADMIN — ACETAMINOPHEN 975 MG: 325 TABLET ORAL at 07:32

## 2018-11-20 RX ADMIN — FENTANYL CITRATE 50 MCG: 50 INJECTION, SOLUTION INTRAMUSCULAR; INTRAVENOUS at 09:37

## 2018-11-20 RX ADMIN — SODIUM CHLORIDE, SODIUM LACTATE, POTASSIUM CHLORIDE, CALCIUM CHLORIDE: 600; 310; 30; 20 INJECTION, SOLUTION INTRAVENOUS at 08:15

## 2018-11-20 RX ADMIN — FENTANYL CITRATE 50 MCG: 50 INJECTION, SOLUTION INTRAMUSCULAR; INTRAVENOUS at 08:20

## 2018-11-20 RX ADMIN — FENTANYL CITRATE 25 MCG: 50 INJECTION, SOLUTION INTRAMUSCULAR; INTRAVENOUS at 08:44

## 2018-11-20 RX ADMIN — CLINDAMYCIN PHOSPHATE 900 MG: 900 INJECTION, SOLUTION INTRAVENOUS at 08:15

## 2018-11-20 RX ADMIN — SODIUM CHLORIDE: 9 INJECTION, SOLUTION INTRAVENOUS at 07:34

## 2018-11-20 RX ADMIN — LIDOCAINE HYDROCHLORIDE 80 MG: 20 INJECTION, SOLUTION INFILTRATION; PERINEURAL at 08:20

## 2018-11-20 RX ADMIN — OXYCODONE HYDROCHLORIDE 5 MG: 5 TABLET ORAL at 09:44

## 2018-11-20 RX ADMIN — PROPOFOL 50 MG: 10 INJECTION, EMULSION INTRAVENOUS at 08:44

## 2018-11-20 RX ADMIN — GABAPENTIN 300 MG: 300 CAPSULE ORAL at 07:32

## 2018-11-20 RX ADMIN — KETOROLAC TROMETHAMINE 30 MG: 30 INJECTION, SOLUTION INTRAMUSCULAR; INTRAVENOUS at 09:10

## 2018-11-20 RX ADMIN — BUPIVACAINE HYDROCHLORIDE AND EPINEPHRINE 20 ML: 2.5; 5 INJECTION, SOLUTION INFILTRATION; PERINEURAL at 08:41

## 2018-11-20 RX ADMIN — PROPOFOL 150 MCG/KG/MIN: 10 INJECTION, EMULSION INTRAVENOUS at 08:20

## 2018-11-20 RX ADMIN — ONDANSETRON 4 MG: 2 INJECTION INTRAMUSCULAR; INTRAVENOUS at 08:47

## 2018-11-20 RX ADMIN — FENTANYL CITRATE 50 MCG: 50 INJECTION, SOLUTION INTRAMUSCULAR; INTRAVENOUS at 09:30

## 2018-11-20 ASSESSMENT — LIFESTYLE VARIABLES: TOBACCO_USE: 1

## 2018-11-20 NOTE — DISCHARGE INSTRUCTIONS
[facility]  Home Care Following Anesthesia  For 24 hours after surgery:  1. Get plenty of rest.  A responsible adult must stay with you for at least 24 hours after you leave the surgery center.  2. Do not drive or use heavy equipment.  If you have weakness or tingling, don't drive or use heavy equipment until this feeling goes away.   3. Do not drink alcohol.   4. Avoid strenuous or risky activities.  Ask for help when climbing stairs.  5. You may feel lightheaded.  IF so, sit for a few minutes before standing.  Have someone help you get up.   6. If you have nausea (feel sick to your stomach): Drink only clear liquids such as apple juice, ginger ale, broth or 7-Up.  Rest may also help.  Be sure to drink enough fluids.  Move to a regular diet as you feel able.   7. You may have a slight fever.  Call the doctor if your fever is over 100 F (37.7 C) (taken under the tongue) or lasts longer than 24 hours.  8. You may have a dry mouth, a sore throat, muscle aches or trouble sleeping. These should go away after 24 hours.  9. Do not make important or legal decisions.               Tips for taking pain medications  To get the best pain relief possible, remember these points:    Take pain medications as directed, before pain becomes severe.    Pain medication can upset your stomach: taking it with food may help.    Constipation is a common side effect of pain medication. Drink plenty of  fluids.    Eat foods high in fiber. Take a stool softener if recommended by your doctor or pharmacist.    Do not drink alcohol, drive or operate machinery while taking pain medications.    Ask about other ways to control pain, such as with heat, ice or relaxation.    Tylenol/Acetaminophen Consumption  To help encourage the safe use of acetaminophen, the makers of TYLENOL  have lowered the maximum daily dose for single-ingredient Extra Strength TYLENOL  (acetaminophen) products sold in the [place] from 8  pills per day (4,000 mg) to 6 pills per day (3,000 mg). The dosing interval has also changed from 2 pills every 4-6 hours to 2 pills every 6 hours.    If you feel your pain relief is insufficient, you may take Tylenol/Acetaminophen in addition to your narcotic pain medication.     Be careful not to exceed 3,000 mg of Tylenol/Acetaminophen in a 24 hour period from all sources.    If you are taking extra strength Tylenol/acetaminophen (500 mg), the maximum dose is 6 tablets in 24 hours.    If you are taking regular strength acetaminophen (325 mg), the maximum dose is 9 tablets in 24 hours.    Call a doctor for any of the followin. Signs of infection (fever, growing tenderness at the surgery site, a large amount of drainage or bleeding, severe pain, foul-smelling drainage, redness, swelling).  2. It has been over 8 to 10 hours since surgery and you are still not able to urinate (pass water).  3. Headache for over 24 hours.  4. Numbness, tingling or weakness the day after surgery (if you had spinal anesthesia).  Your doctor is:       Dr. Sean Pate, Orthopaedics: [phone]               Or dial [phone] and ask for the resident on call for:  Orthopaedics  For emergency care, call the:  [facility] Emergency Department: [phone] (TTY for hearing impaired: [phone])

## 2018-11-20 NOTE — ANESTHESIA CARE TRANSFER NOTE
Patient: Chrissy Dixon    Procedure(s):  Examination Under Anesthesia Right Knee, Right Knee Arthroscopy, Partial Meniscectomy, Chondroplasty, Cyst Decompression, Synovial Biopsy    Diagnosis: Symptomatic Effusion and Meniscus Tear  Diagnosis Additional Information: No value filed.    Anesthesia Type:   General     Note:  Airway :Nasal Cannula  Patient transferred to:PACU  Comments: Arrive PACU, Stable, Airway Intact  111/78, 95,24,100%,98.4Handoff Report: Identifed the Patient, Identified the Reponsible Provider, Reviewed the pertinent medical history, Discussed the surgical course, Reviewed Intra-OP anesthesia mangement and issues during anesthesia, Set expectations for post-procedure period and Allowed opportunity for questions and acknowledgement of understanding      Vitals: (Last set prior to Anesthesia Care Transfer)    CRNA VITALS  11/20/2018 0850 - 11/20/2018 0923      11/20/2018             Pulse: 97    SpO2: 100 %    Resp Rate (set): 10                Electronically Signed By: JOHN Amaro CRNA  November 20, 2018  9:23 AM

## 2018-11-20 NOTE — IP AVS SNAPSHOT
[facility]    [address] [place] [id number]    Phone:  [phone]    Fax:  [phone]                                       After Visit Summary   11/20/2018    Chrissy Dixon    MRN: [id number]           After Visit Summary Signature Page     I have received my discharge instructions, and my questions have been answered. I have discussed any challenges I see with this plan with the nurse or doctor.    ..........................................................................................................................................  Patient/Patient Representative Signature      ..........................................................................................................................................  Patient Representative Print Name and Relationship to Patient    ..................................................               ................................................  Date                                   Time    ..........................................................................................................................................  Reviewed by Signature/Title    ...................................................              ..............................................  Date                                               Time          22EPIC Rev 08/18

## 2018-11-20 NOTE — ANESTHESIA PREPROCEDURE EVALUATION
Anesthesia Pre-Procedure Evaluation    Patient: Chrissy Dixon   MRN:     [id number] Gender:   female   Age:    33 year old :      1985        Preoperative Diagnosis: Symptomatic Effusion and Meniscus Tear   Procedure(s):  Examination Under Anesthesia Right Knee, Right Knee Arthroscopy, Partial Meniscectomy, Chondroplasty, Cyst Decompression     Past Medical History:   Diagnosis Date     Diabetes      Other psoriasis      Polyarthritis     probable psoriatic arthritis      Past Surgical History:   Procedure Laterality Date     AS HYSTEROSCOPY, SURGICAL; W/ ENDOMETRIAL ABLATION, ANY METHOD Bilateral 2017    endometrial ablation with bilater TL     SURGICAL HISTORY OF -       .  Tonsillar abscess          Anesthesia Evaluation     . Pt has had prior anesthetic.     No history of anesthetic complications          ROS/MED HX    ENT/Pulmonary:     (+)tobacco use, Past use , . .    Neurologic:  - neg neurologic ROS     Cardiovascular:  - neg cardiovascular ROS       METS/Exercise Tolerance:  >4 METS   Hematologic:     (+) Anemia, -      Musculoskeletal:   (+) arthritis, , , -       GI/Hepatic:  - neg GI/hepatic ROS       Renal/Genitourinary:  - ROS Renal section negative       Endo:     (+) type I DM, .      Psychiatric:  - neg psychiatric ROS       Infectious Disease:  - neg infectious disease ROS       Malignancy:      - no malignancy   Other:    - neg other ROS                     PHYSICAL EXAM:   Mental Status/Neuro: A/A/O   Airway: Facies: Feasible  Mallampati: I  Mouth/Opening: Full  TM distance: > 6 cm  Neck ROM: Full   Respiratory:   Resp. Rate: Normal     Resp. Effort: Normal      CV:    Comments:      Dental: Normal                  Lab Results   Component Value Date    WBC 7.3 2018    HGB 11.9 2018    HCT 36.1 2018     2018    CRP <2.9 2018    SED 10 2018     2018    POTASSIUM 4.0 2018    CHLORIDE 101 2018    CO2 24 2018    BUN 9  "08/20/2018    CR 0.54 08/20/2018     (H) 08/20/2018    LESA 9.0 08/20/2018    MAG 5.0 (H) 03/16/2011    ALBUMIN 4.1 08/20/2018    PROTTOTAL 7.5 08/20/2018    ALT 17 08/20/2018    AST 5 08/20/2018    ALKPHOS 61 08/20/2018    BILITOTAL 0.4 08/20/2018    LIPASE 55 09/12/2011    AMYLASE 53 09/12/2011    TSH 0.84 08/20/2018    HCG Negative 11/20/2018       Preop Vitals  BP Readings from Last 3 Encounters:   11/20/18 109/74   11/02/18 112/75   08/20/18 103/68    Pulse Readings from Last 3 Encounters:   11/02/18 98   08/20/18 91   08/02/18 89      Resp Readings from Last 3 Encounters:   11/20/18 16   10/06/17 16   07/07/17 16    SpO2 Readings from Last 3 Encounters:   11/20/18 100%   11/02/18 100%   08/20/18 98%      Temp Readings from Last 1 Encounters:   11/20/18 36.9  C (98.5  F) (Oral)    Ht Readings from Last 1 Encounters:   11/20/18 1.626 m (5' 4\")      Wt Readings from Last 1 Encounters:   11/20/18 50.9 kg (112 lb 4.8 oz)    Estimated body mass index is 19.28 kg/(m^2) as calculated from the following:    Height as of this encounter: 1.626 m (5' 4\").    Weight as of this encounter: 50.9 kg (112 lb 4.8 oz).     LDA:  Peripheral IV 11/20/18 Left Hand (Active)   Site Assessment WDL 11/20/2018  7:09 AM   Line Status Infusing 11/20/2018  7:09 AM   Phlebitis Scale 0-->no symptoms 11/20/2018  7:09 AM   Infiltration Scale 0 11/20/2018  7:09 AM   Infiltration Site Treatment Method  None 11/20/2018  7:09 AM   Extravasation? No 11/20/2018  7:09 AM   Dressing Intervention New dressing  11/20/2018  7:09 AM   Number of days:0       Airway - Adult/Peds 4 laryngeal mask airway (Active)   Number of days:0            Assessment:   ASA SCORE: 2    NPO Status: > 2 hours since completed Clear Liquids; > 6 hours since completed Solid Foods   Documentation: H&P complete; Preop Testing complete; Consents complete   Proceeding: Proceed without further delay  Tobacco Use:  NO Active use of Tobacco/UNKNOWN Tobacco use status     Plan: "   Anes. Type:  General   Pre-Induction: Acetaminophen PO   Induction:  IV (Standard)   Airway: LMA   Access/Monitoring: PIV   Maintenance: Balanced   Emergence: Procedure Site   Logistics: Same Day Surgery     Postop Pain/Sedation Strategy:  Standard-Options: Opioids PRN     PONV Management:  Adult Risk Factors: Female, Non-Smoker, Postop Opioids  Prevention: Ondansetron; Dexamethasone; Propofol Infusion     CONSENT: Direct conversation   Plan and risks discussed with: Patient   Blood Products: Consent Deferred (Minimal Blood Loss)                         Ramsey Mason MD

## 2018-11-20 NOTE — ANESTHESIA POSTPROCEDURE EVALUATION
Anesthesia POST Procedure Evaluation    Patient: Chrissy Dixon   MRN:     [id number] Gender:   female   Age:    33 year old :      1985        Preoperative Diagnosis: Symptomatic Effusion and Meniscus Tear   Procedure(s):  Examination Under Anesthesia Right Knee, Right Knee Arthroscopy, Partial Meniscectomy, Chondroplasty, Cyst Decompression, Synovial Biopsy   Postop Comments: No value filed.       Anesthesia Type:  General    Reportable Event: NO     PAIN: Uncomplicated   Sign Out status: Comfortable, Well controlled pain     PONV: No PONV   Sign Out status:  No Nausea or Vomiting     Neuro/Psych: Uneventful perioperative course   Sign Out Status: Preoperative baseline; Age appropriate mentation     Airway/Resp.: Uneventful perioperative course   Sign Out Status: Non labored breathing, age appropriate RR; Resp. Status within EXPECTED Parameters     CV: Uneventful perioperative course   Sign Out status: Appropriate BP and perfusion indices; Appropriate HR/Rhythm     Disposition:   Sign Out in:  PACU  Disposition:  Phase II; Home  Recovery Course: Uneventful  Follow-Up: Not required           Last Anesthesia Record Vitals:  CRNA VITALS  2018 0850 - 2018 0950      2018             Pulse: 97    SpO2: 100 %    Resp Rate (set): 10          Last PACU/Preop Vitals:  Vitals:    18 1025 18 1040 18 1055   BP: 112/68 112/82 105/72   Resp: 16 15 16   Temp: 36.9  C (98.5  F) 36.9  C (98.5  F)    SpO2: 100% 98% 98%         Electronically Signed By: Ramsey Mason MD, 2018, 11:04 AM

## 2018-11-20 NOTE — IP AVS SNAPSHOT
MRN:[id number]                      After Visit Summary   11/20/2018    Chrissy Dixon    MRN: [id number]           Thank you!     Thank you for choosing [facility] for your care. Our goal is always to provide you with excellent care. Hearing back from our patients is one way we can continue to improve our services. Please take a few minutes to complete the written survey that you may receive in the mail after you visit with us. Thank you!        Patient Information     Date Of Birth          1985        About your hospital stay     You were admitted on:  November 20, 2018 [facility]    You were discharged on:  November 20, 2018       Who to Call     For medical emergencies, please call 911.  For non-urgent questions about your medical care, please call your primary care provider or clinic, [phone]  For questions related to your surgery, please call your surgery clinic        Attending Provider     Provider Sean Irving MD Orthopedics       Primary Care Provider Office Phone # Fax #    Nando Haskins [phone]      After Care Instructions      Diet as Tolerated       Return to diet before surgery, unless instructed otherwise.            Discharge Instructions       ARTHROSCOPY, DEBRIDEMENT, MENISECTOMY, OR CHONDROPLASTY POST OPERATIVE INSTRUCTIONS     WBAT WITHOUT BRACE PROTOCOL      FOLLOW UP APPOINTMENT  A follow-up appointment with Dr. Pate should be scheduled approximately one to two weeks after surgery. If your appointment was not scheduled prior to surgery, please call [phone].    Your follow up appointment will be at the location that you regularly see Dr. Pate:    [facility]  [address] [place] [id number]  [phone]    [facility]  [address]  [place], [place] [address] [phone]    Physical therapy:   Physical therapy should begin one week after surgery. An order for physical therapy will be provided by Dr. Pate's office but it will be your responsibility to schedule the first appointment at the location of your choice.     ACTIVITY  Weight bearing status:   You will be allowed to weight bear as tolerated on your operative leg using assistive devices (crutches) as needed.     Exercises:   Perform the following exercises at least three times per day for the first four weeks after surgery to prevent complications, such as blood clots in your legs:  1) Point and flex your feet  2) Move your ankle around in big circles  3) Wiggle your toes   Also, perform thigh muscle tightening exercises for 10 to 15 minutes at least three times per day for the first four weeks after surgery.    Athletic Activities:  Activities such as swimming, bicycling, jogging, running, and stop-and-go sports should be avoided until permitted by your provider.    Driving:  You may return to driving once the following criteria have been met: 1) all narcotic pain medication has been discontinued, 2) you have full control over both lower extremities. Please contact Dr. Pate's office with any questions.    Return to Work:  Return to work as soon as possible.  Your ability to work depends on a number of factors - your level of discomfort and how much demand your job puts on your knees.  If you have any questions, please call Dr. Pate's office.      COMFORT AND PAIN MANAGEMENT  Elevation:   During times of inactivity throughout the first two weeks after surgery, make an effort to decrease swelling by elevating your operative extremity. This is most effectively done by lying down and placing several pillows lengthwise under your thigh and calf to raise your toes above the level of your nose. To ensure that your knee remains in full extension, do not place pillows  directly under your knee.     Icing:  An ice pack will be provided to control swelling and discomfort after surgery. Place a thin towel on your skin and apply the ice pack overtop. You may apply ice for 20 minutes as often as two times per hour.    Pain Medications:  You will be discharged with acetaminophen (Tylenol) and a narcotic medication for pain management after surgery. Acetaminophen can help to effectively manage pain when used as prescribed, however, do not exceed the maximum daily dose of 3000 mg. The narcotic pain medication should be reserved for severe, breakthrough pain. Take the narcotic medication as prescribed and use only as often as necessary.       ANTICOAGULATION  Depending on your risk factors, your provider may prescribe aspirin to prevent blood clots. If prescribed, take aspirin daily for the first four weeks after surgery.      WOUND CARE AND SHOWERING  Wound care:  Keep the initial post-op dressing on, clean, and dry for the first three days after surgery. On the fourth day after surgery, you may remove the dressing. Your surgical incisions were closed with steristrips (small white tape that is directly on the incision areas) that should be left on until they fall off or are removed at your first office visit. All sutures will also be removed at your first office visit. Under no circumstance should you pick or scratch your incision.    Showering:  You may shower on the fourth day after surgery (immediatly after the dressing is removed) provided your incision is intact and dry without drainage. If there is fluid at the incision site, cover the incision with a non-permeable plastic bag. You may allow water to run over the incision, but do not soak or submerge the incision. Do not scrub the incision.     Tub Bathing:  Tub bathing, swimming, or any other activities that cause your incision to be submerged should be avoided until allowed by your provider. Typically, patients are allowed to  return to these activities four weeks after surgery.      CONTACTING YOUR PHYSICIAN:  You may experience symptoms that require follow-up before your scheduled appointment. Please contact Dr. Pate's office if you experience:  1) Pain in your knee that persists or worsens in the first few days after surgery  2) Excessive redness or drainage of cloudy or bloody material from the wounds (clear red tinted fluid and some mild drainage should be expected) or drainage of any kind five days after surgery  3) A temperature elevation greater than 101.5 F   4) Pain, swelling or redness in your calf  5) Numbness or weakness in your leg or foot      Regular business hours (Monday - Friday, 8am - 5pm):  [facility]: [phone]  [facility]: [phone]    After hours and weekends:  [facility] on call Orthopedic resident: [phone]            No Alcohol       For 24 hours post procedure            No driving or operating machinery        until the day after procedure            Remove dressing - at 72 hours            Weight bearing - As tolerated                 Your next 10 appointments already scheduled     Nov 26, 2018 10:00 AM CST   (Arrive by 9:45 AM)   RETURN KNEE with Sean Pate MD   [facility] ([facility])    [address] [place] [id number] [phone]              Further instructions from your care team       [facility]  Home Care Following Anesthesia  For 24 hours after surgery:  1. Get plenty of rest.  A responsible adult must stay with you for at least 24 hours after you leave the surgery center.  2. Do not drive or use heavy equipment.  If you have weakness or tingling, don't drive or use heavy equipment until this feeling goes away.   3. Do not drink alcohol.   4. Avoid strenuous  or risky activities.  Ask for help when climbing stairs.  5. You may feel lightheaded.  IF so, sit for a few minutes before standing.  Have someone help you get up.   6. If you have nausea (feel sick to your stomach): Drink only clear liquids such as apple juice, ginger ale, broth or 7-Up.  Rest may also help.  Be sure to drink enough fluids.  Move to a regular diet as you feel able.   7. You may have a slight fever.  Call the doctor if your fever is over 100 F (37.7 C) (taken under the tongue) or lasts longer than 24 hours.  8. You may have a dry mouth, a sore throat, muscle aches or trouble sleeping. These should go away after 24 hours.  9. Do not make important or legal decisions.               Tips for taking pain medications  To get the best pain relief possible, remember these points:    Take pain medications as directed, before pain becomes severe.    Pain medication can upset your stomach: taking it with food may help.    Constipation is a common side effect of pain medication. Drink plenty of  fluids.    Eat foods high in fiber. Take a stool softener if recommended by your doctor or pharmacist.    Do not drink alcohol, drive or operate machinery while taking pain medications.    Ask about other ways to control pain, such as with heat, ice or relaxation.    Tylenol/Acetaminophen Consumption  To help encourage the safe use of acetaminophen, the makers of TYLENOL  have lowered the maximum daily dose for single-ingredient Extra Strength TYLENOL  (acetaminophen) products sold in the [place] from 8 pills per day (4,000 mg) to 6 pills per day (3,000 mg). The dosing interval has also changed from 2 pills every 4-6 hours to 2 pills every 6 hours.    If you feel your pain relief is insufficient, you may take Tylenol/Acetaminophen in addition to your narcotic pain medication.     Be careful not to exceed 3,000 mg of Tylenol/Acetaminophen in a 24 hour period from all sources.    If you are taking extra strength  "Tylenol/acetaminophen (500 mg), the maximum dose is 6 tablets in 24 hours.    If you are taking regular strength acetaminophen (325 mg), the maximum dose is 9 tablets in 24 hours.    Call a doctor for any of the followin. Signs of infection (fever, growing tenderness at the surgery site, a large amount of drainage or bleeding, severe pain, foul-smelling drainage, redness, swelling).  2. It has been over 8 to 10 hours since surgery and you are still not able to urinate (pass water).  3. Headache for over 24 hours.  4. Numbness, tingling or weakness the day after surgery (if you had spinal anesthesia).  Your doctor is:       Dr. Sean Pate, Orthopaedics: [phone]               Or dial [phone] and ask for the resident on call for:  Orthopaedics  For emergency care, call the:  [facility] Emergency Department: [phone] (TTY for hearing impaired: [phone])                Pending Results     No orders found from 2018 to 2018.            Admission Information     Date & Time Provider Department Dept. Phone    2018 Sean Pate MD [facility] [phone]      Your Vitals Were     Blood Pressure Temperature Respirations Height Weight Last Period    109/74 98.5  F (36.9  C) (Oral) 16 1.626 m (5' 4\") 50.9 kg (112 lb 4.8 oz) 2018 (Approximate)    Pulse Oximetry BMI (Body Mass Index)                100% 19.28 kg/m2          [employer] Information     [employer]o gives you secure access to your electronic health record. If you see a primary care provider, you can also send messages to your care team and make appointments. If you have questions, please call your primary care clinic.  If you do not have a primary care provider, please call [phone] and they will assist you.      [employer] is an electronic gateway that provides easy, online access to your medical records. With [employer], you can request a clinic appointment, read your test results, " renew a prescription or communicate with your care team.     To access your existing account, please contact your [facility] or call [phone] for assistance.        Care EveryWhere ID     This is your Care EveryWhere ID. This could be used by other organizations to access your [facility] medical records  [id number]        Equal Access to Services     SHERINE MARTINEZ : Hadii vick salguero hadasho Soomaali, waaxda luqadaha, qaybta kaalmada yvontonnyda, stanley carterestradaruy puente. So [facility] [phone].    ATENCIÓN: Si habla español, tiene a hameed disposición servicios gratuitos de asistencia lingüística. Llame al [phone].    We comply with applicable federal civil rights laws and [place] laws. We do not discriminate on the basis of race, color, national origin, age, disability, sex, sexual orientation, or gender identity.               Review of your medicines      START taking        Dose / Directions    oxyCODONE IR 5 MG tablet   Commonly known as:  ROXICODONE   Used for:  Complex tear of medial meniscus of right knee as current injury, subsequent encounter        Dose:  5 mg   Take 1 tablet (5 mg) by mouth every 6 hours as needed for severe pain   Quantity:  30 tablet   Refills:  0       senna-docusate 8.6-50 MG per tablet   Commonly known as:  SENOKOT-S;PERICOLACE   Used for:  Complex tear of medial meniscus of right knee as current injury, subsequent encounter        Dose:  1-2 tablet   Take 1-2 tablets by mouth 2 times daily   Quantity:  16 tablet   Refills:  0         CONTINUE these medicines which may have CHANGED, or have new prescriptions. If we are uncertain of the size of tablets/capsules you have at home, strength may be listed as something that might have changed.        Dose / Directions    * ondansetron 4 MG ODT tab   Commonly known as:  ZOFRAN-ODT   This may have changed:  Another medication with the same name was added. Make sure you understand how and when to  take each.   Used for:  Nausea        Dose:  4 mg   Take 1 tablet (4 mg) by mouth every 8 hours as needed for nausea   Quantity:  60 tablet   Refills:  2       * ondansetron 4 MG ODT tab   Commonly known as:  ZOFRAN-ODT   This may have changed:  You were already taking a medication with the same name, and this prescription was added. Make sure you understand how and when to take each.   Used for:  Complex tear of medial meniscus of right knee as current injury, subsequent encounter        Dose:  4-8 mg   Take 1-2 tablets (4-8 mg) by mouth every 8 hours as needed for nausea   Quantity:  4 tablet   Refills:  0       valACYclovir 500 MG tablet   Commonly known as:  VALTREX   This may have changed:    - when to take this  - reasons to take this   Used for:  Psoriatic arthritis (H)        Dose:  500 mg   Take 1 tablet (500 mg) by mouth daily   Quantity:  90 tablet   Refills:  1       * Notice:  This list has 2 medication(s) that are the same as other medications prescribed for you. Read the directions carefully, and ask your doctor or other care provider to review them with you.      CONTINUE these medicines which have NOT CHANGED        Dose / Directions    apremilast 30 MG tablet   Commonly known as:  OTEZLA   Used for:  Psoriatic arthritis (H)        Dose:  30 mg   Take 1 tablet (30 mg) by mouth 2 times daily   Quantity:  60 tablet   Refills:  0       blood glucose monitoring meter device kit   Commonly known as:  no brand specified   Used for:  Diabetes mellitus, type 2 (H)        Use to test blood sugar 6 to 8 times daily or as directed. Okay to dispense One Touch Verio products per patient's insurance.   Quantity:  1 kit   Refills:  0       * blood glucose monitoring test strip   Commonly known as:  no brand specified   Used for:  Diabetes mellitus, type 2 (H)        Use to test blood sugars 6 to 8 times daily or as directed. Okay to dispense One Touch Verio per patient's insurance.   Quantity:  700 strip    Refills:  1       * blood glucose monitoring test strip   Commonly known as:  ACCU-CHEK SMARTVIEW   Used for:  Diabetes mellitus, type 2 (H)        Use to test blood sugar 6-8 times daily or as directed.   Quantity:  700 each   Refills:  1       cholecalciferol 1000 UNIT tablet   Commonly known as:  vitamin D3   Used for:  Vitamin D deficiency        Dose:  1000 Units   Take 1 tablet (1,000 Units) by mouth daily   Quantity:  90 tablet   Refills:  1       clobetasol 0.05 % external solution   Commonly known as:  TEMOVATE   Used for:  Psoriasis        Apply topically 2 times daily   Quantity:  50 mL   Refills:  6       DERMA-SMOOTHE/FS SCALP 0.01 % Oil oil   Used for:  Other psoriasis   Generic drug:  Fluocinolone Acetonide Scalp        Apply to scalp at bedtime, then wear showercap, rinse off in morning.   Quantity:  118 mL   Refills:  3       ferrous gluconate 324 (38 Fe) MG tablet   Commonly known as:  FERGON   Used for:  Iron deficiency anemia due to chronic blood loss, Iron deficiency anemia, unspecified iron deficiency anemia type        Dose:  324 mg   Take 1 tablet (324 mg) by mouth every other day   Quantity:  100 tablet   Refills:  11       fluticasone 50 MCG/ACT spray   Commonly known as:  FLONASE   Used for:  Rhinitis medicamentosa        Dose:  2 spray   Spray 2 sprays into both nostrils daily   Quantity:  48 g   Refills:  3       ibuprofen 600 MG tablet   Commonly known as:  ADVIL/MOTRIN   Used for:  Psoriasis arthropathica (H)        Dose:  600 mg   Take 1 tablet (600 mg) by mouth every 8 hours as needed for moderate pain   Quantity:  270 tablet   Refills:  0       insulin detemir 100 UNIT/ML injection   Commonly known as:  LEVEMIR FLEXTOUCH   Used for:  Type 2 diabetes mellitus without complication, with long-term current use of insulin (H)        Dose:  18 Units   Inject 18 Units Subcutaneous 2 times daily   Quantity:  60 mL   Refills:  3       insulin lispro 100 UNIT/ML injection   Commonly known  as:  HumaLOG KWIKpen   Used for:  Diabetes mellitus, type 2 (H)        USE THREE TIMES DAILY WITH MEALS, CURRENTLY 30 UNITS DAILY   Quantity:  30 mL   Refills:  5       insulin pen needle 31G X 8 MM miscellaneous   Commonly known as:  31G X 8 MM   Used for:  Diabetes mellitus, type 2 (H)        Use  3-4 pen needles daily or as directed.   Quantity:  300 each   Refills:  4       metFORMIN 500 MG tablet   Commonly known as:  GLUCOPHAGE   Used for:  Type 2 diabetes mellitus without complication, with long-term current use of insulin (H)        Dose:  1000 mg   Take 2 tablets (1,000 mg) by mouth 2 times daily (with meals) Labs due for further refills.   Quantity:  360 tablet   Refills:  3       ONETOUCH LANCETS Misc   Used for:  Diabetes mellitus, type 2 (H)        Use to test blood sugar 6 to 8 times daily or as directed. Okay to dispense One Touch Verio products per patient's insurance.   Quantity:  700 each   Refills:  1       oxyCODONE-acetaminophen 5-325 MG per tablet   Commonly known as:  PERCOCET   Used for:  Psoriatic arthritis (H), Recurrent cold sores        Dose:  1 tablet   Take 1 tablet by mouth every 4 hours as needed (must last 30 days from dispense date)   Quantity:  30 tablet   Refills:  0       * predniSONE 10 MG tablet   Commonly known as:  DELTASONE   Used for:  Psoriatic arthropathy (H)        Dose:  10 mg   Take 1 tablet (10 mg) by mouth daily As needed for flares only   Quantity:  30 tablet   Refills:  1       * predniSONE 5 MG tablet   Commonly known as:  DELTASONE   Used for:  Psoriatic arthritis (H)        3 tabs daily for 1 wk, then 2 tabs daily for 1 week.   Quantity:  50 tablet   Refills:  2       * predniSONE 5 MG tablet   Commonly known as:  DELTASONE   Used for:  Psoriatic arthritis (H)        Take 3 tablets (15mg) by mouth for 4 days, then 2 tablets (10mg) by mouth for 4 days.   Quantity:  20 tablet   Refills:  0       * predniSONE 5 MG tablet   Commonly known as:  DELTASONE   Used for:   Rheumatoid arthritis involving both hands with positive rheumatoid factor (H)        20 mg per day x 2 days, 15 mg per day x 2 days, 10 mg per day x 2 days then stop. -   Quantity:  18 tablet   Refills:  0       traMADol 50 MG tablet   Commonly known as:  ULTRAM   Used for:  Psoriatic arthritis (H)        Dose:  50 mg   Take 1 tablet (50 mg) by mouth every 8 hours as needed for moderate pain . Must last 30 days   Quantity:  60 tablet   Refills:  0       triamcinolone 0.1 % cream   Commonly known as:  KENALOG   Used for:  Guttate psoriasis        Apply topically 2 times daily   Quantity:  453.6 g   Refills:  1       ustekinumab 45 MG/0.5ML Sosy   Commonly known as:  STELARA   Used for:  Psoriatic arthritis (H)        Dose:  45 mg   Inject 0.5 mLs (45 mg) Subcutaneous every 3 months   Quantity:  3 Syringe   Refills:  1       * Notice:  This list has 6 medication(s) that are the same as other medications prescribed for you. Read the directions carefully, and ask your doctor or other care provider to review them with you.         Where to get your medicines      These medications were sent to [address] [address]    Hours:  TRANSPLANT PHONE NUMBER [phone] Phone:  [phone]     ondansetron 4 MG ODT tab    senna-docusate 8.6-50 MG per tablet         Some of these will need a paper prescription and others can be bought over the counter. Ask your nurse if you have questions.     Bring a paper prescription for each of these medications     oxyCODONE IR 5 MG tablet                Protect others around you: Learn how to safely use, store and throw away your medicines at www.disposemymeds.org.        Information about OPIOIDS     PRESCRIPTION OPIOIDS: WHAT YOU NEED TO KNOW   We gave you an opioid (narcotic) pain medicine. It is important to manage your pain, but opioids are not always the best choice. You  should first try all the other options your care team gave you. Take this medicine for as short a time (and as few doses) as possible.    Some activities can increase your pain, such as bandage changes or therapy sessions. It may help to take your pain medicine 30 to 60 minutes before these activities. Reduce your stress by getting enough sleep, working on hobbies you enjoy and practicing relaxation or meditation. Talk to your care team about ways to manage your pain beyond prescription opioids.    These medicines have risks:    DO NOT drive when on new or higher doses of pain medicine. These medicines can affect your alertness and reaction times, and you could be arrested for driving under the influence (DUI). If you need to use opioids long-term, talk to your care team about driving.    DO NOT operate heavy machinery    DO NOT do any other dangerous activities while taking these medicines.    DO NOT drink any alcohol while taking these medicines.     If the opioid prescribed includes acetaminophen, DO NOT take with any other medicines that contain acetaminophen. Read all labels carefully. Look for the word  acetaminophen  or  Tylenol.  Ask your pharmacist if you have questions or are unsure.    You can get addicted to pain medicines, especially if you have a history of addiction (chemical, alcohol or substance dependence). Talk to your care team about ways to reduce this risk.    All opioids tend to cause constipation. Drink plenty of water and eat foods that have a lot of fiber, such as fruits, vegetables, prune juice, apple juice and high-fiber cereal. Take a laxative (Miralax, milk of magnesia, Colace, Senna) if you don t move your bowels at least every other day. Other side effects include upset stomach, sleepiness, dizziness, throwing up, tolerance (needing more of the medicine to have the same effect), physical dependence and slowed breathing.    Store your pills in a secure place, locked if possible. We  will not replace any lost or stolen medicine. If you don t finish your medicine, please throw away (dispose) as directed by your pharmacist. The [place] [employer] has more information about safe disposal: https://www.pca.[facility].[place].us/living-green/managing-unwanted-medications             Medication List: This is a list of all your medications and when to take them. Check marks below indicate your daily home schedule. Keep this list as a reference.      Medications           Morning Afternoon Evening Bedtime As Needed    apremilast 30 MG tablet   Commonly known as:  OTEZLA   Take 1 tablet (30 mg) by mouth 2 times daily                                blood glucose monitoring meter device kit   Commonly known as:  no brand specified   Use to test blood sugar 6 to 8 times daily or as directed. Okay to dispense One Touch Verio products per patient's insurance.                                * blood glucose monitoring test strip   Commonly known as:  no brand specified   Use to test blood sugars 6 to 8 times daily or as directed. Okay to dispense One Touch Verio per patient's insurance.                                * blood glucose monitoring test strip   Commonly known as:  ACCU-CHEK SMARTVIEW   Use to test blood sugar 6-8 times daily or as directed.                                cholecalciferol 1000 UNIT tablet   Commonly known as:  vitamin D3   Take 1 tablet (1,000 Units) by mouth daily                                clobetasol 0.05 % external solution   Commonly known as:  TEMOVATE   Apply topically 2 times daily                                DERMA-SMOOTHE/FS SCALP 0.01 % Oil oil   Apply to scalp at bedtime, then wear showercap, rinse off in morning.   Generic drug:  Fluocinolone Acetonide Scalp                                ferrous gluconate 324 (38 Fe) MG tablet   Commonly known as:  FERGON   Take 1 tablet (324 mg) by mouth every other day                                fluticasone 50 MCG/ACT  spray   Commonly known as:  FLONASE   Spray 2 sprays into both nostrils daily                                ibuprofen 600 MG tablet   Commonly known as:  ADVIL/MOTRIN   Take 1 tablet (600 mg) by mouth every 8 hours as needed for moderate pain                                insulin detemir 100 UNIT/ML injection   Commonly known as:  LEVEMIR FLEXTOUCH   Inject 18 Units Subcutaneous 2 times daily                                insulin lispro 100 UNIT/ML injection   Commonly known as:  HumaLOG KWIKpen   USE THREE TIMES DAILY WITH MEALS, CURRENTLY 30 UNITS DAILY                                insulin pen needle 31G X 8 MM miscellaneous   Commonly known as:  31G X 8 MM   Use  3-4 pen needles daily or as directed.                                metFORMIN 500 MG tablet   Commonly known as:  GLUCOPHAGE   Take 2 tablets (1,000 mg) by mouth 2 times daily (with meals) Labs due for further refills.                                * ondansetron 4 MG ODT tab   Commonly known as:  ZOFRAN-ODT   Take 1 tablet (4 mg) by mouth every 8 hours as needed for nausea                                * ondansetron 4 MG ODT tab   Commonly known as:  ZOFRAN-ODT   Take 1-2 tablets (4-8 mg) by mouth every 8 hours as needed for nausea                                ONETOUCH LANCETS Misc   Use to test blood sugar 6 to 8 times daily or as directed. Okay to dispense One Touch Verio products per patient's insurance.                                oxyCODONE IR 5 MG tablet   Commonly known as:  ROXICODONE   Take 1 tablet (5 mg) by mouth every 6 hours as needed for severe pain                                oxyCODONE-acetaminophen 5-325 MG per tablet   Commonly known as:  PERCOCET   Take 1 tablet by mouth every 4 hours as needed (must last 30 days from dispense date)                                * predniSONE 10 MG tablet   Commonly known as:  DELTASONE   Take 1 tablet (10 mg) by mouth daily As needed for flares only                                *  predniSONE 5 MG tablet   Commonly known as:  DELTASONE   3 tabs daily for 1 wk, then 2 tabs daily for 1 week.                                * predniSONE 5 MG tablet   Commonly known as:  DELTASONE   Take 3 tablets (15mg) by mouth for 4 days, then 2 tablets (10mg) by mouth for 4 days.                                * predniSONE 5 MG tablet   Commonly known as:  DELTASONE   20 mg per day x 2 days, 15 mg per day x 2 days, 10 mg per day x 2 days then stop. -                                senna-docusate 8.6-50 MG per tablet   Commonly known as:  SENOKOT-S;PERICOLACE   Take 1-2 tablets by mouth 2 times daily                                traMADol 50 MG tablet   Commonly known as:  ULTRAM   Take 1 tablet (50 mg) by mouth every 8 hours as needed for moderate pain . Must last 30 days                                triamcinolone 0.1 % cream   Commonly known as:  KENALOG   Apply topically 2 times daily                                ustekinumab 45 MG/0.5ML Sosy   Commonly known as:  STELARA   Inject 0.5 mLs (45 mg) Subcutaneous every 3 months                                valACYclovir 500 MG tablet   Commonly known as:  VALTREX   Take 1 tablet (500 mg) by mouth daily                                * Notice:  This list has 8 medication(s) that are the same as other medications prescribed for you. Read the directions carefully, and ask your doctor or other care provider to review them with you.

## 2018-11-20 NOTE — OP NOTE
PREOPERATIVE DIAGNOSIS:   1. Medial meniscus tear right knee  2. Chronic, symptomatic synovitis right knee    POSTOPERATIVE DIAGNOSIS:  1. Medial meniscus tear right knee  2. Extensive synovitis right knee    PROCEDURE:  1. Examination under anesthesia right knee  2. Right knee arthroscopy  3. Extensive synovectomy right knee  4. Synovial biopsy right knee  5. Partial medial meniscectomy    DATE OF SURGERY: 11/20/2018    SURGEON: Sean Pate MD    ASSISTANT: None.     RESIDENT OR FELLOW: Madhu Charles MD    OPERATIVE INDICATIONS: Chrissy Dixon is a pleasant 33 year old female who I saw through my orthopedic clinic with a history, physical, imaging consistent with right knee symptomatic effusion, a large palpable popliteal cyst.  She had evidence of a medial meniscus tear on her MRI.  I offered her an arthroscopy, meniscectomy, chondroplasty and cyst decompression she understood that we would not be performing an open excision of her cyst.  I reviewed with the patient the risks, benefits, complications, techniques and alternatives to surgery.  We reviewed the expected course of recovery and the potential expected outcomes.  The patient understood both the risks and benefits and desired to proceed despite the risks.    OPERATIVE DETAILS: In the preoperative area the patient's informed consent was reviewed and they desired to proceed.  The right leg was marked and the patient was in agreement.  The patient was taken to the operating room where a timeout was performed and all parties were in agreement.  Preoperative antibiotics were given within 1 hour of the time of incision.  The patient was placed in the supine position and surrendered to LMA anesthesia.  No tourniquet was applied.  Egg crate was placed beneath the well leg and a side post was utilized.  The operative leg was prepped and draped in the usual sterile fashion.     Examination Under Anesthesia: Stable to varus valgus stress testing, stable  anterior and posterior drawer testing, no pivot shift, 2 quadrant lateral translation patella, 1 quadrant medial.    Anterior medial and anterolateral arthroscopic portals were created diagnostic arthroscopy was performed with the following findings: Extensive synovitis was noted throughout the suprapatellar pouch, medial gutter, lateral gutter.  ACL, PCL were intact, medial compartment showed a small area of grade 2 chondrosis though overall was intact medial tibial plateau was really normal.  Undersurface tear of the medial meniscus was present lateral meniscus was intact lateral femoral condyle, lateral tibial plateau were normal.  Patellofemoral compartment is well-maintained.    At this time a chondroplasty of the medial femoral condyle was performed until a balanced stable rim of cartilage remained.  A partial medial meniscectomy was then completed of the undersurface the medial meniscus until stable rim of meniscus remained.  An extensive synovectomy of the suprapatellar pouch, medial gutter, lateral gutter was completed until the stable rim of synovium was present.  Just prior to this we did take a biopsy of the synovial lining and sent this for final pathological processing    Copious irrigation was performed an a layered closure was initiated, sterile dressings were applied and the patient was transferred to the recovery room in stable condition with stable vital signs.    ESTIMATED BLOOD LOSS: 5 mL.    TOURNIQUET TIME: No tourniquet was placed.    COMPLICATIONS: None apparent.    DRAINS: None.    SPECIMENS: None.     POSTOPERATIVE PLAN:  1. Weightbearing as tolerated  2. Range of motion as tolerated  3. No running jumping pounding sports for 6 weeks  4. Follow-up my clinic in 1 week  5. We will follow the pathology reports.  If pathology reports chronic synovitis consistent with a rheumatologic process we may get rheumatological labs and have the patient seen by rheumatology.

## 2018-11-23 LAB — COPATH REPORT: NORMAL

## 2018-11-23 NOTE — OR NURSING
Post op call patient stated she was concerned about calf tightness and cramps that she gets on occasion. Encouraged her to continue with ankle exercises and apply heat to relax muscles. Advised patient to monitor calf for any redness, pain, warmth, or fever and if those are present to go to emergency room for a potential DVT, but also told patient that the calf tightness is most likely muscle related and to continue current measures for pain relief such as the tylenol and oxycodone. Patient has follow up appointment on Monday and a PT appointment also. Patient in agreement with current pain plan and monitoring leg for changes.

## 2018-11-26 ENCOUNTER — OFFICE VISIT (OUTPATIENT)
Dept: ORTHOPEDICS | Facility: CLINIC | Age: 33
End: 2018-11-26
Payer: COMMERCIAL

## 2018-11-26 ENCOUNTER — TELEPHONE (OUTPATIENT)
Dept: INTERNAL MEDICINE | Facility: CLINIC | Age: 33
End: 2018-11-26

## 2018-11-26 ENCOUNTER — THERAPY VISIT (OUTPATIENT)
Dept: PHYSICAL THERAPY | Facility: CLINIC | Age: 33
End: 2018-11-26
Attending: ORTHOPAEDIC SURGERY
Payer: COMMERCIAL

## 2018-11-26 DIAGNOSIS — Z98.890 S/P RIGHT KNEE ARTHROSCOPY: ICD-10-CM

## 2018-11-26 DIAGNOSIS — B00.1 RECURRENT COLD SORES: ICD-10-CM

## 2018-11-26 DIAGNOSIS — M25.561 RIGHT KNEE PAIN: Primary | ICD-10-CM

## 2018-11-26 DIAGNOSIS — L40.50 PSORIATIC ARTHRITIS (H): ICD-10-CM

## 2018-11-26 DIAGNOSIS — Z47.89 AFTERCARE FOLLOWING SURGERY OF THE MUSCULOSKELETAL SYSTEM: ICD-10-CM

## 2018-11-26 DIAGNOSIS — S83.231D COMPLEX TEAR OF MEDIAL MENISCUS OF RIGHT KNEE AS CURRENT INJURY, SUBSEQUENT ENCOUNTER: ICD-10-CM

## 2018-11-26 DIAGNOSIS — Z79.4 CONTROLLED TYPE 2 DIABETES MELLITUS WITHOUT COMPLICATION, WITH LONG-TERM CURRENT USE OF INSULIN (H): Primary | ICD-10-CM

## 2018-11-26 DIAGNOSIS — E11.9 CONTROLLED TYPE 2 DIABETES MELLITUS WITHOUT COMPLICATION, WITH LONG-TERM CURRENT USE OF INSULIN (H): Primary | ICD-10-CM

## 2018-11-26 PROCEDURE — 97161 PT EVAL LOW COMPLEX 20 MIN: CPT | Mod: GP | Performed by: PHYSICAL THERAPIST

## 2018-11-26 PROCEDURE — 97110 THERAPEUTIC EXERCISES: CPT | Mod: GP | Performed by: PHYSICAL THERAPIST

## 2018-11-26 RX ORDER — OXYCODONE HYDROCHLORIDE 5 MG/1
5 TABLET ORAL EVERY 6 HOURS PRN
Qty: 30 TABLET | Refills: 0 | Status: SHIPPED | OUTPATIENT
Start: 2018-11-26 | End: 2019-04-24

## 2018-11-26 ASSESSMENT — ACTIVITIES OF DAILY LIVING (ADL)
KNEE_ACTIVITY_OF_DAILY_LIVING_SCORE: 30
SIT WITH YOUR KNEE BENT: ACTIVITY IS FAIRLY DIFFICULT
GIVING WAY, BUCKLING OR SHIFTING OF KNEE: THE SYMPTOM AFFECTS MY ACTIVITY SEVERELY
GO DOWN STAIRS: ACTIVITY IS VERY DIFFICULT
SWELLING: THE SYMPTOM AFFECTS MY ACTIVITY SEVERELY
SQUAT: ACTIVITY IS FAIRLY DIFFICULT
WEAKNESS: THE SYMPTOM AFFECTS MY ACTIVITY MODERATELY
GO UP STAIRS: ACTIVITY IS VERY DIFFICULT
HOW_WOULD_YOU_RATE_THE_CURRENT_FUNCTION_OF_YOUR_KNEE_DURING_YOUR_USUAL_DAILY_ACTIVITIES_ON_A_SCALE_FROM_0_TO_100_WITH_100_BEING_YOUR_LEVEL_OF_KNEE_FUNCTION_PRIOR_TO_YOUR_INJURY_AND_0_BEING_THE_INABILITY_TO_PERFORM_ANY_OF_YOUR_USUAL_DAILY_ACTIVITIES?: 30
PAIN: THE SYMPTOM AFFECTS MY ACTIVITY SEVERELY
AS_A_RESULT_OF_YOUR_KNEE_INJURY,_HOW_WOULD_YOU_RATE_YOUR_CURRENT_LEVEL_OF_DAILY_ACTIVITY?: ABNORMAL
STAND: ACTIVITY IS FAIRLY DIFFICULT
KNEEL ON THE FRONT OF YOUR KNEE: I AM UNABLE TO DO THE ACTIVITY
LIMPING: THE SYMPTOM AFFECTS MY ACTIVITY MODERATELY
WALK: ACTIVITY IS FAIRLY DIFFICULT
HOW_WOULD_YOU_RATE_THE_OVERALL_FUNCTION_OF_YOUR_KNEE_DURING_YOUR_USUAL_DAILY_ACTIVITIES?: ABNORMAL
STIFFNESS: THE SYMPTOM AFFECTS MY ACTIVITY SEVERELY
RAW_SCORE: 21
RISE FROM A CHAIR: ACTIVITY IS SOMEWHAT DIFFICULT
KNEE_ACTIVITY_OF_DAILY_LIVING_SUM: 21

## 2018-11-26 NOTE — PROGRESS NOTES
Suture removal  Date/Time: 11/26/2018 10:17 AM  Performed by: CARLY WILKINSON  Authorized by: JULY SWEET   Body area: lower extremity  Location details: right knee  Wound Appearance: clean  Sutures Removed: 2  Post-removal: Steri-Strips applied  Patient tolerance: Patient tolerated the procedure well with no immediate complications

## 2018-11-26 NOTE — TELEPHONE ENCOUNTER
PRIOR AUTHORIZATION DENIED    Medication: Humalog 100U/ML Juan Pen-DENIED    Denial Date: 11/26/2018    Denial Rational: Criteria Not Met. Insurance states that patient must tried/failed Fiasp, Novolog.        Appeal Information: If provider would like to appeal please provide a letter of medical necessity stating why formulary alternatives would not be clinically appropriate for patient and route back to the PA team.

## 2018-11-26 NOTE — LETTER
11/26/2018       RE: Chrissy Dixon  [address] [place] [facility] [address]     Dear Colleague,    Thank you for referring your patient, Chrissy Dixon, to [facility]. Please see a copy of my visit note below.    Suture removal  Date/Time: 11/26/2018 10:17 AM  Performed by: CARLY WILKINSON  Authorized by: JULY SWEET   Body area: lower extremity  Location details: right knee  Wound Appearance: clean  Sutures Removed: 2  Post-removal: Steri-Strips applied  Patient tolerance: Patient tolerated the procedure well with no immediate complications            DIAGNOSIS:   1. Extensive synovitis right knee  2. Medial meniscus tear right knee    PROCEDURES:  1. Right knee arthroscopy, extensive synovectomy, synovial biopsy and partial medial meniscectomy right knee    Date of service 11/20/2018    HISTORY:  Pleasant 33-year-old woman returns for first postoperative visit today.  She is still taking narcotics.  She is asked for refill.  I encouraged her weaning.  I told her that the refill today will be her last refill.  She is starting therapy this afternoon.    EXAM:     General: Awake, Alert, and oriented. Articulates and communicates with a normal affect     Right lower Extremity:    Incisions well healed without evidence of infection    Normal post-operative effusion and ecchymosis    Range of motion and stability exam not performed    Neurovascularly intact    IMAGING:  None    PATHOLOGY: Biopsy of the synovium did show chronic synovitis    ASSESSMENT:  1. 1 week following arthroscopic medial meniscectomy, extensive synovectomy    PLAN:   Weightbearing as tolerated, range of motion as tolerated  Avoid running jumping pounding sports for the first 6 weeks  Follow-up my clinic in 6 weeks time  If she fails to see lasting improvement, given the amount of synovitis that she has throughout her knee, she may need to revisit this  issue with her rheumatologist as we may need to seek some type of medical solution for the chronic synovitis throughout a large portion of her knee    Follow-up in 6 weeks to continue the discussion.    Pain medicine refill provided for 30 oxycodone tablets.  She is been informed this will be her last refill.        Again, thank you for allowing me to participate in the care of your patient.      Sincerely,    Sean Pate MD

## 2018-11-26 NOTE — TELEPHONE ENCOUNTER
Central Prior Authorization Team   [phone]    PA Initiation    Medication: Humalog 100U/ML Kwik Pen  Insurance Company: [employer] - Phone [phone] Fax [phone]  Pharmacy Filling the Rx: [facility] [address] [place] [address] [address] AT [facility] [address]  Filling Pharmacy Phone: [phone]  Filling Pharmacy Fax:    Start Date: 11/26/2018

## 2018-11-26 NOTE — TELEPHONE ENCOUNTER
Prior Authorization Retail Medication Request    Medication/Dose: Humalog 100U/ML Kwik Pen  ICD code (if different than what is on RX):  E11.9  Previously Tried and Failed:  unknown  Rationale:  none    Insurance Name:  [employer]  Insurance ID:  [id number]

## 2018-11-26 NOTE — PROGRESS NOTES
DIAGNOSIS:   1. Extensive synovitis right knee  2. Medial meniscus tear right knee    PROCEDURES:  1. Right knee arthroscopy, extensive synovectomy, synovial biopsy and partial medial meniscectomy right knee    Date of service 11/20/2018    HISTORY:  Pleasant 33-year-old woman returns for first postoperative visit today.  She is still taking narcotics.  She is asked for refill.  I encouraged her weaning.  I told her that the refill today will be her last refill.  She is starting therapy this afternoon.    EXAM:     General: Awake, Alert, and oriented. Articulates and communicates with a normal affect     Right lower Extremity:    Incisions well healed without evidence of infection    Normal post-operative effusion and ecchymosis    Range of motion and stability exam not performed    Neurovascularly intact    IMAGING:  None    PATHOLOGY: Biopsy of the synovium did show chronic synovitis    ASSESSMENT:  1. 1 week following arthroscopic medial meniscectomy, extensive synovectomy    PLAN:   Weightbearing as tolerated, range of motion as tolerated  Avoid running jumping pounding sports for the first 6 weeks  Follow-up my clinic in 6 weeks time  If she fails to see lasting improvement, given the amount of synovitis that she has throughout her knee, she may need to revisit this issue with her rheumatologist as we may need to seek some type of medical solution for the chronic synovitis throughout a large portion of her knee    Follow-up in 6 weeks to continue the discussion.    Pain medicine refill provided for 30 oxycodone tablets.  She is been informed this will be her last refill.

## 2018-11-26 NOTE — PROGRESS NOTES
[facility] Initial Evaluation  Subjective:  Patient is a 33 year old female presenting with rehab right knee hpi. The history is provided by the patient. No  was used.   Chrissy Dixon is a 33 year old female with a right knee condition.    Condition occurred: for unknown reasons.  This is a new condition  S/p right knee scope partial menisectomy, chondroplasty, and cyst decompression on 11/20/2018.  Patient c/o impaired ambulation on levels and stairs.  Squatting, kneeling, and transfers are also impaired.  Patient has adult onset Type 1 diabetes and psoriatic arthritis.    Patient reports pain:  Lateral, posterior and anterior.    Pain is described as aching  and reported as 6/10.  Associated symptoms:  Loss of strength, edema and loss of motion/stiffness. Pain is the same all the time.  Symptoms are exacerbated by ascending stairs, descending stairs, bending/squatting, kneeling, transfers, walking and standing and relieved by analgesics.  Since onset symptoms are gradually improving.        General health as reported by patient is good.  Pertinent medical history includes:  Anemia, diabetes, migraines/headaches and rheumatoid arthritis.  Medical allergies: yes.  Other surgeries include:  None reported.  Current medications:  Other and pain medication.  Current occupation is [employer].  Patient is working in normal job without restrictions.  Primary job tasks include:  Prolonged sitting and prolonged standing.    Barriers include:  None as reported by the patient.    Red flags:  None as reported by the patient.                        Objective:  Standing Alignment:              Knee:  Genu valgus R and genu valgus L      Gait:    Gait Type:  Antalgic   Assistive Devices:  None  Deviations:  Knee:  Knee flexion decr R                                                      Knee Evaluation:  ROM:    AROM      Extension: Left:    Right:  <5  Flexion: Left:   Right:  95  PROM      Extension: Left:   Right:  <5  Flexion: Left:   Right:  100      Strength:     Extension:  Right: 4+/5    Pain:+  Flexion:  Right: 4+/5    Pain:+      Quad Set Right: Fair    Pain:        Edema:  Edema of the knee: 2/5.            General     ROS    Assessment/Plan:    Patient is a 33 year old female with right side knee complaints.    Patient has the following significant findings with corresponding treatment plan.                Diagnosis 1:  S/P Knee Scope  Pain -  hot/cold therapy, self management, education and home program  Decreased ROM/flexibility - therapeutic exercise, therapeutic activity and home program  Decreased strength - therapeutic exercise, therapeutic activities and home program  Edema - cold therapy and self management/home program  Impaired gait - gait training and home program  Impaired muscle performance - neuro re-education and home program  Decreased function - therapeutic activities and home program    Therapy Evaluation Codes:   1) History comprised of:   Personal factors that impact the plan of care:      None.    Comorbidity factors that impact the plan of care are:      Diabetes and Rheumatoid arthritis.     Medications impacting care: Pain and Other.  2) Examination of Body Systems comprised of:   Body structures and functions that impact the plan of care:      Knee.   Activity limitations that impact the plan of care are:      Squatting/kneeling, Stairs, Standing, Walking and Transfers.  3) Clinical presentation characteristics are:   Stable/Uncomplicated.  4) Decision-Making    Low complexity using standardized patient assessment instrument and/or measureable assessment of functional outcome.  Cumulative Therapy Evaluation is: Low complexity.    Previous and current functional limitations:  (See Goal Flow Sheet for this information)    Short term and Long term goals: (See Goal Flow Sheet for this information)     Communication ability:  Patient appears to be able to  clearly communicate and understand verbal and written communication and follow directions correctly.  Treatment Explanation - The following has been discussed with the patient:   RX ordered/plan of care  Anticipated outcomes  Possible risks and side effects  This patient would benefit from PT intervention to resume normal activities.   Rehab potential is good.    Frequency:  1 X week, once daily  Duration:  for 8 weeks  Discharge Plan:  Achieve all LTG.  Independent in home treatment program.  Reach maximal therapeutic benefit.    Please refer to the daily flowsheet for treatment today, total treatment time and time spent performing 1:1 timed codes.

## 2018-11-26 NOTE — MR AVS SNAPSHOT
After Visit Summary   11/26/2018    Chrissy Dixon    MRN: [id number]           Patient Information     Date Of Birth          1985        Visit Information        Provider Department      11/26/2018 10:00 AM [facility]        Today's Diagnoses     Psoriatic arthritis (H)        Recurrent cold sores        Complex tear of medial meniscus of right knee as current injury, subsequent encounter           Follow-ups after your visit        Your next 10 appointments already scheduled     Nov 26, 2018  2:50 PM CST   (Arrive by 2:35 PM)   [facility] Extremity with Cuong Phillips PT   [facility] [place] ([facility] [place]  )    [address]  [address]  [place] [address]   [phone]            Dec 31, 2018 10:50 AM CST   (Arrive by 10:35 AM)   RETURN KNEE with Sean Pate MD   [facility] ([facility] [place])    [address]  [address] [place] [address] [phone]              Who to contact     Please call your clinic at [phone] to:    Ask questions about your health    Make or cancel appointments    Discuss your medicines    Learn about your test results    Speak to your doctor            Additional Information About Your Visit        [employer] Information     [employer] gives you secure access to your electronic health record. If you see a primary care provider, you can also send messages to your care team and make appointments. If you have questions, please call your primary care clinic.  If you do not have a primary care provider, please call [phone] and they will assist you.      [employer] is an electronic gateway that provides easy, online access to your medical records. With [employer], you can request a clinic appointment, read your test results, renew a prescription or communicate with your care team.     To access your existing account, please contact  your [facility] or call [phone] for assistance.        Care EveryWhere ID     This is your Care EveryWhere ID. This could be used by other organizations to access your [place] medical records  [id number]        Your Vitals Were     Last Period                   11/14/2018 (Approximate)            Blood Pressure from Last 3 Encounters:   11/20/18 105/72   11/02/18 112/75   08/20/18 103/68    Weight from Last 3 Encounters:   11/20/18 50.9 kg (112 lb 4.8 oz)   11/02/18 50.9 kg (112 lb 3.2 oz)   08/20/18 49.3 kg (108 lb 9.6 oz)              We Performed the Following     Suture removal          Today's Medication Changes          These changes are accurate as of 11/26/18 10:53 AM.  If you have any questions, ask your nurse or doctor.               These medicines have changed or have updated prescriptions.        Dose/Directions    valACYclovir 500 MG tablet   Commonly known as:  VALTREX   This may have changed:    - when to take this  - reasons to take this   Used for:  Psoriatic arthritis (H)        Dose:  500 mg   Take 1 tablet (500 mg) by mouth daily   Quantity:  90 tablet   Refills:  1            Where to get your medicines      Some of these will need a paper prescription and others can be bought over the counter.  Ask your nurse if you have questions.     Bring a paper prescription for each of these medications     oxyCODONE 5 MG tablet               Information about OPIOIDS     PRESCRIPTION OPIOIDS: WHAT YOU NEED TO KNOW   We gave you an opioid (narcotic) pain medicine. It is important to manage your pain, but opioids are not always the best choice. You should first try all the other options your care team gave you. Take this medicine for as short a time (and as few doses) as possible.    Some activities can increase your pain, such as bandage changes or therapy sessions. It may help to take your pain medicine 30 to 60 minutes before these activities. Reduce your stress by  getting enough sleep, working on hobbies you enjoy and practicing relaxation or meditation. Talk to your care team about ways to manage your pain beyond prescription opioids.    These medicines have risks:    DO NOT drive when on new or higher doses of pain medicine. These medicines can affect your alertness and reaction times, and you could be arrested for driving under the influence (DUI). If you need to use opioids long-term, talk to your care team about driving.    DO NOT operate heavy machinery    DO NOT do any other dangerous activities while taking these medicines.    DO NOT drink any alcohol while taking these medicines.     If the opioid prescribed includes acetaminophen, DO NOT take with any other medicines that contain acetaminophen. Read all labels carefully. Look for the word  acetaminophen  or  Tylenol.  Ask your pharmacist if you have questions or are unsure.    You can get addicted to pain medicines, especially if you have a history of addiction (chemical, alcohol or substance dependence). Talk to your care team about ways to reduce this risk.    All opioids tend to cause constipation. Drink plenty of water and eat foods that have a lot of fiber, such as fruits, vegetables, prune juice, apple juice and high-fiber cereal. Take a laxative (Miralax, milk of magnesia, Colace, Senna) if you don t move your bowels at least every other day. Other side effects include upset stomach, sleepiness, dizziness, throwing up, tolerance (needing more of the medicine to have the same effect), physical dependence and slowed breathing.    Store your pills in a secure place, locked if possible. We will not replace any lost or stolen medicine. If you don t finish your medicine, please throw away (dispose) as directed by your pharmacist. The [place] Pollution [employer] Agency has more information about safe disposal: https://www.pca.[facility].[place]/living-green/managing-unwanted-medications         Primary Care Provider Office  Phone # Fax #    Nando David Haskins [phone]       [address] [place] [address]        Equal Access to Services     SHERINE MARTINEZ : Hadii aad ku hadbennydennis Sobriana, waaxda luqadaha, qaybta kaalmada kb, stanley luong laSolomonarthur puente. So [facility] [phone].    ATENCIÓN: Si habla español, tiene a hameed disposición servicios gratuitos de asistencia lingüística. Llame al [phone].    We comply with applicable federal civil rights laws and [place] laws. We do not discriminate on the basis of race, color, national origin, age, disability, sex, sexual orientation, or gender identity.            Thank you!     Thank you for choosing [facility]  for your care. Our goal is always to provide you with excellent care. Hearing back from our patients is one way we can continue to improve our services. Please take a few minutes to complete the written survey that you may receive in the mail after your visit with us. Thank you!             Your Updated Medication List - Protect others around you: Learn how to safely use, store and throw away your medicines at www.disposemymeds.org.          This list is accurate as of 11/26/18 10:53 AM.  Always use your most recent med list.                   Brand Name Dispense Instructions for use Diagnosis    apremilast 30 MG tablet    OTEZLA    60 tablet    Take 1 tablet (30 mg) by mouth 2 times daily    Psoriatic arthritis (H)       blood glucose monitoring meter device kit    no brand specified    1 kit    Use to test blood sugar 6 to 8 times daily or as directed. Okay to dispense One Touch Verio products per patient's insurance.    Diabetes mellitus, type 2 (H)       * blood glucose monitoring test strip    no brand specified    700 strip    Use to test blood sugars 6 to 8 times daily or as directed. Okay to dispense One Touch Verio per patient's insurance.    Diabetes mellitus, type 2 (H)       * blood glucose  monitoring test strip    ACCU-CHEK SMARTVIEW    700 each    Use to test blood sugar 6-8 times daily or as directed.    Diabetes mellitus, type 2 (H)       clobetasol 0.05 % external solution    TEMOVATE    50 mL    Apply topically 2 times daily    Psoriasis       DERMA-SMOOTHE/FS SCALP 0.01 % Oil oil   Generic drug:  Fluocinolone Acetonide Scalp     118 mL    Apply to scalp at bedtime, then wear showercap, rinse off in morning.    Other psoriasis       ferrous gluconate 324 (38 Fe) MG tablet    FERGON    100 tablet    Take 1 tablet (324 mg) by mouth every other day    Iron deficiency anemia due to chronic blood loss, Iron deficiency anemia, unspecified iron deficiency anemia type       fluticasone 50 MCG/ACT spray    FLONASE    48 g    Spray 2 sprays into both nostrils daily    Rhinitis medicamentosa       ibuprofen 600 MG tablet    ADVIL/MOTRIN    270 tablet    Take 1 tablet (600 mg) by mouth every 8 hours as needed for moderate pain    Psoriasis arthropathica (H)       insulin detemir 100 UNIT/ML pen    LEVEMIR FLEXTOUCH    60 mL    Inject 18 Units Subcutaneous 2 times daily    Type 2 diabetes mellitus without complication, with long-term current use of insulin (H)       insulin lispro 100 UNIT/ML pen    HumaLOG KWIKpen    30 mL    USE THREE TIMES DAILY WITH MEALS, CURRENTLY 30 UNITS DAILY    Diabetes mellitus, type 2 (H)       insulin pen needle 31G X 8 MM miscellaneous    31G X 8 MM    300 each    Use  3-4 pen needles daily or as directed.    Diabetes mellitus, type 2 (H)       metFORMIN 500 MG tablet    GLUCOPHAGE    360 tablet    Take 2 tablets (1,000 mg) by mouth 2 times daily (with meals) Labs due for further refills.    Type 2 diabetes mellitus without complication, with long-term current use of insulin (H)       * ondansetron 4 MG ODT tab    ZOFRAN-ODT    60 tablet    Take 1 tablet (4 mg) by mouth every 8 hours as needed for nausea    Nausea       * ondansetron 4 MG ODT tab    ZOFRAN-ODT    4 tablet     Take 1-2 tablets (4-8 mg) by mouth every 8 hours as needed for nausea    Complex tear of medial meniscus of right knee as current injury, subsequent encounter       ONETOUCH LANCETS Misc     700 each    Use to test blood sugar 6 to 8 times daily or as directed. Okay to dispense One Touch Verio products per patient's insurance.    Diabetes mellitus, type 2 (H)       oxyCODONE 5 MG tablet    ROXICODONE    30 tablet    Take 1 tablet (5 mg) by mouth every 6 hours as needed for severe pain    Complex tear of medial meniscus of right knee as current injury, subsequent encounter       oxyCODONE-acetaminophen 5-325 MG tablet    PERCOCET    30 tablet    Take 1 tablet by mouth every 4 hours as needed (must last 30 days from dispense date)    Psoriatic arthritis (H), Recurrent cold sores       * predniSONE 10 MG tablet    DELTASONE    30 tablet    Take 1 tablet (10 mg) by mouth daily As needed for flares only    Psoriatic arthropathy (H)       * predniSONE 5 MG tablet    DELTASONE    50 tablet    3 tabs daily for 1 wk, then 2 tabs daily for 1 week.    Psoriatic arthritis (H)       * predniSONE 5 MG tablet    DELTASONE    20 tablet    Take 3 tablets (15mg) by mouth for 4 days, then 2 tablets (10mg) by mouth for 4 days.    Psoriatic arthritis (H)       * predniSONE 5 MG tablet    DELTASONE    18 tablet    20 mg per day x 2 days, 15 mg per day x 2 days, 10 mg per day x 2 days then stop. -    Rheumatoid arthritis involving both hands with positive rheumatoid factor (H)       senna-docusate 8.6-50 MG per tablet    SENOKOT-S;PERICOLACE    16 tablet    Take 1-2 tablets by mouth 2 times daily    Complex tear of medial meniscus of right knee as current injury, subsequent encounter       traMADol 50 MG tablet    ULTRAM    60 tablet    Take 1 tablet (50 mg) by mouth every 8 hours as needed for moderate pain . Must last 30 days    Psoriatic arthritis (H)       triamcinolone 0.1 % cream    KENALOG    453.6 g    Apply topically 2 times  daily    Guttate psoriasis       ustekinumab 45 MG/0.5ML Sosy    STELARA    3 Syringe    Inject 0.5 mLs (45 mg) Subcutaneous every 3 months    Psoriatic arthritis (H)       valACYclovir 500 MG tablet    VALTREX    90 tablet    Take 1 tablet (500 mg) by mouth daily    Psoriatic arthritis (H)       vitamin D3 1000 UNIT tablet    CHOLECALCIFEROL    90 tablet    Take 1 tablet (1,000 Units) by mouth daily    Vitamin D deficiency       * Notice:  This list has 8 medication(s) that are the same as other medications prescribed for you. Read the directions carefully, and ask your doctor or other care provider to review them with you.

## 2018-11-27 NOTE — TELEPHONE ENCOUNTER
Message left for patient informing her of insurance denial. Alternative sent.    Tyra Singleton RN  11/27/2018 4:52 PM

## 2018-12-11 ENCOUNTER — MYC REFILL (OUTPATIENT)
Dept: INTERNAL MEDICINE | Facility: CLINIC | Age: 33
End: 2018-12-11

## 2018-12-11 DIAGNOSIS — L40.50 PSORIATIC ARTHRITIS (H): ICD-10-CM

## 2018-12-13 RX ORDER — TRAMADOL HYDROCHLORIDE 50 MG/1
50 TABLET ORAL EVERY 8 HOURS PRN
Qty: 60 TABLET | Refills: 0 | Status: SHIPPED | OUTPATIENT
Start: 2018-12-17 | End: 2019-01-10

## 2018-12-13 NOTE — TELEPHONE ENCOUNTER
Last office visit 8/20/18. [facility] site reviewed 12/13/18. Tramadol refill last filled 11/16/18 (dated for fill on 11/17/18). Patient has received oxycodone through orthopedics, last filled 11/26/18 for #30 tablets.    Will send to PCP for review.    Tyra Singleton RN    -------------------------------------    Rx called into [id number] pharmacy.    Tyra Singleton RN  12/17/2018 7:31 AM

## 2018-12-14 ENCOUNTER — MYC MEDICAL ADVICE (OUTPATIENT)
Dept: INTERNAL MEDICINE | Facility: CLINIC | Age: 33
End: 2018-12-14

## 2018-12-17 ENCOUNTER — THERAPY VISIT (OUTPATIENT)
Dept: PHYSICAL THERAPY | Facility: CLINIC | Age: 33
End: 2018-12-17
Payer: COMMERCIAL

## 2018-12-17 DIAGNOSIS — M25.561 RIGHT KNEE PAIN: ICD-10-CM

## 2018-12-17 DIAGNOSIS — Z47.89 AFTERCARE FOLLOWING SURGERY OF THE MUSCULOSKELETAL SYSTEM: ICD-10-CM

## 2018-12-17 PROCEDURE — 97112 NEUROMUSCULAR REEDUCATION: CPT | Mod: GP | Performed by: PHYSICAL THERAPIST

## 2018-12-17 PROCEDURE — 97110 THERAPEUTIC EXERCISES: CPT | Mod: GP | Performed by: PHYSICAL THERAPIST

## 2018-12-19 ENCOUNTER — MYC REFILL (OUTPATIENT)
Dept: INTERNAL MEDICINE | Facility: CLINIC | Age: 33
End: 2018-12-19

## 2018-12-19 DIAGNOSIS — B00.1 RECURRENT COLD SORES: ICD-10-CM

## 2018-12-19 DIAGNOSIS — L40.50 PSORIATIC ARTHRITIS (H): ICD-10-CM

## 2018-12-20 NOTE — TELEPHONE ENCOUNTER
Patient had arthroscopic medial meniscectomy, synovectomy on 11/20/18. She was receiving oxycodone during initial post operative period from orthopedics. [facility] site reviewed 12/20/18. Patient received oxycodone 5mg #30 on 11/26/18.    Patient is requesting a refill of percocet to be mailed to her home. Will send to PCP for review.    Tyra Singleton RN

## 2018-12-23 RX ORDER — OXYCODONE AND ACETAMINOPHEN 5; 325 MG/1; MG/1
1 TABLET ORAL EVERY 4 HOURS PRN
Qty: 30 TABLET | Refills: 0 | Status: SHIPPED | OUTPATIENT
Start: 2018-12-26 | End: 2018-12-27

## 2018-12-26 ENCOUNTER — OFFICE VISIT (OUTPATIENT)
Dept: RHEUMATOLOGY | Facility: CLINIC | Age: 33
End: 2018-12-26
Attending: INTERNAL MEDICINE
Payer: COMMERCIAL

## 2018-12-26 ENCOUNTER — TELEPHONE (OUTPATIENT)
Dept: RHEUMATOLOGY | Facility: CLINIC | Age: 33
End: 2018-12-26

## 2018-12-26 VITALS
WEIGHT: 114.5 LBS | OXYGEN SATURATION: 100 % | SYSTOLIC BLOOD PRESSURE: 122 MMHG | BODY MASS INDEX: 20.29 KG/M2 | HEIGHT: 63 IN | DIASTOLIC BLOOD PRESSURE: 79 MMHG | HEART RATE: 88 BPM | TEMPERATURE: 98.4 F

## 2018-12-26 DIAGNOSIS — L40.50 PSORIATIC ARTHRITIS (H): ICD-10-CM

## 2018-12-26 DIAGNOSIS — L40.50 PSORIATIC ARTHRITIS (H): Primary | ICD-10-CM

## 2018-12-26 LAB
ALT SERPL W P-5'-P-CCNC: 19 U/L (ref 0–50)
AST SERPL W P-5'-P-CCNC: 13 U/L (ref 0–45)
CREAT SERPL-MCNC: 0.5 MG/DL (ref 0.52–1.04)
CRP SERPL-MCNC: <2.9 MG/L (ref 0–8)
ERYTHROCYTE [DISTWIDTH] IN BLOOD BY AUTOMATED COUNT: 14.6 % (ref 10–15)
GFR SERPL CREATININE-BSD FRML MDRD: >90 ML/MIN/{1.73_M2}
HCT VFR BLD AUTO: 38.2 % (ref 35–47)
HGB BLD-MCNC: 11.6 G/DL (ref 11.7–15.7)
MCH RBC QN AUTO: 24.9 PG (ref 26.5–33)
MCHC RBC AUTO-ENTMCNC: 30.4 G/DL (ref 31.5–36.5)
MCV RBC AUTO: 82 FL (ref 78–100)
PLATELET # BLD AUTO: 357 10E9/L (ref 150–450)
RBC # BLD AUTO: 4.66 10E12/L (ref 3.8–5.2)
WBC # BLD AUTO: 6.6 10E9/L (ref 4–11)

## 2018-12-26 PROCEDURE — G0463 HOSPITAL OUTPT CLINIC VISIT: HCPCS | Mod: ZF

## 2018-12-26 PROCEDURE — 85027 COMPLETE CBC AUTOMATED: CPT | Performed by: INTERNAL MEDICINE

## 2018-12-26 PROCEDURE — 86140 C-REACTIVE PROTEIN: CPT | Performed by: INTERNAL MEDICINE

## 2018-12-26 PROCEDURE — 84450 TRANSFERASE (AST) (SGOT): CPT | Performed by: INTERNAL MEDICINE

## 2018-12-26 PROCEDURE — 82565 ASSAY OF CREATININE: CPT | Performed by: INTERNAL MEDICINE

## 2018-12-26 PROCEDURE — 84460 ALANINE AMINO (ALT) (SGPT): CPT | Performed by: INTERNAL MEDICINE

## 2018-12-26 PROCEDURE — 36415 COLL VENOUS BLD VENIPUNCTURE: CPT | Performed by: INTERNAL MEDICINE

## 2018-12-26 PROCEDURE — 86480 TB TEST CELL IMMUN MEASURE: CPT | Performed by: INTERNAL MEDICINE

## 2018-12-26 ASSESSMENT — MIFFLIN-ST. JEOR: SCORE: 1198.5

## 2018-12-26 ASSESSMENT — PAIN SCALES - GENERAL: PAINLEVEL: MODERATE PAIN (4)

## 2018-12-26 NOTE — PROGRESS NOTES
She has had psoriasis since birth (presented with diffuse rash at birth), was diagnosed with psoriasis at age 11, and developed arthritis in her 20s. She was on enbrel at age 21 but developed infections such as pneumonia, sinusitis, and mono, and stopped it within 7 months. She was then on methotrexate from 2011 until February 2012, but did not tolerate this due to GI upset. She switched to sulfasalazine 2/2012 and tolerated it well at 1000mg thrice a day, but had decreasing efficacy as of 11/1/13. She started leflunomide in 10-13 but stopped in 1-14 due to GI intolerance. She started humira in 1-14, held in 7-14 during pregnancy, restarted in 7-15 for flaring disease but then discontinued due to worsened psoriasis and lack of efficacy. Leflunomide was then restarted but loose stools developed. Leflunomide was stopped and otezla was started on 9/11/15. She started stelara in fall 2015 but stopped due to cost and inconvenience in early 2016. She has continued with otezla monotherapy through present, and restarted stelara in 2015 with her last dose in 5-17. She was last seen on 8/4/17 at which time she continued to have minor flares of joint pain in fingers L>R hand. Her skin was well controlled with small breakouts lasting only 3-4 days. She had minimal low back pain and showed no symptoms of tenosynovitis, dactylitis, or iritis.

## 2018-12-26 NOTE — TELEPHONE ENCOUNTER
PA Initiation    Medication: Stelara 45mg/ 0.5mL syringes  Insurance Company: [employer] - Phone [phone] Fax [phone]  Pharmacy Filling the Rx: [facility] - [address] [place] [address]  Filling Pharmacy Phone: [phone]  Filling Pharmacy Fax:    Start Date: 12/26/2018

## 2018-12-26 NOTE — PROGRESS NOTES
"[facility]  Rheumatology Clinic  Rene Granados MD  2018     Name: Chrissy Dixon  MRN: [id number]  Age: 33 year old  : 1985  Referring provider: Nando Haskins     Assessment and Plan:  Psoriatic arthritis (polyarthritis, scalp and pustular psoriasis; failed Humira and leflunomide, dx'd post-partum in ): Patient relates moderate ongoing right knee swelling and stiffness, now five weeks status post arthroscopy performed by Dr. Pate. Scalp and chest wall psoriasis is minimal and intermittent. Exam shows cool moderate effusion and persistent baker's cyst at the right knee; knee range of motion is unimpaired. Left knee is without swelling or tenderness today. Lab , electrolytes and creatine were normal as well as liver function and TSH. Glucose was 238. CBC  was normal. Pathology from  synovial biopsy revealed \"chronic synovitis\".     Knee-predominant oligoarthritis is still affecting activities of daily living, despite synovectomy and despite chronic use of apremilast monotherapy. We discussed the potential for augmenting immunomodulatory therapy to improve control of inflammatory arthritis. Patient has been previously been treated by ustekinumab, but the prior three month exposure in  was insufficient to determine efficacy. I recommend a retrial of ustekinumab every 90 days with a loading protocol. I recommend continued apremilast 30mg twice daily. If olgiooarthritis improves over three months of ustekinumab exposure, I will recommend dicontinuation of apremilast. Plan renewal of CBC, LFTs, inflammatory markers and tuberculosis testing today. Return to clinic in four months.    - CRP inflammation  - Creatinine  - CBC with platelets  - ALT  - AST  - Quantiferon TB Gold Plus    - ustekinumab (STELARA) 45 MG/0.5ML SOSY  Dispense: 2 Syringe; Refill: 0  - ustekinumab (STELARA) 45 MG/0.5ML SOSY  Dispense: 1 Syringe; Refill: 1  - apremilast (OTEZLA) 30 MG " tablet  Dispense: 60 tablet; Refill: 4     Follow-up: Return in about 4 months (around 4/26/2019).     HPI:   Chrissy Dixon is a 33 year old female who presents for follow up regarding psoriatic arthritis.    Patient saw Dr. Pate for right knee arthroscopy performed in Fall 2018. Dr. Pate noted chronic synovitis during synovectomy in right knee and recommended evaluation with rheumatology for possible immunomodulatory adjustment. Surgical arthroscopy on November 20th, 2018 biopsy of the right knee synovium showed chronic synovitis.    After the biopsy the patient had lots of swelling in the first week. She states she currently has swelling in the back of the knee, now 6 weeks after surgery. Overall, symptoms pretty similar compared to before surgery. It's still difficult for her to climb steep stairs. Physical therapy has been helpful. She gets tenderness in hands and toes. Left knee hurt more while she was on crutches. She denies skin rashes or psoriasis. No other adverse effects of otezla. She has recently gotten a sensor for her diabetes and her blood sugar has been well controlled with the constant monitoring.     Background Patient History:  She has had psoriasis since birth (presented with diffuse rash at birth), was diagnosed with psoriasis at age 11, and developed arthritis in her 20s. She was on enbrel at age 21 but developed infections such as pneumonia, sinusitis, and mono, and stopped it within 7 months. She was then on methotrexate from 2011 until February 2012, but did not tolerate this due to GI upset. She switched to sulfasalazine 2/2012 and tolerated it well at 1000mg thrice a day, but had decreasing efficacy as of 11/1/13. She started leflunomide in 10-13 but stopped in 1-14 due to GI intolerance. She started humira in 1-14, held in 7-14 during pregnancy, restarted in 7-15 for flaring disease but then discontinued due to worsened psoriasis and lack of efficacy. Leflunomide was then  restarted but loose stools developed. Leflunomide was stopped and otezla was started on 9/11/15. She started stelara in fall 2015 but stopped due to cost and inconvenience in early 2016. She has continued with otezla monotherapy through present. She restarted stelara in 12-18.     Review of Systems:   Pertinent items are noted in HPI or as below, remainder of complete ROS is negative.      No recent problems with hearing or vision. No swallowing problems.   No breathing difficulty, shortness of breath, coughing, or wheezing  No chest pain or palpitations  No heart burn, indigestion, abdominal pain, nausea, vomiting, diarrhea  No urination problems, no bloody, cloudy urine, no dysuria  No numbing, tingling, weakness  No headaches or confusion  No rashes. No easy bleeding or bruising.     Active Medications:   Current Outpatient Medications:      apremilast (OTEZLA) 30 MG tablet, Take 1 tablet (30 mg) by mouth 2 times daily, Disp: 60 tablet, Rfl: 0     blood glucose monitoring (ACCU-CHEK SMARTVIEW) test strip, Use to test blood sugar 6-8 times daily or as directed., Disp: 700 each, Rfl: 1     blood glucose monitoring (NO BRAND SPECIFIED) meter device kit, Use to test blood sugar 6 to 8 times daily or as directed. Okay to dispense One Touch Verio products per patient's insurance., Disp: 1 kit, Rfl: 0     blood glucose monitoring (NO BRAND SPECIFIED) test strip, Use to test blood sugars 6 to 8 times daily or as directed. Okay to dispense One Touch Verio per patient's insurance., Disp: 700 strip, Rfl: 1     cholecalciferol (VITAMIN D3) 1000 UNIT tablet, Take 1 tablet (1,000 Units) by mouth daily, Disp: 90 tablet, Rfl: 1     clobetasol (TEMOVATE) 0.05 % external solution, Apply topically 2 times daily, Disp: 50 mL, Rfl: 6     ferrous gluconate (FERGON) 324 (38 FE) MG tablet, Take 1 tablet (324 mg) by mouth every other day, Disp: 100 tablet, Rfl: 11     Fluocinolone Acetonide (DERMA-SMOOTHE/FS SCALP) 0.01 % OIL, Apply to  scalp at bedtime, then wear showercap, rinse off in morning., Disp: 118 mL, Rfl: 3     fluticasone (FLONASE) 50 MCG/ACT spray, Spray 2 sprays into both nostrils daily, Disp: 48 g, Rfl: 3     ibuprofen (ADVIL/MOTRIN) 600 MG tablet, Take 1 tablet (600 mg) by mouth every 8 hours as needed for moderate pain, Disp: 270 tablet, Rfl: 0     insulin aspart (NOVOLOG FLEXPEN) 100 UNIT/ML pen, Use three times daily with meals, currently 30 units daily, Disp: 30 mL, Rfl: 5     insulin detemir (LEVEMIR FLEXTOUCH) 100 UNIT/ML injection, Inject 18 Units Subcutaneous 2 times daily (Patient taking differently: Inject 18 Units Subcutaneous 2 times daily ), Disp: 60 mL, Rfl: 3     insulin pen needle 31G X 8 MM, Use  3-4 pen needles daily or as directed., Disp: 300 each, Rfl: 4     metFORMIN (GLUCOPHAGE) 500 MG tablet, Take 2 tablets (1,000 mg) by mouth 2 times daily (with meals) Labs due for further refills., Disp: 360 tablet, Rfl: 3     ondansetron (ZOFRAN-ODT) 4 MG ODT tab, Take 1-2 tablets (4-8 mg) by mouth every 8 hours as needed for nausea, Disp: 4 tablet, Rfl: 0     ondansetron (ZOFRAN-ODT) 4 MG ODT tab, Take 1 tablet (4 mg) by mouth every 8 hours as needed for nausea, Disp: 60 tablet, Rfl: 2     ONETOUCH LANCETS MISC, Use to test blood sugar 6 to 8 times daily or as directed. Okay to dispense One Touch Verio products per patient's insurance., Disp: 700 each, Rfl: 1     oxyCODONE-acetaminophen (PERCOCET) 5-325 MG tablet, Take 1 tablet by mouth every 4 hours as needed (must last 30 days from dispense date), Disp: 30 tablet, Rfl: 0     predniSONE (DELTASONE) 10 MG tablet, Take 1 tablet (10 mg) by mouth daily As needed for flares only, Disp: 30 tablet, Rfl: 1     predniSONE (DELTASONE) 5 MG tablet, Take 3 tablets (15mg) by mouth for 4 days, then 2 tablets (10mg) by mouth for 4 days., Disp: 20 tablet, Rfl: 0     predniSONE (DELTASONE) 5 MG tablet, 3 tabs daily for 1 wk, then 2 tabs daily for 1 week., Disp: 50 tablet, Rfl: 2      senna-docusate (SENOKOT-S;PERICOLACE) 8.6-50 MG per tablet, Take 1-2 tablets by mouth 2 times daily, Disp: 16 tablet, Rfl: 0     traMADol (ULTRAM) 50 MG tablet, Take 1 tablet (50 mg) by mouth every 8 hours as needed for moderate pain . Must last 30 days, Disp: 60 tablet, Rfl: 0     triamcinolone (KENALOG) 0.1 % cream, Apply topically 2 times daily, Disp: 453.6 g, Rfl: 1     valACYclovir (VALTREX) 500 MG tablet, Take 1 tablet (500 mg) by mouth daily (Patient taking differently: Take 500 mg by mouth daily as needed ), Disp: 90 tablet, Rfl: 1     oxyCODONE (ROXICODONE) 5 MG tablet, Take 1 tablet (5 mg) by mouth every 6 hours as needed for severe pain (Patient not taking: Reported on 12/26/2018), Disp: 30 tablet, Rfl: 0     predniSONE (DELTASONE) 5 MG tablet, 20 mg per day x 2 days, 15 mg per day x 2 days, 10 mg per day x 2 days then stop. - (Patient not taking: Reported on 12/26/2018.), Disp: 18 tablet, Rfl: 0     ustekinumab (STELARA) 45 MG/0.5ML SOSY, Inject 0.5 mLs (45 mg) Subcutaneous every 3 months (Patient not taking: Reported on 12/26/2018), Disp: 3 Syringe, Rfl: 1     valACYclovir (VALTREX) 1000 mg tablet, Take 2 tablets by mouth 2 times daily for 1 day., Disp: 4 tablet, Rfl: 1    Current Facility-Administered Medications:      lidocaine (PF) (XYLOCAINE) 1 % injection 5 mL, 5 mL, Infiltration, Once, Rene Granados MD     lidocaine (PF) (XYLOCAINE) 1 % injection 50 mg, 5 mL, Infiltration, Once, Rene Granados MD     triamcinolone acetonide (KENALOG-40) injection 40 mg, 40 mg, INTRA-ARTICULAR, Once, Rene Granados MD     triamcinolone acetonide (KENALOG-40) injection 40 mg, 40 mg, INTRA-ARTICULAR, Once, Rene Granados MD      Allergies:   Diagnostic x-ray materials; Betadine [povidone iodine]; Levofloxacin; Penicillins; Pneumococcal vaccines; Prenatal vit-fe fumarate-fa [cavan-folate ob]; and Tdap [daptacel]      Past Medical History:  Diabetes  Psoriatic arthritis (H), Psoriasis arthropathica  "(H)  Polyarthritis (probable psoriatic arthritis)  Cardiovascular screening, LDL goal less than 160  Psoriatic Arthritis (H)  Diabetes mellitus type 2, controlled (H)  Psoriasis arthropathica (H)  Malaise and Fatigue  Iron deficiency anemia      Past Surgical History:  Arthroscopy knee with meniscectomy, right (11/20/2018)  AS Hysteroscopy Surgical W/Endometrial, bilateral (2017)   Tonsillar abcess    Family History:   Mother- Thyroid disease, HTN, Diabetes  Father- Connective tissue disorder (psoriasis)  Maternal Grandfather- Diabetes, Hypertension, Prostate Cancer  Maternal Uncle- Diabetes  Maternal Grandmother - HTN  No family history of asthma.     Social History:   Former smoker. 11 Years tobacco free. Denies smokeless tobacco use. Admits to 2 standard drinks of alcohol weekly.      Physical Exam:   /79   Pulse 88   Temp 98.4  F (36.9  C) (Oral)   Ht 1.6 m (5' 3\")   Wt 51.9 kg (114 lb 8 oz)   SpO2 100%   BMI 20.28 kg/m     Wt Readings from Last 4 Encounters:   12/26/18 51.9 kg (114 lb 8 oz)   11/20/18 50.9 kg (112 lb 4.8 oz)   11/02/18 50.9 kg (112 lb 3.2 oz)   08/20/18 49.3 kg (108 lb 9.6 oz)     Constitutional: Well-developed, appearing stated age; cooperative  Eyes: Normal EOM, PERRLA, vision, conjunctiva, sclera  ENT: Normal external ears, nose, hearing, lips, teeth, gums, throat. No mucous membrane lesions, normal saliva pool  Neck: No mass or thyroid enlargement  Resp: Lungs clear to auscultation, nl to palpation  CV: RRR, no murmurs, rubs or gallops, no edema  GI: No ABD mass or tenderness, no HSM  : Not tested  Lymph: No cervical, supraclavicular, inguinal or epitrochlear nodes  MS: The TMJ, neck, shoulder, elbow, wrist, MCP/PIP/DIP, spine, hip, knee, ankle, and foot MTP/IP joints were examined and found normal. No active synovitis or altered joint anatomy. Full joint ROM. Normal  strength. No dactylitis,  tenosynovitis, enthesopathy. Right knee has modest visible swelling compared to " the left. Well healed surgical port scars. Cool swelling in the posterior knee at the level of baker's cyst. Flexion and extension of both knees are full. Moderate effusion on the right. Both knees are cool.   Skin: No nail pitting, alopecia, rash, nodules or lesions  Neuro: Normal cranial nerves, strength, sensation, DTRs.   Psych: Normal judgement, orientation, memory, affect.     Imaging:  MR Knee Right w/o Contrast (08/27/2018):  Impression:  1. Moderate size right knee joint effusion with synovial  hypertrophy/fatty change, suggesting underlying chronic synovitis. No  erosions.  2. Moderate-sized septated Baker's cyst measuring 7.2 x 3.7 x 1.5 cm.  3. Diminutive medial meniscus body and medial aspect of the posterior  horn. Please correlate for any history of partial meniscectomy, or  else this may represent tearing.   4. The anterior and posterior cruciate ligaments, medial and lateral  supporting structures, and lateral meniscus are intact.  Radha Argueta MD    XR Knee Bilateral 3 views (08/20/2018):  Impression:  Bilateral minimal lateral patellar subluxation, left  patellar tilt. No acute osseous pathology.  Jutta Ellermann, MD    Laboratory:   Component      Latest Ref Rng & Units 11/20/2018 11/20/2018           8:51 AM  9:24 AM   Sodium      133 - 144 mmol/L     Potassium      3.4 - 5.3 mmol/L     Chloride      94 - 109 mmol/L     Carbon Dioxide      20 - 32 mmol/L     Anion Gap      3 - 14 mmol/L     Glucose      70 - 99 mg/dL  175 (H)   Urea Nitrogen      7 - 30 mg/dL     Creatinine      0.52 - 1.04 mg/dL     GFR Estimate      >60 mL/min/1.73:m2     GFR Estimate If Black      >60 mL/min/1.73:m2     Calcium      8.5 - 10.1 mg/dL     Bilirubin Total      0.2 - 1.3 mg/dL     Albumin      3.4 - 5.0 g/dL     Protein Total      6.8 - 8.8 g/dL     Alkaline Phosphatase      40 - 150 U/L     ALT      0 - 50 U/L     AST      0 - 45 U/L     WBC      4.0 - 11.0 10e9/L     RBC Count      3.8 - 5.2 10e12/L      Hemoglobin      11.7 - 15.7 g/dL     Hematocrit      35.0 - 47.0 %     MCV      78 - 100 fl     MCH      26.5 - 33.0 pg     MCHC      31.5 - 36.5 g/dL     RDW      10.0 - 15.0 %     Platelet Count      150 - 450 10e9/L     Cholesterol      <200 mg/dL     Triglycerides      <150 mg/dL     HDL Cholesterol      >49 mg/dL     LDL Cholesterol Calculated      <100 mg/dL     Non HDL Cholesterol      <130 mg/dL     HCG Qual Urine      neg     Internal QC OK           TSH      0.40 - 4.00 mU/L     Copath Report       Patient Name: ADI CHANDLER . . .    CRP Inflammation      0.0 - 8.0 mg/L       Component      Latest Ref Rng & Units 11/20/2018 12/26/2018          10:51 AM    Sodium      133 - 144 mmol/L     Potassium      3.4 - 5.3 mmol/L     Chloride      94 - 109 mmol/L     Carbon Dioxide      20 - 32 mmol/L     Anion Gap      3 - 14 mmol/L     Glucose      70 - 99 mg/dL 144 (H)    Urea Nitrogen      7 - 30 mg/dL     Creatinine      0.52 - 1.04 mg/dL  0.50 (L)   GFR Estimate      >60 mL/min/1.73:m2  >90   GFR Estimate If Black      >60 mL/min/1.73:m2  >90   Calcium      8.5 - 10.1 mg/dL     Bilirubin Total      0.2 - 1.3 mg/dL     Albumin      3.4 - 5.0 g/dL     Protein Total      6.8 - 8.8 g/dL     Alkaline Phosphatase      40 - 150 U/L     ALT      0 - 50 U/L  19   AST      0 - 45 U/L  13   WBC      4.0 - 11.0 10e9/L  6.6   RBC Count      3.8 - 5.2 10e12/L  4.66   Hemoglobin      11.7 - 15.7 g/dL  11.6 (L)   Hematocrit      35.0 - 47.0 %  38.2   MCV      78 - 100 fl  82   MCH      26.5 - 33.0 pg  24.9 (L)   MCHC      31.5 - 36.5 g/dL  30.4 (L)   RDW      10.0 - 15.0 %  14.6   Platelet Count      150 - 450 10e9/L  357   Cholesterol      <200 mg/dL     Triglycerides      <150 mg/dL     HDL Cholesterol      >49 mg/dL     LDL Cholesterol Calculated      <100 mg/dL     Non HDL Cholesterol      <130 mg/dL     HCG Qual Urine      neg     Internal QC OK           TSH      0.40 - 4.00 mU/L     Copath Report           CRP  Inflammation      0.0 - 8.0 mg/L  <2.9     RHEUM RESULTS Latest Ref Rng & Units 4/30/2018 8/20/2018 12/26/2018   SED RATE 0 - 20 mm/h 10 - -   CRP, INFLAMMATION 0.0 - 8.0 mg/L <2.9 - <2.9   CK TOTAL 32 - 200 U/L - - -   RHEUMATOID FACTOR 0 - 14 IU/mL - - -   CHALO SCREEN BY EIA <1.0 - - -   AST 0 - 45 U/L 10 5 13   ALT 0 - 50 U/L 18 17 19   ALBUMIN 3.4 - 5.0 g/dL - 4.1 -   WBC 4.0 - 11.0 10e9/L 7.3 - 6.6   RBC 3.8 - 5.2 10e12/L 4.46 - 4.66   HGB 11.7 - 15.7 g/dL 11.9 - 11.6(L)   HCT 35.0 - 47.0 % 36.1 - 38.2   MCV 78 - 100 fl 81 - 82   MCHC 31.5 - 36.5 g/dL 33.0 - 30.4(L)   RDW 10.0 - 15.0 % 14.7 - 14.6    - 450 10e9/L 346 - 357   CREATININE 0.52 - 1.04 mg/dL 0.52 0.54 0.50(L)   GFR ESTIMATE, IF BLACK >60 mL/min/[1.73:m2] >90 >90 >90   GFR ESTIMATE >60 mL/min/[1.73:m2] >90 >90 >90   HEPATITIS C ANTIBODY NEG - - -       Rheumatoid Factor   Date Value Ref Range Status   08/11/2011 11 0 - 14 IU/mL Final   ,  ,   Cyclic Cit Pept IgG/IgA   Date Value Ref Range Status   08/11/2011 <20  Interpretation:  Negative <20 UNITS Final   ,  ,  ,  ,  ,   CHALO Screen by EIA   Date Value Ref Range Status   04/09/2014 <1.0  Interpretation:  Negative <1.0 Final   ,  ,  ,  ,  ,  ,  ,  ,  ,   Antistreptolysin O   Date Value Ref Range Status   09/11/2015 109 0 - 120 IU/mL Final     Comment:     A single ASO analysis may not be meaningful due to the variability of ASO   values   within the normal population.  Both clinical and laboratory findings should   be   considered in reaching a diagnosis.  A single analysis may be less   informative   than a repeat analysis showing a change.       ,  ,  ,  ,  ,  ,  ,  ,  ,   Neutrophil Cytoplasmic IgG Antibody   Date Value Ref Range Status   05/31/2013   Final    <1:20  Reference range: <1:20  (Note)  The ANCA IFA is <1:20; therefore, no further testing will  be performed.  INTERPRETIVE INFORMATION: Anti-Neutrophil Cyto Ab, IgG    Neutrophil Cytoplasmic Antibodies (C-ANCA = granular  cytoplasmic  staining, P-ANCA = perinuclear staining) are  found in the serum of over 90 percent of patients with  certain necrotizing systemic vasculitides, and usually in  less than 5 percent of patients with collagen vascular  disease or arthritis.  Performed by [facility],  [address] [place] [id number] [phone]  [email or web address], Nataly Roth MD, Lab. Director   ,  ,  ,  ,  ,  ,  ,  ,  ,  ,  ,  ,  ,  ,  ,  ,  ,  ,    Scribe Disclosure:   I, Debra Rivero, am serving as a scribe to document services personally performed by Rene Granados MD at this visit, based upon the provider's statements to me. All documentation has been reviewed by the aforementioned provider prior to being entered into the official medical record.     Portions of this medical record were completed by a scribe. UPON MY REVIEW AND AUTHENTICATION BY ELECTRONIC SIGNATURE, this confirms (a) I performed the applicable clinical services, and (b) the record is accurate.  Rene Granados MD  Staff RheumatologistTERI

## 2018-12-26 NOTE — LETTER
"2018      RE: Chrissy Dixon  [address] [facility] [address]           [facility]  Rheumatology Clinic  Rene Granados MD  2018     Name: Chrissy Dixon  MRN: [id number]  Age: 33 year old  : 1985  Referring provider: Nando Haskins     Assessment and Plan:  Psoriatic arthritis (polyarthritis, scalp and pustular psoriasis; failed Humira and leflunomide, dx'd post-partum in ): Patient relates moderate ongoing right knee swelling and stiffness, now five weeks status post arthroscopy performed by Dr. Pate. Scalp and chest wall psoriasis is minimal and intermittent. Exam shows cool moderate effusion and persistent baker's cyst at the right knee; knee range of motion is unimpaired. Left knee is without swelling or tenderness today. Lab , electrolytes and creatine were normal as well as liver function and TSH. Glucose was 238. CBC  was normal. Pathology from  synovial biopsy revealed \"chronic synovitis\".     Knee-predominant oligoarthritis is still affecting activities of daily living, despite synovectomy and despite chronic use of apremilast monotherapy. We discussed the potential for augmenting immunomodulatory therapy to improve control of inflammatory arthritis. Patient has been previously been treated by ustekinumab, but the prior three month exposure in  was insufficient to determine efficacy. I recommend a retrial of ustekinumab every 90 days with a loading protocol. I recommend continued apremilast 30mg twice daily. If olgiooarthritis improves over three months of ustekinumab exposure, I will recommend dicontinuation of apremilast. Plan renewal of CBC, LFTs, inflammatory markers and tuberculosis testing today. Return to clinic in four months.    - CRP inflammation  - Creatinine  - CBC with platelets  - ALT  - AST  - Quantiferon TB Gold Plus    - ustekinumab (STELARA) 45 MG/0.5ML SOSY  Dispense: 2 Syringe; Refill: 0  - ustekinumab " (STELARA) 45 MG/0.5ML SOSY  Dispense: 1 Syringe; Refill: 1  - apremilast (OTEZLA) 30 MG tablet  Dispense: 60 tablet; Refill: 4     Follow-up: Return in about 4 months (around 4/26/2019).     HPI:   Chrissy Dixon is a 33 year old female who presents for follow up regarding psoriatic arthritis.    Patient saw Dr. Pate for right knee arthroscopy performed in Fall 2018. Dr. Pate noted chronic synovitis during synovectomy in right knee and recommended evaluation with rheumatology for possible immunomodulatory adjustment. Surgical arthroscopy on November 20th, 2018 biopsy of the right knee synovium showed chronic synovitis.    After the biopsy the patient had lots of swelling in the first week. She states she currently has swelling in the back of the knee, now 6 weeks after surgery. Overall, symptoms pretty similar compared to before surgery. It's still difficult for her to climb steep stairs. Physical therapy has been helpful. She gets tenderness in hands and toes. Left knee hurt more while she was on crutches. She denies skin rashes or psoriasis. No other adverse effects of otezla. She has recently gotten a sensor for her diabetes and her blood sugar has been well controlled with the constant monitoring.     Background Patient History:  She has had psoriasis since birth (presented with diffuse rash at birth), was diagnosed with psoriasis at age 11, and developed arthritis in her 20s. She was on enbrel at age 21 but developed infections such as pneumonia, sinusitis, and mono, and stopped it within 7 months. She was then on methotrexate from 2011 until February 2012, but did not tolerate this due to GI upset. She switched to sulfasalazine 2/2012 and tolerated it well at 1000mg thrice a day, but had decreasing efficacy as of 11/1/13. She started leflunomide in 10-13 but stopped in 1-14 due to GI intolerance. She started humira in 1-14, held in 7-14 during pregnancy, restarted in 7-15 for flaring disease but  then discontinued due to worsened psoriasis and lack of efficacy. Leflunomide was then restarted but loose stools developed. Leflunomide was stopped and otezla was started on 9/11/15. She started stelara in fall 2015 but stopped due to cost and inconvenience in early 2016. She has continued with otezla monotherapy through present. She restarted stelara in 12-18.     Review of Systems:   Pertinent items are noted in HPI or as below, remainder of complete ROS is negative.      No recent problems with hearing or vision. No swallowing problems.   No breathing difficulty, shortness of breath, coughing, or wheezing  No chest pain or palpitations  No heart burn, indigestion, abdominal pain, nausea, vomiting, diarrhea  No urination problems, no bloody, cloudy urine, no dysuria  No numbing, tingling, weakness  No headaches or confusion  No rashes. No easy bleeding or bruising.     Active Medications:   Current Outpatient Medications:      apremilast (OTEZLA) 30 MG tablet, Take 1 tablet (30 mg) by mouth 2 times daily, Disp: 60 tablet, Rfl: 0     blood glucose monitoring (ACCU-CHEK SMARTVIEW) test strip, Use to test blood sugar 6-8 times daily or as directed., Disp: 700 each, Rfl: 1     blood glucose monitoring (NO BRAND SPECIFIED) meter device kit, Use to test blood sugar 6 to 8 times daily or as directed. Okay to dispense One Touch Verio products per patient's insurance., Disp: 1 kit, Rfl: 0     blood glucose monitoring (NO BRAND SPECIFIED) test strip, Use to test blood sugars 6 to 8 times daily or as directed. Okay to dispense One Touch Verio per patient's insurance., Disp: 700 strip, Rfl: 1     cholecalciferol (VITAMIN D3) 1000 UNIT tablet, Take 1 tablet (1,000 Units) by mouth daily, Disp: 90 tablet, Rfl: 1     clobetasol (TEMOVATE) 0.05 % external solution, Apply topically 2 times daily, Disp: 50 mL, Rfl: 6     ferrous gluconate (FERGON) 324 (38 FE) MG tablet, Take 1 tablet (324 mg) by mouth every other day, Disp: 100  tablet, Rfl: 11     Fluocinolone Acetonide (DERMA-SMOOTHE/FS SCALP) 0.01 % OIL, Apply to scalp at bedtime, then wear showercap, rinse off in morning., Disp: 118 mL, Rfl: 3     fluticasone (FLONASE) 50 MCG/ACT spray, Spray 2 sprays into both nostrils daily, Disp: 48 g, Rfl: 3     ibuprofen (ADVIL/MOTRIN) 600 MG tablet, Take 1 tablet (600 mg) by mouth every 8 hours as needed for moderate pain, Disp: 270 tablet, Rfl: 0     insulin aspart (NOVOLOG FLEXPEN) 100 UNIT/ML pen, Use three times daily with meals, currently 30 units daily, Disp: 30 mL, Rfl: 5     insulin detemir (LEVEMIR FLEXTOUCH) 100 UNIT/ML injection, Inject 18 Units Subcutaneous 2 times daily (Patient taking differently: Inject 18 Units Subcutaneous 2 times daily ), Disp: 60 mL, Rfl: 3     insulin pen needle 31G X 8 MM, Use  3-4 pen needles daily or as directed., Disp: 300 each, Rfl: 4     metFORMIN (GLUCOPHAGE) 500 MG tablet, Take 2 tablets (1,000 mg) by mouth 2 times daily (with meals) Labs due for further refills., Disp: 360 tablet, Rfl: 3     ondansetron (ZOFRAN-ODT) 4 MG ODT tab, Take 1-2 tablets (4-8 mg) by mouth every 8 hours as needed for nausea, Disp: 4 tablet, Rfl: 0     ondansetron (ZOFRAN-ODT) 4 MG ODT tab, Take 1 tablet (4 mg) by mouth every 8 hours as needed for nausea, Disp: 60 tablet, Rfl: 2     ONETOUCH LANCETS MISC, Use to test blood sugar 6 to 8 times daily or as directed. Okay to dispense One Touch Verio products per patient's insurance., Disp: 700 each, Rfl: 1     oxyCODONE-acetaminophen (PERCOCET) 5-325 MG tablet, Take 1 tablet by mouth every 4 hours as needed (must last 30 days from dispense date), Disp: 30 tablet, Rfl: 0     predniSONE (DELTASONE) 10 MG tablet, Take 1 tablet (10 mg) by mouth daily As needed for flares only, Disp: 30 tablet, Rfl: 1     predniSONE (DELTASONE) 5 MG tablet, Take 3 tablets (15mg) by mouth for 4 days, then 2 tablets (10mg) by mouth for 4 days., Disp: 20 tablet, Rfl: 0     predniSONE (DELTASONE) 5 MG  tablet, 3 tabs daily for 1 wk, then 2 tabs daily for 1 week., Disp: 50 tablet, Rfl: 2     senna-docusate (SENOKOT-S;PERICOLACE) 8.6-50 MG per tablet, Take 1-2 tablets by mouth 2 times daily, Disp: 16 tablet, Rfl: 0     traMADol (ULTRAM) 50 MG tablet, Take 1 tablet (50 mg) by mouth every 8 hours as needed for moderate pain . Must last 30 days, Disp: 60 tablet, Rfl: 0     triamcinolone (KENALOG) 0.1 % cream, Apply topically 2 times daily, Disp: 453.6 g, Rfl: 1     valACYclovir (VALTREX) 500 MG tablet, Take 1 tablet (500 mg) by mouth daily (Patient taking differently: Take 500 mg by mouth daily as needed ), Disp: 90 tablet, Rfl: 1     oxyCODONE (ROXICODONE) 5 MG tablet, Take 1 tablet (5 mg) by mouth every 6 hours as needed for severe pain (Patient not taking: Reported on 12/26/2018), Disp: 30 tablet, Rfl: 0     predniSONE (DELTASONE) 5 MG tablet, 20 mg per day x 2 days, 15 mg per day x 2 days, 10 mg per day x 2 days then stop. - (Patient not taking: Reported on 12/26/2018.), Disp: 18 tablet, Rfl: 0     ustekinumab (STELARA) 45 MG/0.5ML SOSY, Inject 0.5 mLs (45 mg) Subcutaneous every 3 months (Patient not taking: Reported on 12/26/2018), Disp: 3 Syringe, Rfl: 1     valACYclovir (VALTREX) 1000 mg tablet, Take 2 tablets by mouth 2 times daily for 1 day., Disp: 4 tablet, Rfl: 1    Current Facility-Administered Medications:      lidocaine (PF) (XYLOCAINE) 1 % injection 5 mL, 5 mL, Infiltration, Once, Rene Granados MD     lidocaine (PF) (XYLOCAINE) 1 % injection 50 mg, 5 mL, Infiltration, Once, Rene Granados MD     triamcinolone acetonide (KENALOG-40) injection 40 mg, 40 mg, INTRA-ARTICULAR, Once, Rene Granados MD     triamcinolone acetonide (KENALOG-40) injection 40 mg, 40 mg, INTRA-ARTICULAR, Once, Rene Granados MD      Allergies:   Diagnostic x-ray materials; Betadine [povidone iodine]; Levofloxacin; Penicillins; Pneumococcal vaccines; Prenatal vit-fe fumarate-fa [cavan-folate ob]; and Tdap  "[daptacel]      Past Medical History:  Diabetes  Psoriatic arthritis (H), Psoriasis arthropathica (H)  Polyarthritis (probable psoriatic arthritis)  Cardiovascular screening, LDL goal less than 160  Psoriatic Arthritis (H)  Diabetes mellitus type 2, controlled (H)  Psoriasis arthropathica (H)  Malaise and Fatigue  Iron deficiency anemia      Past Surgical History:  Arthroscopy knee with meniscectomy, right (11/20/2018)  AS Hysteroscopy Surgical W/Endometrial, bilateral (2017)   Tonsillar abcess    Family History:   Mother- Thyroid disease, HTN, Diabetes  Father- Connective tissue disorder (psoriasis)  Maternal Grandfather- Diabetes, Hypertension, Prostate Cancer  Maternal Uncle- Diabetes  Maternal Grandmother - HTN  No family history of asthma.     Social History:   Former smoker. 11 Years tobacco free. Denies smokeless tobacco use. Admits to 2 standard drinks of alcohol weekly.      Physical Exam:   /79   Pulse 88   Temp 98.4  F (36.9  C) (Oral)   Ht 1.6 m (5' 3\")   Wt 51.9 kg (114 lb 8 oz)   SpO2 100%   BMI 20.28 kg/m      Wt Readings from Last 4 Encounters:   12/26/18 51.9 kg (114 lb 8 oz)   11/20/18 50.9 kg (112 lb 4.8 oz)   11/02/18 50.9 kg (112 lb 3.2 oz)   08/20/18 49.3 kg (108 lb 9.6 oz)     Constitutional: Well-developed, appearing stated age; cooperative  Eyes: Normal EOM, PERRLA, vision, conjunctiva, sclera  ENT: Normal external ears, nose, hearing, lips, teeth, gums, throat. No mucous membrane lesions, normal saliva pool  Neck: No mass or thyroid enlargement  Resp: Lungs clear to auscultation, nl to palpation  CV: RRR, no murmurs, rubs or gallops, no edema  GI: No ABD mass or tenderness, no HSM  : Not tested  Lymph: No cervical, supraclavicular, inguinal or epitrochlear nodes  MS: The TMJ, neck, shoulder, elbow, wrist, MCP/PIP/DIP, spine, hip, knee, ankle, and foot MTP/IP joints were examined and found normal. No active synovitis or altered joint anatomy. Full joint ROM. Normal  " strength. No dactylitis,  tenosynovitis, enthesopathy. Right knee has modest visible swelling compared to the left. Well healed surgical port scars. Cool swelling in the posterior knee at the level of baker's cyst. Flexion and extension of both knees are full. Moderate effusion on the right. Both knees are cool.   Skin: No nail pitting, alopecia, rash, nodules or lesions  Neuro: Normal cranial nerves, strength, sensation, DTRs.   Psych: Normal judgement, orientation, memory, affect.     Imaging:  MR Knee Right w/o Contrast (08/27/2018):  Impression:  1. Moderate size right knee joint effusion with synovial  hypertrophy/fatty change, suggesting underlying chronic synovitis. No  erosions.  2. Moderate-sized septated Baker's cyst measuring 7.2 x 3.7 x 1.5 cm.  3. Diminutive medial meniscus body and medial aspect of the posterior  horn. Please correlate for any history of partial meniscectomy, or  else this may represent tearing.   4. The anterior and posterior cruciate ligaments, medial and lateral  supporting structures, and lateral meniscus are intact.  Radha Argueta MD    XR Knee Bilateral 3 views (08/20/2018):  Impression:  Bilateral minimal lateral patellar subluxation, left  patellar tilt. No acute osseous pathology.  Jutta Ellermann, MD    Laboratory:   Component      Latest Ref Rng & Units 11/20/2018 11/20/2018           8:51 AM  9:24 AM   Sodium      133 - 144 mmol/L     Potassium      3.4 - 5.3 mmol/L     Chloride      94 - 109 mmol/L     Carbon Dioxide      20 - 32 mmol/L     Anion Gap      3 - 14 mmol/L     Glucose      70 - 99 mg/dL  175 (H)   Urea Nitrogen      7 - 30 mg/dL     Creatinine      0.52 - 1.04 mg/dL     GFR Estimate      >60 mL/min/1.73:m2     GFR Estimate If Black      >60 mL/min/1.73:m2     Calcium      8.5 - 10.1 mg/dL     Bilirubin Total      0.2 - 1.3 mg/dL     Albumin      3.4 - 5.0 g/dL     Protein Total      6.8 - 8.8 g/dL     Alkaline Phosphatase      40 - 150 U/L     ALT      0 -  50 U/L     AST      0 - 45 U/L     WBC      4.0 - 11.0 10e9/L     RBC Count      3.8 - 5.2 10e12/L     Hemoglobin      11.7 - 15.7 g/dL     Hematocrit      35.0 - 47.0 %     MCV      78 - 100 fl     MCH      26.5 - 33.0 pg     MCHC      31.5 - 36.5 g/dL     RDW      10.0 - 15.0 %     Platelet Count      150 - 450 10e9/L     Cholesterol      <200 mg/dL     Triglycerides      <150 mg/dL     HDL Cholesterol      >49 mg/dL     LDL Cholesterol Calculated      <100 mg/dL     Non HDL Cholesterol      <130 mg/dL     HCG Qual Urine      neg     Internal QC OK           TSH      0.40 - 4.00 mU/L     Copath Report       Patient Name: ADI CHANDLER . . .    CRP Inflammation      0.0 - 8.0 mg/L       Component      Latest Ref Rng & Units 11/20/2018 12/26/2018          10:51 AM    Sodium      133 - 144 mmol/L     Potassium      3.4 - 5.3 mmol/L     Chloride      94 - 109 mmol/L     Carbon Dioxide      20 - 32 mmol/L     Anion Gap      3 - 14 mmol/L     Glucose      70 - 99 mg/dL 144 (H)    Urea Nitrogen      7 - 30 mg/dL     Creatinine      0.52 - 1.04 mg/dL  0.50 (L)   GFR Estimate      >60 mL/min/1.73:m2  >90   GFR Estimate If Black      >60 mL/min/1.73:m2  >90   Calcium      8.5 - 10.1 mg/dL     Bilirubin Total      0.2 - 1.3 mg/dL     Albumin      3.4 - 5.0 g/dL     Protein Total      6.8 - 8.8 g/dL     Alkaline Phosphatase      40 - 150 U/L     ALT      0 - 50 U/L  19   AST      0 - 45 U/L  13   WBC      4.0 - 11.0 10e9/L  6.6   RBC Count      3.8 - 5.2 10e12/L  4.66   Hemoglobin      11.7 - 15.7 g/dL  11.6 (L)   Hematocrit      35.0 - 47.0 %  38.2   MCV      78 - 100 fl  82   MCH      26.5 - 33.0 pg  24.9 (L)   MCHC      31.5 - 36.5 g/dL  30.4 (L)   RDW      10.0 - 15.0 %  14.6   Platelet Count      150 - 450 10e9/L  357   Cholesterol      <200 mg/dL     Triglycerides      <150 mg/dL     HDL Cholesterol      >49 mg/dL     LDL Cholesterol Calculated      <100 mg/dL     Non HDL Cholesterol      <130 mg/dL     HCG Qual  Urine      neg     Internal QC OK           TSH      0.40 - 4.00 mU/L     Copath Report           CRP Inflammation      0.0 - 8.0 mg/L  <2.9     RHEUM RESULTS Latest Ref Rng & Units 4/30/2018 8/20/2018 12/26/2018   SED RATE 0 - 20 mm/h 10 - -   CRP, INFLAMMATION 0.0 - 8.0 mg/L <2.9 - <2.9   CK TOTAL 32 - 200 U/L - - -   RHEUMATOID FACTOR 0 - 14 IU/mL - - -   CHALO SCREEN BY EIA <1.0 - - -   AST 0 - 45 U/L 10 5 13   ALT 0 - 50 U/L 18 17 19   ALBUMIN 3.4 - 5.0 g/dL - 4.1 -   WBC 4.0 - 11.0 10e9/L 7.3 - 6.6   RBC 3.8 - 5.2 10e12/L 4.46 - 4.66   HGB 11.7 - 15.7 g/dL 11.9 - 11.6(L)   HCT 35.0 - 47.0 % 36.1 - 38.2   MCV 78 - 100 fl 81 - 82   MCHC 31.5 - 36.5 g/dL 33.0 - 30.4(L)   RDW 10.0 - 15.0 % 14.7 - 14.6    - 450 10e9/L 346 - 357   CREATININE 0.52 - 1.04 mg/dL 0.52 0.54 0.50(L)   GFR ESTIMATE, IF BLACK >60 mL/min/[1.73:m2] >90 >90 >90   GFR ESTIMATE >60 mL/min/[1.73:m2] >90 >90 >90   HEPATITIS C ANTIBODY NEG - - -       Rheumatoid Factor   Date Value Ref Range Status   08/11/2011 11 0 - 14 IU/mL Final   ,  ,   Cyclic Cit Pept IgG/IgA   Date Value Ref Range Status   08/11/2011 <20  Interpretation:  Negative <20 UNITS Final   ,  ,  ,  ,  ,   CHALO Screen by EIA   Date Value Ref Range Status   04/09/2014 <1.0  Interpretation:  Negative <1.0 Final   ,  ,  ,  ,  ,  ,  ,  ,  ,   Antistreptolysin O   Date Value Ref Range Status   09/11/2015 109 0 - 120 IU/mL Final     Comment:     A single ASO analysis may not be meaningful due to the variability of ASO   values   within the normal population.  Both clinical and laboratory findings should   be   considered in reaching a diagnosis.  A single analysis may be less   informative   than a repeat analysis showing a change.       ,  ,  ,  ,  ,  ,  ,  ,  ,   Neutrophil Cytoplasmic IgG Antibody   Date Value Ref Range Status   05/31/2013   Final    <1:20  Reference range: <1:20  (Note)  The ANCA IFA is <1:20; therefore, no further testing will  be performed.  INTERPRETIVE  INFORMATION: Anti-Neutrophil Cyto Ab, IgG    Neutrophil Cytoplasmic Antibodies (C-ANCA = granular  cytoplasmic staining, P-ANCA = perinuclear staining) are  found in the serum of over 90 percent of patients with  certain necrotizing systemic vasculitides, and usually in  less than 5 percent of patients with collagen vascular  disease or arthritis.  Performed by [facility],  [address] [place] [id number] [phone]  [email or web address], Nataly Roth MD, Lab. Director   ,  ,  ,  ,  ,  ,  ,  ,  ,  ,  ,  ,  ,  ,  ,  ,  ,  ,    Scribe Disclosure:   I, Debra Rivero, am serving as a scribe to document services personally performed by Rene Granados MD at this visit, based upon the provider's statements to me. All documentation has been reviewed by the aforementioned provider prior to being entered into the official medical record.     Portions of this medical record were completed by a scribe. UPON MY REVIEW AND AUTHENTICATION BY ELECTRONIC SIGNATURE, this confirms (a) I performed the applicable clinical services, and (b) the record is accurate.  Rene Granados MD  Staff Rheumatologist, [facility]

## 2018-12-26 NOTE — NURSING NOTE
"Chief Complaint   Patient presents with     RECHECK     f/u for knee pain     /79   Pulse 88   Temp 98.4  F (36.9  C) (Oral)   Ht 1.6 m (5' 3\")   Wt 51.9 kg (114 lb 8 oz)   SpO2 100%   BMI 20.28 kg/m    Edith Guerra CMA    "

## 2018-12-26 NOTE — PATIENT INSTRUCTIONS
Psoriatic arthritis with moderate activity persisting in right > left knee.  Plan: add stelara 45 mg every 90 days to otezla 60 mg twice daily.

## 2018-12-27 RX ORDER — OXYCODONE AND ACETAMINOPHEN 5; 325 MG/1; MG/1
1 TABLET ORAL EVERY 4 HOURS PRN
Qty: 30 TABLET | Refills: 0 | Status: SHIPPED | OUTPATIENT
Start: 2018-12-27 | End: 2019-01-11

## 2018-12-27 NOTE — TELEPHONE ENCOUNTER
Prior Authorization Approval    Authorization Effective Date:    Authorization Expiration Date:    Medication: Stelara 45mg/ 0.5mL syringes  Approved Dose/Quantity: every 3 months  Reference #: [facility] key# [id number]   Insurance Company: [employer] - Phone [phone] Fax [phone]  Expected CoPay:       CoPay Card Available: Yes    Foundation Assistance Needed:    Which Pharmacy is filling the prescription (Not needed for infusion/clinic administered): [facility] - [address] [place] [address]  Pharmacy Notified: Yes  Patient Notified:

## 2018-12-27 NOTE — TELEPHONE ENCOUNTER
Provider approved prescription refill, though hard copy script did not print. Rx reprinted, provider signed the reprinted prescription, and it was placed in the mail for patient's home. [employer]lo tracking #[id number].    Tyra Singleton RN

## 2018-12-28 LAB
GAMMA INTERFERON BACKGROUND BLD IA-ACNC: 0.02 IU/ML
M TB IFN-G BLD-IMP: NEGATIVE
M TB IFN-G CD4+ BCKGRND COR BLD-ACNC: >10 IU/ML
MITOGEN IGNF BCKGRD COR BLD-ACNC: 0 IU/ML
MITOGEN IGNF BCKGRD COR BLD-ACNC: 0 IU/ML

## 2019-01-10 ENCOUNTER — MYC REFILL (OUTPATIENT)
Dept: INTERNAL MEDICINE | Facility: CLINIC | Age: 34
End: 2019-01-10

## 2019-01-10 DIAGNOSIS — L40.50 PSORIATIC ARTHRITIS (H): ICD-10-CM

## 2019-01-10 DIAGNOSIS — B00.1 RECURRENT COLD SORES: ICD-10-CM

## 2019-01-11 NOTE — TELEPHONE ENCOUNTER
site reviewed 1/11/19. Last tramadol filled 12/17/18. Last percocet filled 12/28/18.    Will send to PCP for review. Will hold percocet in clinic until requested by patient.    Tyra Singleton RN

## 2019-01-14 RX ORDER — TRAMADOL HYDROCHLORIDE 50 MG/1
50 TABLET ORAL EVERY 8 HOURS PRN
Qty: 60 TABLET | Refills: 0 | Status: SHIPPED | OUTPATIENT
Start: 2019-01-16 | End: 2019-02-10

## 2019-01-14 RX ORDER — OXYCODONE AND ACETAMINOPHEN 5; 325 MG/1; MG/1
1 TABLET ORAL EVERY 4 HOURS PRN
Qty: 30 TABLET | Refills: 0 | Status: SHIPPED | OUTPATIENT
Start: 2019-01-27 | End: 2019-02-11

## 2019-01-14 NOTE — TELEPHONE ENCOUNTER
Tramadol faxed to [facility] #[id number] today at 0907. Will hold percocet in clinic until requested by patient.    Tyra Singleton RN

## 2019-01-21 ENCOUNTER — MYC MEDICAL ADVICE (OUTPATIENT)
Dept: INTERNAL MEDICINE | Facility: CLINIC | Age: 34
End: 2019-01-21

## 2019-02-10 ENCOUNTER — MYC REFILL (OUTPATIENT)
Dept: INTERNAL MEDICINE | Facility: CLINIC | Age: 34
End: 2019-02-10

## 2019-02-10 DIAGNOSIS — E11.9 TYPE 2 DIABETES MELLITUS WITHOUT COMPLICATION, WITH LONG-TERM CURRENT USE OF INSULIN (H): ICD-10-CM

## 2019-02-10 DIAGNOSIS — Z79.4 TYPE 2 DIABETES MELLITUS WITHOUT COMPLICATION, WITH LONG-TERM CURRENT USE OF INSULIN (H): ICD-10-CM

## 2019-02-10 DIAGNOSIS — B00.1 RECURRENT COLD SORES: ICD-10-CM

## 2019-02-10 DIAGNOSIS — L40.50 PSORIATIC ARTHRITIS (H): ICD-10-CM

## 2019-02-11 RX ORDER — TRAMADOL HYDROCHLORIDE 50 MG/1
50 TABLET ORAL EVERY 8 HOURS PRN
Qty: 60 TABLET | Refills: 0 | Status: SHIPPED | OUTPATIENT
Start: 2019-02-15 | End: 2019-03-12

## 2019-02-11 RX ORDER — OXYCODONE AND ACETAMINOPHEN 5; 325 MG/1; MG/1
1 TABLET ORAL EVERY 4 HOURS PRN
Qty: 30 TABLET | Refills: 0 | Status: SHIPPED | OUTPATIENT
Start: 2019-02-26 | End: 2019-03-14

## 2019-02-11 NOTE — TELEPHONE ENCOUNTER
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    Last Fill Date:   1/16/19: tramadol  1/27/19: percocet    Last Visit with PCP:   8/20/18    Future Visits with PCP:   Yes:  Date 4/22/19.    Processing:   fax tramadol to patient's pharmacy. Hold percocet in clinic until requested by patient.     Tyra Singleton RN

## 2019-02-12 NOTE — TELEPHONE ENCOUNTER
Tramadol rx faxed to [facility] #[id number]. Percocet prescription will be held in clinic until requested by patient.    Tyra Singleton RN

## 2019-02-20 ENCOUNTER — MYC MEDICAL ADVICE (OUTPATIENT)
Dept: INTERNAL MEDICINE | Facility: CLINIC | Age: 34
End: 2019-02-20

## 2019-03-12 ENCOUNTER — MYC REFILL (OUTPATIENT)
Dept: INTERNAL MEDICINE | Facility: CLINIC | Age: 34
End: 2019-03-12

## 2019-03-12 DIAGNOSIS — B00.1 RECURRENT COLD SORES: ICD-10-CM

## 2019-03-12 DIAGNOSIS — L40.50 PSORIATIC ARTHRITIS (H): ICD-10-CM

## 2019-03-14 RX ORDER — OXYCODONE AND ACETAMINOPHEN 5; 325 MG/1; MG/1
1 TABLET ORAL EVERY 4 HOURS PRN
Qty: 30 TABLET | Refills: 0 | Status: SHIPPED | OUTPATIENT
Start: 2019-03-27 | End: 2019-04-22

## 2019-03-14 RX ORDER — TRAMADOL HYDROCHLORIDE 50 MG/1
50 TABLET ORAL EVERY 8 HOURS PRN
Qty: 60 TABLET | Refills: 0 | Status: SHIPPED | OUTPATIENT
Start: 2019-03-16 | End: 2019-04-09

## 2019-03-14 NOTE — PROGRESS NOTES
Discharge Note    Progress reporting period is from Nov 25, 2018 to Dec 17, 2018.  Chrissy failed to follow up and current status is unknown.  Please see information below for last relevant information on current status.  Patient seen for 2 visits.    SUBJECTIVE  Subjective changes noted by patient:  Patient reports knee is feeling much better.  She is no longer using crutches.  Patient has returned to normal work schedule.  .  Changes in function:  Yes (See Goal flowsheet attached for changes in current functional level)  Adverse reaction to treatment or activity: None    OBJECTIVE  Changes noted in objective findings: PROM: 0-150 deg. Gait: nearly normal.  AROM: WNL.  Swelling: 3/5.  Good control with 4 inch lateral step down     ASSESSMENT/PLAN  Diagnosis: s/p R Knee Scope    STG/LTGs have been met or progress has been made towards goals:  Yes, please see goal flowsheet for most current information  Assessment of Progress: current status is unknown.    Last current status: Pt is progressing well   Self Management Plans:  HEP  I have re-evaluated this patient and find that the nature, scope, duration and intensity of the therapy is appropriate for the medical condition of the patient.  Chrissy continues to require the following intervention to meet STG and LTG's:  HEP.    Recommendations:  Discharge with current home program.  Patient to follow up with MD as needed.    Please refer to the daily flowsheet for treatment today, total treatment time and time spent performing 1:1 timed codes.

## 2019-03-14 NOTE — TELEPHONE ENCOUNTER
Last office visit with PCP 8/20/18. Upcoming visit 4/22/19.  site reviewed 3/14/19. Tramadol last received 2/14/19. Last percocet refill received 2/25/19. Will hold percocet until requested by patient.    Will send to PCP for review.    Tyra Singleton RN

## 2019-03-15 NOTE — TELEPHONE ENCOUNTER
Tramadol rx faxed to [facility] #[id number] today at 0801. Will hold percocet prescription until requested by patient.    Tyra Singleton RN

## 2019-03-20 ENCOUNTER — MYC MEDICAL ADVICE (OUTPATIENT)
Dept: INTERNAL MEDICINE | Facility: CLINIC | Age: 34
End: 2019-03-20

## 2019-04-09 ENCOUNTER — MYC REFILL (OUTPATIENT)
Dept: INTERNAL MEDICINE | Facility: CLINIC | Age: 34
End: 2019-04-09

## 2019-04-09 DIAGNOSIS — L40.50 PSORIATIC ARTHRITIS (H): ICD-10-CM

## 2019-04-10 NOTE — TELEPHONE ENCOUNTER
Reason For Call:   Chief Complaint   Patient presents with     Refill Request       Medication Name, Dose and Monthly Quantity:   traMADol (ULTRAM) 50 MG tablet 60 tablet 0 3/16/2019  No   Sig - Route: Take 1 tablet (50 mg) by mouth every 8 hours as needed for moderate pain . Must last 30 days          Diagnosis requiring opiates:   Psoriatic arthritis (H) [L40.50]     Opioid Agreement On File - [facility] PAIN CONTRACT ID# [id number]:  No    Last Urine Drug Screen (at least once every 12 months) Date:   None  Unexpected Results:   No.    [place]  Data Reviewed (at least once every 3 months) Date:   4/10/19   Unexpected Results:    No.    Last Fill Date:   3/15/19    Last Visit with PCP:   8/18/18    Future Visits with PCP:   Yes:  Date 4/22/19.    Processing:   Faxed to [facility] #[id number]    Radha Lindsey RN

## 2019-04-11 RX ORDER — TRAMADOL HYDROCHLORIDE 50 MG/1
50 TABLET ORAL EVERY 8 HOURS PRN
Qty: 60 TABLET | Refills: 0 | Status: SHIPPED | OUTPATIENT
Start: 2019-04-14 | End: 2019-05-06

## 2019-04-17 ASSESSMENT — ENCOUNTER SYMPTOMS
WEAKNESS: 1
LIGHT-HEADEDNESS: 0
PARALYSIS: 0
POOR WOUND HEALING: 0
SKIN CHANGES: 0
TREMORS: 0
ORTHOPNEA: 0
BACK PAIN: 0
HYPOTENSION: 0
NAIL CHANGES: 0
MYALGIAS: 0
SLEEP DISTURBANCES DUE TO BREATHING: 0
DIZZINESS: 0
MUSCLE WEAKNESS: 0
MUSCLE CRAMPS: 1
MEMORY LOSS: 0
TINGLING: 1
ARTHRALGIAS: 1
HEADACHES: 1
SYNCOPE: 0
NECK PAIN: 0
SPEECH CHANGE: 0
HYPERTENSION: 0
DISTURBANCES IN COORDINATION: 0
LEG PAIN: 1
PALPITATIONS: 0
NUMBNESS: 1
JOINT SWELLING: 1
EXERCISE INTOLERANCE: 0
STIFFNESS: 1

## 2019-04-22 ENCOUNTER — OFFICE VISIT (OUTPATIENT)
Dept: INTERNAL MEDICINE | Facility: CLINIC | Age: 34
End: 2019-04-22
Payer: COMMERCIAL

## 2019-04-22 VITALS
SYSTOLIC BLOOD PRESSURE: 103 MMHG | WEIGHT: 113.3 LBS | DIASTOLIC BLOOD PRESSURE: 68 MMHG | BODY MASS INDEX: 20.07 KG/M2 | HEART RATE: 94 BPM

## 2019-04-22 DIAGNOSIS — E11.9 TYPE 2 DIABETES MELLITUS WITHOUT COMPLICATION, WITH LONG-TERM CURRENT USE OF INSULIN (H): ICD-10-CM

## 2019-04-22 DIAGNOSIS — L40.50 PSORIATIC ARTHRITIS (H): ICD-10-CM

## 2019-04-22 DIAGNOSIS — Z79.4 CONTROLLED TYPE 2 DIABETES MELLITUS WITHOUT COMPLICATION, WITH LONG-TERM CURRENT USE OF INSULIN (H): ICD-10-CM

## 2019-04-22 DIAGNOSIS — Z79.4 TYPE 2 DIABETES MELLITUS WITHOUT COMPLICATION, WITH LONG-TERM CURRENT USE OF INSULIN (H): ICD-10-CM

## 2019-04-22 DIAGNOSIS — L40.50: Primary | ICD-10-CM

## 2019-04-22 DIAGNOSIS — E11.9 CONTROLLED TYPE 2 DIABETES MELLITUS WITHOUT COMPLICATION, WITH LONG-TERM CURRENT USE OF INSULIN (H): ICD-10-CM

## 2019-04-22 DIAGNOSIS — B00.1 RECURRENT COLD SORES: ICD-10-CM

## 2019-04-22 LAB — HBA1C MFR BLD: 8 % (ref 0–5.6)

## 2019-04-22 RX ORDER — OXYCODONE AND ACETAMINOPHEN 5; 325 MG/1; MG/1
1 TABLET ORAL EVERY 4 HOURS PRN
Qty: 30 TABLET | Refills: 0 | Status: SHIPPED | OUTPATIENT
Start: 2019-04-22 | End: 2019-05-15

## 2019-04-22 RX ORDER — VALACYCLOVIR HYDROCHLORIDE 500 MG/1
500 TABLET, FILM COATED ORAL DAILY PRN
Qty: 60 TABLET | Refills: 3 | Status: SHIPPED | OUTPATIENT
Start: 2019-04-22 | End: 2020-05-29

## 2019-04-22 ASSESSMENT — PAIN SCALES - GENERAL: PAINLEVEL: SEVERE PAIN (7)

## 2019-04-22 NOTE — NURSING NOTE
Chief Complaint   Patient presents with     Recheck Medication       Roxie Branch LPN at 8:59 AM on April 22, 2019

## 2019-04-22 NOTE — PROGRESS NOTES
HPI  33-year-old diabetic with psoriatic arthritis presents today for medication review.  She has had recurrent cold sores she uses as needed Valtrex for this with good effect.  She no longer is taking chronic suppressive therapy for this.  Her blood sugars have been fluctuating dramatically.  She is varying from low to high within the same day.  Is following with endocrinology regarding this and is being considered presently for an insulin pump.  We reviewed her A1c today which is 8% higher than when she was pregnant but lower than what it has been recently.  We refilled her metformin and Levemir pending starting the pump therapy.  Is been having more pain and discomfort in the knees presently the right knee.  Is associated with some initial joint stiffness and soreness particularly when she initiates movement and activity.  She has not had any locking or buckling in the knee.  Otherwise she has been doing well her skin has been under excellent control on her present medications and the only joints that are bothering her over the knees predominantly the right knee.  Past Medical History:   Diagnosis Date     Diabetes      Other psoriasis      Polyarthritis     probable psoriatic arthritis     Past Surgical History:   Procedure Laterality Date     ARTHROSCOPY KNEE WITH MENISCECTOMY Right 11/20/2018    Procedure: Examination Under Anesthesia Right Knee, Right Knee Arthroscopy, Partial Meniscectomy, Chondroplasty, Cyst Decompression, Synovial Biopsy;  Surgeon: Sean Pate MD;  Location: [facility] OR     AS HYSTEROSCOPY, SURGICAL; W/ ENDOMETRIAL ABLATION, ANY METHOD Bilateral 2017    endometrial ablation with bilater TL     SURGICAL HISTORY OF -       .  Tonsillar abscess     Family History   Problem Relation Age of Onset     Thyroid Disease Mother      Diabetes Mother      Hypertension Mother      Connective Tissue Disorder Father         Psoriasis     Diabetes Maternal Grandfather      Hypertension Maternal  Grandfather      Prostate Cancer Maternal Grandfather      Diabetes Maternal Uncle      Diabetes Maternal Uncle      Hypertension Maternal Grandmother      Asthma No family hx of      Social History     Socioeconomic History     Marital status:      Spouse name: None     Number of children: None     Years of education: None     Highest education level: None   Occupational History     Occupation: Dental Assist.     Employer: UNKNOWN   Social Needs     Financial resource strain: None     Food insecurity:     Worry: None     Inability: None     Transportation needs:     Medical: None     Non-medical: None   Tobacco Use     Smoking status: Former Smoker     Packs/day: 1.00     Years: 5.00     Pack years: 5.00     Types: Cigarettes     Last attempt to quit: 2007     Years since quittin.4     Smokeless tobacco: Never Used   Substance and Sexual Activity     Alcohol use: No     Alcohol/week: 1.0 oz     Types: 2 Standard drinks or equivalent per week     Drug use: No     Sexual activity: Yes     Partners: Male     Birth control/protection: IUD     Comment: Mirena   Lifestyle     Physical activity:     Days per week: None     Minutes per session: None     Stress: None   Relationships     Social connections:     Talks on phone: None     Gets together: None     Attends Catholic service: None     Active member of club or organization: None     Attends meetings of clubs or organizations: None     Relationship status: None     Intimate partner violence:     Fear of current or ex partner: None     Emotionally abused: None     Physically abused: None     Forced sexual activity: None   Other Topics Concern      Service Not Asked     Blood Transfusions Not Asked     Caffeine Concern Not Asked     Occupational Exposure Not Asked     Hobby Hazards Not Asked     Sleep Concern Not Asked     Stress Concern Not Asked     Weight Concern Not Asked     Special Diet Not Asked     Back Care Not Asked     Exercise No      Bike Helmet Not Asked     Seat Belt Not Asked     Self-Exams Not Asked     Parent/sibling w/ CABG, MI or angioplasty before 65F 55M? Not Asked   Social History Narrative    How much exercise per week? No    How much calcium per day? Diet      How much caffeine per day? Coffee  2    How much vitamin D per day? Diet    Do you/your family wear seatbelts?  Yes    Do you/your family use safety helmets? No    Do you/your family use sunscreen? Yes    Do you/your family keep firearms in the home? No    Do you/your family have a smoke detector(s)? Yes        Do you feel safe in your home? Yes    Has anyone ever touched you in an unwanted manner? No     Explain          Answers for HPI/ROS submitted by the patient on 4/17/2019   General Symptoms: No  Skin Symptoms: Yes  HENT Symptoms: No  EYE SYMPTOMS: No  HEART SYMPTOMS: Yes  LUNG SYMPTOMS: No  INTESTINAL SYMPTOMS: No  URINARY SYMPTOMS: No  GYNECOLOGIC SYMPTOMS: No  BREAST SYMPTOMS: No  SKELETAL SYMPTOMS: Yes  BLOOD SYMPTOMS: No  NERVOUS SYSTEM SYMPTOMS: Yes  MENTAL HEALTH SYMPTOMS: No  Changes in hair: No  Changes in moles/birth marks: No  Itching: No  Rashes: No  Changes in nails: No  Acne: Yes  Hair in places you don't want it: No  Change in facial hair: No  Warts: No  Non-healing sores: No  Scarring: No  Flaking of skin: No  Color changes of hands/feet in cold : Yes  Sun sensitivity: No  Skin thickening: No  Chest pain or pressure: No  Fast or irregular heartbeat: No  Pain in legs with walking: Yes  Trouble breathing while lying down: No  Fingers or toes appear blue: Yes  High blood pressure: No  Low blood pressure: No  Fainting: No  Murmurs: No  Pacemaker: No  Varicose veins: No  Edema or swelling: Yes  Wake up at night with shortness of breath: No  Light-headedness: No  Exercise intolerance: No  Back pain: No  Muscle aches: No  Neck pain: No  Swollen joints: Yes  Joint pain: Yes  Bone pain: No  Muscle cramps: Yes  Muscle weakness: No  Joint stiffness: Yes  Bone  fracture: No  Trouble with coordination: No  Dizziness or trouble with balance: No  Memory loss: No  Headache: Yes  Speech problems: No  Tingling: Yes  Tremor: No  Weakness: Yes  Difficulty walking: Yes  Paralysis: No  Numbness: Yes    Exam:  /68   Pulse 94   Wt 51.4 kg (113 lb 4.8 oz)   BMI 20.07 kg/m    113 lbs 4.8 oz  The patient is alert, oriented with a clear sensorium.   Skin shows no lesions or rashes and good turgor.   Head is normocephalic and atraumatic.    Neck shows no nodes, thyromegaly.     Lungs are clear.   Heart shows normal S1 and S2 without murmur or gallop.    Extremities show no edema, bilateral popliteal cysts right greater than left small joint effusion on the right with mild joint line tenderness.      Labs reviewed with her:  Results for orders placed or performed in visit on 04/22/19   Hemoglobin A1c   Result Value Ref Range    Hemoglobin A1C 8.0 (H) 0 - 5.6 %       ASSESSMENT  1 psoriatic arthritis with persistent mild synovitis and popliteal cyst on the right knee  2 diabetes mellitus fair control  3 chronic pain related to #1 above  4 recurrent cold sores    Plan  Refilled her Metformin and Levemir will refill her oxycodone later this week.  Also gave her a prescription for Narcan to have on hand.  We discussed possible intra-articular steroid injection of the right knee and she will discuss this with rheumatology with her appointment later this week.  Also encouraged her to pursue the insulin pump.    This note was completed using Dragon voice recognition software.  Although reviewed after completion, some word and grammatical errors may occur.    Nando Haskins MD  General Internal Medicine  [facility]  [phone]

## 2019-05-06 ENCOUNTER — MYC MEDICAL ADVICE (OUTPATIENT)
Dept: INTERNAL MEDICINE | Facility: CLINIC | Age: 34
End: 2019-05-06

## 2019-05-06 ENCOUNTER — MYC REFILL (OUTPATIENT)
Dept: INTERNAL MEDICINE | Facility: CLINIC | Age: 34
End: 2019-05-06

## 2019-05-06 DIAGNOSIS — L40.50 PSORIATIC ARTHRITIS (H): ICD-10-CM

## 2019-05-07 ENCOUNTER — MYC MEDICAL ADVICE (OUTPATIENT)
Dept: INTERNAL MEDICINE | Facility: CLINIC | Age: 34
End: 2019-05-07

## 2019-05-07 NOTE — TELEPHONE ENCOUNTER
site reviewed 5/07/19. Last tramadol refill received 4/12/19.    Will send to PCP for review.    Tyra Singleton RN

## 2019-05-08 ENCOUNTER — MEDICAL CORRESPONDENCE (OUTPATIENT)
Dept: HEALTH INFORMATION MANAGEMENT | Facility: CLINIC | Age: 34
End: 2019-05-08

## 2019-05-08 RX ORDER — TRAMADOL HYDROCHLORIDE 50 MG/1
50 TABLET ORAL EVERY 8 HOURS PRN
Qty: 60 TABLET | Refills: 0 | Status: SHIPPED | OUTPATIENT
Start: 2019-05-12 | End: 2019-05-17

## 2019-05-15 ENCOUNTER — MYC REFILL (OUTPATIENT)
Dept: INTERNAL MEDICINE | Facility: CLINIC | Age: 34
End: 2019-05-15

## 2019-05-15 DIAGNOSIS — B00.1 RECURRENT COLD SORES: ICD-10-CM

## 2019-05-15 DIAGNOSIS — L40.50 PSORIATIC ARTHRITIS (H): ICD-10-CM

## 2019-05-17 NOTE — TELEPHONE ENCOUNTER
Reason For Call:   Chief Complaint   Patient presents with     Refill Request            Medication Name, Dose and Monthly Quantity:   Oxycodone with APAP (Percocet): Dose 5-325mg Schedule 1 tablet Q4H PRN Monthly Quantity 30   Medication Name, Dose and Monthly Quantity:  Tramadol (Ultram): Dose 50mg Schedule 1 tablet Q8H PRN Monthly Quantity 60     Diagnosis requiring opiates:   Psoriatic arthritis (H) [L40.50]       Recurrent cold sores [B00.1]         Problem List Updated:   Yes    Opioid Agreement On File - [facility] PAIN CONTRACT ID# [id number]:  No    Last Urine Drug Screen (at least once every 12 months) Date:   none  Unexpected Results:   N/A    [place]  Data Reviewed (at least once every 3 months) Date:   5/17/19   Unexpected Results:    Tramadol filled 3 days early.    Last Fill Date:   Percocet:4/24/19  Tramadol: 5/09/19. Fill date was listed for 5/12.    Last Visit with PCP:   4/22/19    Future Visits with PCP:   No.    Processing:   Mail to patient.     Tyra Singleton RN

## 2019-05-20 RX ORDER — TRAMADOL HYDROCHLORIDE 50 MG/1
50 TABLET ORAL EVERY 8 HOURS PRN
Qty: 60 TABLET | Refills: 0 | Status: SHIPPED | OUTPATIENT
Start: 2019-06-11 | End: 2019-07-07

## 2019-05-20 RX ORDER — OXYCODONE AND ACETAMINOPHEN 5; 325 MG/1; MG/1
1 TABLET ORAL EVERY 4 HOURS PRN
Qty: 30 TABLET | Refills: 0 | Status: SHIPPED | OUTPATIENT
Start: 2019-05-24 | End: 2019-06-05

## 2019-06-04 DIAGNOSIS — L40.50 PSORIATIC ARTHRITIS (H): ICD-10-CM

## 2019-06-05 ENCOUNTER — TELEPHONE (OUTPATIENT)
Dept: RHEUMATOLOGY | Facility: CLINIC | Age: 34
End: 2019-06-05

## 2019-06-05 ENCOUNTER — MYC REFILL (OUTPATIENT)
Dept: INTERNAL MEDICINE | Facility: CLINIC | Age: 34
End: 2019-06-05

## 2019-06-05 DIAGNOSIS — L40.50 PSORIATIC ARTHRITIS (H): ICD-10-CM

## 2019-06-05 DIAGNOSIS — B00.1 RECURRENT COLD SORES: ICD-10-CM

## 2019-06-05 NOTE — TELEPHONE ENCOUNTER
apremilast (OTEZLA) 30 MG tablet      Last Written Prescription Date:  12-26-18  Last Fill Quantity: 60,   # refills: 4  Last Office Visit : 12-26-18  Future Office visit:  8-2-19    Routing refill request to provider for review/approval because:  > 6 month between appt,   Authorize/sign for RF till next appt    60 tab:1 RF pending

## 2019-06-07 RX ORDER — OXYCODONE AND ACETAMINOPHEN 5; 325 MG/1; MG/1
1 TABLET ORAL EVERY 4 HOURS PRN
Qty: 30 TABLET | Refills: 0 | Status: SHIPPED | OUTPATIENT
Start: 2019-06-23 | End: 2019-07-15

## 2019-06-07 NOTE — TELEPHONE ENCOUNTER
Tramadol rx faxed to [facility] #[id number].      Reason For Call:   Chief Complaint   Patient presents with     Refill Request            Medication Name, Dose and Monthly Quantity:   Oxycodone with APAP (Percocet): Dose 5-325mg Schedule 1 tablet Q4H PRN Monthly Quantity 30     Diagnosis requiring opiates:   Psoriatic arthritis (H) [L40.50]       Recurrent cold sores [B00.1]           Problem List Updated:   Yes    Opioid Agreement On File - [facility] PAIN CONTRACT ID# [id number]:  No    Last Urine Drug Screen (at least once every 12 months) Date:   none  Unexpected Results:   N/A    [place]  Data Reviewed (at least once every 3 months) Date:   6/07/19   Unexpected Results:    No.    Last Fill Date:   5/24/19    Last Visit with PCP:   4/22/19    Future Visits with PCP:   No.    Processing:   Mail to patient.     Tyra Singleton RN

## 2019-06-13 DIAGNOSIS — E11.9 TYPE 2 DIABETES MELLITUS WITHOUT COMPLICATION, WITH LONG-TERM CURRENT USE OF INSULIN (H): Primary | ICD-10-CM

## 2019-06-13 DIAGNOSIS — Z79.4 TYPE 2 DIABETES MELLITUS WITHOUT COMPLICATION, WITH LONG-TERM CURRENT USE OF INSULIN (H): Primary | ICD-10-CM

## 2019-06-17 NOTE — TELEPHONE ENCOUNTER
Continuous Blood Gluc Transmit (DEXCOM G6 TRANSMITTER) MISC  Not active on med list      Continuous Blood Gluc Sensor (DEXCOM G6 SENSOR) MISC      Not active on med list  Last Office Visit : 4-22-19  Future Office visit:  none  5-6-19  [facility] encounter  Regarding insulin pump- forms  (? Equipment)    Routing refill request to provider for review/approval because:  Drug not active on patient's medication list

## 2019-06-18 RX ORDER — PROCHLORPERAZINE 25 MG/1
1 SUPPOSITORY RECTAL CONTINUOUS
Qty: 1 EACH | Refills: 0 | Status: SHIPPED | OUTPATIENT
Start: 2019-06-18 | End: 2019-10-17

## 2019-06-18 RX ORDER — PROCHLORPERAZINE 25 MG/1
1 SUPPOSITORY RECTAL CONTINUOUS
Qty: 3 EACH | Refills: 0 | Status: SHIPPED | OUTPATIENT
Start: 2019-06-18 | End: 2019-09-03

## 2019-07-07 ENCOUNTER — MYC REFILL (OUTPATIENT)
Dept: INTERNAL MEDICINE | Facility: CLINIC | Age: 34
End: 2019-07-07

## 2019-07-07 DIAGNOSIS — L40.50 PSORIATIC ARTHRITIS (H): ICD-10-CM

## 2019-07-08 NOTE — TELEPHONE ENCOUNTER
RX for Tramadol faxed to [facility] [place].    JUANCHO GIRALDO at 2:03 PM on 7/9/2019.        Patient Requested  Tramadol    Last Filled  06/11/2019  Last Office Visit  04/22/2019  Next Office Visit  Nothing scheduled   Checked  07/08/2019    JUANCHO GIRALDO at 10:25 AM on 7/8/2019.

## 2019-07-09 RX ORDER — TRAMADOL HYDROCHLORIDE 50 MG/1
50 TABLET ORAL EVERY 8 HOURS PRN
Qty: 60 TABLET | Refills: 0 | Status: SHIPPED | OUTPATIENT
Start: 2019-07-11 | End: 2019-08-05

## 2019-07-11 ENCOUNTER — MYC MEDICAL ADVICE (OUTPATIENT)
Dept: RHEUMATOLOGY | Facility: CLINIC | Age: 34
End: 2019-07-11

## 2019-07-11 ENCOUNTER — TRANSFERRED RECORDS (OUTPATIENT)
Dept: HEALTH INFORMATION MANAGEMENT | Facility: CLINIC | Age: 34
End: 2019-07-11

## 2019-07-11 NOTE — TELEPHONE ENCOUNTER
Spoke with Adriana and she is reporting that both of her knees are swollen almost to the point where she is not able to bend them. Adriana states that it started over the weekend and has gradually gotten worse.  She has had problems in the past and had fluid removed on several occassions and has had steroid injections.     Adriana states that she will connect with her primary care to see if she is able to see them this day because she says she will need to have aspiration done she feels.  Otherwise she is going to go to Urgent Care.     Adriana said she will connect and give us an update.    Alysa Juarez MSN, RN  Rheumatology RN Care Coordinator  [facility]

## 2019-07-11 NOTE — TELEPHONE ENCOUNTER
Left message for Adriana to return my call and will send her mychart as well.     Alysa Juarez MSN, RN  Rheumatology RN Care Coordinator  [facility]

## 2019-07-11 NOTE — TELEPHONE ENCOUNTER
[facility] Call Center    Phone Message    May a detailed message be left on voicemail: yes    Reason for Call: Other: Patient called to see if Dr. Granados, or anyone, can see her today.  Her knees are swollen and are triple in size.  She cannot straighten them and care bareley walk.  She sent a Geeksphone message with pics of her knees.  Please follow up with patient. Thank yoU!      Action Taken: Message routed to:  [facility]

## 2019-07-15 ENCOUNTER — MYC REFILL (OUTPATIENT)
Dept: INTERNAL MEDICINE | Facility: CLINIC | Age: 34
End: 2019-07-15

## 2019-07-15 DIAGNOSIS — B00.1 RECURRENT COLD SORES: ICD-10-CM

## 2019-07-15 DIAGNOSIS — L40.50 PSORIATIC ARTHRITIS (H): ICD-10-CM

## 2019-07-15 RX ORDER — OXYCODONE AND ACETAMINOPHEN 5; 325 MG/1; MG/1
1 TABLET ORAL EVERY 4 HOURS PRN
Qty: 30 TABLET | Refills: 0 | Status: SHIPPED | OUTPATIENT
Start: 2019-07-23 | End: 2019-08-14

## 2019-07-15 NOTE — TELEPHONE ENCOUNTER
RX for Percocet mailed to patient's home address.    JUANCHO GIRALDO at 1:03 PM on 7/15/2019.  Reason For Call:   Chief Complaint   Patient presents with     Refill Request            Medication Name, Dose and Monthly Quantity:   Oxycodone with APAP (Percocet): Dose 5-325mg Schedule 1 tablet Q4H PRN Monthly Quantity 30     Diagnosis requiring opiates:   Psoriatic arthritis (H) [L40.50]       Recurrent cold sores [B00.1]           Problem List Updated:   Yes    Opioid Agreement On File - [facility] PAIN CONTRACT ID# [id number]:  No    Last Urine Drug Screen (at least once every 12 months) Date:   none  Unexpected Results:   N/A    [place]  Data Reviewed (at least once every 3 months) Date:   07/15/2019  Unexpected Results:    No.    Last Fill Date:   06/23/2019    Last Visit with PCP:   07/23/2019    Future Visits with PCP:   No.    Processing:   Mail to patient.     JUANCHO GIRALDO at 9:00 AM on 7/15/2019.

## 2019-07-19 ENCOUNTER — MYC MEDICAL ADVICE (OUTPATIENT)
Dept: RHEUMATOLOGY | Facility: CLINIC | Age: 34
End: 2019-07-19

## 2019-07-19 NOTE — TELEPHONE ENCOUNTER
If it is specifically the knee then the options would be to go to a specific Ortho/Sports medicine clinic/urgent center where they are more experienced in aspirating or injecting knees, or stay on Prednisone until Dr. Granados returns and can answer options of sooner appt or change in therapy. It would be unusual for 40 mg Prednisone not to be helping so I favor her being seen in the Ortho walk in setting. gb

## 2019-08-01 ENCOUNTER — TELEPHONE (OUTPATIENT)
Dept: RHEUMATOLOGY | Facility: CLINIC | Age: 34
End: 2019-08-01

## 2019-08-01 NOTE — TELEPHONE ENCOUNTER
Patient contacted and reminded of upcoming appointment.  Patient confirmed they will be attending.  Patient instructed to bring updated medications list to appointment.    Joaquim Lucas  EMT

## 2019-08-02 ENCOUNTER — TELEPHONE (OUTPATIENT)
Dept: RHEUMATOLOGY | Facility: CLINIC | Age: 34
End: 2019-08-02

## 2019-08-02 ENCOUNTER — OFFICE VISIT (OUTPATIENT)
Dept: RHEUMATOLOGY | Facility: CLINIC | Age: 34
End: 2019-08-02
Attending: INTERNAL MEDICINE
Payer: COMMERCIAL

## 2019-08-02 VITALS
SYSTOLIC BLOOD PRESSURE: 131 MMHG | OXYGEN SATURATION: 98 % | WEIGHT: 108.2 LBS | DIASTOLIC BLOOD PRESSURE: 87 MMHG | HEIGHT: 63 IN | HEART RATE: 105 BPM | BODY MASS INDEX: 19.17 KG/M2 | TEMPERATURE: 97.9 F

## 2019-08-02 DIAGNOSIS — Z79.4 CONTROLLED TYPE 2 DIABETES MELLITUS WITHOUT COMPLICATION, WITH LONG-TERM CURRENT USE OF INSULIN (H): ICD-10-CM

## 2019-08-02 DIAGNOSIS — L40.50 PSORIATIC ARTHRITIS (H): ICD-10-CM

## 2019-08-02 DIAGNOSIS — E11.9 CONTROLLED TYPE 2 DIABETES MELLITUS WITHOUT COMPLICATION, WITH LONG-TERM CURRENT USE OF INSULIN (H): ICD-10-CM

## 2019-08-02 DIAGNOSIS — L40.50 PSORIATIC ARTHRITIS (H): Primary | ICD-10-CM

## 2019-08-02 LAB
ALT SERPL W P-5'-P-CCNC: 19 U/L (ref 0–50)
AST SERPL W P-5'-P-CCNC: 9 U/L (ref 0–45)
CRP SERPL-MCNC: <2.9 MG/L (ref 0–8)
ERYTHROCYTE [DISTWIDTH] IN BLOOD BY AUTOMATED COUNT: 15.8 % (ref 10–15)
HBA1C MFR BLD: 8 % (ref 0–5.6)
HCT VFR BLD AUTO: 39.3 % (ref 35–47)
HGB BLD-MCNC: 11.9 G/DL (ref 11.7–15.7)
MCH RBC QN AUTO: 25.3 PG (ref 26.5–33)
MCHC RBC AUTO-ENTMCNC: 30.3 G/DL (ref 31.5–36.5)
MCV RBC AUTO: 84 FL (ref 78–100)
PLATELET # BLD AUTO: 357 10E9/L (ref 150–450)
RBC # BLD AUTO: 4.7 10E12/L (ref 3.8–5.2)
WBC # BLD AUTO: 7.7 10E9/L (ref 4–11)

## 2019-08-02 PROCEDURE — 84450 TRANSFERASE (AST) (SGOT): CPT | Performed by: INTERNAL MEDICINE

## 2019-08-02 PROCEDURE — 36415 COLL VENOUS BLD VENIPUNCTURE: CPT | Performed by: INTERNAL MEDICINE

## 2019-08-02 PROCEDURE — 84460 ALANINE AMINO (ALT) (SGPT): CPT | Performed by: INTERNAL MEDICINE

## 2019-08-02 PROCEDURE — G0463 HOSPITAL OUTPT CLINIC VISIT: HCPCS | Mod: ZF

## 2019-08-02 PROCEDURE — 86140 C-REACTIVE PROTEIN: CPT | Performed by: INTERNAL MEDICINE

## 2019-08-02 PROCEDURE — 85027 COMPLETE CBC AUTOMATED: CPT | Performed by: INTERNAL MEDICINE

## 2019-08-02 PROCEDURE — 86704 HEP B CORE ANTIBODY TOTAL: CPT | Performed by: INTERNAL MEDICINE

## 2019-08-02 ASSESSMENT — MIFFLIN-ST. JEOR: SCORE: 1169.92

## 2019-08-02 ASSESSMENT — PAIN SCALES - GENERAL: PAINLEVEL: NO PAIN (0)

## 2019-08-02 NOTE — PATIENT INSTRUCTIONS
Diagnosis: Psoriatic arthritis, improved with prednisone.    Plan:   - Start cosentyx 150 mg monthly after loading  - Stop Stelara   - Continue Otezla 60 bid while starting the cosentyx. If suppression of knee swelling is achieved, Otezla could be stopped.   - Finish prednisone taper   - Repeat labs with CBC, LFTs, CRP, and hepatitis B

## 2019-08-02 NOTE — PROGRESS NOTES
[facility]  Rheumatology Clinic  Rene Granados MD  2019     Name: Chrissy Dixon  MRN: [id number]  Age: 33 year old  : 1985  Referring provider: Nando Haskins     Assessment and Plan:  # Psoriatic arthritis (polyarthritis, scalp and pustular psoriasis; failed Humira and leflunomide, dx'd post-partum in ):   Patient relates several episodes of recurrent bilateral knee swelling and pain since 2019. She has had remarkable improvement using high dose systemic prednisone in the past month. Exam shows no psoriasis or inflammatory changes at the knees today. In 2018, creatinine, transaminases, CRP, and CBC were normal.     I think that psoriatic arthritis, relapsing/remitting, remains active despite the patient having received two doses of ustekinumab, and despite continuing Otezla since the last visit. 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 I think that Stelara has failed. I recommend discontinuing Stelara and starting secukinumab (anti-IL17) for active psoriatic arthritis. I suggest a loading schedule of 150 mg subcutaneous weekly x 4, followed by 150 mg monthly. I expect benefit with reduced severity and frequency of knee swelling and pain within weeks to months of starting therapy. I recommend continuing Otezla 60 mg twice daily for the present. If after 2 months of secukinumab treatment the patient has no new symptoms of arthritis, I will recommend discontinuation of Otezla. Check CBC, LFTs, CRP, hepatitis core antibody today.     Follow-up: Return in about 3 months (around 2019).     HPI:   Chrissy Dixon is a 33 year old female with a history of psoriatic arthritis who presents for follow up. She was last seen on 2018, at which time knee-predominant oligoarthritis was still affecting activities of daily living, despite synovectomy and despite chronic use of  apremilast monotherapy. Plan was to try a retrial  ustekinumab every 90 day and continue apremilast 30 mg twice daily. If olgiooarthritis improves over three months of ustekinumab exposure, plan would be to stop apremilast use.     The patient was seen in the ED on 07/11/2019 for bilateral knee and calf swelling and cramping. Right knee aspiration was attempted but could not be completed. She was started on a steroid burst and taper down from 60 mg daily and about a week and a half later experienced relief. She notes that prior to starting prednisone, she tried elevation and soaks without relief. Her feet had become blue/purple for about a week. She is now on 10 mg daily prednisone. The steroids have affected her blood sugars and she sometimes has to do an additional 6 injections per day to keep the blood sugar under control.     She typically develops pain in the back of the knee and over the course of the day has a significant increase in swelling, typically worse on one side. She continues to have locking in the right knee, particularly with swelling and use of the stairs. She notes that apart from the knees, she is doing well. She sometimes has brief early morning stiffness but denies any visible swelling. She has had had no new areas of psoriasis on Otezla.     She reports that there were some difficulties with her Stelara prescription and she received her first loading dose, then received the second loading dose 2 months later and has not yet received any additional doses.     Background Patient History:  She has had psoriasis since birth (presented with diffuse rash at birth), was diagnosed with psoriasis at age 11, and developed arthritis in her 20s. She was on enbrel at age 21 but developed infections such as pneumonia, sinusitis, and mono, and stopped it within 7 months. She was then on methotrexate from 2011 until February 2012, but did not tolerate this due to GI upset. She switched to sulfasalazine  2/2012 and tolerated it well at 1000mg thrice a day, but had decreasing efficacy as of 11/1/13. She started leflunomide in 10-13 but stopped in 1-14 due to GI intolerance. She started humira in 1-14, held in 7-14 during pregnancy, restarted in 7-15 for flaring disease but then discontinued due to worsened psoriasis and lack of efficacy. Leflunomide was then restarted but loose stools developed. Leflunomide was stopped and otezla was started on 9/11/15. She started stelara in fall 2015 but stopped due to cost and inconvenience in early 2016. She has continued with otezla monotherapy through present. She restarted stelara in 12-18.    Review of Systems:   Pertinent items are noted in HPI or as below, remainder of complete ROS is negative.      No recent problems with hearing or vision. No swallowing problems.   No breathing difficulty, shortness of breath, coughing, or wheezing  No chest pain or palpitations  No heart burn, indigestion, abdominal pain, nausea, vomiting, diarrhea  No urination problems, no bloody, cloudy urine, no dysuria  No numbing, tingling, weakness  No headaches or confusion  No rashes. No easy bleeding or bruising.     Active Medications:     Current Outpatient Medications:      apremilast (OTEZLA) 30 MG tablet, Take 1 tablet (30 mg) by mouth 2 times daily Please keep 8-2-19 appt for further refills, Disp: 60 tablet, Rfl: 1     blood glucose monitoring (ACCU-CHEK SMARTVIEW) test strip, Use to test blood sugar 6-8 times daily or as directed., Disp: 700 each, Rfl: 1     blood glucose monitoring (NO BRAND SPECIFIED) meter device kit, Use to test blood sugar 6 to 8 times daily or as directed. Okay to dispense One Touch Verio products per patient's insurance., Disp: 1 kit, Rfl: 0     blood glucose monitoring (NO BRAND SPECIFIED) test strip, Use to test blood sugars 6 to 8 times daily or as directed. Okay to dispense One Touch Verio per patient's insurance., Disp: 700 strip, Rfl: 1     cholecalciferol  (VITAMIN D3) 1000 UNIT tablet, Take 1 tablet (1,000 Units) by mouth daily, Disp: 90 tablet, Rfl: 1     clobetasol (TEMOVATE) 0.05 % external solution, Apply topically 2 times daily (Patient taking differently: Apply topically 2 times daily as needed ), Disp: 50 mL, Rfl: 6     Continuous Blood Gluc Transmit (DEXCOM G6 TRANSMITTER) MISC, 1 each continuous Use as directed for continuous glucose monitoring change every 90 days., Disp: 1 each, Rfl: 0     ferrous gluconate (FERGON) 324 (38 FE) MG tablet, Take 1 tablet (324 mg) by mouth every other day, Disp: 100 tablet, Rfl: 11     Fluocinolone Acetonide (DERMA-SMOOTHE/FS SCALP) 0.01 % OIL, Apply to scalp at bedtime, then wear showercap, rinse off in morning. (Patient taking differently: Apply to scalp at bedtime, then wear showercap, rinse off in morning as needed.), Disp: 118 mL, Rfl: 3     fluticasone (FLONASE) 50 MCG/ACT spray, Spray 2 sprays into both nostrils daily, Disp: 48 g, Rfl: 3     ibuprofen (ADVIL/MOTRIN) 600 MG tablet, Take 1 tablet (600 mg) by mouth every 8 hours as needed for moderate pain, Disp: 270 tablet, Rfl: 0     insulin aspart (NOVOLOG FLEXPEN) 100 UNIT/ML pen, Use three times daily with meals, currently 30 units daily, Disp: 30 mL, Rfl: 5     insulin detemir (LEVEMIR FLEXTOUCH) 100 UNIT/ML pen, Inject 18 Units Subcutaneous 2 times daily, Disp: 60 mL, Rfl: 11     insulin pen needle 31G X 8 MM, Use  3-4 pen needles daily or as directed., Disp: 300 each, Rfl: 4     metFORMIN (GLUCOPHAGE) 500 MG tablet, Take 2 tablets (1,000 mg) by mouth 2 times daily (with meals) Labs due for further refills., Disp: 360 tablet, Rfl: 3     naloxone (NARCAN) 4 MG/0.1ML nasal spray, Spray 1 spray (4 mg) into one nostril alternating nostrils once as needed for opioid reversal every 2-3 minutes until assistance arrives, Disp: 0.2 mL, Rfl: 1     ondansetron (ZOFRAN-ODT) 4 MG ODT tab, Take 1-2 tablets (4-8 mg) by mouth every 8 hours as needed for nausea, Disp: 4 tablet,  Rfl: 0     ONETOUCH LANCETS MISC, Use to test blood sugar 6 to 8 times daily or as directed. Okay to dispense One Touch Verio products per patient's insurance., Disp: 700 each, Rfl: 1     oxyCODONE-acetaminophen (PERCOCET) 5-325 MG tablet, Take 1 tablet by mouth every 4 hours as needed (must last 30 days from dispense date), Disp: 30 tablet, Rfl: 0     predniSONE (DELTASONE) 10 MG tablet, Take 1 tablet (10 mg) by mouth daily As needed for flares only, Disp: 30 tablet, Rfl: 1     secukinumab (COSENTYX SENSOREADY PEN) 150 MG/ML Sensoready pen, Inject 1 mL (150 mg) Subcutaneous every 28 days Hold for signs of infection, and seek medical attention., Disp: 1 each, Rfl: 5     secukinumab (COSENTYX) 150 MG/ML prefilled syringe, Inject 1 mL (150 mg) Subcutaneous once a week At weeks 0, 1, 2, 3, and 4 and every 4 weeks there after loading dose., Disp: 5 Syringe, Rfl: 0     traMADol (ULTRAM) 50 MG tablet, Take 1 tablet (50 mg) by mouth every 8 hours as needed for moderate pain . Must last 30 days, Disp: 60 tablet, Rfl: 0     triamcinolone (KENALOG) 0.1 % cream, Apply topically 2 times daily (Patient taking differently: Apply topically 2 times daily as needed ), Disp: 453.6 g, Rfl: 1     ustekinumab (STELARA) 45 MG/0.5ML SOSY, Inject 0.5 mLs (45 mg) Subcutaneous every 3 months Hold for signs of infection, and seek medical attention., Disp: 1 Syringe, Rfl: 1     ustekinumab (STELARA) 45 MG/0.5ML SOSY, Inject 0.5 mLs (45 mg) Subcutaneous every 3 months, Disp: 3 Syringe, Rfl: 1     valACYclovir (VALTREX) 500 MG tablet, Take 1 tablet (500 mg) by mouth daily as needed, Disp: 60 tablet, Rfl: 3      Allergies:   Diagnostic x-ray materials; Betadine [povidone iodine]; Levofloxacin; Penicillins; Pneumococcal vaccines; Prenatal vit-fe fumarate-fa [cavan-folate ob]; and Tdap [daptacel]      Past Medical History:  Diabetes  Psoriatic arthritis (H), Psoriasis arthropathica (H)  Polyarthritis (probable psoriatic arthritis)  Cardiovascular  "screening, LDL goal less than 160  Psoriatic Arthritis (H)  Diabetes mellitus type 2, controlled (H)  Psoriasis arthropathica (H)  Malaise and Fatigue  Iron deficiency anemia      Past Surgical History:  Arthroscopy knee with meniscectomy, right (11/20/2018)  AS Hysteroscopy Surgical W/Endometrial, bilateral (2017)   Tonsillar abcess     Family History:   Mother- Thyroid disease, HTN, Diabetes  Father- Connective tissue disorder (psoriasis)  Maternal Grandfather- Diabetes, Hypertension, Prostate Cancer  Maternal Uncle- Diabetes  Maternal Grandmother - HTN  No family history of asthma.     Social History:   Former smoker. 11 Years tobacco free. Denies smokeless tobacco use. Admits to 2 standard drinks of alcohol weekly.      Physical Exam:   /87   Pulse 105   Temp 97.9  F (36.6  C) (Oral)   Ht 1.6 m (5' 3\")   Wt 49.1 kg (108 lb 3.2 oz)   SpO2 98%   BMI 19.17 kg/m     Wt Readings from Last 4 Encounters:   08/02/19 49.1 kg (108 lb 3.2 oz)   04/22/19 51.4 kg (113 lb 4.8 oz)   12/26/18 51.9 kg (114 lb 8 oz)   11/20/18 50.9 kg (112 lb 4.8 oz)     Constitutional: Well-developed, appearing stated age; cooperative  Eyes: Normal EOM, PERRLA, vision, conjunctiva, sclera  ENT: Normal external ears, nose, hearing, lips, teeth, gums, throat. No mucous membrane lesions, normal saliva pool  Neck: No mass or thyroid enlargement  Resp: Lungs clear to auscultation, nl to palpation  CV: RRR, no murmurs, rubs or gallops, no edema  GI: No ABD mass or tenderness, no HSM  : Not tested  Lymph: No cervical, supraclavicular, inguinal or epitrochlear nodes  MS: Right knee is cool. Some hesitation with full flexion but very little fluid on the knee. No crepitus today. No effusion over the left knee.   Skin: No nail pitting, alopecia, rash, nodules or lesions. No signs of peeling, redness, erythema on the neck.   Neuro: Normal cranial nerves, strength, sensation, DTRs.   Psych: Normal judgement, orientation, memory, affect. "     Laboratory:   RHEUM RESULTS Latest Ref Rng & Units 4/30/2018 8/20/2018 12/26/2018   SED RATE 0 - 20 mm/h 10 - -   CRP, INFLAMMATION 0.0 - 8.0 mg/L <2.9 - <2.9   CK TOTAL 32 - 200 U/L - - -   RHEUMATOID FACTOR 0 - 14 IU/mL - - -   CHALO SCREEN BY EIA <1.0 - - -   AST 0 - 45 U/L 10 5 13   ALT 0 - 50 U/L 18 17 19   ALBUMIN 3.4 - 5.0 g/dL - 4.1 -   WBC 4.0 - 11.0 10e9/L 7.3 - 6.6   RBC 3.8 - 5.2 10e12/L 4.46 - 4.66   HGB 11.7 - 15.7 g/dL 11.9 - 11.6(L)   HCT 35.0 - 47.0 % 36.1 - 38.2   MCV 78 - 100 fl 81 - 82   MCHC 31.5 - 36.5 g/dL 33.0 - 30.4(L)   RDW 10.0 - 15.0 % 14.7 - 14.6    - 450 10e9/L 346 - 357   CREATININE 0.52 - 1.04 mg/dL 0.52 0.54 0.50(L)   GFR ESTIMATE, IF BLACK >60 mL/min/[1.73:m2] >90 >90 >90   GFR ESTIMATE >60 mL/min/[1.73:m2] >90 >90 >90   HEPATITIS C ANTIBODY NEG - - -       Rheumatoid Factor   Date Value Ref Range Status   08/11/2011 11 0 - 14 IU/mL Final     Cyclic Cit Pept IgG/IgA   Date Value Ref Range Status   08/11/2011 <20  Interpretation:  Negative <20 UNITS Final     CHALO Screen by EIA   Date Value Ref Range Status   04/09/2014 <1.0  Interpretation:  Negative <1.0 Final     Antistreptolysin O   Date Value Ref Range Status   09/11/2015 109 0 - 120 IU/mL Final     Comment:     A single ASO analysis may not be meaningful due to the variability of ASO   values   within the normal population.  Both clinical and laboratory findings should   be   considered in reaching a diagnosis.  A single analysis may be less   informative   than a repeat analysis showing a change.       Quantiferon-TB Gold Plus Result   Date Value Ref Range Status   12/26/2018 Negative NEG^Negative Final     Comment:     No interferon gamma response to M.tuberculosis antigens was detected.   Infection with M.tuberculosis is unlikely, however a single negative result   does not exclude infection. In patients at high risk for infection, a second   test should be considered  in accordance with the 2017 ATS/IDSA/CDC  Clinical Practice Guidelines for   Diagnosis of Tuberculosis in Adults and Children [Angela DIXON et   al.Clin.Infect.Dis. 2017 64(2):111-115].       TB1 Ag minus Nil Value   Date Value Ref Range Status   12/26/2018 0.00 IU/mL Final     TB2 Ag minus Nil Value   Date Value Ref Range Status   12/26/2018 0.00 IU/mL Final     Mitogen minus Nil Result   Date Value Ref Range Status   12/26/2018 >10.00 IU/mL Final     Nil Result   Date Value Ref Range Status   12/26/2018 0.02 IU/mL Final     Neutrophil Cytoplasmic IgG Antibody   Date Value Ref Range Status   05/31/2013   Final    <1:20  Reference range: <1:20  (Note)  The ANCA IFA is <1:20; therefore, no further testing will  be performed.  INTERPRETIVE INFORMATION: Anti-Neutrophil Cyto Ab, IgG    Neutrophil Cytoplasmic Antibodies (C-ANCA = granular  cytoplasmic staining, P-ANCA = perinuclear staining) are  found in the serum of over 90 percent of patients with  certain necrotizing systemic vasculitides, and usually in  less than 5 percent of patients with collagen vascular  disease or arthritis.  Performed by [facility],  [address] [place] [id number] [phone]  [email or web address] Nataly Roth MD, Lab. Director     Scribe Disclosure:  IJenny, am serving as a scribe to document services personally performed by Rene Granados MD at this visit, based upon the provider's statements to me. All documentation has been reviewed by the aforementioned provider prior to being entered into the official medical record.

## 2019-08-02 NOTE — NURSING NOTE
"Chief Complaint   Patient presents with     RECHECK     Psoriatic arthritis      /87   Pulse 105   Temp 97.9  F (36.6  C) (Oral)   Ht 1.6 m (5' 3\")   Wt 49.1 kg (108 lb 3.2 oz)   SpO2 98%   BMI 19.17 kg/m    Ciera Campos [facility]  8/2/2019 12:36 PM      "

## 2019-08-02 NOTE — TELEPHONE ENCOUNTER
PA Initiation    Medication: COSENTYX   Insurance Company: [employer] - Phone [phone] Fax [phone]  Pharmacy Filling the Rx: [facility] [address]  Filling Pharmacy Phone:    Filling Pharmacy Fax:    Start Date: 8/2/2019

## 2019-08-02 NOTE — LETTER
2019      RE: Chrissy Dixon  [address] [facility] [address]       [facility]  Rheumatology Clinic  Rene Granados MD  2019     Name: Chrissy Dixon  MRN: [id number]  Age: 33 year old  : 1985  Referring provider: Nando Haskins     Assessment and Plan:  # Psoriatic arthritis (polyarthritis, scalp and pustular psoriasis; failed Humira and leflunomide, dx'd post-partum in ):   Patient relates several episodes of recurrent bilateral knee swelling and pain since 2019. She has had remarkable improvement using high dose systemic prednisone in the past month. Exam shows no psoriasis or inflammatory changes at the knees today. In 2018, creatinine, transaminases, CRP, and CBC were normal.     I think that psoriatic arthritis, relapsing/remitting, remains active despite the patient having received two doses of ustekinumab, and despite continuing Otezla since the last visit. 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 I think that Stelara has failed. I recommend discontinuing Stelara and starting secukinumab (anti-IL17) for active psoriatic arthritis. I suggest a loading schedule of 150 mg subcutaneous weekly x 4, followed by 150 mg monthly. I expect benefit with reduced severity and frequency of knee swelling and pain within weeks to months of starting therapy. I recommend continuing Otezla 60 mg twice daily for the present. If after 2 months of secukinumab treatment the patient has no new symptoms of arthritis, I will recommend discontinuation of Otezla. Check CBC, LFTs, CRP, hepatitis core antibody today.     Follow-up: Return in about 3 months (around 2019).     HPI:   Chrissy Dixon is a 33 year old female with a history of psoriatic arthritis who presents for follow up. She was last seen on 2018, at which time knee-predominant oligoarthritis was still  affecting activities of daily living, despite synovectomy and despite chronic use of apremilast monotherapy.  Plan was to try a retrial  ustekinumab every 90 day and continue apremilast 30 mg twice daily. If olgiooarthritis improves over three months of ustekinumab exposure,  plan would be to stop apremilast use.     The patient was seen in the ED on 07/11/2019 for bilateral knee and calf swelling and cramping. Right knee aspiration was attempted but could not be completed. She was started on a steroid burst and taper down from 60 mg daily and about a week and a half later experienced relief. She notes that prior to starting prednisone, she tried elevation and soaks without relief. Her feet had become blue/purple for about a week. She is now on 10 mg daily prednisone. The steroids have affected her blood sugars and she sometimes has to do an additional 6 injections per day to keep the blood sugar under control.     She typically develops pain in the back of the knee and over the course of the day has a significant increase in swelling, typically worse on one side. She continues to have locking in the right knee, particularly with swelling and use of the stairs. She notes that apart from the knees, she is doing well. She sometimes has brief early morning stiffness but denies any visible swelling. She has had had no new areas of psoriasis on Otezla.     She reports that there were some difficulties with her Stelara prescription and she received her first loading dose, then received the second loading dose 2 months later and has not yet received any additional doses.     Background Patient History:  She has had psoriasis since birth (presented with diffuse rash at birth), was diagnosed with psoriasis at age 11, and developed arthritis in her 20s. She was on enbrel at age 21 but developed infections such as pneumonia, sinusitis, and mono, and stopped it within 7 months. She was then on methotrexate from 2011 until  February 2012, but did not tolerate this due to GI upset. She switched to sulfasalazine 2/2012 and tolerated it well at 1000mg thrice a day, but had decreasing efficacy as of 11/1/13. She started leflunomide in 10-13 but stopped in 1-14 due to GI intolerance. She started humira in 1-14, held in 7-14 during pregnancy, restarted in 7-15 for flaring disease but then discontinued due to worsened psoriasis and lack of efficacy. Leflunomide was then restarted but loose stools developed. Leflunomide was stopped and otezla was started on 9/11/15. She started stelara in fall 2015 but stopped due to cost and inconvenience in early 2016. She has continued with otezla monotherapy through present. She restarted stelara in 12-18.    Review of Systems:   Pertinent items are noted in HPI or as below, remainder of complete ROS is negative.      No recent problems with hearing or vision. No swallowing problems.   No breathing difficulty, shortness of breath, coughing, or wheezing  No chest pain or palpitations  No heart burn, indigestion, abdominal pain, nausea, vomiting, diarrhea  No urination problems, no bloody, cloudy urine, no dysuria  No numbing, tingling, weakness  No headaches or confusion  No rashes. No easy bleeding or bruising.     Active Medications:     Current Outpatient Medications:      apremilast (OTEZLA) 30 MG tablet, Take 1 tablet (30 mg) by mouth 2 times daily Please keep 8-2-19 appt for further refills, Disp: 60 tablet, Rfl: 1     blood glucose monitoring (ACCU-CHEK SMARTVIEW) test strip, Use to test blood sugar 6-8 times daily or as directed., Disp: 700 each, Rfl: 1     blood glucose monitoring (NO BRAND SPECIFIED) meter device kit, Use to test blood sugar 6 to 8 times daily or as directed. Okay to dispense One Touch Verio products per patient's insurance., Disp: 1 kit, Rfl: 0     blood glucose monitoring (NO BRAND SPECIFIED) test strip, Use to test blood sugars 6 to 8 times daily or as directed. Okay to  dispense One Touch Verio per patient's insurance., Disp: 700 strip, Rfl: 1     cholecalciferol (VITAMIN D3) 1000 UNIT tablet, Take 1 tablet (1,000 Units) by mouth daily, Disp: 90 tablet, Rfl: 1     clobetasol (TEMOVATE) 0.05 % external solution, Apply topically 2 times daily (Patient taking differently: Apply topically 2 times daily as needed ), Disp: 50 mL, Rfl: 6     Continuous Blood Gluc Transmit (DEXCOM G6 TRANSMITTER) MISC, 1 each continuous Use as directed for continuous glucose monitoring change every 90 days., Disp: 1 each, Rfl: 0     ferrous gluconate (FERGON) 324 (38 FE) MG tablet, Take 1 tablet (324 mg) by mouth every other day, Disp: 100 tablet, Rfl: 11     Fluocinolone Acetonide (DERMA-SMOOTHE/FS SCALP) 0.01 % OIL, Apply to scalp at bedtime, then wear showercap, rinse off in morning. (Patient taking differently: Apply to scalp at bedtime, then wear showercap, rinse off in morning as needed.), Disp: 118 mL, Rfl: 3     fluticasone (FLONASE) 50 MCG/ACT spray, Spray 2 sprays into both nostrils daily, Disp: 48 g, Rfl: 3     ibuprofen (ADVIL/MOTRIN) 600 MG tablet, Take 1 tablet (600 mg) by mouth every 8 hours as needed for moderate pain, Disp: 270 tablet, Rfl: 0     insulin aspart (NOVOLOG FLEXPEN) 100 UNIT/ML pen, Use three times daily with meals, currently 30 units daily, Disp: 30 mL, Rfl: 5     insulin detemir (LEVEMIR FLEXTOUCH) 100 UNIT/ML pen, Inject 18 Units Subcutaneous 2 times daily, Disp: 60 mL, Rfl: 11     insulin pen needle 31G X 8 MM, Use  3-4 pen needles daily or as directed., Disp: 300 each, Rfl: 4     metFORMIN (GLUCOPHAGE) 500 MG tablet, Take 2 tablets (1,000 mg) by mouth 2 times daily (with meals) Labs due for further refills., Disp: 360 tablet, Rfl: 3     naloxone (NARCAN) 4 MG/0.1ML nasal spray, Spray 1 spray (4 mg) into one nostril alternating nostrils once as needed for opioid reversal every 2-3 minutes until assistance arrives, Disp: 0.2 mL, Rfl: 1     ondansetron (ZOFRAN-ODT) 4 MG ODT  tab, Take 1-2 tablets (4-8 mg) by mouth every 8 hours as needed for nausea, Disp: 4 tablet, Rfl: 0     ONETOUCH LANCETS MISC, Use to test blood sugar 6 to 8 times daily or as directed. Okay to dispense One Touch Verio products per patient's insurance., Disp: 700 each, Rfl: 1     oxyCODONE-acetaminophen (PERCOCET) 5-325 MG tablet, Take 1 tablet by mouth every 4 hours as needed (must last 30 days from dispense date), Disp: 30 tablet, Rfl: 0     predniSONE (DELTASONE) 10 MG tablet, Take 1 tablet (10 mg) by mouth daily As needed for flares only, Disp: 30 tablet, Rfl: 1     secukinumab (COSENTYX SENSOREADY PEN) 150 MG/ML Sensoready pen, Inject 1 mL (150 mg) Subcutaneous every 28 days Hold for signs of infection, and seek medical attention., Disp: 1 each, Rfl: 5     secukinumab (COSENTYX) 150 MG/ML prefilled syringe, Inject 1 mL (150 mg) Subcutaneous once a week At weeks 0, 1, 2, 3, and 4 and every 4 weeks there after loading dose., Disp: 5 Syringe, Rfl: 0     traMADol (ULTRAM) 50 MG tablet, Take 1 tablet (50 mg) by mouth every 8 hours as needed for moderate pain . Must last 30 days, Disp: 60 tablet, Rfl: 0     triamcinolone (KENALOG) 0.1 % cream, Apply topically 2 times daily (Patient taking differently: Apply topically 2 times daily as needed ), Disp: 453.6 g, Rfl: 1     ustekinumab (STELARA) 45 MG/0.5ML SOSY, Inject 0.5 mLs (45 mg) Subcutaneous every 3 months Hold for signs of infection, and seek medical attention., Disp: 1 Syringe, Rfl: 1     ustekinumab (STELARA) 45 MG/0.5ML SOSY, Inject 0.5 mLs (45 mg) Subcutaneous every 3 months, Disp: 3 Syringe, Rfl: 1     valACYclovir (VALTREX) 500 MG tablet, Take 1 tablet (500 mg) by mouth daily as needed, Disp: 60 tablet, Rfl: 3      Allergies:   Diagnostic x-ray materials; Betadine [povidone iodine]; Levofloxacin; Penicillins; Pneumococcal vaccines; Prenatal vit-fe fumarate-fa [cavan-folate ob]; and Tdap [daptacel]      Past Medical History:  Diabetes  Psoriatic arthritis (H),  "Psoriasis arthropathica (H)  Polyarthritis (probable psoriatic arthritis)  Cardiovascular screening, LDL goal less than 160  Psoriatic Arthritis (H)  Diabetes mellitus type 2, controlled (H)  Psoriasis arthropathica (H)  Malaise and Fatigue  Iron deficiency anemia      Past Surgical History:  Arthroscopy knee with meniscectomy, right (11/20/2018)  AS Hysteroscopy Surgical W/Endometrial, bilateral (2017)   Tonsillar abcess     Family History:   Mother- Thyroid disease, HTN, Diabetes  Father- Connective tissue disorder (psoriasis)  Maternal Grandfather- Diabetes, Hypertension, Prostate Cancer  Maternal Uncle- Diabetes  Maternal Grandmother - HTN  No family history of asthma.     Social History:   Former smoker. 11 Years tobacco free. Denies smokeless tobacco use. Admits to 2 standard drinks of alcohol weekly.      Physical Exam:   /87   Pulse 105   Temp 97.9  F (36.6  C) (Oral)   Ht 1.6 m (5' 3\")   Wt 49.1 kg (108 lb 3.2 oz)   SpO2 98%   BMI 19.17 kg/m      Wt Readings from Last 4 Encounters:   08/02/19 49.1 kg (108 lb 3.2 oz)   04/22/19 51.4 kg (113 lb 4.8 oz)   12/26/18 51.9 kg (114 lb 8 oz)   11/20/18 50.9 kg (112 lb 4.8 oz)     Constitutional: Well-developed, appearing stated age; cooperative  Eyes: Normal EOM, PERRLA, vision, conjunctiva, sclera  ENT: Normal external ears, nose, hearing, lips, teeth, gums, throat. No mucous membrane lesions, normal saliva pool  Neck: No mass or thyroid enlargement  Resp: Lungs clear to auscultation, nl to palpation  CV: RRR, no murmurs, rubs or gallops, no edema  GI: No ABD mass or tenderness, no HSM  : Not tested  Lymph: No cervical, supraclavicular, inguinal or epitrochlear nodes  MS: Right knee is cool. Some hesitation with full flexion but very little fluid on the knee. No crepitus today. No effusion over the left knee.   Skin: No nail pitting, alopecia, rash, nodules or lesions. No signs of peeling, redness, erythema on the neck.   Neuro: Normal cranial nerves, " strength, sensation, DTRs.   Psych: Normal judgement, orientation, memory, affect.     Laboratory:   RHEUM RESULTS Latest Ref Rng & Units 4/30/2018 8/20/2018 12/26/2018   SED RATE 0 - 20 mm/h 10 - -   CRP, INFLAMMATION 0.0 - 8.0 mg/L <2.9 - <2.9   CK TOTAL 32 - 200 U/L - - -   RHEUMATOID FACTOR 0 - 14 IU/mL - - -   CHALO SCREEN BY EIA <1.0 - - -   AST 0 - 45 U/L 10 5 13   ALT 0 - 50 U/L 18 17 19   ALBUMIN 3.4 - 5.0 g/dL - 4.1 -   WBC 4.0 - 11.0 10e9/L 7.3 - 6.6   RBC 3.8 - 5.2 10e12/L 4.46 - 4.66   HGB 11.7 - 15.7 g/dL 11.9 - 11.6(L)   HCT 35.0 - 47.0 % 36.1 - 38.2   MCV 78 - 100 fl 81 - 82   MCHC 31.5 - 36.5 g/dL 33.0 - 30.4(L)   RDW 10.0 - 15.0 % 14.7 - 14.6    - 450 10e9/L 346 - 357   CREATININE 0.52 - 1.04 mg/dL 0.52 0.54 0.50(L)   GFR ESTIMATE, IF BLACK >60 mL/min/[1.73:m2] >90 >90 >90   GFR ESTIMATE >60 mL/min/[1.73:m2] >90 >90 >90   HEPATITIS C ANTIBODY NEG - - -       Rheumatoid Factor   Date Value Ref Range Status   08/11/2011 11 0 - 14 IU/mL Final     Cyclic Cit Pept IgG/IgA   Date Value Ref Range Status   08/11/2011 <20  Interpretation:  Negative <20 UNITS Final     CHALO Screen by EIA   Date Value Ref Range Status   04/09/2014 <1.0  Interpretation:  Negative <1.0 Final     Antistreptolysin O   Date Value Ref Range Status   09/11/2015 109 0 - 120 IU/mL Final     Comment:     A single ASO analysis may not be meaningful due to the variability of ASO   values   within the normal population.  Both clinical and laboratory findings should   be   considered in reaching a diagnosis.  A single analysis may be less   informative   than a repeat analysis showing a change.       Quantiferon-TB Gold Plus Result   Date Value Ref Range Status   12/26/2018 Negative NEG^Negative Final     Comment:     No interferon gamma response to M.tuberculosis antigens was detected.   Infection with M.tuberculosis is unlikely, however a single negative result   does not exclude infection. In patients at high risk for infection, a  second   test should be considered  in accordance with the 2017 ATS/IDSA/CDC Clinical Practice Guidelines for   Diagnosis of Tuberculosis in Adults and Children [Lewinsohn DM et   al.Clin.Infect.Dis. 2017 64(2):111-115].       TB1 Ag minus Nil Value   Date Value Ref Range Status   12/26/2018 0.00 IU/mL Final     TB2 Ag minus Nil Value   Date Value Ref Range Status   12/26/2018 0.00 IU/mL Final     Mitogen minus Nil Result   Date Value Ref Range Status   12/26/2018 >10.00 IU/mL Final     Nil Result   Date Value Ref Range Status   12/26/2018 0.02 IU/mL Final     Neutrophil Cytoplasmic IgG Antibody   Date Value Ref Range Status   05/31/2013   Final    <1:20  Reference range: <1:20  (Note)  The ANCA IFA is <1:20; therefore, no further testing will  be performed.  INTERPRETIVE INFORMATION: Anti-Neutrophil Cyto Ab, IgG    Neutrophil Cytoplasmic Antibodies (C-ANCA = granular  cytoplasmic staining, P-ANCA = perinuclear staining) are  found in the serum of over 90 percent of patients with  certain necrotizing systemic vasculitides, and usually in  less than 5 percent of patients with collagen vascular  disease or arthritis.  Performed by [facility],  [address] [place] [id number] [phone]  [email or web address], Nataly Roth MD, Lab. Director     Scribe Disclosure:  IJenny, am serving as a scribe to document services personally performed by Rene Granados MD at this visit, based upon the provider's statements to me. All documentation has been reviewed by the aforementioned provider prior to being entered into the official medical record.     Rene Granados MD

## 2019-08-04 LAB — HBV CORE AB SERPL QL IA: NONREACTIVE

## 2019-08-04 NOTE — TELEPHONE ENCOUNTER
apremilast (OTEZLA) 30 MG tablet  Last Written Prescription Date:  6/5/19  Last Fill Quantity: 60,   # refills: 1  Last Office Visit : 8/2/19  Future Office visit:  11/22/19

## 2019-08-05 ENCOUNTER — TELEPHONE (OUTPATIENT)
Dept: RHEUMATOLOGY | Facility: CLINIC | Age: 34
End: 2019-08-05

## 2019-08-05 ENCOUNTER — MYC REFILL (OUTPATIENT)
Dept: INTERNAL MEDICINE | Facility: CLINIC | Age: 34
End: 2019-08-05

## 2019-08-05 DIAGNOSIS — L40.50 PSORIATIC ARTHRITIS (H): ICD-10-CM

## 2019-08-05 RX ORDER — TRAMADOL HYDROCHLORIDE 50 MG/1
50 TABLET ORAL EVERY 8 HOURS PRN
Qty: 60 TABLET | Refills: 0 | Status: SHIPPED | OUTPATIENT
Start: 2019-08-10 | End: 2019-09-02

## 2019-08-05 NOTE — TELEPHONE ENCOUNTER
Patient Requested  traMADol (ULTRAM) 50 MG tablet  Last Filled  07/11/2019  Last Office Visit  04/22/2019  Next Office Visit  Nothing scheduled   Checked  08/05/2019    DX: Psoriatic arthritis     Pharmacy: [id number] [place], [place] - [address] [place] [facility] [place] [address] at 8:41 AM on 8/5/2019.

## 2019-08-05 NOTE — TELEPHONE ENCOUNTER
[facility] Call Center    Phone Message    May a detailed message be left on voicemail: yes    Reason for Call: Other: Pharmacy called wanting to know if the patient will be continuing with the script for COSENTYX. Please call to confirm     Action Taken: Other: ump rheumatology

## 2019-08-05 NOTE — TELEPHONE ENCOUNTER
Prior Authorization Approval    Authorization Effective Date: 8/2/2019  Authorization Expiration Date: 8/2/2021  Medication: COSENTYX - Approved  Approved Dose/Quantity:  Starter and maintenance   Reference #: [id number]   Insurance Company: [employer] - Phone [phone] Fax [phone]  Expected CoPay:       CoPay Card Available:      Foundation Assistance Needed:    Which Pharmacy is filling the prescription (Not needed for infusion/clinic administered): [facility] - [address] [place] [address]  Pharmacy Notified: Yes - rx released  Patient Notified: Yes - via [facility]

## 2019-08-06 NOTE — TELEPHONE ENCOUNTER
Connected with [facility] to verify the Chrissy is to be on both Cosentyx and Otezla.     Alysa Juarez MSN, RN  Rheumatology RN Care Coordinator  [facility]

## 2019-08-06 NOTE — TELEPHONE ENCOUNTER
[facility] Call Center    Phone Message    May a detailed message be left on voicemail: yes    Reason for Call: Medication Question or concern regarding medication   Prescription Clarification  Name of Medication: cosentyx and otezla  Prescribing Provider: ELMIRA Guidry   Pharmacy: [facility]    What on the order needs clarification? [facility] is asking if the pt is to be taking cosentyx or otezla. Please call [facility] at [phone] to clarify. Thanks.          Action Taken: Message routed to:  [facility]

## 2019-08-07 ENCOUNTER — TELEPHONE (OUTPATIENT)
Dept: RHEUMATOLOGY | Facility: CLINIC | Age: 34
End: 2019-08-07

## 2019-08-07 NOTE — TELEPHONE ENCOUNTER
[facility] Call Center    Phone Message    May a detailed message be left on voicemail: yes    Reason for Call: Medication Question or concern regarding medication   Prescription Clarification  Name of Medication: Cosentyx  Prescribing Provider:Rene Granados   Pharmacy: [facility]   What on the order needs clarification? The pharmacy needs a clarification on whether the patient should be using the syringes or pens? Please contact them to clarify the above. Thanks.        Action Taken: Message routed to:  [facility] rheum

## 2019-08-08 NOTE — TELEPHONE ENCOUNTER
Called [facility] to verify that prescription was for the sensoready pen.    Alysa Juarez MSN, RN  Rheumatology RN Care Coordinator   [place]

## 2019-08-14 ENCOUNTER — MYC REFILL (OUTPATIENT)
Dept: INTERNAL MEDICINE | Facility: CLINIC | Age: 34
End: 2019-08-14

## 2019-08-14 DIAGNOSIS — B00.1 RECURRENT COLD SORES: ICD-10-CM

## 2019-08-14 DIAGNOSIS — L40.50 PSORIATIC ARTHRITIS (H): ICD-10-CM

## 2019-08-19 NOTE — TELEPHONE ENCOUNTER
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    Last Fill Date:   07/22/2019    Last Visit with PCP:   04/22/2019    Future Visits with PCP:   No.    Processing:   Larry GIRALDO at 9:37 AM on 8/19/2019.

## 2019-08-21 RX ORDER — OXYCODONE AND ACETAMINOPHEN 5; 325 MG/1; MG/1
1 TABLET ORAL EVERY 4 HOURS PRN
Qty: 30 TABLET | Refills: 0 | Status: SHIPPED | OUTPATIENT
Start: 2019-08-21 | End: 2019-09-12

## 2019-09-02 ENCOUNTER — MYC REFILL (OUTPATIENT)
Dept: INTERNAL MEDICINE | Facility: CLINIC | Age: 34
End: 2019-09-02

## 2019-09-02 DIAGNOSIS — L40.50 PSORIATIC ARTHRITIS (H): ICD-10-CM

## 2019-09-03 DIAGNOSIS — E11.9 TYPE 2 DIABETES MELLITUS WITHOUT COMPLICATION, WITH LONG-TERM CURRENT USE OF INSULIN (H): ICD-10-CM

## 2019-09-03 DIAGNOSIS — Z79.4 TYPE 2 DIABETES MELLITUS WITHOUT COMPLICATION, WITH LONG-TERM CURRENT USE OF INSULIN (H): ICD-10-CM

## 2019-09-03 RX ORDER — TRAMADOL HYDROCHLORIDE 50 MG/1
50 TABLET ORAL EVERY 8 HOURS PRN
Qty: 60 TABLET | Refills: 0 | Status: SHIPPED | OUTPATIENT
Start: 2019-09-07 | End: 2019-10-03

## 2019-09-03 NOTE — TELEPHONE ENCOUNTER
Patient Requested  traMADol (ULTRAM) 50 MG tablet  Last Filled  08/09/2019  Last Office Visit  04/22/2019  Next Office Visit  Nothing scheduled   Checked  09/03/2019    DX: Psoriatic arthritis     Pharmacy: [facility] #[id number] - [place], [place] - [address] AT [facility] [place] [place] [address] at 8:41 AM on 8/5/2019.

## 2019-09-05 ENCOUNTER — TELEPHONE (OUTPATIENT)
Dept: INTERNAL MEDICINE | Facility: CLINIC | Age: 34
End: 2019-09-05

## 2019-09-05 DIAGNOSIS — Z79.4 TYPE 2 DIABETES MELLITUS WITHOUT COMPLICATION, WITH LONG-TERM CURRENT USE OF INSULIN (H): Primary | ICD-10-CM

## 2019-09-05 DIAGNOSIS — E11.9 TYPE 2 DIABETES MELLITUS WITHOUT COMPLICATION, WITH LONG-TERM CURRENT USE OF INSULIN (H): Primary | ICD-10-CM

## 2019-09-05 NOTE — TELEPHONE ENCOUNTER
----- Message -----   From: Maddie Tapia   Sent: 8/5/2019  11:31 AM   To: [facility] Nursing Staff-   Subject: FW: Referral Request                                     ----- Message -----   From: Chrissy Zack   Sent: 8/5/2019  10:30 AM   To: [facility] Mychart   Subject: Referral Request                                   Adriana Dixon would like to request a referral.   Reason: I need a referral from Dr. Haskins to Endocrinology   Requested provider: Endocrinologist   Comment:   I was seen in Rheumatology on Friday and they said I would need a referral from primary to establish care with endocrinology. I have been unable to find a provider closer to home and the rest of my doctors are here at the [facility]. I would like to start on an insulin pump and in order to do that I need to see endocrinology. Please feel free to call with questions [phone].

## 2019-09-05 NOTE — TELEPHONE ENCOUNTER
Patient requesting referral to endocrine to discuss insulin pump. Kerry Garcia LPN 9/5/2019 12:29 PM

## 2019-09-09 RX ORDER — PROCHLORPERAZINE 25 MG/1
SUPPOSITORY RECTAL
Qty: 3 EACH | Refills: 0 | Status: SHIPPED | OUTPATIENT
Start: 2019-09-09 | End: 2019-10-17

## 2019-09-09 NOTE — TELEPHONE ENCOUNTER
Called and left message with Endocrinology phone number to call for appointment. Referral has been placed by provider.

## 2019-09-12 ENCOUNTER — MYC REFILL (OUTPATIENT)
Dept: INTERNAL MEDICINE | Facility: CLINIC | Age: 34
End: 2019-09-12

## 2019-09-12 DIAGNOSIS — B00.1 RECURRENT COLD SORES: ICD-10-CM

## 2019-09-12 DIAGNOSIS — L40.50 PSORIATIC ARTHRITIS (H): ICD-10-CM

## 2019-09-13 RX ORDER — OXYCODONE AND ACETAMINOPHEN 5; 325 MG/1; MG/1
1 TABLET ORAL EVERY 4 HOURS PRN
Qty: 30 TABLET | Refills: 0 | Status: SHIPPED | OUTPATIENT
Start: 2019-09-21 | End: 2019-10-13

## 2019-09-16 NOTE — TELEPHONE ENCOUNTER
RECORDS RECEIVED FROM: [facility]   DATE RECEIVED: 9/16/19   NOTES (FOR ALL VISITS) STATUS DETAILS   OFFICE NOTES from referring provider [facility] Nando Haskins MD - Via telephone encounter 9/5/19    OFFICE NOTES from other specialist [facility] Rheum: 8/2/19     ED NOTES [facility] 7/11/19   OPERATIVE REPORT  (thyroid, pituitary, adrenal, parathyroid) NA    MEDICATION LIST Epic 9/9/19   IMAGING      DEXASCAN NA    MRI (BRAIN) NA    XR (Chest) NA    CT (HEAD/NECK/CHEST/ABDOMEN) NA    NUCLEAR  NA    ULTRASOUND (HEAD/NECK) NA    LABS     DIABETES: HBGA1C, CREATININE, FASTING LIPIDS, MICROALBUMIN URINE, POTASSIUM, TSH, T4    THYROID: TSH, T4, CBC, THYRODLONULIN, TOTAL T3, FREE T4, CALCITONIN, CEA [facility] 8/2/19

## 2019-10-03 ENCOUNTER — MYC REFILL (OUTPATIENT)
Dept: INTERNAL MEDICINE | Facility: CLINIC | Age: 34
End: 2019-10-03

## 2019-10-03 DIAGNOSIS — L40.50 PSORIATIC ARTHRITIS (H): ICD-10-CM

## 2019-10-04 RX ORDER — TRAMADOL HYDROCHLORIDE 50 MG/1
50 TABLET ORAL EVERY 8 HOURS PRN
Qty: 60 TABLET | Refills: 0 | Status: SHIPPED | OUTPATIENT
Start: 2019-10-08 | End: 2019-11-04

## 2019-10-04 NOTE — TELEPHONE ENCOUNTER
Patient Requested  traMADol (ULTRAM) 50 MG tablet  Last Filled  09/08/2019  Last Office Visit  04/22/2019  Next Office Visit  Nothing scheduled   Checked  10/04/2019    DX: Psoriatic arthritis     Pharmacy: [facility] #[id number] [facility], [place] - [address] AT [facility] OF [address] at 8:05 AM on 10/4/2019.

## 2019-10-13 ENCOUNTER — MYC REFILL (OUTPATIENT)
Dept: INTERNAL MEDICINE | Facility: CLINIC | Age: 34
End: 2019-10-13

## 2019-10-13 DIAGNOSIS — B00.1 RECURRENT COLD SORES: ICD-10-CM

## 2019-10-13 DIAGNOSIS — L40.50 PSORIATIC ARTHRITIS (H): ICD-10-CM

## 2019-10-14 RX ORDER — OXYCODONE AND ACETAMINOPHEN 5; 325 MG/1; MG/1
1 TABLET ORAL EVERY 4 HOURS PRN
Qty: 30 TABLET | Refills: 0 | Status: SHIPPED | OUTPATIENT
Start: 2019-10-21 | End: 2019-11-11

## 2019-10-14 NOTE — TELEPHONE ENCOUNTER
Reason For Call:   Chief Complaint   Patient presents with     Refill Request            Medication Name, Dose and Monthly Quantity:   Oxycodone with APAP (Percocet): Dose 5-325mg Schedule 1 tablet Q4H PRN Monthly Quantity 30     Diagnosis requiring opiates:   Psoriatic arthritis (H) [L40.50]       Recurrent cold sores [B00.1]           Problem List Updated:   Yes    Opioid Agreement On File - [facility] PAIN CONTRACT ID# [id number]:  No    Last Urine Drug Screen (at least once every 12 months) Date:   none  Unexpected Results:   N/A    [place]  Data Reviewed (at least once every 3 months) Date:   10/14/2019    Unexpected Results:    No.    Last Fill Date:   09/21/2019    Last Visit with PCP:   04/22/2019    Future Visits with PCP:   No.    Processing:   Larry GIRALDO CMA at 8:17 AM on 10/14/2019.

## 2019-10-17 ENCOUNTER — MYC REFILL (OUTPATIENT)
Dept: INTERNAL MEDICINE | Facility: CLINIC | Age: 34
End: 2019-10-17

## 2019-10-17 DIAGNOSIS — Z79.4 TYPE 2 DIABETES MELLITUS WITHOUT COMPLICATION, WITH LONG-TERM CURRENT USE OF INSULIN (H): ICD-10-CM

## 2019-10-17 DIAGNOSIS — E11.9 TYPE 2 DIABETES MELLITUS WITHOUT COMPLICATION, WITH LONG-TERM CURRENT USE OF INSULIN (H): ICD-10-CM

## 2019-10-17 RX ORDER — PROCHLORPERAZINE 25 MG/1
1 SUPPOSITORY RECTAL
Qty: 9 EACH | Refills: 2 | Status: SHIPPED | OUTPATIENT
Start: 2019-10-17 | End: 2020-10-12

## 2019-10-17 NOTE — TELEPHONE ENCOUNTER
Last Clinic Visit: 4/22/2019  [facility]

## 2019-10-21 RX ORDER — PROCHLORPERAZINE 25 MG/1
1 SUPPOSITORY RECTAL
Qty: 9 EACH | Refills: 1 | Status: SHIPPED | OUTPATIENT
Start: 2019-10-21 | End: 2020-08-13

## 2019-10-21 RX ORDER — PROCHLORPERAZINE 25 MG/1
1 SUPPOSITORY RECTAL CONTINUOUS
Qty: 1 EACH | Refills: 1 | Status: SHIPPED | OUTPATIENT
Start: 2019-10-21 | End: 2020-08-13

## 2019-11-04 ENCOUNTER — MYC REFILL (OUTPATIENT)
Dept: INTERNAL MEDICINE | Facility: CLINIC | Age: 34
End: 2019-11-04

## 2019-11-04 DIAGNOSIS — L40.50 PSORIATIC ARTHRITIS (H): ICD-10-CM

## 2019-11-04 RX ORDER — TRAMADOL HYDROCHLORIDE 50 MG/1
50 TABLET ORAL EVERY 8 HOURS PRN
Qty: 60 TABLET | Refills: 0 | Status: SHIPPED | OUTPATIENT
Start: 2019-11-08 | End: 2019-12-03

## 2019-11-04 NOTE — TELEPHONE ENCOUNTER
Patient Requested  traMADol (ULTRAM) 50 MG tablet  Last Filled  10/09/2019  Last Office Visit  04/22/2019  Next Office Visit  Nothing scheduled   Checked  11/04/2019    DX: Psoriatic arthritis     Pharmacy: [facility] #[id number] [facility], [place] - [address] AT [facility] OF [address] at 9:01 AM on 11/4/2019.

## 2019-11-08 ENCOUNTER — HEALTH MAINTENANCE LETTER (OUTPATIENT)
Age: 34
End: 2019-11-08

## 2019-11-11 ENCOUNTER — MYC REFILL (OUTPATIENT)
Dept: INTERNAL MEDICINE | Facility: CLINIC | Age: 34
End: 2019-11-11

## 2019-11-11 DIAGNOSIS — L40.50 PSORIATIC ARTHRITIS (H): ICD-10-CM

## 2019-11-11 DIAGNOSIS — B00.1 RECURRENT COLD SORES: ICD-10-CM

## 2019-11-12 RX ORDER — OXYCODONE AND ACETAMINOPHEN 5; 325 MG/1; MG/1
1 TABLET ORAL EVERY 4 HOURS PRN
Qty: 30 TABLET | Refills: 0 | Status: SHIPPED | OUTPATIENT
Start: 2019-11-12 | End: 2019-12-15

## 2019-11-12 NOTE — TELEPHONE ENCOUNTER
Reason For Call:   Chief Complaint   Patient presents with     Refill Request            Medication Name, Dose and Monthly Quantity:   Oxycodone with APAP (Percocet): Dose 5-325mg Schedule 1 tablet Q4H PRN Monthly Quantity 30     Diagnosis requiring opiates:   Psoriatic arthritis (H) [L40.50]       Recurrent cold sores [B00.1]           Problem List Updated:   Yes    Opioid Agreement On File - [facility] PAIN CONTRACT ID# [id number]:  No    Last Urine Drug Screen (at least once every 12 months) Date:   none  Unexpected Results:   N/A    [place]  Data Reviewed (at least once every 3 months) Date:   11/12/2019    Unexpected Results:    No.    Last Fill Date:   10/20/2019    Last Visit with PCP:   04/22/2019    Future Visits with PCP:   No.    Processing:   Larry GIRALDO CMA at 10:29 AM on 11/12/2019.

## 2019-11-13 ENCOUNTER — PRE VISIT (OUTPATIENT)
Dept: ENDOCRINOLOGY | Facility: CLINIC | Age: 34
End: 2019-11-13

## 2019-11-21 ASSESSMENT — ENCOUNTER SYMPTOMS
SINUS CONGESTION: 1
MYALGIAS: 1
FATIGUE: 1
INSOMNIA: 0
VOMITING: 0
NERVOUS/ANXIOUS: 1
MUSCLE CRAMPS: 1
CONSTIPATION: 0
WEIGHT GAIN: 0
HOARSE VOICE: 0
SKIN CHANGES: 0
LOSS OF CONSCIOUSNESS: 0
HEADACHES: 1
SEIZURES: 0
RECTAL PAIN: 0
DECREASED LIBIDO: 0
BLOATING: 0
SINUS PAIN: 0
TREMORS: 0
CHILLS: 0
HALLUCINATIONS: 0
ALTERED TEMPERATURE REGULATION: 1
NIGHT SWEATS: 1
BLOOD IN STOOL: 0
DIZZINESS: 0
WEAKNESS: 0
POLYPHAGIA: 0
STIFFNESS: 0
NECK PAIN: 0
MUSCLE WEAKNESS: 0
DEPRESSION: 0
SMELL DISTURBANCE: 0
WEIGHT LOSS: 0
JOINT SWELLING: 0
BOWEL INCONTINENCE: 0
NUMBNESS: 1
ABDOMINAL PAIN: 1
SORE THROAT: 0
FEVER: 0
NECK MASS: 0
DECREASED APPETITE: 0
MEMORY LOSS: 0
DIARRHEA: 1
DECREASED CONCENTRATION: 1
POLYDIPSIA: 0
TINGLING: 1
SPEECH CHANGE: 0
ARTHRALGIAS: 1
DISTURBANCES IN COORDINATION: 0
PANIC: 0
PARALYSIS: 0
BACK PAIN: 1
POOR WOUND HEALING: 1
INCREASED ENERGY: 0
NAUSEA: 1
NAIL CHANGES: 1
TROUBLE SWALLOWING: 0
TASTE DISTURBANCE: 0
HOT FLASHES: 0
HEARTBURN: 0
JAUNDICE: 0

## 2019-11-22 ENCOUNTER — OFFICE VISIT (OUTPATIENT)
Dept: RHEUMATOLOGY | Facility: CLINIC | Age: 34
End: 2019-11-22
Attending: INTERNAL MEDICINE
Payer: COMMERCIAL

## 2019-11-22 VITALS
HEART RATE: 100 BPM | HEIGHT: 63 IN | DIASTOLIC BLOOD PRESSURE: 73 MMHG | SYSTOLIC BLOOD PRESSURE: 116 MMHG | WEIGHT: 109.6 LBS | TEMPERATURE: 98.6 F | BODY MASS INDEX: 19.42 KG/M2 | OXYGEN SATURATION: 98 %

## 2019-11-22 DIAGNOSIS — L40.50 PSORIATIC ARTHRITIS (H): ICD-10-CM

## 2019-11-22 DIAGNOSIS — Z23 NEED FOR PROPHYLACTIC VACCINATION AND INOCULATION AGAINST INFLUENZA: Primary | ICD-10-CM

## 2019-11-22 PROCEDURE — G0463 HOSPITAL OUTPT CLINIC VISIT: HCPCS | Mod: 25,ZF

## 2019-11-22 PROCEDURE — G0008 ADMIN INFLUENZA VIRUS VAC: HCPCS | Mod: ZF

## 2019-11-22 PROCEDURE — 25000128 H RX IP 250 OP 636: Mod: ZF | Performed by: INTERNAL MEDICINE

## 2019-11-22 PROCEDURE — 90686 IIV4 VACC NO PRSV 0.5 ML IM: CPT | Mod: ZF | Performed by: INTERNAL MEDICINE

## 2019-11-22 RX ADMIN — INFLUENZA A VIRUS A/BRISBANE/02/2018 IVR-190 (H1N1) ANTIGEN (FORMALDEHYDE INACTIVATED), INFLUENZA A VIRUS A/KANSAS/14/2017 X-327 (H3N2) ANTIGEN (FORMALDEHYDE INACTIVATED), INFLUENZA B VIRUS B/PHUKET/3073/2013 ANTIGEN (FORMALDEHYDE INACTIVATED), AND INFLUENZA B VIRUS B/MARYLAND/15/2016 BX-69A ANTIGEN (FORMALDEHYDE INACTIVATED) 0.5 ML: 15; 15; 15; 15 INJECTION, SUSPENSION INTRAMUSCULAR at 10:51

## 2019-11-22 ASSESSMENT — MIFFLIN-ST. JEOR: SCORE: 1171.27

## 2019-11-22 ASSESSMENT — PAIN SCALES - GENERAL: PAINLEVEL: SEVERE PAIN (6)

## 2019-11-22 NOTE — PATIENT INSTRUCTIONS
Diagnosis:  1. Psoriatic arthritis, significantly improved    Plan:  1. Increase Cosentyx dosage by one additional 150 mg/mL (one extra syringe) to total 300 mg every month  2. Discontinue Otezla.  3. Complete blood work today to measure hemoglobin A1c, kidney function, liver function, and blood counts.

## 2019-11-22 NOTE — PROGRESS NOTES
[facility]  Rheumatology Clinic  Rene Granados MD  2019     Name: Chrissy Dixon  MRN: [id number]  Age: 34 year old  : 1985  Referring provider: Nando Haskins    Assessment and Plan:  # Psoriatic arthritis (polyarthritis, scalp and pustular psoriasis; failed Humira and leflunomide, dx'd post-partum in ):   Patient relates dramatic improvements in knee mobility, swelling, and pain since starting Cosentyx in 2019. Exam shows no psoriasis and no inflammatory joint changes other than scant L knee effusion. Blood work from August showed LFTs, CRP, CBC, Hepatitis B as negative or normal.     Psoriatic arthritis, formerly resistant to Otezla monotherapy, unresponsive to Stelara, and unresponsive to anti-TNF, is greatly improved now 3 months after starting anti-IL17. Patient still relates dosing cycle-dependent joint pain and stiffness with low-dose Cosentyx. I recommend increasing Cosentyx to 300 mg subcutaneous once monthly. Because combination Otezla and Cosentyx is not well-studied, I recommend discontinuing Otezla. If psoriasis recurs, she may use 0.1 % kenalog cream    # Type 1 diabetes mellitus:   Hemoglobin A1c remained above 8 at last check in 2019, but I suspect control has improved after patient discontinued steroid use. I will recheck hemoglobin A1c today. Patient following with Endocrinology. Return to clinic in 4 month.    Orders:  - Creatinine  - CBC with platelets  - ALT  - AST  - secukinumab (COSENTYX SENSOREADY PEN) 150 MG/ML Sensoready pen  Dispense: 2 each; Refill: 5    Follow-up: Return in about 4 months (around 3/22/2020).    HPI:   Chrissy Dixon is a 34 year old female with a history including psoriatic arthritis who presents for follow-up. I last evaluated the patient on 2019, at which time psoriatic arthritis remained active despite the patient having received two doses of ustekinumab, and despite continuing Otezla. We planned to discontinue  Stelara and start secukinumab.    Background History:  She has had psoriasis since birth (presented with diffuse rash at birth), was diagnosed with psoriasis at age 11, and developed arthritis in her 20s. She was on Enbrel at age 21 but developed infections such as pneumonia, sinusitis, and mono, and stopped it within 7 months. She was then on methotrexate from 2011 until February 2012, but did not tolerate this due to GI upset. She switched to sulfasalazine 2/2012 and tolerated it well at 1000 mg thrice a day, but had decreasing efficacy as of 11/1/13. She started leflunomide in 10-13 but stopped in 1-14 due to GI intolerance. She started Humira in 1-14, held in 7-14 during pregnancy, restarted in 7-15 for flaring disease but then discontinued due to worsened psoriasis and lack of efficacy. Leflunomide was then restarted but loose stools developed. Leflunomide was stopped and Otezla was started on 9/11/15. She started Stelara in fall 2015 but stopped due to cost and inconvenience in early 2016. She has continued with Otezla monotherapy through present. She restarted Stelara in 12-18.    Today, she reports that her knee mobility is 80% improved. She states that she has only had 1 flare in her knee since starting secukinumab. She does note that she sometimes has swelling in her toes and fingers, but this does not limit her. She states that she has been able to walk daily now that her knee pain is much better.    She explains that the introduction of secukinumab injections seems to be the most effective of her managements as she noticed resolution of knee symptoms very shortly after her first injection. She continued Otezla, did try a lower dosage, and, after lowering the dosage, she experienced a recurrence of psoriasis plaques. Other than this Otezla dosing change, her psoriasis has been mild and scant. She has also been using triamcinolone cream.    She states that this month marks her third dose of Cosentyx. She  explains that one week prior to receiving this injection, she hs increased morning stiffness in her hands and feet. She notes that, two days after receiving this Cosentyx injection, her morning stiffness is much better.     She reports that she has not been on prednisone or other steroids since shortly after her last visit with me. She explains that her blood sugars have been much easier to control now that she is off of prednisone.     She reports that her last knee injection or aspiration took place around 3 months ago.      Review of Systems:   Pertinent items are noted in HPI or as below, remainder of complete ROS is negative.      No recent problems with hearing or vision. No swallowing problems.   No breathing difficulty, shortness of breath, coughing, or wheezing.  No chest pain or palpitations.  No heart burn, indigestion, abdominal pain, nausea, vomiting, diarrhea.  No urination problems, no bloody, cloudy urine, no dysuria.  No numbing, tingling, weakness.  No headaches or confusion.  No easy bleeding or bruising.     Active Medications:   Current Outpatient Medications:      apremilast (OTEZLA) 30 MG tablet, Take 1 tablet (30 mg) by mouth 2 times daily, Disp: 180 tablet, Rfl: 1     blood glucose monitoring (ACCU-CHEK SMARTVIEW) test strip, Use to test blood sugar 6-8 times daily or as directed., Disp: 700 each, Rfl: 1     blood glucose monitoring (NO BRAND SPECIFIED) meter device kit, Use to test blood sugar 6 to 8 times daily or as directed. Okay to dispense One Touch Verio products per patient's insurance., Disp: 1 kit, Rfl: 0     cholecalciferol (VITAMIN D3) 1000 UNIT tablet, Take 1 tablet (1,000 Units) by mouth daily, Disp: 90 tablet, Rfl: 1     clobetasol (TEMOVATE) 0.05 % external solution, Apply topically 2 times daily (Patient taking differently: Apply topically 2 times daily as needed ), Disp: 50 mL, Rfl: 6     Continuous Blood Gluc Sensor (DEXCOM G6 SENSOR) MISC, 1 Package by Device route every  10 days, Disp: 9 each, Rfl: 1     Continuous Blood Gluc Sensor (DEXCOM G6 SENSOR) MISC, 1 Device by Device route every 10 days, Disp: 9 each, Rfl: 2     Continuous Blood Gluc Transmit (DEXCOM G6 TRANSMITTER) MISC, Inject 1 each Subcutaneous continuous, Disp: 1 each, Rfl: 1     Fluocinolone Acetonide (DERMA-SMOOTHE/FS SCALP) 0.01 % OIL, Apply to scalp at bedtime, then wear showercap, rinse off in morning. (Patient taking differently: Apply to scalp at bedtime, then wear showercap, rinse off in morning as needed.), Disp: 118 mL, Rfl: 3     fluticasone (FLONASE) 50 MCG/ACT spray, Spray 2 sprays into both nostrils daily, Disp: 48 g, Rfl: 3     ibuprofen (ADVIL/MOTRIN) 600 MG tablet, Take 1 tablet (600 mg) by mouth every 8 hours as needed for moderate pain, Disp: 270 tablet, Rfl: 0     insulin aspart (NOVOLOG FLEXPEN) 100 UNIT/ML pen, Use three times daily with meals, currently 30 units daily, Disp: 30 mL, Rfl: 5     insulin detemir (LEVEMIR FLEXTOUCH) 100 UNIT/ML pen, Inject 18 Units Subcutaneous 2 times daily, Disp: 60 mL, Rfl: 11     insulin pen needle 31G X 8 MM, Use  3-4 pen needles daily or as directed., Disp: 300 each, Rfl: 4     metFORMIN (GLUCOPHAGE) 500 MG tablet, Take 2 tablets (1,000 mg) by mouth 2 times daily (with meals) Labs due for further refills., Disp: 360 tablet, Rfl: 3     ONETOUCH LANCETS MISC, Use to test blood sugar 6 to 8 times daily or as directed. Okay to dispense One Touch Verio products per patient's insurance., Disp: 700 each, Rfl: 1     oxyCODONE-acetaminophen (PERCOCET) 5-325 MG tablet, Take 1 tablet by mouth every 4 hours as needed for severe pain (must last 30 days from dispense date), Disp: 30 tablet, Rfl: 0     secukinumab (COSENTYX SENSOREADY PEN) 150 MG/ML Sensoready pen, Inject 1 mL (150 mg) Subcutaneous every 28 days Hold for signs of infection, and seek medical attention., Disp: 1 each, Rfl: 5     secukinumab (COSENTYX) 150 MG/ML prefilled syringe, Inject 1 mL (150 mg)  Subcutaneous once a week At weeks 0, 1, 2, 3, and 4 and every 4 weeks there after loading dose., Disp: 5 Syringe, Rfl: 0     traMADol (ULTRAM) 50 MG tablet, Take 1 tablet (50 mg) by mouth every 8 hours as needed for moderate pain . Must last 30 days, Disp: 60 tablet, Rfl: 0     triamcinolone (KENALOG) 0.1 % cream, Apply topically 2 times daily (Patient taking differently: Apply topically 2 times daily as needed ), Disp: 453.6 g, Rfl: 1     valACYclovir (VALTREX) 500 MG tablet, Take 1 tablet (500 mg) by mouth daily as needed, Disp: 60 tablet, Rfl: 3     blood glucose monitoring (NO BRAND SPECIFIED) test strip, Use to test blood sugars 6 to 8 times daily or as directed. Okay to dispense One Touch Verio per patient's insurance., Disp: 700 strip, Rfl: 1     ferrous gluconate (FERGON) 324 (38 FE) MG tablet, Take 1 tablet (324 mg) by mouth every other day, Disp: 100 tablet, Rfl: 11     naloxone (NARCAN) 4 MG/0.1ML nasal spray, Spray 1 spray (4 mg) into one nostril alternating nostrils once as needed for opioid reversal every 2-3 minutes until assistance arrives (Patient not taking: Reported on 11/22/2019), Disp: 0.2 mL, Rfl: 1     ondansetron (ZOFRAN-ODT) 4 MG ODT tab, Take 1-2 tablets (4-8 mg) by mouth every 8 hours as needed for nausea, Disp: 4 tablet, Rfl: 0     predniSONE (DELTASONE) 10 MG tablet, Take 1 tablet (10 mg) by mouth daily As needed for flares only, Disp: 30 tablet, Rfl: 1     ustekinumab (STELARA) 45 MG/0.5ML SOSY, Inject 0.5 mLs (45 mg) Subcutaneous every 3 months Hold for signs of infection, and seek medical attention. (Patient not taking: Reported on 11/22/2019), Disp: 1 Syringe, Rfl: 1     ustekinumab (STELARA) 45 MG/0.5ML SOSY, Inject 0.5 mLs (45 mg) Subcutaneous every 3 months, Disp: 3 Syringe, Rfl: 1  No current facility-administered medications for this visit.     Allergies:  [employer]   Betadine   Levofloxacin   Penicillins   Pneumococcal Vaccines   Prenatal Vit-Fe  "Fumarate-Fa  Tdap    Past Medical History:  Diabetes    Psoriatic arthritis   Diabetes mellitus type 2, controlled   Psoriasis arthropathia   Malaise and fatigue  Iron deficiency anemia due to chronic blood loss  Right knee pain    Past Surgical History:  Arthroscopy knee with meniscectomy, right (11/20/2018)  AS Hysteroscopy Surgical W/Endometrial, bilateral (2017)   Tonsillar abscess    Family History:    The patient's family history includes Connective Tissue Disorder in her father; Diabetes in her maternal grandfather, maternal uncle, maternal uncle, and mother; Hypertension in her maternal grandfather, maternal grandmother, and mother; Prostate Cancer in her maternal grandfather; Thyroid Disease in her mother.    Social History:  The patient reports that she quit smoking about 12 years ago. Her smoking use included cigarettes. She has a 5.00 pack-year smoking history. She has never used smokeless tobacco. She reports that she does not drink alcohol or use drugs.   PCP: Nando Haskins  Marital Status:      Physical Exam:   /73   Pulse 100   Temp 98.6  F (37  C) (Oral)   Ht 1.6 m (5' 3\")   Wt 49.7 kg (109 lb 9.6 oz)   SpO2 98%   BMI 19.41 kg/m     Wt Readings from Last 4 Encounters:   11/22/19 49.7 kg (109 lb 9.6 oz)   08/02/19 49.1 kg (108 lb 3.2 oz)   04/22/19 51.4 kg (113 lb 4.8 oz)   12/26/18 51.9 kg (114 lb 8 oz)     Constitutional: Well-developed, appearing stated age; cooperative.  Eyes: Normal EOM, PERRLA, vision, conjunctiva, sclera.  ENT: Normal external ears, nose, hearing, lips, teeth, gums, throat. No mucous membrane lesions, normal saliva pool.  Neck: No mass or thyroid enlargement.  Resp: Lungs clear to auscultation, nl to palpation.  CV: RRR, no murmurs, rubs or gallops, no edema.  GI: No ABD mass or tenderness, no HSM.  : Not tested.  Lymph: No cervical, supraclavicular, inguinal or epitrochlear nodes.  MS: The TMJ, neck, shoulder, elbow, wrist, MCP/PIP/DIP, spine, " hip, ankle, and foot MTP/IP joints were examined and found normal. No active synovitis or altered joint anatomy. Full joint ROM. Normal  strength. No tenosynovitis, enthesopathy. No inflammatory changes on the thumb joint. There is a scant, cool effusion on the left knee, but not on the right. Full flexion and extension in both knees. No evidence of dactylitis today.  Skin: No nail pitting, alopecia, rash, nodules, or lesions. No flaking or redness along the hairline or in the ears. No psoriasis over the back.  Neuro: Normal cranial nerves, strength, sensation, DTRs.   Psych: Normal judgement, orientation, memory, affect.     Laboratory:   RHEUM RESULTS Latest Ref Rng & Units 8/20/2018 12/26/2018 8/2/2019   SED RATE 0 - 20 mm/h - - -   CRP, INFLAMMATION 0.0 - 8.0 mg/L - <2.9 <2.9   CK TOTAL 32 - 200 U/L - - -   RHEUMATOID FACTOR 0 - 14 IU/mL - - -   CHALO SCREEN BY EIA <1.0 - - -   AST 0 - 45 U/L 5 13 9   ALT 0 - 50 U/L 17 19 19   ALBUMIN 3.4 - 5.0 g/dL 4.1 - -   WBC 4.0 - 11.0 10e9/L - 6.6 7.7   RBC 3.8 - 5.2 10e12/L - 4.66 4.70   HGB 11.7 - 15.7 g/dL - 11.6(L) 11.9   HCT 35.0 - 47.0 % - 38.2 39.3   MCV 78 - 100 fl - 82 84   MCHC 31.5 - 36.5 g/dL - 30.4(L) 30.3(L)   RDW 10.0 - 15.0 % - 14.6 15.8(H)    - 450 10e9/L - 357 357   CREATININE 0.52 - 1.04 mg/dL 0.54 0.50(L) -   GFR ESTIMATE, IF BLACK >60 mL/min/[1.73:m2] >90 >90 -   GFR ESTIMATE >60 mL/min/[1.73:m2] >90 >90 -   HEPATITIS C ANTIBODY NEG - - -     Rheumatoid Factor   Date Value Ref Range Status   08/11/2011 11 0 - 14 IU/mL Final     Cyclic Cit Pept IgG/IgA   Date Value Ref Range Status   08/11/2011 <20  Interpretation:  Negative <20 UNITS Final     CHALO Screen by EIA   Date Value Ref Range Status   04/09/2014 <1.0  Interpretation:  Negative <1.0 Final     Hepatitis B Core Ana Rosa   Date Value Ref Range Status   08/02/2019 Nonreactive NR^Nonreactive Final     Antistreptolysin O   Date Value Ref Range Status   09/11/2015 109 0 - 120 IU/mL Final      Comment:     A single ASO analysis may not be meaningful due to the variability of ASO   values   within the normal population.  Both clinical and laboratory findings should   be   considered in reaching a diagnosis.  A single analysis may be less   informative   than a repeat analysis showing a change.       Quantiferon-TB Gold Plus Result   Date Value Ref Range Status   12/26/2018 Negative NEG^Negative Final     Comment:     No interferon gamma response to M.tuberculosis antigens was detected.   Infection with M.tuberculosis is unlikely, however a single negative result   does not exclude infection. In patients at high risk for infection, a second   test should be considered  in accordance with the 2017 ATS/IDSA/CDC Clinical Practice Guidelines for   Diagnosis of Tuberculosis in Adults and Children [Rolandinsohdemario DIXON et   al.Clin.Infect.Dis. 2017 64(2):111-115].       TB1 Ag minus Nil Value   Date Value Ref Range Status   12/26/2018 0.00 IU/mL Final     TB2 Ag minus Nil Value   Date Value Ref Range Status   12/26/2018 0.00 IU/mL Final     Mitogen minus Nil Result   Date Value Ref Range Status   12/26/2018 >10.00 IU/mL Final     Nil Result   Date Value Ref Range Status   12/26/2018 0.02 IU/mL Final     Neutrophil Cytoplasmic IgG Antibody   Date Value Ref Range Status   05/31/2013   Final    <1:20  Reference range: <1:20  (Note)  The ANCA IFA is <1:20; therefore, no further testing will  be performed.  INTERPRETIVE INFORMATION: Anti-Neutrophil Cyto Ab, IgG    Neutrophil Cytoplasmic Antibodies (C-ANCA = granular  cytoplasmic staining, P-ANCA = perinuclear staining) are  found in the serum of over 90 percent of patients with  certain necrotizing systemic vasculitides, and usually in  less than 5 percent of patients with collagen vascular  disease or arthritis.  Performed by [facility],  [address] [place] [id number] [phone]  [email or web address] Nataly Roth MD, Lab. Director     Scribe Disclosure:  Isaias KEY  Brooke, am serving as a scribe to document services personally performed by Rene Granados MD at this visit, based upon the provider's statements to me. All documentation has been reviewed by the aforementioned provider prior to being entered into the official medical record.

## 2019-11-22 NOTE — NURSING NOTE
"Chief Complaint   Patient presents with     RECHECK     Psoriatic arthritis      /73   Pulse 100   Temp 98.6  F (37  C) (Oral)   Ht 1.6 m (5' 3\")   Wt 49.7 kg (109 lb 9.6 oz)   SpO2 98%   BMI 19.41 kg/m    Ciera Campos CMA    "

## 2019-11-22 NOTE — LETTER
2019    RE: Chrissy Dixon  [address] [facility] [address]     [facility]  Rheumatology Clinic  Rene Granados MD  2019     Name: Chrissy Dixon  MRN: [id number]  Age: 34 year old  : 1985  Referring provider: Nando Haskins    Assessment and Plan:  # Psoriatic arthritis (polyarthritis, scalp and pustular psoriasis; failed Humira and leflunomide, dx'd post-partum in ):   Patient relates dramatic improvements in knee mobility, swelling, and pain since starting Cosentyx in 2019. Exam shows no psoriasis and no inflammatory joint changes other than scant L knee effusion. Blood work from August showed LFTs, CRP, CBC, Hepatitis B as negative or normal.     Psoriatic arthritis, formerly resistant to Otezla monotherapy, unresponsive to Stelara, and unresponsive to anti-TNF, is greatly improved now 3 months after starting anti-IL17. Patient still relates dosing cycle-dependent joint pain and stiffness with low-dose Cosentyx. I recommend increasing Cosentyx to 300 mg subcutaneous once monthly. Because combination Otezla and Cosentyx is not well-studied, I recommend discontinuing Otezla. If psoriasis recurs, she may use 0.1 % kenalog cream    # Type 1 diabetes mellitus:   Hemoglobin A1c remained above 8 at last check in 2019, but I suspect control has improved after patient discontinued steroid use. I will recheck hemoglobin A1c today. Patient following with Endocrinology. Return to clinic in 4 month.    Orders:  - Creatinine  - CBC with platelets  - ALT  - AST  - secukinumab (COSENTYX SENSOREADY PEN) 150 MG/ML Sensoready pen  Dispense: 2 each; Refill: 5    Follow-up: Return in about 4 months (around 3/22/2020).    HPI:   Chrissy Dixon is a 34 year old female with a history including psoriatic arthritis who presents for follow-up. I last evaluated the patient on 2019, at which time psoriatic arthritis remained active despite the patient having received  two doses of ustekinumab, and despite continuing Otezla. We planned to discontinue Stelara and start secukinumab.    Background History:  She has had psoriasis since birth (presented with diffuse rash at birth), was diagnosed with psoriasis at age 11, and developed arthritis in her 20s. She was on Enbrel at age 21 but developed infections such as pneumonia, sinusitis, and mono, and stopped it within 7 months. She was then on methotrexate from 2011 until February 2012, but did not tolerate this due to GI upset. She switched to sulfasalazine 2/2012 and tolerated it well at 1000 mg thrice a day, but had decreasing efficacy as of 11/1/13. She started leflunomide in 10-13 but stopped in 1-14 due to GI intolerance. She started Humira in 1-14, held in 7-14 during pregnancy, restarted in 7-15 for flaring disease but then discontinued due to worsened psoriasis and lack of efficacy. Leflunomide was then restarted but loose stools developed. Leflunomide was stopped and Otezla was started on 9/11/15. She started Stelara in fall 2015 but stopped due to cost and inconvenience in early 2016. She has continued with Otezla monotherapy through present. She restarted Stelara in 12-18.    Today, she reports that her knee mobility is 80% improved. She states that she has only had 1 flare in her knee since starting secukinumab. She does note that she sometimes has swelling in her toes and fingers, but this does not limit her. She states that she has been able to walk daily now that her knee pain is much better.    She explains that the introduction of secukinumab injections seems to be the most effective of her managements as she noticed resolution of knee symptoms very shortly after her first injection. She continued Otezla, did try a lower dosage, and, after lowering the dosage, she experienced a recurrence of psoriasis plaques. Other than this Otezla dosing change, her psoriasis has been mild and scant. She has also been using  triamcinolone cream.    She states that this month marks her third dose of Cosentyx. She explains that one week prior to receiving this injection, she hs increased morning stiffness in her hands and feet. She notes that, two days after receiving this Cosentyx injection, her morning stiffness is much better.     She reports that she has not been on prednisone or other steroids since shortly after her last visit with me. She explains that her blood sugars have been much easier to control now that she is off of prednisone.     She reports that her last knee injection or aspiration took place around 3 months ago.      Review of Systems:   Pertinent items are noted in HPI or as below, remainder of complete ROS is negative.      No recent problems with hearing or vision. No swallowing problems.   No breathing difficulty, shortness of breath, coughing, or wheezing.  No chest pain or palpitations.  No heart burn, indigestion, abdominal pain, nausea, vomiting, diarrhea.  No urination problems, no bloody, cloudy urine, no dysuria.  No numbing, tingling, weakness.  No headaches or confusion.  No easy bleeding or bruising.     Active Medications:   Current Outpatient Medications:      apremilast (OTEZLA) 30 MG tablet, Take 1 tablet (30 mg) by mouth 2 times daily, Disp: 180 tablet, Rfl: 1     blood glucose monitoring (ACCU-CHEK SMARTVIEW) test strip, Use to test blood sugar 6-8 times daily or as directed., Disp: 700 each, Rfl: 1     blood glucose monitoring (NO BRAND SPECIFIED) meter device kit, Use to test blood sugar 6 to 8 times daily or as directed. Okay to dispense One Touch Verio products per patient's insurance., Disp: 1 kit, Rfl: 0     cholecalciferol (VITAMIN D3) 1000 UNIT tablet, Take 1 tablet (1,000 Units) by mouth daily, Disp: 90 tablet, Rfl: 1     clobetasol (TEMOVATE) 0.05 % external solution, Apply topically 2 times daily (Patient taking differently: Apply topically 2 times daily as needed ), Disp: 50 mL, Rfl:  6     Continuous Blood Gluc Sensor (DEXCOM G6 SENSOR) MISC, 1 Package by Device route every 10 days, Disp: 9 each, Rfl: 1     Continuous Blood Gluc Sensor (DEXCOM G6 SENSOR) MISC, 1 Device by Device route every 10 days, Disp: 9 each, Rfl: 2     Continuous Blood Gluc Transmit (DEXCOM G6 TRANSMITTER) MISC, Inject 1 each Subcutaneous continuous, Disp: 1 each, Rfl: 1     Fluocinolone Acetonide (DERMA-SMOOTHE/FS SCALP) 0.01 % OIL, Apply to scalp at bedtime, then wear showercap, rinse off in morning. (Patient taking differently: Apply to scalp at bedtime, then wear showercap, rinse off in morning as needed.), Disp: 118 mL, Rfl: 3     fluticasone (FLONASE) 50 MCG/ACT spray, Spray 2 sprays into both nostrils daily, Disp: 48 g, Rfl: 3     ibuprofen (ADVIL/MOTRIN) 600 MG tablet, Take 1 tablet (600 mg) by mouth every 8 hours as needed for moderate pain, Disp: 270 tablet, Rfl: 0     insulin aspart (NOVOLOG FLEXPEN) 100 UNIT/ML pen, Use three times daily with meals, currently 30 units daily, Disp: 30 mL, Rfl: 5     insulin detemir (LEVEMIR FLEXTOUCH) 100 UNIT/ML pen, Inject 18 Units Subcutaneous 2 times daily, Disp: 60 mL, Rfl: 11     insulin pen needle 31G X 8 MM, Use  3-4 pen needles daily or as directed., Disp: 300 each, Rfl: 4     metFORMIN (GLUCOPHAGE) 500 MG tablet, Take 2 tablets (1,000 mg) by mouth 2 times daily (with meals) Labs due for further refills., Disp: 360 tablet, Rfl: 3     ONETOUCH LANCETS MISC, Use to test blood sugar 6 to 8 times daily or as directed. Okay to dispense One Touch Verio products per patient's insurance., Disp: 700 each, Rfl: 1     oxyCODONE-acetaminophen (PERCOCET) 5-325 MG tablet, Take 1 tablet by mouth every 4 hours as needed for severe pain (must last 30 days from dispense date), Disp: 30 tablet, Rfl: 0     secukinumab (COSENTYX SENSOREADY PEN) 150 MG/ML Sensoready pen, Inject 1 mL (150 mg) Subcutaneous every 28 days Hold for signs of infection, and seek medical attention., Disp: 1 each,  Rfl: 5     secukinumab (COSENTYX) 150 MG/ML prefilled syringe, Inject 1 mL (150 mg) Subcutaneous once a week At weeks 0, 1, 2, 3, and 4 and every 4 weeks there after loading dose., Disp: 5 Syringe, Rfl: 0     traMADol (ULTRAM) 50 MG tablet, Take 1 tablet (50 mg) by mouth every 8 hours as needed for moderate pain . Must last 30 days, Disp: 60 tablet, Rfl: 0     triamcinolone (KENALOG) 0.1 % cream, Apply topically 2 times daily (Patient taking differently: Apply topically 2 times daily as needed ), Disp: 453.6 g, Rfl: 1     valACYclovir (VALTREX) 500 MG tablet, Take 1 tablet (500 mg) by mouth daily as needed, Disp: 60 tablet, Rfl: 3     blood glucose monitoring (NO BRAND SPECIFIED) test strip, Use to test blood sugars 6 to 8 times daily or as directed. Okay to dispense One Touch Verio per patient's insurance., Disp: 700 strip, Rfl: 1     ferrous gluconate (FERGON) 324 (38 FE) MG tablet, Take 1 tablet (324 mg) by mouth every other day, Disp: 100 tablet, Rfl: 11     naloxone (NARCAN) 4 MG/0.1ML nasal spray, Spray 1 spray (4 mg) into one nostril alternating nostrils once as needed for opioid reversal every 2-3 minutes until assistance arrives (Patient not taking: Reported on 11/22/2019), Disp: 0.2 mL, Rfl: 1     ondansetron (ZOFRAN-ODT) 4 MG ODT tab, Take 1-2 tablets (4-8 mg) by mouth every 8 hours as needed for nausea, Disp: 4 tablet, Rfl: 0     predniSONE (DELTASONE) 10 MG tablet, Take 1 tablet (10 mg) by mouth daily As needed for flares only, Disp: 30 tablet, Rfl: 1     ustekinumab (STELARA) 45 MG/0.5ML SOSY, Inject 0.5 mLs (45 mg) Subcutaneous every 3 months Hold for signs of infection, and seek medical attention. (Patient not taking: Reported on 11/22/2019), Disp: 1 Syringe, Rfl: 1     ustekinumab (STELARA) 45 MG/0.5ML SOSY, Inject 0.5 mLs (45 mg) Subcutaneous every 3 months, Disp: 3 Syringe, Rfl: 1  No current facility-administered medications for this visit.     Allergies:  [employer]   Betadine  "  Levofloxacin   Penicillins   Pneumococcal Vaccines   Prenatal Vit-Fe Fumarate-Fa  Tdap    Past Medical History:  Diabetes    Psoriatic arthritis   Diabetes mellitus type 2, controlled   Psoriasis arthropathia   Malaise and fatigue  Iron deficiency anemia due to chronic blood loss  Right knee pain    Past Surgical History:  Arthroscopy knee with meniscectomy, right (11/20/2018)  AS Hysteroscopy Surgical W/Endometrial, bilateral (2017)   Tonsillar abscess    Family History:    The patient's family history includes Connective Tissue Disorder in her father; Diabetes in her maternal grandfather, maternal uncle, maternal uncle, and mother; Hypertension in her maternal grandfather, maternal grandmother, and mother; Prostate Cancer in her maternal grandfather; Thyroid Disease in her mother.    Social History:  The patient reports that she quit smoking about 12 years ago. Her smoking use included cigarettes. She has a 5.00 pack-year smoking history. She has never used smokeless tobacco. She reports that she does not drink alcohol or use drugs.   PCP: Nando Haskins  Marital Status:      Physical Exam:   /73   Pulse 100   Temp 98.6  F (37  C) (Oral)   Ht 1.6 m (5' 3\")   Wt 49.7 kg (109 lb 9.6 oz)   SpO2 98%   BMI 19.41 kg/m      Wt Readings from Last 4 Encounters:   11/22/19 49.7 kg (109 lb 9.6 oz)   08/02/19 49.1 kg (108 lb 3.2 oz)   04/22/19 51.4 kg (113 lb 4.8 oz)   12/26/18 51.9 kg (114 lb 8 oz)     Constitutional: Well-developed, appearing stated age; cooperative.  Eyes: Normal EOM, PERRLA, vision, conjunctiva, sclera.  ENT: Normal external ears, nose, hearing, lips, teeth, gums, throat. No mucous membrane lesions, normal saliva pool.  Neck: No mass or thyroid enlargement.  Resp: Lungs clear to auscultation, nl to palpation.  CV: RRR, no murmurs, rubs or gallops, no edema.  GI: No ABD mass or tenderness, no HSM.  : Not tested.  Lymph: No cervical, supraclavicular, inguinal or epitrochlear " nodes.  MS: The TMJ, neck, shoulder, elbow, wrist, MCP/PIP/DIP, spine, hip, ankle, and foot MTP/IP joints were examined and found normal. No active synovitis or altered joint anatomy. Full joint ROM. Normal  strength. No tenosynovitis, enthesopathy. No inflammatory changes on the thumb joint. There is a scant, cool effusion on the left knee, but not on the right. Full flexion and extension in both knees. No evidence of dactylitis today.  Skin: No nail pitting, alopecia, rash, nodules, or lesions. No flaking or redness along the hairline or in the ears. No psoriasis over the back.  Neuro: Normal cranial nerves, strength, sensation, DTRs.   Psych: Normal judgement, orientation, memory, affect.     Laboratory:   RHEUM RESULTS Latest Ref Rng & Units 8/20/2018 12/26/2018 8/2/2019   SED RATE 0 - 20 mm/h - - -   CRP, INFLAMMATION 0.0 - 8.0 mg/L - <2.9 <2.9   CK TOTAL 32 - 200 U/L - - -   RHEUMATOID FACTOR 0 - 14 IU/mL - - -   CHALO SCREEN BY EIA <1.0 - - -   AST 0 - 45 U/L 5 13 9   ALT 0 - 50 U/L 17 19 19   ALBUMIN 3.4 - 5.0 g/dL 4.1 - -   WBC 4.0 - 11.0 10e9/L - 6.6 7.7   RBC 3.8 - 5.2 10e12/L - 4.66 4.70   HGB 11.7 - 15.7 g/dL - 11.6(L) 11.9   HCT 35.0 - 47.0 % - 38.2 39.3   MCV 78 - 100 fl - 82 84   MCHC 31.5 - 36.5 g/dL - 30.4(L) 30.3(L)   RDW 10.0 - 15.0 % - 14.6 15.8(H)    - 450 10e9/L - 357 357   CREATININE 0.52 - 1.04 mg/dL 0.54 0.50(L) -   GFR ESTIMATE, IF BLACK >60 mL/min/[1.73:m2] >90 >90 -   GFR ESTIMATE >60 mL/min/[1.73:m2] >90 >90 -   HEPATITIS C ANTIBODY NEG - - -     Rheumatoid Factor   Date Value Ref Range Status   08/11/2011 11 0 - 14 IU/mL Final     Cyclic Cit Pept IgG/IgA   Date Value Ref Range Status   08/11/2011 <20  Interpretation:  Negative <20 UNITS Final     CHALO Screen by EIA   Date Value Ref Range Status   04/09/2014 <1.0  Interpretation:  Negative <1.0 Final     Hepatitis B Core Ana Rosa   Date Value Ref Range Status   08/02/2019 Nonreactive NR^Nonreactive Final     Antistreptolysin O   Date  Value Ref Range Status   09/11/2015 109 0 - 120 IU/mL Final     Comment:     A single ASO analysis may not be meaningful due to the variability of ASO   values   within the normal population.  Both clinical and laboratory findings should   be   considered in reaching a diagnosis.  A single analysis may be less   informative   than a repeat analysis showing a change.       Quantiferon-TB Gold Plus Result   Date Value Ref Range Status   12/26/2018 Negative NEG^Negative Final     Comment:     No interferon gamma response to M.tuberculosis antigens was detected.   Infection with M.tuberculosis is unlikely, however a single negative result   does not exclude infection. In patients at high risk for infection, a second   test should be considered  in accordance with the 2017 ATS/IDSA/CDC Clinical Practice Guidelines for   Diagnosis of Tuberculosis in Adults and Children [Lewinsohn DM et   al.Clin.Infect.Dis. 2017 64(2):111-115].       TB1 Ag minus Nil Value   Date Value Ref Range Status   12/26/2018 0.00 IU/mL Final     TB2 Ag minus Nil Value   Date Value Ref Range Status   12/26/2018 0.00 IU/mL Final     Mitogen minus Nil Result   Date Value Ref Range Status   12/26/2018 >10.00 IU/mL Final     Nil Result   Date Value Ref Range Status   12/26/2018 0.02 IU/mL Final     Neutrophil Cytoplasmic IgG Antibody   Date Value Ref Range Status   05/31/2013   Final    <1:20  Reference range: <1:20  (Note)  The ANCA IFA is <1:20; therefore, no further testing will  be performed.  INTERPRETIVE INFORMATION: Anti-Neutrophil Cyto Ab, IgG    Neutrophil Cytoplasmic Antibodies (C-ANCA = granular  cytoplasmic staining, P-ANCA = perinuclear staining) are  found in the serum of over 90 percent of patients with  certain necrotizing systemic vasculitides, and usually in  less than 5 percent of patients with collagen vascular  disease or arthritis.  Performed by [facility],  [address] [place] [id number] [phone]  [email or web address],  Nataly Roth MD, Lab. Director     Scribe Disclosure:  I, Isaias Cox, am serving as a scribe to document services personally performed by Rene Granados MD at this visit, based upon the provider's statements to me. All documentation has been reviewed by the aforementioned provider prior to being entered into the official medical record.       Rene Granados MD

## 2019-11-25 ENCOUNTER — OFFICE VISIT (OUTPATIENT)
Dept: ENDOCRINOLOGY | Facility: CLINIC | Age: 34
End: 2019-11-25
Payer: COMMERCIAL

## 2019-11-25 VITALS
HEART RATE: 88 BPM | SYSTOLIC BLOOD PRESSURE: 118 MMHG | DIASTOLIC BLOOD PRESSURE: 80 MMHG | WEIGHT: 110.9 LBS | BODY MASS INDEX: 19.65 KG/M2

## 2019-11-25 DIAGNOSIS — E10.9 TYPE 1 DIABETES MELLITUS WITHOUT COMPLICATION (H): Primary | ICD-10-CM

## 2019-11-25 DIAGNOSIS — L40.50 PSORIATIC ARTHRITIS (H): ICD-10-CM

## 2019-11-25 LAB
ALT SERPL W P-5'-P-CCNC: 19 U/L (ref 0–50)
AST SERPL W P-5'-P-CCNC: 12 U/L (ref 0–45)
CREAT SERPL-MCNC: 0.6 MG/DL (ref 0.52–1.04)
ERYTHROCYTE [DISTWIDTH] IN BLOOD BY AUTOMATED COUNT: 15.3 % (ref 10–15)
GFR SERPL CREATININE-BSD FRML MDRD: >90 ML/MIN/{1.73_M2}
HBA1C MFR BLD: 7.8 % (ref 4.3–6)
HCT VFR BLD AUTO: 36.5 % (ref 35–47)
HGB BLD-MCNC: 11.4 G/DL (ref 11.7–15.7)
MCH RBC QN AUTO: 25.4 PG (ref 26.5–33)
MCHC RBC AUTO-ENTMCNC: 31.2 G/DL (ref 31.5–36.5)
MCV RBC AUTO: 81 FL (ref 78–100)
PLATELET # BLD AUTO: 358 10E9/L (ref 150–450)
RBC # BLD AUTO: 4.49 10E12/L (ref 3.8–5.2)
WBC # BLD AUTO: 6.8 10E9/L (ref 4–11)

## 2019-11-25 ASSESSMENT — PAIN SCALES - GENERAL: PAINLEVEL: NO PAIN (0)

## 2019-11-25 NOTE — PROGRESS NOTES
Reason for visit/consult: Type 1 Diabetes    Primary care provider: Nando Haskins    HPI:  35 yo female seen for eval/mgmt of Type 1 Diabetes since age 21 without complications (had a retinal tear in past that may not be related and has psoriatic arthritis). Takes Levemir 14 units at am and Levemir 15-16 units in PM and Novolog 3-4 units w/ meals (2 unit(s) per 15 grams of CHOs) and does not use Novolog correction and Metformin 1,000 mg bid.  She wants an insulin pump. Has CGMS Dexcom but not Clarity, so information cannot be downloaded today and is viewed w/ patient using carlos on her phone. We are not able to view 30 day average.    Glucose data: 118 now. Thinks her average is 160 or so. Has had lows to about 55. She believes she overcorrected after a high. She gets symptoms of confused and lightheaded and shaky when low. She has had some more highs lately. She feels like she has had more highs and lows over the past few months. She has 2 kids and this keeps her busy. Also, she just started CGMS x about 6 months so is more aware of the lows and the highs now.    Fasting data: >130 in general, she thinks. Takes am dose at 5:40 am and PM dose is 7:45 pm.    S/p tubal ligation last year and she had an ablation as well and her hormones maybe affecting since periods are irregular and sugars are higher then.    A1c today 7.8% was 8.0% on 8/2/19.     Lab Results   Component Value Date    A1C 8.0 08/02/2019    A1C 8.0 04/22/2019    A1C 8.2 06/26/2018    A1C 8.2 03/20/2017    A1C 8.7 11/30/2016       Past Medical/Surgical History:  Past Medical History:   Diagnosis Date     Diabetes      Other psoriasis      Polyarthritis     probable psoriatic arthritis     Past Surgical History:   Procedure Laterality Date     ARTHROSCOPY KNEE WITH MENISCECTOMY Right 11/20/2018    Procedure: Examination Under Anesthesia Right Knee, Right Knee Arthroscopy, Partial Meniscectomy, Chondroplasty, Cyst Decompression, Synovial Biopsy;   Surgeon: Sean Pate MD;  Location: [facility]     AS HYSTEROSCOPY, SURGICAL; W/ ENDOMETRIAL ABLATION, ANY METHOD Bilateral 2017    endometrial ablation with bilater TL     SURGICAL HISTORY OF -       .  Tonsillar abscess       Allergies:  Allergies   Allergen Reactions     Diagnostic X-Ray Materials Swelling     Betadine [Povidone Iodine] Swelling     Levofloxacin Other (See Comments)     High blood sugars, doesn't feel well at all      Penicillins Hives     Pneumococcal Vaccines Swelling     Prenatal Vit-Fe Fumarate-Fa [Cavan-Folate Ob] Hives     Tdap [Daptacel] Swelling       Current Medications   Current Outpatient Medications   Medication     apremilast (OTEZLA) 30 MG tablet     blood glucose monitoring (ACCU-CHEK SMARTVIEW) test strip     blood glucose monitoring (NO BRAND SPECIFIED) meter device kit     cholecalciferol (VITAMIN D3) 1000 UNIT tablet     clobetasol (TEMOVATE) 0.05 % external solution     Continuous Blood Gluc Sensor (DEXCOM G6 SENSOR) MISC     Continuous Blood Gluc Sensor (DEXCOM G6 SENSOR) MISC     Continuous Blood Gluc Transmit (DEXCOM G6 TRANSMITTER) MISC     Fluocinolone Acetonide (DERMA-SMOOTHE/FS SCALP) 0.01 % OIL     fluticasone (FLONASE) 50 MCG/ACT spray     ibuprofen (ADVIL/MOTRIN) 600 MG tablet     insulin aspart (NOVOLOG FLEXPEN) 100 UNIT/ML pen     insulin detemir (LEVEMIR FLEXTOUCH) 100 UNIT/ML pen     insulin pen needle 31G X 8 MM     metFORMIN (GLUCOPHAGE) 500 MG tablet     ONETOUCH LANCETS MISC     oxyCODONE-acetaminophen (PERCOCET) 5-325 MG tablet     secukinumab (COSENTYX SENSOREADY PEN) 150 MG/ML Sensoready pen     secukinumab (COSENTYX) 150 MG/ML prefilled syringe     traMADol (ULTRAM) 50 MG tablet     triamcinolone (KENALOG) 0.1 % cream     ustekinumab (STELARA) 45 MG/0.5ML SOSY     valACYclovir (VALTREX) 500 MG tablet     blood glucose monitoring (NO BRAND SPECIFIED) test strip     ferrous gluconate (FERGON) 324 (38 FE) MG tablet     naloxone (NARCAN) 4  MG/0.1ML nasal spray     ondansetron (ZOFRAN-ODT) 4 MG ODT tab     predniSONE (DELTASONE) 10 MG tablet     ustekinumab (STELARA) 45 MG/0.5ML SOSY     No current facility-administered medications for this visit.        Family History:  Family History   Problem Relation Age of Onset     Thyroid Disease Mother      Diabetes Mother      Hypertension Mother      Connective Tissue Disorder Father         Psoriasis     Diabetes Maternal Grandfather      Hypertension Maternal Grandfather      Prostate Cancer Maternal Grandfather      Diabetes Maternal Uncle      Diabetes Maternal Uncle      Hypertension Maternal Grandmother      Asthma No family hx of        Social History:  Social History     Tobacco Use     Smoking status: Former Smoker     Packs/day: 1.00     Years: 5.00     Pack years: 5.00     Types: Cigarettes     Last attempt to quit: 2007     Years since quittin.0     Smokeless tobacco: Never Used   Substance Use Topics     Alcohol use: No     Alcohol/week: 1.7 standard drinks     Types: 2 Standard drinks or equivalent per week     ROS:  negative except as mentioned in HPI and in list below    Exam  Blood pressure 118/80, pulse 88, weight 50.3 kg (110 lb 14.4 oz), not currently breastfeeding.   Body mass index is 19.65 kg/m .  Gen: well appearing, nad, pleasant and conversant  HEENT: anicteric, EOMI, no proptosis or lid lag, no goiter or LAD  Neuro: A&Ox3, no tremor on outstretched arms    Labs/Imaging    Lab Results   Component Value Date    A1C 8.0 2019    A1C 8.0 2019    A1C 8.2 2018    A1C 8.2 2017    A1C 8.7 2016       ENDO DIABETES Latest Ref Rng & Units 2019   HEMOGLOBIN A1C 0 - 5.6 %    HEMOGLOBIN A1C, POC 4.3 - 6 % 7.8 (A)   HGB 11.7 - 15.7 g/dL 11.4 (L)   GLUCOSE 70 - 99 mg/dL    CHOLESTEROL <200 mg/dL    LDL CHOLESTEROL, CALCULATED <100 mg/dL    HDL CHOLESTEROL >49 mg/dL    VLDL-CHOLESTEROL 0 - 30 mg/dL    NON HDL CHOLESTEROL <130 mg/dL    TRIGLYCERIDES <150  mg/dL    ALBUMIN URINE MG/L mg/L    ALBUMIN URINE MG/G CR 0 - 25 mg/g Cr    CREATININE 0.52 - 1.04 mg/dL 0.60   POTASSIUM 3.4 - 5.3 mmol/L    CK TOTAL 32 - 200 U/L    ALT 0 - 50 U/L 19   AST 0 - 45 U/L 12   WBC 4.0 - 11.0 10e9/L 6.8   RBC 3.8 - 5.2 10e12/L 4.49   HGB 11.7 - 15.7 g/dL 11.4 (L)   HCT 35.0 - 47.0 % 36.5   MCV 78 - 100 fl 81   MCH 26.5 - 33.0 pg 25.4 (L)   MCHC 31.5 - 36.5 g/dL 31.2 (L)   RDW 10.0 - 15.0 % 15.3 (H)    - 450 10e9/L 358     ENDO THYROID LABS-[facility] Latest Ref Rng & Units 8/20/2018   TSH 0.40 - 4.00 mU/L 0.84   THYROGLOBULIN ANTIBODY <40 IU/mL    THYR PEROXIDASE RAY <35 IU/mL      ENDO THYROID LABS-[facility] Latest Ref Rng & Units 5/18/2016   TSH 0.40 - 4.00 mU/L 1.32   THYROGLOBULIN ANTIBODY <40 IU/mL <20   THYR PEROXIDASE RAY <35 IU/mL <10       Assessment and Plan  Type 1 Diabetes, hameed-optimal control w/ hyperglycemia, some overcorrection leading to hypoglycemia. She wants to get an insulin pump and could benefit from seeing CDE and from using a correction scale and changing her levemir dosing to once daily until she gets the pump.    Patient Instructions:    Goals: fasting , 2 hours after eating .    Please change Levemir from 14 unit(s) in am + 16 units in pm to 30 units daily starting tomorrow morning. After a couple of days, you can increase this by 2 units twice weekly until am fasting sugars are in  range.    For this evening and going forwards if needed, please use the following correction scale:    MEDIUM Insulin Resistance or Correction Factor of 1 for 50 mg/dL   Correction Scale - MEDIUM INSULIN RESISTANCE DOSING     Do Not give Correction Insulin if Pre-Meal BG less than 140.   For Pre-Meal  - 189 give 1 unit.   For Pre-Meal  - 239 give 2 units.   For Pre-Meal  - 289 give 3 units.   For Pre-Meal  - 339 give 4 units.   For Pre-Meal - 399 give 5 units.   For Pre-Meal -449 give 6 units  For Pre-Meal BG greater than or equal to  450 give 7 units.   To be given with prandial insulin, and based on pre-meal blood glucose.    Notify provider if glucose greater than or equal to 350 mg/dL after administration of correction dose.     MEDIUM Insulin Resistance or Correction Factor of 1 for 50 mg/dL   MEDIUM INSULIN RESISTANCE DOSING    Do Not give Bedtime Correction Insulin if BG less than 200.   For  - 249 give 1 units.   For  - 299 give 2 units.   For  - 349 give 3 units.   For  -399 give 4 units.   For BG greater than or equal to 400 give 5 units.  Notify provider if glucose greater than or equal to 350 mg/dL after administration of correction dose.    Please continue your Novolog insulin 3-4 units w/ meals (or 2 units per 15 grams of CHOs) as you are    Please continue Metformin 1,000 mg twice daily as you are    Please see Diabetes Education in 1-4 weeks, Diabetes Team PA in 1-3 months, and me in 6 months. Diabetes Educator can help you get the insulin pump.    Please check blood sugars when fasting and before meals and at bedtime.    If questions about your blood sugars, please call [phone] during daytime hours or [phone] & ask for diabetes on call, after hours and on weekends.      RTC: Diabetes Education in 1-4 weeks, Diabetes Team PA in 1-3 months, me in 6 months    Tamera Pérez MD, MPH  Attending Physician  Diabetes/Endocrinology/Metabolism    Time: 60 min spent on evaluation, management, counseling, education, & motivational interviewing with greater than 50% of the total time was spent on counseling and coordinating care        Answers for HPI/ROS submitted by the patient on 11/21/2019   General Symptoms: Yes  Skin Symptoms: Yes  HENT Symptoms: Yes  EYE SYMPTOMS: No  HEART SYMPTOMS: No  LUNG SYMPTOMS: No  INTESTINAL SYMPTOMS: Yes  URINARY SYMPTOMS: No  GYNECOLOGIC SYMPTOMS: Yes  BREAST SYMPTOMS: No  SKELETAL SYMPTOMS: Yes  BLOOD SYMPTOMS: No  NERVOUS SYSTEM SYMPTOMS: Yes  MENTAL HEALTH  SYMPTOMS: Yes  Fever: No  Loss of appetite: No  Weight loss: No  Weight gain: No  Fatigue: Yes  Night sweats: Yes  Chills: No  Increased stress: Yes  Excessive hunger: No  Excessive thirst: No  Feeling hot or cold when others believe the temperature is normal: Yes  Loss of height: No  Post-operative complications: No  Surgical site pain: No  Hallucinations: No  Change in or Loss of Energy: No  Hyperactivity: No  Confusion: Yes  Changes in hair: No  Changes in moles/birth marks: No  Itching: No  Rashes: No  Changes in nails: Yes  Acne: No  Hair in places you don't want it: No  Change in facial hair: No  Warts: No  Non-healing sores: Yes  Scarring: No  Flaking of skin: No  Color changes of hands/feet in cold : Yes  Sun sensitivity: No  Skin thickening: No  Ear pain: No  Ear discharge: No  Hearing loss: No  Tinnitus: No  Nosebleeds: No  Congestion: Yes  Sinus pain: No  Trouble swallowing: No   Voice hoarseness: No  Mouth sores: Yes  Sore throat: No  Tooth pain: No  Gum tenderness: No  Bleeding gums: No  Change in taste: No  Change in sense of smell: No  Dry mouth: Yes  Hearing aid used: No  Neck lump: No  Heart burn or indigestion: No  Nausea: Yes  Vomiting: No  Abdominal pain: Yes  Bloating: No  Constipation: No  Diarrhea: Yes  Blood in stool: No  Black stools: No  Rectal or Anal pain: No  Fecal incontinence: No  Yellowing of skin or eyes: No  Vomit with blood: No  Change in stools: No  Back pain: Yes  Muscle aches: Yes  Neck pain: No  Swollen joints: No  Joint pain: Yes  Bone pain: No  Muscle cramps: Yes  Muscle weakness: No  Joint stiffness: No  Bone fracture: No  Trouble with coordination: No  Dizziness or trouble with balance: No  Fainting or black-out spells: No  Memory loss: No  Headache: Yes  Seizures: No  Speech problems: No  Tingling: Yes  Tremor: No  Weakness: No  Difficulty walking: No  Paralysis: No  Numbness: Yes  Bleeding or spotting between periods: No  Heavy or painful periods: Yes  Irregular periods:  Yes  Vaginal discharge: No  Hot flashes: No  Vaginal dryness: No  Genital ulcers: No  Reduced libido: No  Painful intercourse: No  Difficulty with sexual arousal: No  Post-menopausal bleeding: No  Nervous or Anxious: Yes  Depression: No  Trouble sleeping: No  Trouble thinking or concentrating: Yes  Mood changes: Yes  Panic attacks: No

## 2019-11-25 NOTE — LETTER
11/25/2019       RE: Chrissy Dixon  [address] [facility] [address]     Dear Colleague,    Thank you for referring your patient, Chrissy Dixon, to the [facility] ENDOCRINOLOGY at [facility]. Please see a copy of my visit note below.    Reason for visit/consult: Type 1 Diabetes    Primary care provider: Nando Haskins    HPI:  33 yo female seen for eval/mgmt of Type 1 Diabetes since age 21 without complications (had a retinal tear in past that may not be related and has psoriatic arthritis). Takes Levemir 14 units at am and Levemir 15-16 units in PM and Novolog 3-4 units w/ meals (2 unit(s) per 15 grams of CHOs) and does not use Novolog correction and Metformin 1,000 mg bid.  She wants an insulin pump. Has CGMS Dexcom but not Clarity, so information cannot be downloaded today and is viewed w/ patient using carlos on her phone. We are not able to view 30 day average.    Glucose data: 118 now. Thinks her average is 160 or so. Has had lows to about 55. She believes she overcorrected after a high. She gets symptoms of confused and lightheaded and shaky when low. She has had some more highs lately. She feels like she has had more highs and lows over the past few months. She has 2 kids and this keeps her busy. Also, she just started CGMS x about 6 months so is more aware of the lows and the highs now.    Fasting data: >130 in general, she thinks. Takes am dose at 5:40 am and PM dose is 7:45 pm.    S/p tubal ligation last year and she had an ablation as well and her hormones maybe affecting since periods are irregular and sugars are higher then.    A1c today 7.8% was 8.0% on 8/2/19.     Lab Results   Component Value Date    A1C 8.0 08/02/2019    A1C 8.0 04/22/2019    A1C 8.2 06/26/2018    A1C 8.2 03/20/2017    A1C 8.7 11/30/2016       Past Medical/Surgical History:  Past Medical History:   Diagnosis Date     Diabetes      Other psoriasis      Polyarthritis      probable psoriatic arthritis     Past Surgical History:   Procedure Laterality Date     ARTHROSCOPY KNEE WITH MENISCECTOMY Right 11/20/2018    Procedure: Examination Under Anesthesia Right Knee, Right Knee Arthroscopy, Partial Meniscectomy, Chondroplasty, Cyst Decompression, Synovial Biopsy;  Surgeon: Sean Pate MD;  Location: [facility]     AS HYSTEROSCOPY, SURGICAL; W/ ENDOMETRIAL ABLATION, ANY METHOD Bilateral 2017    endometrial ablation with bilater TL     SURGICAL HISTORY OF -       .  Tonsillar abscess       Allergies:  Allergies   Allergen Reactions     Diagnostic X-Ray Materials Swelling     Betadine [Povidone Iodine] Swelling     Levofloxacin Other (See Comments)     High blood sugars, doesn't feel well at all      Penicillins Hives     Pneumococcal Vaccines Swelling     Prenatal Vit-Fe Fumarate-Fa [Cavan-Folate Ob] Hives     Tdap [Daptacel] Swelling       Current Medications   Current Outpatient Medications   Medication     apremilast (OTEZLA) 30 MG tablet     blood glucose monitoring (ACCU-CHEK SMARTVIEW) test strip     blood glucose monitoring (NO BRAND SPECIFIED) meter device kit     cholecalciferol (VITAMIN D3) 1000 UNIT tablet     clobetasol (TEMOVATE) 0.05 % external solution     Continuous Blood Gluc Sensor (DEXCOM G6 SENSOR) MISC     Continuous Blood Gluc Sensor (DEXCOM G6 SENSOR) MISC     Continuous Blood Gluc Transmit (DEXCOM G6 TRANSMITTER) MISC     Fluocinolone Acetonide (DERMA-SMOOTHE/FS SCALP) 0.01 % OIL     fluticasone (FLONASE) 50 MCG/ACT spray     ibuprofen (ADVIL/MOTRIN) 600 MG tablet     insulin aspart (NOVOLOG FLEXPEN) 100 UNIT/ML pen     insulin detemir (LEVEMIR FLEXTOUCH) 100 UNIT/ML pen     insulin pen needle 31G X 8 MM     metFORMIN (GLUCOPHAGE) 500 MG tablet     ONETOUCH LANCETS MISC     oxyCODONE-acetaminophen (PERCOCET) 5-325 MG tablet     secukinumab (COSENTYX SENSOREADY PEN) 150 MG/ML Sensoready pen     secukinumab (COSENTYX) 150 MG/ML prefilled syringe      traMADol (ULTRAM) 50 MG tablet     triamcinolone (KENALOG) 0.1 % cream     ustekinumab (STELARA) 45 MG/0.5ML SOSY     valACYclovir (VALTREX) 500 MG tablet     blood glucose monitoring (NO BRAND SPECIFIED) test strip     ferrous gluconate (FERGON) 324 (38 FE) MG tablet     naloxone (NARCAN) 4 MG/0.1ML nasal spray     ondansetron (ZOFRAN-ODT) 4 MG ODT tab     predniSONE (DELTASONE) 10 MG tablet     ustekinumab (STELARA) 45 MG/0.5ML SOSY     No current facility-administered medications for this visit.        Family History:  Family History   Problem Relation Age of Onset     Thyroid Disease Mother      Diabetes Mother      Hypertension Mother      Connective Tissue Disorder Father         Psoriasis     Diabetes Maternal Grandfather      Hypertension Maternal Grandfather      Prostate Cancer Maternal Grandfather      Diabetes Maternal Uncle      Diabetes Maternal Uncle      Hypertension Maternal Grandmother      Asthma No family hx of        Social History:  Social History     Tobacco Use     Smoking status: Former Smoker     Packs/day: 1.00     Years: 5.00     Pack years: 5.00     Types: Cigarettes     Last attempt to quit: 2007     Years since quittin.0     Smokeless tobacco: Never Used   Substance Use Topics     Alcohol use: No     Alcohol/week: 1.7 standard drinks     Types: 2 Standard drinks or equivalent per week     ROS:  negative except as mentioned in HPI and in list below    Exam  Blood pressure 118/80, pulse 88, weight 50.3 kg (110 lb 14.4 oz), not currently breastfeeding.   Body mass index is 19.65 kg/m .  Gen: well appearing, nad, pleasant and conversant  HEENT: anicteric, EOMI, no proptosis or lid lag, no goiter or LAD  Neuro: A&Ox3, no tremor on outstretched arms    Labs/Imaging    Lab Results   Component Value Date    A1C 8.0 2019    A1C 8.0 2019    A1C 8.2 2018    A1C 8.2 2017    A1C 8.7 2016       ENDO DIABETES Latest Ref Rng & Units 2019   HEMOGLOBIN A1C 0  - 5.6 %    HEMOGLOBIN A1C, POC 4.3 - 6 % 7.8 (A)   HGB 11.7 - 15.7 g/dL 11.4 (L)   GLUCOSE 70 - 99 mg/dL    CHOLESTEROL <200 mg/dL    LDL CHOLESTEROL, CALCULATED <100 mg/dL    HDL CHOLESTEROL >49 mg/dL    VLDL-CHOLESTEROL 0 - 30 mg/dL    NON HDL CHOLESTEROL <130 mg/dL    TRIGLYCERIDES <150 mg/dL    ALBUMIN URINE MG/L mg/L    ALBUMIN URINE MG/G CR 0 - 25 mg/g Cr    CREATININE 0.52 - 1.04 mg/dL 0.60   POTASSIUM 3.4 - 5.3 mmol/L    CK TOTAL 32 - 200 U/L    ALT 0 - 50 U/L 19   AST 0 - 45 U/L 12   WBC 4.0 - 11.0 10e9/L 6.8   RBC 3.8 - 5.2 10e12/L 4.49   HGB 11.7 - 15.7 g/dL 11.4 (L)   HCT 35.0 - 47.0 % 36.5   MCV 78 - 100 fl 81   MCH 26.5 - 33.0 pg 25.4 (L)   MCHC 31.5 - 36.5 g/dL 31.2 (L)   RDW 10.0 - 15.0 % 15.3 (H)    - 450 10e9/L 358     ENDO THYROID LABS-[facility] Latest Ref Rng & Units 8/20/2018   TSH 0.40 - 4.00 mU/L 0.84   THYROGLOBULIN ANTIBODY <40 IU/mL    THYR PEROXIDASE RAY <35 IU/mL      ENDO THYROID LABS-[facility] Latest Ref Rng & Units 5/18/2016   TSH 0.40 - 4.00 mU/L 1.32   THYROGLOBULIN ANTIBODY <40 IU/mL <20   THYR PEROXIDASE RAY <35 IU/mL <10       Assessment and Plan  Type 1 Diabetes, hameed-optimal control w/ hyperglycemia, some overcorrection leading to hypoglycemia. She wants to get an insulin pump and could benefit from seeing CDE and from using a correction scale and changing her levemir dosing to once daily until she gets the pump.    Patient Instructions:    Goals: fasting , 2 hours after eating .    Please change Levemir from 14 unit(s) in am + 16 units in pm to 30 units daily starting tomorrow morning. After a couple of days, you can increase this by 2 units twice weekly until am fasting sugars are in  range.    For this evening and going forwards if needed, please use the following correction scale:    MEDIUM Insulin Resistance or Correction Factor of 1 for 50 mg/dL   Correction Scale - MEDIUM INSULIN RESISTANCE DOSING     Do Not give Correction Insulin if Pre-Meal BG less than 140.    For Pre-Meal  - 189 give 1 unit.   For Pre-Meal  - 239 give 2 units.   For Pre-Meal  - 289 give 3 units.   For Pre-Meal  - 339 give 4 units.   For Pre-Meal - 399 give 5 units.   For Pre-Meal -449 give 6 units  For Pre-Meal BG greater than or equal to 450 give 7 units.   To be given with prandial insulin, and based on pre-meal blood glucose.    Notify provider if glucose greater than or equal to 350 mg/dL after administration of correction dose.     MEDIUM Insulin Resistance or Correction Factor of 1 for 50 mg/dL   MEDIUM INSULIN RESISTANCE DOSING    Do Not give Bedtime Correction Insulin if BG less than 200.   For  - 249 give 1 units.   For  - 299 give 2 units.   For  - 349 give 3 units.   For  -399 give 4 units.   For BG greater than or equal to 400 give 5 units.  Notify provider if glucose greater than or equal to 350 mg/dL after administration of correction dose.    Please continue your Novolog insulin 3-4 units w/ meals (or 2 units per 15 grams of CHOs) as you are    Please continue Metformin 1,000 mg twice daily as you are    Please see Diabetes Education in 1-4 weeks, Diabetes Team PA in 1-3 months, and me in 6 months. Diabetes Educator can help you get the insulin pump.    Please check blood sugars when fasting and before meals and at bedtime.    If questions about your blood sugars, please call [phone] during daytime hours or [phone] & ask for diabetes on call, after hours and on weekends.      RTC: Diabetes Education in 1-4 weeks, Diabetes Team PA in 1-3 months, me in 6 months    Tamera Pérez MD, MPH  Attending Physician  Diabetes/Endocrinology/Metabolism    Time: 60 min spent on evaluation, management, counseling, education, & motivational interviewing with greater than 50% of the total time was spent on counseling and coordinating care

## 2019-11-25 NOTE — PATIENT INSTRUCTIONS
Goals: fasting , 2 hours after eating .    Please change Levemir from 14 unit(s) in am + 16 units in pm to 30 units daily starting tomorrow morning. After a couple of days, you can increase this by 2 units twice weekly until am fasting sugars are in  range.    For this evening and going forwards if needed, please use the following correction scale:    MEDIUM Insulin Resistance or Correction Factor of 1 for 50 mg/dL   Correction Scale - MEDIUM INSULIN RESISTANCE DOSING     Do Not give Correction Insulin if Pre-Meal BG less than 140.   For Pre-Meal  - 189 give 1 unit.   For Pre-Meal  - 239 give 2 units.   For Pre-Meal  - 289 give 3 units.   For Pre-Meal  - 339 give 4 units.   For Pre-Meal - 399 give 5 units.   For Pre-Meal -449 give 6 units  For Pre-Meal BG greater than or equal to 450 give 7 units.   To be given with prandial insulin, and based on pre-meal blood glucose.    Notify provider if glucose greater than or equal to 350 mg/dL after administration of correction dose.     MEDIUM Insulin Resistance or Correction Factor of 1 for 50 mg/dL   MEDIUM INSULIN RESISTANCE DOSING    Do Not give Bedtime Correction Insulin if BG less than 200.   For  - 249 give 1 units.   For  - 299 give 2 units.   For  - 349 give 3 units.   For  -399 give 4 units.   For BG greater than or equal to 400 give 5 units.  Notify provider if glucose greater than or equal to 350 mg/dL after administration of correction dose.    Please continue your Novolog insulin 3-4 units w/ meals (or 2 units per 15 grams of CHOs) as you are    Please continue Metformin 1,000 mg twice daily as you are    Please see Diabetes Education in 1-4 weeks, Diabetes Team PA in 1-3 months, and me in 6 months. Diabetes Educator can help you get the insulin pump.    Please check blood sugars when fasting and before meals and at bedtime.    If questions about your blood sugars, please call [phone] during daytime hours or [phone] & ask for diabetes on call, after hours and on weekends.

## 2019-12-03 ENCOUNTER — MYC REFILL (OUTPATIENT)
Dept: INTERNAL MEDICINE | Facility: CLINIC | Age: 34
End: 2019-12-03

## 2019-12-03 DIAGNOSIS — L40.50 PSORIATIC ARTHRITIS (H): ICD-10-CM

## 2019-12-03 RX ORDER — TRAMADOL HYDROCHLORIDE 50 MG/1
50 TABLET ORAL EVERY 8 HOURS PRN
Qty: 60 TABLET | Refills: 0 | Status: SHIPPED | OUTPATIENT
Start: 2019-12-03 | End: 2019-12-31

## 2019-12-03 NOTE — TELEPHONE ENCOUNTER
Patient Requested  traMADol (ULTRAM) 50 MG tablet  Last Filled  11/07/2019  Last Office Visit  04/22/2019  Next Office Visit  Nothing scheduled   Checked  12/03/2019    DX: Psoriatic arthritis     Pharmacy: [id number] - [place], [place] - [address] AT [facility] [address] at 1:59 PM on 12/3/2019.

## 2019-12-11 ENCOUNTER — MEDICAL CORRESPONDENCE (OUTPATIENT)
Dept: HEALTH INFORMATION MANAGEMENT | Facility: CLINIC | Age: 34
End: 2019-12-11

## 2019-12-14 ENCOUNTER — TELEPHONE (OUTPATIENT)
Dept: ENDOCRINOLOGY | Facility: CLINIC | Age: 34
End: 2019-12-14

## 2019-12-14 NOTE — TELEPHONE ENCOUNTER
----- Message from Tamera Pérez MD sent at 12/13/2019  2:02 PM CST -----  Regarding: re: pls call pt & help her schedule with RNBRITNEYE for Diabetes Education  Please call pt & help her schedule with RNMICHAEL for Diabetes Education for her hyperglycemia.  Thanks!  Dr. Tamera Pérez MD, MPH  Endocrinologist

## 2019-12-15 ENCOUNTER — MYC REFILL (OUTPATIENT)
Dept: INTERNAL MEDICINE | Facility: CLINIC | Age: 34
End: 2019-12-15

## 2019-12-15 DIAGNOSIS — L40.50 PSORIATIC ARTHRITIS (H): ICD-10-CM

## 2019-12-15 DIAGNOSIS — B00.1 RECURRENT COLD SORES: ICD-10-CM

## 2019-12-16 RX ORDER — OXYCODONE AND ACETAMINOPHEN 5; 325 MG/1; MG/1
1 TABLET ORAL EVERY 4 HOURS PRN
Qty: 30 TABLET | Refills: 0 | Status: SHIPPED | OUTPATIENT
Start: 2019-12-16 | End: 2020-01-10

## 2019-12-16 NOTE — TELEPHONE ENCOUNTER
Reason For Call:   Chief Complaint   Patient presents with     Refill Request            Medication Name, Dose and Monthly Quantity:   Oxycodone with APAP (Percocet): Dose 5-325mg Schedule 1 tablet Q4H PRN Monthly Quantity 30     Diagnosis requiring opiates:   Psoriatic arthritis (H) [L40.50]       Recurrent cold sores [B00.1]           Problem List Updated:   Yes    Opioid Agreement On File - [facility] PAIN CONTRACT ID# [id number]:  No    Last Urine Drug Screen (at least once every 12 months) Date:   none  Unexpected Results:   N/A    [place]  Data Reviewed (at least once every 3 months) Date:   12/16/2019    Unexpected Results:    No.    Last Fill Date:   12/05/2019    Last Visit with PCP:   04/22/2019    Future Visits with PCP:   No.    Processing:   Larry GIRALDO CMA at 9:47 AM on 12/16/2019.

## 2019-12-26 ENCOUNTER — TRANSFERRED RECORDS (OUTPATIENT)
Dept: HEALTH INFORMATION MANAGEMENT | Facility: CLINIC | Age: 34
End: 2019-12-26

## 2019-12-26 ENCOUNTER — TELEPHONE (OUTPATIENT)
Dept: RHEUMATOLOGY | Facility: CLINIC | Age: 34
End: 2019-12-26

## 2019-12-29 ENCOUNTER — TRANSFERRED RECORDS (OUTPATIENT)
Dept: HEALTH INFORMATION MANAGEMENT | Facility: CLINIC | Age: 34
End: 2019-12-29

## 2019-12-31 ENCOUNTER — MYC REFILL (OUTPATIENT)
Dept: INTERNAL MEDICINE | Facility: CLINIC | Age: 34
End: 2019-12-31

## 2019-12-31 DIAGNOSIS — L40.50 PSORIATIC ARTHRITIS (H): ICD-10-CM

## 2019-12-31 NOTE — TELEPHONE ENCOUNTER
Patient Requested  traMADol (ULTRAM) 50 MG tablet  Last Filled  12/05/2019  Last Office Visit  04/22/2019  Next Office Visit  Nothing scheduled   Checked  12/31/2019    DX: Psoriatic arthritis     Pharmacy: [facility] #[id number] - [place], [place] - [address] [facility] [address] GIRALDO CMA at 1:54 PM on 12/31/2019.

## 2020-01-02 ENCOUNTER — TRANSFERRED RECORDS (OUTPATIENT)
Dept: HEALTH INFORMATION MANAGEMENT | Facility: CLINIC | Age: 35
End: 2020-01-02

## 2020-01-02 ENCOUNTER — TELEPHONE (OUTPATIENT)
Dept: INTERNAL MEDICINE | Facility: CLINIC | Age: 35
End: 2020-01-02

## 2020-01-02 RX ORDER — TRAMADOL HYDROCHLORIDE 50 MG/1
50 TABLET ORAL EVERY 8 HOURS PRN
Qty: 60 TABLET | Refills: 0 | Status: SHIPPED | OUTPATIENT
Start: 2020-01-04 | End: 2020-01-27

## 2020-01-02 NOTE — TELEPHONE ENCOUNTER
Prior Authorization Retail Medication Request    Medication/Dose: traMADol (ULTRAM) 50 MG tablet  1 tablet Q8H PRN  ICD code (if different than what is on RX):    Previously Tried and Failed:    Rationale:      Insurance Name:    Insurance ID:        Pharmacy Information (if different than what is on RX)  Name:    Phone:

## 2020-01-06 NOTE — TELEPHONE ENCOUNTER
Prior Authorization Not Needed per Insurance    Medication: traMADol (ULTRAM) 50 MG tablet - INITIATED  Insurance Company:    Expected CoPay:      Pharmacy Filling the Rx: [facility] #[id number] [facility] [address] [place] [facility] [address]  Pharmacy Notified: Yes  Patient Notified: Yes      Called [facility]'s pharmacy to get clarification on which insurance is rejecting with PA needed message. Tech explained that someone at clinic already submitted PA and patient was able to get medication on 1/2 for an approximate $5 copay. NO PA IS NEEDED at this time.      Agnieszka INMAN  Central PA Team

## 2020-01-10 ENCOUNTER — MYC REFILL (OUTPATIENT)
Dept: INTERNAL MEDICINE | Facility: CLINIC | Age: 35
End: 2020-01-10

## 2020-01-10 DIAGNOSIS — L40.50 PSORIATIC ARTHRITIS (H): ICD-10-CM

## 2020-01-10 DIAGNOSIS — B00.1 RECURRENT COLD SORES: ICD-10-CM

## 2020-01-10 RX ORDER — OXYCODONE AND ACETAMINOPHEN 5; 325 MG/1; MG/1
1 TABLET ORAL EVERY 4 HOURS PRN
Qty: 30 TABLET | Refills: 0 | Status: SHIPPED | OUTPATIENT
Start: 2020-01-10 | End: 2020-02-11

## 2020-01-10 NOTE — TELEPHONE ENCOUNTER
Reason For Call:   Chief Complaint   Patient presents with     Refill Request            Medication Name, Dose and Monthly Quantity:   Oxycodone with APAP (Percocet): Dose 5-325mg Schedule 1 tablet Q4H PRN Monthly Quantity 30     Diagnosis requiring opiates:   Psoriatic arthritis (H) [L40.50]       Recurrent cold sores [B00.1]           Problem List Updated:   Yes    Opioid Agreement On File - [facility] PAIN CONTRACT ID# [id number]:  No    Last Urine Drug Screen (at least once every 12 months) Date:   none  Unexpected Results:   N/A    [place]  Data Reviewed (at least once every 3 months) Date:   01/10/2020    Unexpected Results:    No.    Last Fill Date:   12/18/2019    Last Visit with PCP:   04/22/2019    Future Visits with PCP:   No.    Processing:   Larry GIRALDO CMA at 11:20 AM on 1/10/2020.

## 2020-01-27 ENCOUNTER — MYC REFILL (OUTPATIENT)
Dept: INTERNAL MEDICINE | Facility: CLINIC | Age: 35
End: 2020-01-27

## 2020-01-27 DIAGNOSIS — L40.50 PSORIATIC ARTHRITIS (H): ICD-10-CM

## 2020-01-28 RX ORDER — TRAMADOL HYDROCHLORIDE 50 MG/1
50 TABLET ORAL EVERY 8 HOURS PRN
Qty: 60 TABLET | Refills: 0 | Status: SHIPPED | OUTPATIENT
Start: 2020-02-01 | End: 2020-02-24

## 2020-01-28 NOTE — TELEPHONE ENCOUNTER
Patient Requested  traMADol (ULTRAM) 50 MG tablet  Last Filled  01/03/2020  Last Office Visit  04/22/2019  Next Office Visit  Nothing scheduled   Checked  01/28/2020    DX: Psoriatic arthritis     Pharmacy: [facility] #[id number] [place], [place] - [address] AT [facility] [address] GIRALDO CMA at 7:44 AM on 1/28/2020.

## 2020-02-05 ENCOUNTER — TRANSFERRED RECORDS (OUTPATIENT)
Dept: HEALTH INFORMATION MANAGEMENT | Facility: CLINIC | Age: 35
End: 2020-02-05

## 2020-02-11 ENCOUNTER — MYC REFILL (OUTPATIENT)
Dept: INTERNAL MEDICINE | Facility: CLINIC | Age: 35
End: 2020-02-11

## 2020-02-11 DIAGNOSIS — L40.50 PSORIATIC ARTHRITIS (H): ICD-10-CM

## 2020-02-11 DIAGNOSIS — B00.1 RECURRENT COLD SORES: ICD-10-CM

## 2020-02-12 RX ORDER — OXYCODONE AND ACETAMINOPHEN 5; 325 MG/1; MG/1
1 TABLET ORAL EVERY 4 HOURS PRN
Qty: 30 TABLET | Refills: 0 | Status: SHIPPED | OUTPATIENT
Start: 2020-02-17 | End: 2020-03-06

## 2020-02-12 NOTE — TELEPHONE ENCOUNTER
Reason For Call:   Chief Complaint   Patient presents with     Refill Request            Medication Name, Dose and Monthly Quantity:   Oxycodone with APAP (Percocet): Dose 5-325mg Schedule 1 tablet Q4H PRN Monthly Quantity 30     Diagnosis requiring opiates:   Psoriatic arthritis (H) [L40.50]       Recurrent cold sores [B00.1]           Problem List Updated:   Yes    Opioid Agreement On File - [facility] PAIN CONTRACT ID# [id number]:  No    Last Urine Drug Screen (at least once every 12 months) Date:   none  Unexpected Results:   N/A    [place]  Data Reviewed (at least once every 3 months) Date:   02/12/2020    Unexpected Results:    No.    Last Fill Date:   01/18/2020  Last Visit with PCP:   04/22/2019    Future Visits with PCP:   No.    Processing:   Larry GIRALDO CMA at 11:20 AM on 1/10/2020.

## 2020-02-24 ENCOUNTER — MYC REFILL (OUTPATIENT)
Dept: INTERNAL MEDICINE | Facility: CLINIC | Age: 35
End: 2020-02-24

## 2020-02-24 DIAGNOSIS — L40.50 PSORIATIC ARTHRITIS (H): ICD-10-CM

## 2020-02-24 RX ORDER — TRAMADOL HYDROCHLORIDE 50 MG/1
50 TABLET ORAL EVERY 8 HOURS PRN
Qty: 60 TABLET | Refills: 0 | Status: SHIPPED | OUTPATIENT
Start: 2020-03-04 | End: 2020-03-30

## 2020-02-24 NOTE — TELEPHONE ENCOUNTER
February 24, 2020  8:15 AM    Patient Requested  traMADol (ULTRAM) 50 MG tablet  Last Filled  2/3/20  Last Office Visit  04/22/2019  Next Office Visit  Nothing scheduled   Checked  2/24/20     DX: Psoriatic arthritis      Pharmacy: [facility] #[id number] [facility] [address] AT [facility] [address]    Nuria Viera RN BSN

## 2020-03-04 ENCOUNTER — MYC REFILL (OUTPATIENT)
Dept: RHEUMATOLOGY | Facility: CLINIC | Age: 35
End: 2020-03-04

## 2020-03-04 DIAGNOSIS — L40.50 PSORIATIC ARTHRITIS (H): ICD-10-CM

## 2020-03-04 NOTE — TELEPHONE ENCOUNTER
secukinumab (COSENTYX SENSOREADY PEN) 150 MG/ML Sensoready pen: RF available, my chart request

## 2020-03-06 ENCOUNTER — MYC REFILL (OUTPATIENT)
Dept: INTERNAL MEDICINE | Facility: CLINIC | Age: 35
End: 2020-03-06

## 2020-03-06 DIAGNOSIS — L40.50 PSORIATIC ARTHRITIS (H): ICD-10-CM

## 2020-03-06 DIAGNOSIS — B00.1 RECURRENT COLD SORES: ICD-10-CM

## 2020-03-06 NOTE — TELEPHONE ENCOUNTER
March 6, 2020  12:29 PM    Reason For Call:        Chief Complaint   Patient presents with     Refill Request                 Medication Name, Dose and Monthly Quantity:   Oxycodone with APAP (Percocet): Dose 5-325mg Schedule 1 tablet Q4H PRN Monthly Quantity 30      Diagnosis requiring opiates:   Psoriatic arthritis (H) [L40.50]       Recurrent cold sores [B00.1]             Problem List Updated:   Yes     Opioid Agreement On File - [facility] PAIN CONTRACT ID# [id number]:  No     Last Urine Drug Screen (at least once every 12 months) Date:   none  Unexpected Results:   N/A     [place]  Data Reviewed (at least once every 3 months) Date:   3/6/20     Unexpected Results:    No.     Last Fill Date:   2/17/20     Last Visit with PCP:   04/22/2019     Future Visits with PCP:   No.     Processing:   Larry Viera, RN BSN

## 2020-03-16 RX ORDER — OXYCODONE AND ACETAMINOPHEN 5; 325 MG/1; MG/1
1 TABLET ORAL EVERY 4 HOURS PRN
Qty: 30 TABLET | Refills: 0 | Status: SHIPPED | OUTPATIENT
Start: 2020-03-18 | End: 2020-04-06

## 2020-03-27 ENCOUNTER — VIRTUAL VISIT (OUTPATIENT)
Dept: RHEUMATOLOGY | Facility: CLINIC | Age: 35
End: 2020-03-27
Attending: INTERNAL MEDICINE
Payer: COMMERCIAL

## 2020-03-27 DIAGNOSIS — L40.50 PSORIATIC ARTHRITIS (H): Primary | ICD-10-CM

## 2020-03-27 RX ORDER — METHYLPREDNISOLONE 4 MG/1
TABLET ORAL
Qty: 35 TABLET | Refills: 1 | Status: SHIPPED | OUTPATIENT
Start: 2020-03-27 | End: 2020-10-27

## 2020-03-27 RX ORDER — APREMILAST 30 MG/1
30 TABLET, FILM COATED ORAL 2 TIMES DAILY
Qty: 180 TABLET | Refills: 2 | Status: SHIPPED | OUTPATIENT
Start: 2020-03-27 | End: 2020-08-25

## 2020-03-27 NOTE — PROGRESS NOTES
"Chrissy Dixon is a 34 year old female who is being evaluated via a billable telephone visit.      The patient has been notified of following:     \"This telephone visit will be conducted via a call between you and your physician/provider. We have found that certain health care needs can be provided without the need for a physical exam.  This service lets us provide the care you need with a short phone conversation.  If a prescription is necessary we can send it directly to your pharmacy.  If lab work is needed we can place an order for that and you can then stop by our lab to have the test done at a later time.    If during the course of the call the physician/provider feels a telephone visit is not appropriate, you will not be charged for this service.\"     Physician has received verbal consent for a Telephone Visit from the patient? Yes    Chrissy Dixon reports joint pain    Last seen in 11-19.     She stopped otezla after our last visit. She had sudden onset of low back pain in 12-19; she was seen by [facility]; she was given a prednisone burst and taper. Unfortunately, she developed painful swelling in both legs which limited her ability to walk. There was concern about possible adverse reaction to prednisone. MRI of the lumbar spine showed nerve root compression. She was given epidural injection in 1-20; she had no immediate improvement in movement-associated back pain. After 3-4 weeks, she again had full motion with less pain without sciatica.    As of March 16, she was set up to get PTx; this was cancelled due to COVID.    By late February, swelling in legs/knees had become more intermittent. Swelling is best in the morning, but it comes on rapidly with weight-bearing. \"Heaviness\" graduates to swelling/stiffness in evening.    She restarted otezla in 2-20. She noted some improvement immediately in overall joint pain.    Her scalp felt much more itchy and she had new patches of psoriasis on her legs after " astopping otezla.    I have reviewed and updated the patient's Past Medical History, Social History, Family History and Medication List.    ALLERGIES  Diagnostic x-ray materials; Betadine [povidone iodine]; Levofloxacin; Penicillins; Pneumococcal vaccines; Prenatal vit-fe fumarate-fa [cavan-folate ob]; and Tdap [daptacel]    Additional provider notes:  RHEUM RESULTS Latest Ref Rng & Units 12/26/2018 8/2/2019 11/25/2019   SED RATE 0 - 20 mm/h - - -   CRP, INFLAMMATION 0.0 - 8.0 mg/L <2.9 <2.9 -   CK TOTAL 32 - 200 U/L - - -   RHEUMATOID FACTOR 0 - 14 IU/mL - - -   CHALO SCREEN BY EIA <1.0 - - -   AST 0 - 45 U/L 13 9 12   ALT 0 - 50 U/L 19 19 19   ALBUMIN 3.4 - 5.0 g/dL - - -   WBC 4.0 - 11.0 10e9/L 6.6 7.7 6.8   RBC 3.8 - 5.2 10e12/L 4.66 4.70 4.49   HGB 11.7 - 15.7 g/dL 11.6(L) 11.9 11.4(L)   HCT 35.0 - 47.0 % 38.2 39.3 36.5   MCV 78 - 100 fl 82 84 81   MCHC 31.5 - 36.5 g/dL 30.4(L) 30.3(L) 31.2(L)   RDW 10.0 - 15.0 % 14.6 15.8(H) 15.3(H)    - 450 10e9/L 357 357 358   CREATININE 0.52 - 1.04 mg/dL 0.50(L) - 0.60   GFR ESTIMATE, IF BLACK >60 mL/min/[1.73:m2] >90 - >90   GFR ESTIMATE >60 mL/min/[1.73:m2] >90 - >90   HEPATITIS C ANTIBODY NEG - - -       Rheumatoid Factor   Date Value Ref Range Status   08/11/2011 11 0 - 14 IU/mL Final   ,  ,   Cyclic Cit Pept IgG/IgA   Date Value Ref Range Status   08/11/2011 <20  Interpretation:  Negative <20 UNITS Final   ,  ,  ,  ,  ,   CHALO Screen by EIA   Date Value Ref Range Status   04/09/2014 <1.0  Interpretation:  Negative <1.0 Final   ,  ,  ,  ,  ,  ,  ,   Hepatitis B Core Ana Rosa   Date Value Ref Range Status   08/02/2019 Nonreactive NR^Nonreactive Final   ,  ,  ,   Antistreptolysin O   Date Value Ref Range Status   09/11/2015 109 0 - 120 IU/mL Final     Comment:     A single ASO analysis may not be meaningful due to the variability of ASO   values   within the normal population.  Both clinical and laboratory findings should   be   considered in reaching a diagnosis.  A  single analysis may be less   informative   than a repeat analysis showing a change.       ,  ,   Quantiferon-TB Gold Plus Result   Date Value Ref Range Status   12/26/2018 Negative NEG^Negative Final     Comment:     No interferon gamma response to M.tuberculosis antigens was detected.   Infection with M.tuberculosis is unlikely, however a single negative result   does not exclude infection. In patients at high risk for infection, a second   test should be considered  in accordance with the 2017 ATS/IDSA/CDC Clinical Practice Guidelines for   Diagnosis of Tuberculosis in Adults and Children [Stefann DESTINY et   al.Clin.Infect.Dis. 2017 64(2):111-115].       TB1 Ag minus Nil Value   Date Value Ref Range Status   12/26/2018 0.00 IU/mL Final     TB2 Ag minus Nil Value   Date Value Ref Range Status   12/26/2018 0.00 IU/mL Final     Mitogen minus Nil Result   Date Value Ref Range Status   12/26/2018 >10.00 IU/mL Final     Nil Result   Date Value Ref Range Status   12/26/2018 0.02 IU/mL Final   ,  ,  ,  ,  ,  ,  ,   Neutrophil Cytoplasmic IgG Antibody   Date Value Ref Range Status   05/31/2013   Final    <1:20  Reference range: <1:20  (Note)  The ANCA IFA is <1:20; therefore, no further testing will  be performed.  INTERPRETIVE INFORMATION: Anti-Neutrophil Cyto Ab, IgG    Neutrophil Cytoplasmic Antibodies (C-ANCA = granular  cytoplasmic staining, P-ANCA = perinuclear staining) are  found in the serum of over 90 percent of patients with  certain necrotizing systemic vasculitides, and usually in  less than 5 percent of patients with collagen vascular  disease or arthritis.  Performed by [facility],  [address] [place] [id number] [phone]  [email or web address], Nataly Roth MD, Lab. Director   ,  ,  ,  ,  ,  ,  ,  ,  ,  ,  ,  ,  ,  ,  ,  ,  ,  ,      Assessment/Plan:  # Psoriatic arthritis (polyarthritis, scalp and pustular psoriasis; failed Humira and leflunomide, dx'd post-partum in 2015):   Patient relates  deterioration in knee mobility with increased swelling, and pain since November 2019.  Blood work from 12-19 showed LFTs, CBC as negative or normal.      Psoriatic arthritis, formerly responsive to combination Otezla and cosentyx, has flared with otezla discontinuation. Disease has been unresponsive to Stelara, and unresponsive to anti-TNF,was greatly improved in 11-19 3 months after starting anti-IL17. Patient now     I recommend continuing Cosentyx 300 mg subcutaneous once monthly. Although combination Otezla and Cosentyx is not well-studied, I recommend using combined Otezla 30 mg twice daily with great caution.  To help counter active inflammatory joint disease most prominent in the knees, I recommend that course of Medrol.  She may use 12 mg daily for 1 week, then 8 mg daily for 1 week, then off.  If she experiences adverse effects such as paradoxical increased swelling in joints or legs with Medrol, she should stop immediately.  If psoriasis recurs, she may use 0.1 % kenalog cream     # Type 1 diabetes mellitus:   Hemoglobin A1c remained above 8 at last check in August 2019, but I suspect control has improved after patient discontinued steroid use. I will recheck hemoglobin A1c today. Patient following with Endocrinology. Return to clinic in 4 month.    # Sciatica:  Now improved after epidural injection.       Follow-up: Return in about 4 months  Phone call duration: 23 minutes    Rene Granados MD

## 2020-03-27 NOTE — PATIENT INSTRUCTIONS
Diagnosis:   1. Psoriatic arthritis, improving but active  2.  Nerve root compression with sciatica, lumbar spine, improved.    Plan:  1.  Continue Cosentyx 300 mg (2 injections) every 30 days.  2.  Continue Otezla 30 mg twice daily  3.  Start Medrol 12 mg daily for 1 week, then 8 mg daily for 1 week, then off.  4.  Stop Medrol immediately for any unexpected side effects such as leg swelling and contact our office.  5.  Blood work within the next month.

## 2020-03-30 ENCOUNTER — MYC REFILL (OUTPATIENT)
Dept: INTERNAL MEDICINE | Facility: CLINIC | Age: 35
End: 2020-03-30

## 2020-03-30 DIAGNOSIS — L40.50 PSORIATIC ARTHRITIS (H): ICD-10-CM

## 2020-03-30 RX ORDER — TRAMADOL HYDROCHLORIDE 50 MG/1
50 TABLET ORAL EVERY 8 HOURS PRN
Qty: 60 TABLET | Refills: 0 | Status: SHIPPED | OUTPATIENT
Start: 2020-03-30 | End: 2020-04-20

## 2020-03-30 NOTE — TELEPHONE ENCOUNTER
Patient Requested  traMADol (ULTRAM) 50 MG tablet  Last Fill  03/04/2020  Last Office Visit  04/22/2019  Next Office Visit  Nothing scheduled   Checked  03/30/2020    DX: Psoriatic arthritis     Pharmacy: [facility] #[id number] [place], [place] - [address] [place] [facility] [place] [address]ALDO CMA at 8:37 AM on 3/30/2020.

## 2020-04-06 ENCOUNTER — MYC REFILL (OUTPATIENT)
Dept: INTERNAL MEDICINE | Facility: CLINIC | Age: 35
End: 2020-04-06

## 2020-04-06 DIAGNOSIS — B00.1 RECURRENT COLD SORES: ICD-10-CM

## 2020-04-06 DIAGNOSIS — L40.50 PSORIATIC ARTHRITIS (H): ICD-10-CM

## 2020-04-06 RX ORDER — OXYCODONE AND ACETAMINOPHEN 5; 325 MG/1; MG/1
1 TABLET ORAL EVERY 4 HOURS PRN
Qty: 30 TABLET | Refills: 0 | Status: SHIPPED | OUTPATIENT
Start: 2020-04-06 | End: 2020-04-27

## 2020-04-06 NOTE — TELEPHONE ENCOUNTER
Reason For Call:   Chief Complaint   Patient presents with     Refill Request            Medication Name, Dose and Monthly Quantity:   Oxycodone with APAP (Percocet): Dose 5-325mg Schedule 1 tablet Q4H PRN Monthly Quantity 30     Diagnosis requiring opiates:   Psoriatic arthritis (H) [L40.50]       Recurrent cold sores [B00.1]           Problem List Updated:   Yes    Opioid Agreement On File - [facility] PAIN CONTRACT ID# [id number]:  No    Last Urine Drug Screen (at least once every 12 months) Date:   none  Unexpected Results:   N/A    [place]  Data Reviewed (at least once every 3 months) Date:   04/06/2020    Unexpected Results:    No.    Last Fill Date:   03/18/2020  Last Visit with PCP:   04/22/2019    Future Visits with PCP:   No.    Processing:   Larry GIRALDO CMA at 10:26 AM on 4/6/2020.

## 2020-04-20 ENCOUNTER — MYC REFILL (OUTPATIENT)
Dept: INTERNAL MEDICINE | Facility: CLINIC | Age: 35
End: 2020-04-20

## 2020-04-20 DIAGNOSIS — L40.50 PSORIATIC ARTHRITIS (H): ICD-10-CM

## 2020-04-21 RX ORDER — TRAMADOL HYDROCHLORIDE 50 MG/1
50 TABLET ORAL EVERY 8 HOURS PRN
Qty: 60 TABLET | Refills: 0 | Status: SHIPPED | OUTPATIENT
Start: 2020-04-29 | End: 2020-05-18

## 2020-04-21 NOTE — TELEPHONE ENCOUNTER
Patient Requested  traMADol (ULTRAM) 50 MG tablet  Last Fill  03/30/2020  Last Office Visit  04/22/2019  Next Office Visit  Nothing scheduled   Checked  04/21/2020    DX: Psoriatic arthritis     Pharmacy: [facility] #[id number] [place], [place] - [address] AT [facility] [address] GIRALDO CMA at 9:54 AM on 4/21/2020.

## 2020-04-27 ENCOUNTER — MYC REFILL (OUTPATIENT)
Dept: INTERNAL MEDICINE | Facility: CLINIC | Age: 35
End: 2020-04-27

## 2020-04-27 DIAGNOSIS — L40.50 PSORIATIC ARTHRITIS (H): ICD-10-CM

## 2020-04-27 DIAGNOSIS — B00.1 RECURRENT COLD SORES: ICD-10-CM

## 2020-04-27 RX ORDER — OXYCODONE AND ACETAMINOPHEN 5; 325 MG/1; MG/1
1 TABLET ORAL EVERY 4 HOURS PRN
Qty: 30 TABLET | Refills: 0 | Status: SHIPPED | OUTPATIENT
Start: 2020-05-06 | End: 2020-05-28

## 2020-04-27 NOTE — TELEPHONE ENCOUNTER
Reason For Call:   Chief Complaint   Patient presents with     Refill Request            Medication Name, Dose and Monthly Quantity:   Oxycodone with APAP (Percocet): Dose 5-325mg Schedule 1 tablet Q4H PRN Monthly Quantity 30     Diagnosis requiring opiates:   Psoriatic arthritis (H) [L40.50]       Recurrent cold sores [B00.1]           Problem List Updated:   Yes    Opioid Agreement On File - [facility] PAIN CONTRACT ID# [id number]:  No    Last Urine Drug Screen (at least once every 12 months) Date:   none  Unexpected Results:   N/A    [place]  Data Reviewed (at least once every 3 months) Date:   04/27/2020    Unexpected Results:    No.    Last Fill Date:   04/06/2020  Last Visit with PCP:   04/22/2019    Future Visits with PCP:   No.    Processing:   Larry GIRALDO CMA at 9:48 AM on 4/27/2020.

## 2020-05-18 ENCOUNTER — MYC REFILL (OUTPATIENT)
Dept: INTERNAL MEDICINE | Facility: CLINIC | Age: 35
End: 2020-05-18

## 2020-05-18 DIAGNOSIS — L40.50 PSORIATIC ARTHRITIS (H): ICD-10-CM

## 2020-05-18 RX ORDER — TRAMADOL HYDROCHLORIDE 50 MG/1
50 TABLET ORAL EVERY 8 HOURS PRN
Qty: 60 TABLET | Refills: 0 | Status: SHIPPED | OUTPATIENT
Start: 2020-05-28 | End: 2020-06-14

## 2020-05-18 NOTE — TELEPHONE ENCOUNTER
Patient Requested  traMADol (ULTRAM) 50 MG tablet  Last Fill  04/28/2020  Last Office Visit  05/28/2020  Next Office Visit  Nothing scheduled   Checked  05/18/2020    DX: Psoriatic arthritis     Pharmacy: [facility] #[id number] [place], [place] - [address] [place] [facility] [place] [address] CMA at 9:28 AM on 5/18/2020.

## 2020-05-19 DIAGNOSIS — L40.50 PSORIATIC ARTHRITIS (H): ICD-10-CM

## 2020-05-21 ENCOUNTER — MYC REFILL (OUTPATIENT)
Dept: INTERNAL MEDICINE | Facility: CLINIC | Age: 35
End: 2020-05-21

## 2020-05-21 DIAGNOSIS — Z79.4 TYPE 2 DIABETES MELLITUS WITHOUT COMPLICATION, WITH LONG-TERM CURRENT USE OF INSULIN (H): ICD-10-CM

## 2020-05-21 DIAGNOSIS — E11.9 TYPE 2 DIABETES MELLITUS WITHOUT COMPLICATION, WITH LONG-TERM CURRENT USE OF INSULIN (H): ICD-10-CM

## 2020-05-27 ENCOUNTER — TELEPHONE (OUTPATIENT)
Dept: RHEUMATOLOGY | Facility: CLINIC | Age: 35
End: 2020-05-27

## 2020-05-27 DIAGNOSIS — Z79.4 TYPE 2 DIABETES MELLITUS WITHOUT COMPLICATION, WITH LONG-TERM CURRENT USE OF INSULIN (H): ICD-10-CM

## 2020-05-27 DIAGNOSIS — E11.9 TYPE 2 DIABETES MELLITUS WITHOUT COMPLICATION, WITH LONG-TERM CURRENT USE OF INSULIN (H): ICD-10-CM

## 2020-05-27 DIAGNOSIS — L40.50 PSORIATIC ARTHRITIS (H): ICD-10-CM

## 2020-05-27 NOTE — TELEPHONE ENCOUNTER
Prior Authorization Approval    Authorization Effective Date: 4/27/2020  Authorization Expiration Date: 9/24/2020  Medication: Otezla Tablet-PA APPROVED   Approved Dose/Quantity:   Reference #: CASE # [id number]   Insurance Company: [employer] - Phone [phone] Fax [phone]  Expected CoPay:       CoPay Card Available:      Foundation Assistance Needed:    Which Pharmacy is filling the prescription (Not needed for infusion/clinic administered): [facility] [address]  Pharmacy Notified: Yes- **Instructed pharmacy to notify patient when script is ready to /ship.**   Patient Notified: Yes

## 2020-05-27 NOTE — TELEPHONE ENCOUNTER
Central Prior Authorization Team   [phone]    PA Initiation    Medication: Otezla Tablet  Insurance Company: [employer] - Phone [phone] Fax [phone]  Pharmacy Filling the Rx: [facility] [place] - [address] [place] [address]  Filling Pharmacy Phone: [phone]  Filling Pharmacy Fax: [phone]  Start Date: 5/27/2020

## 2020-05-27 NOTE — TELEPHONE ENCOUNTER
Prior Authorization Retail Medication Request    Medication/Dose: Otezla Tablet  ICD code (if different than what is on RX):  Psoriatic arthritis (H) [L40.50]  - Primary   Previously Tried and Failed:    Rationale:      Insurance Name:    Insurance ID:        Pharmacy Information (if different than what is on RX)  Name:    Phone:

## 2020-05-29 NOTE — TELEPHONE ENCOUNTER
VALACYCLOVIR 500MG TABLET     Last Written Prescription Date:  4/22/2019  Last Fill Quantity: 60,   # refills: 3  Last Office Visit : 4/22/2019  Future Office visit:  None  Routing refill request to provider for review/approval because:  Office Visit Due         30 day pended  Nurse - this is a second refill request for medication with out or overdue labs. With last request pt was given 90 day Jacqueline notified.      METFORMIN 500MG TABLETS   Last Written Prescription Date:  5/21/2020  Last Fill Quantity: 360,   # refills: 0  Last Office Visit : 4/22/2019  Future Office visit:  None  Routing refill request to provider for review/approval because:  Office Visit and labs Due:    A1C          30 day pended  Nurse - this is a second refill request for medication with out or overdue labs. With last request pt was given 90 day Jacqueline notified.    Reyna Chacko RN  Central Triage Red Flags/Med Refills

## 2020-06-01 ENCOUNTER — MYC REFILL (OUTPATIENT)
Dept: INTERNAL MEDICINE | Facility: CLINIC | Age: 35
End: 2020-06-01

## 2020-06-01 DIAGNOSIS — L40.50 PSORIATIC ARTHRITIS (H): ICD-10-CM

## 2020-06-01 DIAGNOSIS — E11.9 TYPE 2 DIABETES MELLITUS WITHOUT COMPLICATION, WITH LONG-TERM CURRENT USE OF INSULIN (H): ICD-10-CM

## 2020-06-01 DIAGNOSIS — B00.1 RECURRENT COLD SORES: ICD-10-CM

## 2020-06-01 DIAGNOSIS — Z79.4 TYPE 2 DIABETES MELLITUS WITHOUT COMPLICATION, WITH LONG-TERM CURRENT USE OF INSULIN (H): ICD-10-CM

## 2020-06-01 RX ORDER — OXYCODONE AND ACETAMINOPHEN 5; 325 MG/1; MG/1
1 TABLET ORAL EVERY 4 HOURS PRN
Qty: 30 TABLET | Refills: 0 | Status: CANCELLED | OUTPATIENT
Start: 2020-06-01

## 2020-06-01 RX ORDER — VALACYCLOVIR HYDROCHLORIDE 500 MG/1
500 TABLET, FILM COATED ORAL DAILY PRN
Qty: 60 TABLET | Refills: 0 | Status: SHIPPED | OUTPATIENT
Start: 2020-06-01 | End: 2020-09-14

## 2020-06-01 NOTE — TELEPHONE ENCOUNTER
Erica sent to patient on 5/21 advising she schedule an annual physical this summer.     Tyra Collier RN (Brasch)

## 2020-06-14 ENCOUNTER — MYC REFILL (OUTPATIENT)
Dept: INTERNAL MEDICINE | Facility: CLINIC | Age: 35
End: 2020-06-14

## 2020-06-14 DIAGNOSIS — L40.50 PSORIATIC ARTHRITIS (H): ICD-10-CM

## 2020-06-15 RX ORDER — TRAMADOL HYDROCHLORIDE 50 MG/1
50 TABLET ORAL EVERY 8 HOURS PRN
Qty: 60 TABLET | Refills: 0 | Status: SHIPPED | OUTPATIENT
Start: 2020-06-25 | End: 2020-07-23

## 2020-06-15 NOTE — TELEPHONE ENCOUNTER
Patient Requested  traMADol (ULTRAM) 50 MG tablet  Last Fill  05/26/3030  Last Office Visit  05/28/2020  Next Office Visit  Nothing scheduled   Checked  06/15/2020    DX: Psoriatic arthritis     Pharmacy: [facility] #[id number] - [place], [place] - [address] AT [facility] [address] CMA at 2:07 PM on 6/15/2020.

## 2020-06-16 RX ORDER — TRAMADOL HYDROCHLORIDE 50 MG/1
TABLET ORAL
Qty: 60 TABLET | OUTPATIENT
Start: 2020-06-16

## 2020-06-26 ENCOUNTER — MYC REFILL (OUTPATIENT)
Dept: INTERNAL MEDICINE | Facility: CLINIC | Age: 35
End: 2020-06-26

## 2020-06-26 DIAGNOSIS — B00.1 RECURRENT COLD SORES: ICD-10-CM

## 2020-06-26 DIAGNOSIS — L40.50 PSORIATIC ARTHRITIS (H): ICD-10-CM

## 2020-06-29 RX ORDER — OXYCODONE AND ACETAMINOPHEN 5; 325 MG/1; MG/1
1 TABLET ORAL EVERY 4 HOURS PRN
Qty: 30 TABLET | Refills: 0 | Status: SHIPPED | OUTPATIENT
Start: 2020-07-02 | End: 2020-07-23

## 2020-06-29 NOTE — TELEPHONE ENCOUNTER
Reason For Call:   Chief Complaint   Patient presents with     Refill Request            Medication Name, Dose and Monthly Quantity:   Oxycodone with APAP (Percocet): Dose 5-325mg Schedule 1 tablet Q4H PRN Monthly Quantity 30     Diagnosis requiring opiates:   Psoriatic arthritis (H) [L40.50]       Recurrent cold sores [B00.1]           Problem List Updated:   Yes    Opioid Agreement On File - [facility] PAIN CONTRACT ID# [id number]:  No    Last Urine Drug Screen (at least once every 12 months) Date:   none  Unexpected Results:   N/A    [place]  Data Reviewed (at least once every 3 months) Date:   06/29/2020    Unexpected Results:    No.    Last Fill Date:   06/02/2020  Last Visit with PCP:   04/22/2019    Future Visits with PCP:   No.    Processing:   Larry GIRALDO CMA at 9:14 AM on 6/29/2020.

## 2020-07-15 DIAGNOSIS — L40.50 PSORIATIC ARTHRITIS (H): ICD-10-CM

## 2020-07-15 RX ORDER — SECUKINUMAB 150 MG/ML
300 INJECTION SUBCUTANEOUS
Qty: 2 EACH | Refills: 5 | Status: SHIPPED | OUTPATIENT
Start: 2020-07-15 | End: 2020-08-25

## 2020-07-15 NOTE — TELEPHONE ENCOUNTER
[facility] Call Center    Phone Message    May a detailed message be left on voicemail: yes     Reason for Call: Medication Refill Request    Has the patient contacted the pharmacy for the refill? Yes   Name of medication being requested: secukinumab (COSENTYX SENSOREADY PEN) 150 MG/ML Sensoready pen   Provider who prescribed the medication:   Pharmacy: [facility]  Date medication is needed: asap         Action Taken: Message routed to:  [facility] ([facility]): rheum    Travel Screening: Not Applicable

## 2020-07-15 NOTE — TELEPHONE ENCOUNTER
Centralized Medication Refill Team note:       secukinumab (COSENTYX SENSOREADY PEN) 150 MG/ML Sensoready pen   Inject 2 mLs (300 mg) Subcutaneous every 28   Last Written Prescription Date:  11/22/2019  Last Fill Quantity: 2 each,   # refills: 5  Last Office Visit : 3/27/20  Future Office visit:  4 months, not scheduled.    Routing refill request to provider for review/approval because:  Last visit 3/27/20 requested labs within the next month.Care everywhere and media tab reviewed without results.    Message to patient:Your refill request has been entered and sent to your physician for authorization. Please call your pharmacy prior to going in to pick it up.      CBC RESULTS:   Recent Labs   Lab Test 11/25/19  1528   WBC 6.8   RBC 4.49   HGB 11.4*   HCT 36.5   MCV 81   MCH 25.4*   MCHC 31.2*   RDW 15.3*          Creatinine   Date Value Ref Range Status   11/25/2019 0.60 0.52 - 1.04 mg/dL Final   ]    Liver Function Studies -   Recent Labs   Lab Test 11/25/19  1528  08/20/18  1200   PROTTOTAL  --   --  7.5   ALBUMIN  --   --  4.1   BILITOTAL  --   --  0.4   ALKPHOS  --   --  61   AST 12   < > 5   ALT 19   < > 17    < > = values in this interval not displayed.

## 2020-07-23 ENCOUNTER — MYC REFILL (OUTPATIENT)
Dept: INTERNAL MEDICINE | Facility: CLINIC | Age: 35
End: 2020-07-23

## 2020-07-23 DIAGNOSIS — B00.1 RECURRENT COLD SORES: ICD-10-CM

## 2020-07-23 DIAGNOSIS — L40.50 PSORIATIC ARTHRITIS (H): ICD-10-CM

## 2020-07-24 RX ORDER — OXYCODONE AND ACETAMINOPHEN 5; 325 MG/1; MG/1
1 TABLET ORAL EVERY 4 HOURS PRN
Qty: 30 TABLET | Refills: 0 | Status: SHIPPED | OUTPATIENT
Start: 2020-08-01 | End: 2020-08-17

## 2020-07-24 RX ORDER — TRAMADOL HYDROCHLORIDE 50 MG/1
50 TABLET ORAL EVERY 8 HOURS PRN
Qty: 60 TABLET | Refills: 0 | Status: SHIPPED | OUTPATIENT
Start: 2020-07-24 | End: 2020-08-17

## 2020-07-24 NOTE — TELEPHONE ENCOUNTER
Patient Requested  traMADol (ULTRAM) 50 MG tablet  Last Fill  06/25/2020  Last Office Visit  05/28/2020  Next Office Visit  Nothing scheduled   Checked  07/24/2020    DX: Psoriatic arthritis     Pharmacy: [facility] #[id number] [place], [place] - [address] AT [facility] OF [address] CMA at 6:57 AM on 7/24/2020.

## 2020-07-27 ENCOUNTER — DOCUMENTATION ONLY (OUTPATIENT)
Dept: CARE COORDINATION | Facility: CLINIC | Age: 35
End: 2020-07-27

## 2020-07-29 DIAGNOSIS — L40.50 PSORIATIC ARTHRITIS (H): ICD-10-CM

## 2020-07-29 LAB
ERYTHROCYTE [DISTWIDTH] IN BLOOD BY AUTOMATED COUNT: 14.9 % (ref 10–15)
HCT VFR BLD AUTO: 34.5 % (ref 35–47)
HGB BLD-MCNC: 10.9 G/DL (ref 11.7–15.7)
MCH RBC QN AUTO: 24.9 PG (ref 26.5–33)
MCHC RBC AUTO-ENTMCNC: 31.6 G/DL (ref 31.5–36.5)
MCV RBC AUTO: 79 FL (ref 78–100)
PLATELET # BLD AUTO: 342 10E9/L (ref 150–450)
RBC # BLD AUTO: 4.37 10E12/L (ref 3.8–5.2)
WBC # BLD AUTO: 5.8 10E9/L (ref 4–11)

## 2020-07-29 PROCEDURE — 36415 COLL VENOUS BLD VENIPUNCTURE: CPT | Performed by: INTERNAL MEDICINE

## 2020-07-29 PROCEDURE — 82565 ASSAY OF CREATININE: CPT | Performed by: INTERNAL MEDICINE

## 2020-07-29 PROCEDURE — 85027 COMPLETE CBC AUTOMATED: CPT | Performed by: INTERNAL MEDICINE

## 2020-07-29 PROCEDURE — 84460 ALANINE AMINO (ALT) (SGPT): CPT | Performed by: INTERNAL MEDICINE

## 2020-07-29 PROCEDURE — 84450 TRANSFERASE (AST) (SGOT): CPT | Performed by: INTERNAL MEDICINE

## 2020-07-30 LAB
ALT SERPL W P-5'-P-CCNC: 16 U/L (ref 0–50)
AST SERPL W P-5'-P-CCNC: 9 U/L (ref 0–45)
CREAT SERPL-MCNC: 0.43 MG/DL (ref 0.52–1.04)
GFR SERPL CREATININE-BSD FRML MDRD: >90 ML/MIN/{1.73_M2}

## 2020-08-13 ENCOUNTER — MYC REFILL (OUTPATIENT)
Dept: INTERNAL MEDICINE | Facility: CLINIC | Age: 35
End: 2020-08-13

## 2020-08-13 DIAGNOSIS — E11.9 TYPE 2 DIABETES MELLITUS WITHOUT COMPLICATION, WITH LONG-TERM CURRENT USE OF INSULIN (H): ICD-10-CM

## 2020-08-13 DIAGNOSIS — Z79.4 TYPE 2 DIABETES MELLITUS WITHOUT COMPLICATION, WITH LONG-TERM CURRENT USE OF INSULIN (H): ICD-10-CM

## 2020-08-14 RX ORDER — PROCHLORPERAZINE 25 MG/1
1 SUPPOSITORY RECTAL
Qty: 4 EACH | Refills: 0 | Status: SHIPPED | OUTPATIENT
Start: 2020-08-14 | End: 2022-03-17

## 2020-08-14 RX ORDER — PROCHLORPERAZINE 25 MG/1
1 SUPPOSITORY RECTAL CONTINUOUS
Qty: 1 EACH | Refills: 0 | Status: SHIPPED | OUTPATIENT
Start: 2020-08-14 | End: 2020-11-08

## 2020-08-14 NOTE — TELEPHONE ENCOUNTER
Last Clinic Visit: 4/22/19, Next scheduled appointment 9/14/20  Should these insulin pump supplies be refilled per endo?  Last seen by Dr Pérez with endo 11/25/19

## 2020-08-20 ENCOUNTER — TRANSFERRED RECORDS (OUTPATIENT)
Dept: HEALTH INFORMATION MANAGEMENT | Facility: CLINIC | Age: 35
End: 2020-08-20

## 2020-08-25 ENCOUNTER — VIRTUAL VISIT (OUTPATIENT)
Dept: RHEUMATOLOGY | Facility: CLINIC | Age: 35
End: 2020-08-25
Attending: INTERNAL MEDICINE
Payer: COMMERCIAL

## 2020-08-25 DIAGNOSIS — L40.50 PSORIATIC ARTHRITIS (H): ICD-10-CM

## 2020-08-25 RX ORDER — SECUKINUMAB 150 MG/ML
300 INJECTION SUBCUTANEOUS
Qty: 2 EACH | Refills: 5 | Status: SHIPPED | OUTPATIENT
Start: 2020-08-25 | End: 2020-12-28

## 2020-08-25 RX ORDER — APREMILAST 30 MG/1
30 TABLET, FILM COATED ORAL 2 TIMES DAILY
Qty: 180 TABLET | Refills: 2 | Status: SHIPPED | OUTPATIENT
Start: 2020-08-25 | End: 2021-04-20

## 2020-08-25 ASSESSMENT — PAIN SCALES - GENERAL: PAINLEVEL: SEVERE PAIN (6)

## 2020-08-25 NOTE — PATIENT INSTRUCTIONS
Diagnosis:  1.  Psoriatic arthritis with intermittent knee swelling: Knee symptoms and mobility problems have reduced in severity and frequency since the last visit.  I think that combination of Otezla and Cosentyx are working well for both psoriasis and inflammatory arthritis.  2.  Diabetes mellitus    Plan:  1.  Continue Cosentyx 300 mg once monthly, and Otezla 30 mg twice daily.  2.  Check blood work for kidney function, liver function, and blood counts in 12 to 16 weeks from July 29.

## 2020-08-25 NOTE — PROGRESS NOTES
[facility]  Rheumatology Clinic-telephone  Rene Granados MD  2020     Name: Chrissy Dixon  MRN: [id number]  Age: 34 year old  : 1985  Referring provider: Nando Haskins    Assessment and Plan:  # Psoriatic arthritis (polyarthritis, scalp and pustular psoriasis; failed Humira and leflunomide, dx'd post-partum in 2015):   Patient relates improvement in knee mobility with reduced swelling and pain. No exam due to telephone visit. Laboratory evaluation on  showed creatinine 0.43; transaminases were normal, CBC showed mild anemia with an hemoglobin of 10.9.     Psoriatic arthritis, formerly responsive to combination Otezla and cosentyx, has improved with otezla restart. Disease had been unresponsive to Stelara, and unresponsive to anti-TNF ,was greatly improved in -, 3 months after starting anti-IL17.     I recommend continuing Cosentyx 300 mg subcutaneous once monthly. Although combination Otezla and Cosentyx is not well-studied, I recommend using combined Otezla 30 mg twice daily with great caution in the time of coronavirus.  If psoriasis recurs, she may use 0.1 % kenalog cream.     # Type 1 diabetes mellitus:   Hemoglobin A1c remained above 8 at last check in 2019, but I suspect control has improved after patient discontinued steroid use. I will recheck hemoglobin A1c today. Patient following with Endocrinology. Return to clinic in 4 month.     # Sciatica:  Now improved after epidural injection.     Follow-up: Return in about 4 months    HPI:   Chrissy Dixon is a 34 year old female with a history including psoriatic arthritis who presents for follow-up. I last evaluated the patient on 2020, during a virtual visit.  Psoriatic arthritis was judged active.  I recommended continuing Cosentyx 300 mg monthly, and restarting Otezla 30 mg twice daily.  I prescribed a Medrol taper.  Background History:  She has had psoriasis since birth (presented with diffuse rash  "at birth), was diagnosed with psoriasis at age 11, and developed arthritis in her 20s. She was on Enbrel at age 21 but developed infections such as pneumonia, sinusitis, and mono, and stopped it within 7 months. She was then on methotrexate from 2011 until February 2012, but did not tolerate this due to GI upset. She switched to sulfasalazine 2/2012 and tolerated it well at 1000 mg thrice a day, but had decreasing efficacy as of 11/1/13. She started leflunomide in 10-13 but stopped in 1-14 due to GI intolerance. She started Humira in 1-14, held in 7-14 during pregnancy, restarted in 7-15 for flaring disease but then discontinued due to worsened psoriasis and lack of efficacy. Leflunomide was then restarted but loose stools developed. Leflunomide was stopped and Otezla was started on 9/11/15. She started Stelara in fall 2015 but stopped due to cost and inconvenience in early 2016. She has continued with Otezla monotherapy through present. She restarted Stelara in 12-18.  Stelara was discontinued in late 2019 for lack of benefit.  She started Cosentyx in November 2019 with excellent relief of joint pain.  Combination Cosentyx and Otezla was begun in early 2020.      Interval history 08-:    She has been \"pretty good\". Reports reduced flares of pain in her knees. She does note some cycling of symptoms with knee tightness/stiffness about 1 week before she is due to take cosentyx. With the combination of otezla/cosentyx she is overall much better. NO need for medrol.    Still has intermittent back pain, will start PTx in the next few weeks. She has some radiculopathic symptoms in the left foot.    No new skin symptoms. +minor waxing/waning of plaques behind L ear    Hx 11-19:  Today, she reports that her knee mobility is 80% improved. She states that she has only had 1 flare in her knee since starting secukinumab. She does note that she sometimes has swelling in her toes and fingers, but this does not limit her. " She states that she has been able to walk daily now that her knee pain is much better.    She explains that the introduction of secukinumab injections seems to be the most effective of her managements as she noticed resolution of knee symptoms very shortly after her first injection. She continued Otezla, did try a lower dosage, and, after lowering the dosage, she experienced a recurrence of psoriasis plaques. Other than this Otezla dosing change, her psoriasis has been mild and scant. She has also been using triamcinolone cream.    She states that this month marks her third dose of Cosentyx. She explains that one week prior to receiving this injection, she hs increased morning stiffness in her hands and feet. She notes that, two days after receiving this Cosentyx injection, her morning stiffness is much better.     She reports that she has not been on prednisone or other steroids since shortly after her last visit with me. She explains that her blood sugars have been much easier to control now that she is off of prednisone.     She reports that her last knee injection or aspiration took place around 3 months ago.      Review of Systems:   Pertinent items are noted in HPI or as below, remainder of complete ROS is negative.      No recent problems with hearing or vision. No swallowing problems.   No breathing difficulty, shortness of breath, coughing, or wheezing.  No chest pain or palpitations.  No heart burn, indigestion, abdominal pain, nausea, vomiting, diarrhea.  No urination problems, no bloody, cloudy urine, no dysuria.  No numbing, tingling, weakness.  No headaches or confusion.  No easy bleeding or bruising.     Active Medications:   Current Outpatient Medications:      apremilast (OTEZLA) 30 MG tablet, Take 1 tablet (30 mg) by mouth 2 times daily, Disp: 180 tablet, Rfl: 1     blood glucose monitoring (ACCU-CHEK SMARTVIEW) test strip, Use to test blood sugar 6-8 times daily or as directed., Disp: 700 each,  Rfl: 1     blood glucose monitoring (NO BRAND SPECIFIED) meter device kit, Use to test blood sugar 6 to 8 times daily or as directed. Okay to dispense One Touch Verio products per patient's insurance., Disp: 1 kit, Rfl: 0     cholecalciferol (VITAMIN D3) 1000 UNIT tablet, Take 1 tablet (1,000 Units) by mouth daily, Disp: 90 tablet, Rfl: 1     clobetasol (TEMOVATE) 0.05 % external solution, Apply topically 2 times daily (Patient taking differently: Apply topically 2 times daily as needed ), Disp: 50 mL, Rfl: 6     Continuous Blood Gluc Sensor (DEXCOM G6 SENSOR) MISC, 1 Package by Device route every 10 days, Disp: 9 each, Rfl: 1     Continuous Blood Gluc Sensor (DEXCOM G6 SENSOR) MISC, 1 Device by Device route every 10 days, Disp: 9 each, Rfl: 2     Continuous Blood Gluc Transmit (DEXCOM G6 TRANSMITTER) MISC, Inject 1 each Subcutaneous continuous, Disp: 1 each, Rfl: 1     Fluocinolone Acetonide (DERMA-SMOOTHE/FS SCALP) 0.01 % OIL, Apply to scalp at bedtime, then wear showercap, rinse off in morning. (Patient taking differently: Apply to scalp at bedtime, then wear showercap, rinse off in morning as needed.), Disp: 118 mL, Rfl: 3     fluticasone (FLONASE) 50 MCG/ACT spray, Spray 2 sprays into both nostrils daily, Disp: 48 g, Rfl: 3     ibuprofen (ADVIL/MOTRIN) 600 MG tablet, Take 1 tablet (600 mg) by mouth every 8 hours as needed for moderate pain, Disp: 270 tablet, Rfl: 0     insulin aspart (NOVOLOG FLEXPEN) 100 UNIT/ML pen, Use three times daily with meals, currently 30 units daily, Disp: 30 mL, Rfl: 5     insulin detemir (LEVEMIR FLEXTOUCH) 100 UNIT/ML pen, Inject 18 Units Subcutaneous 2 times daily, Disp: 60 mL, Rfl: 11     insulin pen needle 31G X 8 MM, Use  3-4 pen needles daily or as directed., Disp: 300 each, Rfl: 4     metFORMIN (GLUCOPHAGE) 500 MG tablet, Take 2 tablets (1,000 mg) by mouth 2 times daily (with meals) Labs due for further refills., Disp: 360 tablet, Rfl: 3     ONETOUCH LANCETS MISC, Use to test  blood sugar 6 to 8 times daily or as directed. Okay to dispense One Touch Verio products per patient's insurance., Disp: 700 each, Rfl: 1     oxyCODONE-acetaminophen (PERCOCET) 5-325 MG tablet, Take 1 tablet by mouth every 4 hours as needed for severe pain (must last 30 days from dispense date), Disp: 30 tablet, Rfl: 0     secukinumab (COSENTYX SENSOREADY PEN) 150 MG/ML Sensoready pen, Inject 1 mL (150 mg) Subcutaneous every 28 days Hold for signs of infection, and seek medical attention., Disp: 1 each, Rfl: 5     secukinumab (COSENTYX) 150 MG/ML prefilled syringe, Inject 1 mL (150 mg) Subcutaneous once a week At weeks 0, 1, 2, 3, and 4 and every 4 weeks there after loading dose., Disp: 5 Syringe, Rfl: 0     traMADol (ULTRAM) 50 MG tablet, Take 1 tablet (50 mg) by mouth every 8 hours as needed for moderate pain . Must last 30 days, Disp: 60 tablet, Rfl: 0     triamcinolone (KENALOG) 0.1 % cream, Apply topically 2 times daily (Patient taking differently: Apply topically 2 times daily as needed ), Disp: 453.6 g, Rfl: 1     valACYclovir (VALTREX) 500 MG tablet, Take 1 tablet (500 mg) by mouth daily as needed, Disp: 60 tablet, Rfl: 3     blood glucose monitoring (NO BRAND SPECIFIED) test strip, Use to test blood sugars 6 to 8 times daily or as directed. Okay to dispense One Touch Verio per patient's insurance., Disp: 700 strip, Rfl: 1     ferrous gluconate (FERGON) 324 (38 FE) MG tablet, Take 1 tablet (324 mg) by mouth every other day, Disp: 100 tablet, Rfl: 11     naloxone (NARCAN) 4 MG/0.1ML nasal spray, Spray 1 spray (4 mg) into one nostril alternating nostrils once as needed for opioid reversal every 2-3 minutes until assistance arrives (Patient not taking: Reported on 11/22/2019), Disp: 0.2 mL, Rfl: 1     ondansetron (ZOFRAN-ODT) 4 MG ODT tab, Take 1-2 tablets (4-8 mg) by mouth every 8 hours as needed for nausea, Disp: 4 tablet, Rfl: 0     predniSONE (DELTASONE) 10 MG tablet, Take 1 tablet (10 mg) by mouth daily As  needed for flares only, Disp: 30 tablet, Rfl: 1     ustekinumab (STELARA) 45 MG/0.5ML SOSY, Inject 0.5 mLs (45 mg) Subcutaneous every 3 months Hold for signs of infection, and seek medical attention. (Patient not taking: Reported on 11/22/2019), Disp: 1 Syringe, Rfl: 1     ustekinumab (STELARA) 45 MG/0.5ML SOSY, Inject 0.5 mLs (45 mg) Subcutaneous every 3 months, Disp: 3 Syringe, Rfl: 1  No current facility-administered medications for this visit.     Allergies:  [employer]   Betadine   Levofloxacin   Penicillins   Pneumococcal Vaccines   Prenatal Vit-Fe Fumarate-Fa  Tdap    Past Medical History:  Diabetes    Psoriatic arthritis   Diabetes mellitus type 2, controlled   Psoriasis arthropathia   Malaise and fatigue  Iron deficiency anemia due to chronic blood loss  Right knee pain    Past Surgical History:  Arthroscopy knee with meniscectomy, right (11/20/2018)  AS Hysteroscopy Surgical W/Endometrial, bilateral (2017)   Tonsillar abscess    Family History:    The patient's family history includes Connective Tissue Disorder in her father; Diabetes in her maternal grandfather, maternal uncle, maternal uncle, and mother; Hypertension in her maternal grandfather, maternal grandmother, and mother; Prostate Cancer in her maternal grandfather; Thyroid Disease in her mother.    Social History:  The patient reports that she quit smoking about 12 years ago. Her smoking use included cigarettes. She has a 5.00 pack-year smoking history. She has never used smokeless tobacco. She reports that she does not drink alcohol or use drugs.   PCP: Nando Haskins  Marital Status:      Physical Exam:   There were no vitals taken for this visit.   Wt Readings from Last 4 Encounters:   11/25/19 50.3 kg (110 lb 14.4 oz)   11/22/19 49.7 kg (109 lb 9.6 oz)   08/02/19 49.1 kg (108 lb 3.2 oz)   04/22/19 51.4 kg (113 lb 4.8 oz)     Psych: Normal judgement, orientation, memory, affect.     Laboratory:   RHEUM RESULTS  Latest Ref Rng & Units 8/2/2019 11/25/2019 7/29/2020   SED RATE 0 - 20 mm/h - - -   CRP, INFLAMMATION 0.0 - 8.0 mg/L <2.9 - -   CK TOTAL 32 - 200 U/L - - -   RHEUMATOID FACTOR 0 - 14 IU/mL - - -   CHALO SCREEN BY EIA <1.0 - - -   AST 0 - 45 U/L 9 12 9   ALT 0 - 50 U/L 19 19 16   ALBUMIN 3.4 - 5.0 g/dL - - -   WBC 4.0 - 11.0 10e9/L 7.7 6.8 5.8   RBC 3.8 - 5.2 10e12/L 4.70 4.49 4.37   HGB 11.7 - 15.7 g/dL 11.9 11.4(L) 10.9(L)   HCT 35.0 - 47.0 % 39.3 36.5 34.5(L)   MCV 78 - 100 fl 84 81 79   MCHC 31.5 - 36.5 g/dL 30.3(L) 31.2(L) 31.6   RDW 10.0 - 15.0 % 15.8(H) 15.3(H) 14.9    - 450 10e9/L 357 358 342   CREATININE 0.52 - 1.04 mg/dL - 0.60 0.43(L)   GFR ESTIMATE, IF BLACK >60 mL/min/[1.73:m2] - >90 >90   GFR ESTIMATE >60 mL/min/[1.73:m2] - >90 >90   HEPATITIS C ANTIBODY NEG - - -     Rheumatoid Factor   Date Value Ref Range Status   08/11/2011 11 0 - 14 IU/mL Final     Cyclic Cit Pept IgG/IgA   Date Value Ref Range Status   08/11/2011 <20  Interpretation:  Negative <20 UNITS Final     CHALO Screen by EIA   Date Value Ref Range Status   04/09/2014 <1.0  Interpretation:  Negative <1.0 Final     Hepatitis B Core Ana Rosa   Date Value Ref Range Status   08/02/2019 Nonreactive NR^Nonreactive Final     Antistreptolysin O   Date Value Ref Range Status   09/11/2015 109 0 - 120 IU/mL Final     Comment:     A single ASO analysis may not be meaningful due to the variability of ASO   values   within the normal population.  Both clinical and laboratory findings should   be   considered in reaching a diagnosis.  A single analysis may be less   informative   than a repeat analysis showing a change.       Quantiferon-TB Gold Plus Result   Date Value Ref Range Status   12/26/2018 Negative NEG^Negative Final     Comment:     No interferon gamma response to M.tuberculosis antigens was detected.   Infection with M.tuberculosis is unlikely, however a single negative result   does not exclude infection. In patients at high risk for infection,  "a second   test should be considered  in accordance with the 2017 ATS/IDSA/CDC Clinical Practice Guidelines for   Diagnosis of Tuberculosis in Adults and Children [Angela DM et   al.Clin.Infect.Dis. 2017 64(2):111-115].       TB1 Ag minus Nil Value   Date Value Ref Range Status   12/26/2018 0.00 IU/mL Final     TB2 Ag minus Nil Value   Date Value Ref Range Status   12/26/2018 0.00 IU/mL Final     Mitogen minus Nil Result   Date Value Ref Range Status   12/26/2018 >10.00 IU/mL Final     Nil Result   Date Value Ref Range Status   12/26/2018 0.02 IU/mL Final     Neutrophil Cytoplasmic IgG Antibody   Date Value Ref Range Status   05/31/2013   Final    <1:20  Reference range: <1:20  (Note)  The ANCA IFA is <1:20; therefore, no further testing will  be performed.  INTERPRETIVE INFORMATION: Anti-Neutrophil Cyto Ab, IgG    Neutrophil Cytoplasmic Antibodies (C-ANCA = granular  cytoplasmic staining, P-ANCA = perinuclear staining) are  found in the serum of over 90 percent of patients with  certain necrotizing systemic vasculitides, and usually in  less than 5 percent of patients with collagen vascular  disease or arthritis.  Performed by [facility],  [address] [place] [id number] [phone]  [email or web address], Nataly Roth MD, Lab. Director     Chrissy Zack is a 34 year old female who is being evaluated via a billable telephone visit.      The patient has been notified of following:     \"This telephone visit will be conducted via a call between you and your physician/provider. We have found that certain health care needs can be provided without the need for a physical exam.  This service lets us provide the care you need with a short phone conversation.  If a prescription is necessary we can send it directly to your pharmacy.  If lab work is needed we can place an order for that and you can then stop by our lab to have the test done at a later time.    Telephone visits are billed at different rates " "depending on your insurance coverage. During this emergency period, for some insurers they may be billed the same as an in-person visit.  Please reach out to your insurance provider with any questions.    If during the course of the call the physician/provider feels a telephone visit is not appropriate, you will not be charged for this service.\"    Patient has given verbal consent for Telephone visit?  Yes    What phone number would you like to be contacted at? [phone]    How would you like to obtain your AVS? MyChart    Phone call duration: 13 minutes    Rene Granados MD      "

## 2020-08-25 NOTE — LETTER
2020       RE: Chrissy Dixon  [address] [address]     Dear Colleague,    Thank you for referring your patient, Chrissy Dixon, to the [facility] RHEUMATOLOGY at [facility]. Please see a copy of my visit note below.    [facility]  Rheumatology Clinic-telephone  Rene Granados MD  2020     Name: Chrissy Dixon  MRN: [id number]  Age: 34 year old  : 1985  Referring provider: Nando Haskins    Assessment and Plan:  # Psoriatic arthritis (polyarthritis, scalp and pustular psoriasis; failed Humira and leflunomide, dx'd post-partum in ):   Patient relates improvement in knee mobility with reduced swelling and pain. No exam due to telephone visit. Laboratory evaluation on  showed creatinine 0.43; transaminases were normal, CBC showed mild anemia with an hemoglobin of 10.9.     Psoriatic arthritis, formerly responsive to combination Otezla and cosentyx, has improved with otezla restart. Disease had been unresponsive to Stelara, and unresponsive to anti-TNF ,was greatly improved in -, 3 months after starting anti-IL17.     I recommend continuing Cosentyx 300 mg subcutaneous once monthly. Although combination Otezla and Cosentyx is not well-studied, I recommend using combined Otezla 30 mg twice daily with great caution in the time of coronavirus.  If psoriasis recurs, she may use 0.1 % kenalog cream.     # Type 1 diabetes mellitus:   Hemoglobin A1c remained above 8 at last check in 2019, but I suspect control has improved after patient discontinued steroid use. I will recheck hemoglobin A1c today. Patient following with Endocrinology. Return to clinic in 4 month.     # Sciatica:  Now improved after epidural injection.     Follow-up: Return in about 4 months    HPI:   Chrissy Dixon is a 34 year old female with a history including psoriatic arthritis who presents for follow-up. I last evaluated the patient on  "March 27, 2020, during a virtual visit.  Psoriatic arthritis was judged active.  I recommended continuing Cosentyx 300 mg monthly, and restarting Otezla 30 mg twice daily.  I prescribed a Medrol taper.  Background History:  She has had psoriasis since birth (presented with diffuse rash at birth), was diagnosed with psoriasis at age 11, and developed arthritis in her 20s. She was on Enbrel at age 21 but developed infections such as pneumonia, sinusitis, and mono, and stopped it within 7 months. She was then on methotrexate from 2011 until February 2012, but did not tolerate this due to GI upset. She switched to sulfasalazine 2/2012 and tolerated it well at 1000 mg thrice a day, but had decreasing efficacy as of 11/1/13. She started leflunomide in 10-13 but stopped in 1-14 due to GI intolerance. She started Humira in 1-14, held in 7-14 during pregnancy, restarted in 7-15 for flaring disease but then discontinued due to worsened psoriasis and lack of efficacy. Leflunomide was then restarted but loose stools developed. Leflunomide was stopped and Otezla was started on 9/11/15. She started Stelara in fall 2015 but stopped due to cost and inconvenience in early 2016. She has continued with Otezla monotherapy through present. She restarted Stelara in 12-18.  Stelara was discontinued in late 2019 for lack of benefit.  She started Cosentyx in November 2019 with excellent relief of joint pain.  Combination Cosentyx and Otezla was begun in early 2020.      Interval history 08-:    She has been \"pretty good\". Reports reduced flares of pain in her knees. She does note some cycling of symptoms with knee tightness/stiffness about 1 week before she is due to take cosentyx. With the combination of otezla/cosentyx she is overall much better. NO need for medrol.    Still has intermittent back pain, will start PTx in the next few weeks. She has some radiculopathic symptoms in the left foot.    No new skin symptoms. +minor " waxing/waning of plaques behind L ear    Hx 11-19:  Today, she reports that her knee mobility is 80% improved. She states that she has only had 1 flare in her knee since starting secukinumab. She does note that she sometimes has swelling in her toes and fingers, but this does not limit her. She states that she has been able to walk daily now that her knee pain is much better.    She explains that the introduction of secukinumab injections seems to be the most effective of her managements as she noticed resolution of knee symptoms very shortly after her first injection. She continued Otezla, did try a lower dosage, and, after lowering the dosage, she experienced a recurrence of psoriasis plaques. Other than this Otezla dosing change, her psoriasis has been mild and scant. She has also been using triamcinolone cream.    She states that this month marks her third dose of Cosentyx. She explains that one week prior to receiving this injection, she hs increased morning stiffness in her hands and feet. She notes that, two days after receiving this Cosentyx injection, her morning stiffness is much better.     She reports that she has not been on prednisone or other steroids since shortly after her last visit with me. She explains that her blood sugars have been much easier to control now that she is off of prednisone.     She reports that her last knee injection or aspiration took place around 3 months ago.      Review of Systems:   Pertinent items are noted in HPI or as below, remainder of complete ROS is negative.      No recent problems with hearing or vision. No swallowing problems.   No breathing difficulty, shortness of breath, coughing, or wheezing.  No chest pain or palpitations.  No heart burn, indigestion, abdominal pain, nausea, vomiting, diarrhea.  No urination problems, no bloody, cloudy urine, no dysuria.  No numbing, tingling, weakness.  No headaches or confusion.  No easy bleeding or bruising.     Active  Medications:   Current Outpatient Medications:      apremilast (OTEZLA) 30 MG tablet, Take 1 tablet (30 mg) by mouth 2 times daily, Disp: 180 tablet, Rfl: 1     blood glucose monitoring (ACCU-CHEK SMARTVIEW) test strip, Use to test blood sugar 6-8 times daily or as directed., Disp: 700 each, Rfl: 1     blood glucose monitoring (NO BRAND SPECIFIED) meter device kit, Use to test blood sugar 6 to 8 times daily or as directed. Okay to dispense One Touch Verio products per patient's insurance., Disp: 1 kit, Rfl: 0     cholecalciferol (VITAMIN D3) 1000 UNIT tablet, Take 1 tablet (1,000 Units) by mouth daily, Disp: 90 tablet, Rfl: 1     clobetasol (TEMOVATE) 0.05 % external solution, Apply topically 2 times daily (Patient taking differently: Apply topically 2 times daily as needed ), Disp: 50 mL, Rfl: 6     Continuous Blood Gluc Sensor (DEXCOM G6 SENSOR) MISC, 1 Package by Device route every 10 days, Disp: 9 each, Rfl: 1     Continuous Blood Gluc Sensor (DEXCOM G6 SENSOR) MISC, 1 Device by Device route every 10 days, Disp: 9 each, Rfl: 2     Continuous Blood Gluc Transmit (DEXCOM G6 TRANSMITTER) MISC, Inject 1 each Subcutaneous continuous, Disp: 1 each, Rfl: 1     Fluocinolone Acetonide (DERMA-SMOOTHE/FS SCALP) 0.01 % OIL, Apply to scalp at bedtime, then wear showercap, rinse off in morning. (Patient taking differently: Apply to scalp at bedtime, then wear showercap, rinse off in morning as needed.), Disp: 118 mL, Rfl: 3     fluticasone (FLONASE) 50 MCG/ACT spray, Spray 2 sprays into both nostrils daily, Disp: 48 g, Rfl: 3     ibuprofen (ADVIL/MOTRIN) 600 MG tablet, Take 1 tablet (600 mg) by mouth every 8 hours as needed for moderate pain, Disp: 270 tablet, Rfl: 0     insulin aspart (NOVOLOG FLEXPEN) 100 UNIT/ML pen, Use three times daily with meals, currently 30 units daily, Disp: 30 mL, Rfl: 5     insulin detemir (LEVEMIR FLEXTOUCH) 100 UNIT/ML pen, Inject 18 Units Subcutaneous 2 times daily, Disp: 60 mL, Rfl: 11      insulin pen needle 31G X 8 MM, Use  3-4 pen needles daily or as directed., Disp: 300 each, Rfl: 4     metFORMIN (GLUCOPHAGE) 500 MG tablet, Take 2 tablets (1,000 mg) by mouth 2 times daily (with meals) Labs due for further refills., Disp: 360 tablet, Rfl: 3     ONETOUCH LANCETS MISC, Use to test blood sugar 6 to 8 times daily or as directed. Okay to dispense One Touch Verio products per patient's insurance., Disp: 700 each, Rfl: 1     oxyCODONE-acetaminophen (PERCOCET) 5-325 MG tablet, Take 1 tablet by mouth every 4 hours as needed for severe pain (must last 30 days from dispense date), Disp: 30 tablet, Rfl: 0     secukinumab (COSENTYX SENSOREADY PEN) 150 MG/ML Sensoready pen, Inject 1 mL (150 mg) Subcutaneous every 28 days Hold for signs of infection, and seek medical attention., Disp: 1 each, Rfl: 5     secukinumab (COSENTYX) 150 MG/ML prefilled syringe, Inject 1 mL (150 mg) Subcutaneous once a week At weeks 0, 1, 2, 3, and 4 and every 4 weeks there after loading dose., Disp: 5 Syringe, Rfl: 0     traMADol (ULTRAM) 50 MG tablet, Take 1 tablet (50 mg) by mouth every 8 hours as needed for moderate pain . Must last 30 days, Disp: 60 tablet, Rfl: 0     triamcinolone (KENALOG) 0.1 % cream, Apply topically 2 times daily (Patient taking differently: Apply topically 2 times daily as needed ), Disp: 453.6 g, Rfl: 1     valACYclovir (VALTREX) 500 MG tablet, Take 1 tablet (500 mg) by mouth daily as needed, Disp: 60 tablet, Rfl: 3     blood glucose monitoring (NO BRAND SPECIFIED) test strip, Use to test blood sugars 6 to 8 times daily or as directed. Okay to dispense One Touch Verio per patient's insurance., Disp: 700 strip, Rfl: 1     ferrous gluconate (FERGON) 324 (38 FE) MG tablet, Take 1 tablet (324 mg) by mouth every other day, Disp: 100 tablet, Rfl: 11     naloxone (NARCAN) 4 MG/0.1ML nasal spray, Spray 1 spray (4 mg) into one nostril alternating nostrils once as needed for opioid reversal every 2-3 minutes until  assistance arrives (Patient not taking: Reported on 11/22/2019), Disp: 0.2 mL, Rfl: 1     ondansetron (ZOFRAN-ODT) 4 MG ODT tab, Take 1-2 tablets (4-8 mg) by mouth every 8 hours as needed for nausea, Disp: 4 tablet, Rfl: 0     predniSONE (DELTASONE) 10 MG tablet, Take 1 tablet (10 mg) by mouth daily As needed for flares only, Disp: 30 tablet, Rfl: 1     ustekinumab (STELARA) 45 MG/0.5ML SOSY, Inject 0.5 mLs (45 mg) Subcutaneous every 3 months Hold for signs of infection, and seek medical attention. (Patient not taking: Reported on 11/22/2019), Disp: 1 Syringe, Rfl: 1     ustekinumab (STELARA) 45 MG/0.5ML SOSY, Inject 0.5 mLs (45 mg) Subcutaneous every 3 months, Disp: 3 Syringe, Rfl: 1  No current facility-administered medications for this visit.     Allergies:  Diagnostic X-[employer]   Betadine   Levofloxacin   Penicillins   Pneumococcal Vaccines   Prenatal Vit-Fe Fumarate-Fa  Tdap    Past Medical History:  Diabetes    Psoriatic arthritis   Diabetes mellitus type 2, controlled   Psoriasis arthropathia   Malaise and fatigue  Iron deficiency anemia due to chronic blood loss  Right knee pain    Past Surgical History:  Arthroscopy knee with meniscectomy, right (11/20/2018)  AS Hysteroscopy Surgical W/Endometrial, bilateral (2017)   Tonsillar abscess    Family History:    The patient's family history includes Connective Tissue Disorder in her father; Diabetes in her maternal grandfather, maternal uncle, maternal uncle, and mother; Hypertension in her maternal grandfather, maternal grandmother, and mother; Prostate Cancer in her maternal grandfather; Thyroid Disease in her mother.    Social History:  The patient reports that she quit smoking about 12 years ago. Her smoking use included cigarettes. She has a 5.00 pack-year smoking history. She has never used smokeless tobacco. She reports that she does not drink alcohol or use drugs.   PCP: Nando Haskins  Marital Status:      Physical Exam:   There  were no vitals taken for this visit.   Wt Readings from Last 4 Encounters:   11/25/19 50.3 kg (110 lb 14.4 oz)   11/22/19 49.7 kg (109 lb 9.6 oz)   08/02/19 49.1 kg (108 lb 3.2 oz)   04/22/19 51.4 kg (113 lb 4.8 oz)     Psych: Normal judgement, orientation, memory, affect.     Laboratory:   RHEUM RESULTS Latest Ref Rng & Units 8/2/2019 11/25/2019 7/29/2020   SED RATE 0 - 20 mm/h - - -   CRP, INFLAMMATION 0.0 - 8.0 mg/L <2.9 - -   CK TOTAL 32 - 200 U/L - - -   RHEUMATOID FACTOR 0 - 14 IU/mL - - -   CHALO SCREEN BY EIA <1.0 - - -   AST 0 - 45 U/L 9 12 9   ALT 0 - 50 U/L 19 19 16   ALBUMIN 3.4 - 5.0 g/dL - - -   WBC 4.0 - 11.0 10e9/L 7.7 6.8 5.8   RBC 3.8 - 5.2 10e12/L 4.70 4.49 4.37   HGB 11.7 - 15.7 g/dL 11.9 11.4(L) 10.9(L)   HCT 35.0 - 47.0 % 39.3 36.5 34.5(L)   MCV 78 - 100 fl 84 81 79   MCHC 31.5 - 36.5 g/dL 30.3(L) 31.2(L) 31.6   RDW 10.0 - 15.0 % 15.8(H) 15.3(H) 14.9    - 450 10e9/L 357 358 342   CREATININE 0.52 - 1.04 mg/dL - 0.60 0.43(L)   GFR ESTIMATE, IF BLACK >60 mL/min/[1.73:m2] - >90 >90   GFR ESTIMATE >60 mL/min/[1.73:m2] - >90 >90   HEPATITIS C ANTIBODY NEG - - -     Rheumatoid Factor   Date Value Ref Range Status   08/11/2011 11 0 - 14 IU/mL Final     Cyclic Cit Pept IgG/IgA   Date Value Ref Range Status   08/11/2011 <20  Interpretation:  Negative <20 UNITS Final     CHALO Screen by EIA   Date Value Ref Range Status   04/09/2014 <1.0  Interpretation:  Negative <1.0 Final     Hepatitis B Core Ana Rosa   Date Value Ref Range Status   08/02/2019 Nonreactive NR^Nonreactive Final     Antistreptolysin O   Date Value Ref Range Status   09/11/2015 109 0 - 120 IU/mL Final     Comment:     A single ASO analysis may not be meaningful due to the variability of ASO   values   within the normal population.  Both clinical and laboratory findings should   be   considered in reaching a diagnosis.  A single analysis may be less   informative   than a repeat analysis showing a change.       Quantiferon-TB Gold Plus Result    Date Value Ref Range Status   12/26/2018 Negative NEG^Negative Final     Comment:     No interferon gamma response to M.tuberculosis antigens was detected.   Infection with M.tuberculosis is unlikely, however a single negative result   does not exclude infection. In patients at high risk for infection, a second   test should be considered  in accordance with the 2017 ATS/IDSA/CDC Clinical Practice Guidelines for   Diagnosis of Tuberculosis in Adults and Children [Rolandinsohn DESTINY et   al.Clin.Infect.Dis. 2017 64(2):111-115].       TB1 Ag minus Nil Value   Date Value Ref Range Status   12/26/2018 0.00 IU/mL Final     TB2 Ag minus Nil Value   Date Value Ref Range Status   12/26/2018 0.00 IU/mL Final     Mitogen minus Nil Result   Date Value Ref Range Status   12/26/2018 >10.00 IU/mL Final     Nil Result   Date Value Ref Range Status   12/26/2018 0.02 IU/mL Final     Neutrophil Cytoplasmic IgG Antibody   Date Value Ref Range Status   05/31/2013   Final    <1:20  Reference range: <1:20  (Note)  The ANCA IFA is <1:20; therefore, no further testing will  be performed.  INTERPRETIVE INFORMATION: Anti-Neutrophil Cyto Ab, IgG    Neutrophil Cytoplasmic Antibodies (C-ANCA = granular  cytoplasmic staining, P-ANCA = perinuclear staining) are  found in the serum of over 90 percent of patients with  certain necrotizing systemic vasculitides, and usually in  less than 5 percent of patients with collagen vascular  disease or arthritis.  Performed by [facility],  [address] [place] [id number] [phone]  [email or web address], Nataly Roth MD, Lab. Director     Chrissy Dixon is a 34 year old female who is being evaluated via a billable telephone visit.        Rene Granados MD

## 2020-09-14 ENCOUNTER — OFFICE VISIT (OUTPATIENT)
Dept: INTERNAL MEDICINE | Facility: CLINIC | Age: 35
End: 2020-09-14
Payer: COMMERCIAL

## 2020-09-14 VITALS
HEART RATE: 95 BPM | WEIGHT: 112 LBS | DIASTOLIC BLOOD PRESSURE: 84 MMHG | OXYGEN SATURATION: 96 % | BODY MASS INDEX: 19.84 KG/M2 | SYSTOLIC BLOOD PRESSURE: 138 MMHG

## 2020-09-14 DIAGNOSIS — E11.9 TYPE 2 DIABETES MELLITUS WITHOUT COMPLICATION, WITH LONG-TERM CURRENT USE OF INSULIN (H): ICD-10-CM

## 2020-09-14 DIAGNOSIS — Z23 NEED FOR VACCINATION: Primary | ICD-10-CM

## 2020-09-14 DIAGNOSIS — J31.0 RHINITIS MEDICAMENTOSA: ICD-10-CM

## 2020-09-14 DIAGNOSIS — L40.50 PSORIATIC ARTHRITIS (H): ICD-10-CM

## 2020-09-14 DIAGNOSIS — T48.5X5A RHINITIS MEDICAMENTOSA: ICD-10-CM

## 2020-09-14 DIAGNOSIS — Z79.4 TYPE 2 DIABETES MELLITUS WITHOUT COMPLICATION, WITH LONG-TERM CURRENT USE OF INSULIN (H): ICD-10-CM

## 2020-09-14 LAB
CREAT UR-MCNC: 88 MG/DL
HBA1C MFR BLD: 8.3 % (ref 0–5.6)
MICROALBUMIN UR-MCNC: 14 MG/L
MICROALBUMIN/CREAT UR: 15.83 MG/G CR (ref 0–25)
TSH SERPL DL<=0.005 MIU/L-ACNC: 0.82 MU/L (ref 0.4–4)

## 2020-09-14 RX ORDER — VALACYCLOVIR HYDROCHLORIDE 500 MG/1
500 TABLET, FILM COATED ORAL DAILY PRN
Qty: 90 TABLET | Refills: 3 | Status: SHIPPED | OUTPATIENT
Start: 2020-09-14 | End: 2021-10-26

## 2020-09-14 RX ORDER — FLUTICASONE PROPIONATE 50 MCG
2 SPRAY, SUSPENSION (ML) NASAL DAILY
Qty: 48 G | Refills: 11 | Status: SHIPPED | OUTPATIENT
Start: 2020-09-14 | End: 2021-10-26

## 2020-09-14 ASSESSMENT — ENCOUNTER SYMPTOMS
DEPRESSION: 0
WEAKNESS: 0
MUSCLE WEAKNESS: 0
NECK PAIN: 0
LOSS OF CONSCIOUSNESS: 0
ARTHRALGIAS: 1
INSOMNIA: 1
NUMBNESS: 1
SEIZURES: 0
TINGLING: 1
DECREASED LIBIDO: 0
NERVOUS/ANXIOUS: 1
MEMORY LOSS: 0
JOINT SWELLING: 1
SPEECH CHANGE: 0
MYALGIAS: 1
STIFFNESS: 1
DECREASED CONCENTRATION: 0
TREMORS: 0
DISTURBANCES IN COORDINATION: 0
PANIC: 0
BACK PAIN: 1
HOT FLASHES: 0
PARALYSIS: 0
HEADACHES: 1
MUSCLE CRAMPS: 0

## 2020-09-14 ASSESSMENT — PAIN SCALES - GENERAL: PAINLEVEL: SEVERE PAIN (7)

## 2020-09-14 NOTE — PROGRESS NOTES
HPI  35-year-old presents today for annual exam.  She has been doing reasonably well on the present immunotherapy.  Arthritis is largely in remission although she gets an occasional flare early in the mornings with some hand stiffness.  She is other wise able to function well.  She said no associated significant rash or joint swelling.  She is monitoring her blood sugars closely and these have been under reasonably good control.  Otherwise she has no new complaints or symptoms today.  Past Medical History:   Diagnosis Date     Diabetes      Other psoriasis      Polyarthritis     probable psoriatic arthritis     Past Surgical History:   Procedure Laterality Date     ARTHROSCOPY KNEE WITH MENISCECTOMY Right 11/20/2018    Procedure: Examination Under Anesthesia Right Knee, Right Knee Arthroscopy, Partial Meniscectomy, Chondroplasty, Cyst Decompression, Synovial Biopsy;  Surgeon: Sean Pate MD;  Location: [facility] OR     AS HYSTEROSCOPY, SURGICAL; W/ ENDOMETRIAL ABLATION, ANY METHOD Bilateral 2017    endometrial ablation with bilater TL     SURGICAL HISTORY OF -       .  Tonsillar abscess     Family History   Problem Relation Age of Onset     Thyroid Disease Mother      Diabetes Mother      Hypertension Mother      Connective Tissue Disorder Father         Psoriasis     Diabetes Maternal Grandfather      Hypertension Maternal Grandfather      Prostate Cancer Maternal Grandfather      Diabetes Maternal Uncle      Diabetes Maternal Uncle      Hypertension Maternal Grandmother      Asthma No family hx of      Social History     Socioeconomic History     Marital status:      Spouse name: None     Number of children: None     Years of education: None     Highest education level: None   Occupational History     Occupation: Dental Assist.     Employer: UNKNOWN   Social Needs     Financial resource strain: None     Food insecurity     Worry: None     Inability: None     Transportation needs     Medical: None      Non-medical: None   Tobacco Use     Smoking status: Former Smoker     Packs/day: 1.00     Years: 5.00     Pack years: 5.00     Types: Cigarettes     Last attempt to quit: 2007     Years since quittin.8     Smokeless tobacco: Never Used   Substance and Sexual Activity     Alcohol use: No     Alcohol/week: 1.7 standard drinks     Types: 2 Standard drinks or equivalent per week     Drug use: No     Sexual activity: Yes     Partners: Male     Birth control/protection: I.U.D.     Comment: Mirena   Lifestyle     Physical activity     Days per week: None     Minutes per session: None     Stress: None   Relationships     Social connections     Talks on phone: None     Gets together: None     Attends Oriental orthodox service: None     Active member of club or organization: None     Attends meetings of clubs or organizations: None     Relationship status: None     Intimate partner violence     Fear of current or ex partner: None     Emotionally abused: None     Physically abused: None     Forced sexual activity: None   Other Topics Concern      Service Not Asked     Blood Transfusions Not Asked     Caffeine Concern Not Asked     Occupational Exposure Not Asked     Hobby Hazards Not Asked     Sleep Concern Not Asked     Stress Concern Not Asked     Weight Concern Not Asked     Special Diet Not Asked     Back Care Not Asked     Exercise No     Bike Helmet Not Asked     Seat Belt Not Asked     Self-Exams Not Asked     Parent/sibling w/ CABG, MI or angioplasty before 65F 55M? Not Asked   Social History Narrative    How much exercise per week? No    How much calcium per day? Diet      How much caffeine per day? Coffee  2    How much vitamin D per day? Diet    Do you/your family wear seatbelts?  Yes    Do you/your family use safety helmets? No    Do you/your family use sunscreen? Yes    Do you/your family keep firearms in the home? No    Do you/your family have a smoke detector(s)? Yes        Do you feel safe in your  home? Yes    Has anyone ever touched you in an unwanted manner? No     Explain          Answers for HPI/ROS submitted by the patient on 9/14/2020   General Symptoms: No  Skin Symptoms: No  HENT Symptoms: No  EYE SYMPTOMS: No  HEART SYMPTOMS: No  LUNG SYMPTOMS: No  INTESTINAL SYMPTOMS: No  URINARY SYMPTOMS: No  GYNECOLOGIC SYMPTOMS: Yes  BREAST SYMPTOMS: No  SKELETAL SYMPTOMS: Yes  BLOOD SYMPTOMS: No  NERVOUS SYSTEM SYMPTOMS: Yes  MENTAL HEALTH SYMPTOMS: Yes  Back pain: Yes  Muscle aches: Yes  Neck pain: No  Swollen joints: Yes  Joint pain: Yes  Bone pain: No  Muscle cramps: No  Muscle weakness: No  Joint stiffness: Yes  Bone fracture: No  Trouble with coordination: No  Fainting or black-out spells: No  Memory loss: No  Headache: Yes  Seizures: No  Speech problems: No  Tingling: Yes  Tremor: No  Weakness: No  Difficulty walking: No  Paralysis: No  Numbness: Yes  Bleeding or spotting between periods: No  Heavy or painful periods: No  Irregular periods: Yes  Vaginal discharge: No  Hot flashes: No  Vaginal dryness: No  Genital ulcers: No  Reduced libido: No  Painful intercourse: No  Difficulty with sexual arousal: No  Post-menopausal bleeding: No  Nervous or Anxious: Yes  Depression: No  Trouble sleeping: Yes  Trouble thinking or concentrating: No  Mood changes: Yes  Panic attacks: No    Exam:  /84 (BP Location: Left arm, Patient Position: Sitting, Cuff Size: Adult Regular)   Pulse 95   Wt 50.8 kg (112 lb)   SpO2 96%   BMI 19.84 kg/m    112 lbs 0 oz  PHYSICAL EXAMINATION:   The patient is alert, oriented with a clear sensorium.   Skin shows no lesions or rashes and good turgor.   Head is normocephalic and atraumatic.   Eyes show PERRLA.   Ears show normal TMs bilaterally.   Neck shows no nodes, thyromegaly or bruits.   Back is nontender.   Lungs are clear to percussion and auscultation.   Heart shows normal S1 and S2 without murmur or gallop.   Abdomen is soft, nontender without masses or organomegaly.    Extremities show no edema and no evidence of active synovitis.   Neurologic examination shows cranial nerves II-XII intact. Motor shows normal strength. Reflexes are full and symmetrical.     ASSESSMENT  1 diabetes mellitus needs reevaluation  2 psoriasis in remission  3 apparent psoriatic arthritis in remission    Plan  Organ to check her A1c microalbumin today update her immunizations with a flu shot and Pneumovax plan to continue on the same medications and reassess in 6 to 12 months    This note was completed using Dragon voice recognition software.      Nando Haskins MD  General Internal Medicine  [facility]  [phone]

## 2020-09-14 NOTE — NURSING NOTE
Chief Complaint   Patient presents with     Physical     pt here for physical       Melanie Bettencourt CMA, EMT at 1:31 PM on 9/14/2020.

## 2020-09-15 ENCOUNTER — MYC REFILL (OUTPATIENT)
Dept: INTERNAL MEDICINE | Facility: CLINIC | Age: 35
End: 2020-09-15

## 2020-09-15 DIAGNOSIS — L40.50 PSORIATIC ARTHRITIS (H): ICD-10-CM

## 2020-09-16 RX ORDER — TRAMADOL HYDROCHLORIDE 50 MG/1
50 TABLET ORAL EVERY 8 HOURS PRN
Qty: 60 TABLET | Refills: 0 | Status: SHIPPED | OUTPATIENT
Start: 2020-09-21 | End: 2020-10-12

## 2020-09-16 NOTE — TELEPHONE ENCOUNTER
Patient Requested  traMADol (ULTRAM) 50 MG tablet  Last Fill  08/22/2020  Last Office Visit  05/28/2020  Next Office Visit  Nothing scheduled   Checked  09/16/2020    DX: Psoriatic arthritis     Pharmacy: [id number] [place], [place] - [address] AT [facility] [place] [facility] [address] JAUN GIRALDO CMA at 1:11 PM on 9/16/2020.

## 2020-09-21 ENCOUNTER — MYC REFILL (OUTPATIENT)
Dept: INTERNAL MEDICINE | Facility: CLINIC | Age: 35
End: 2020-09-21

## 2020-09-21 DIAGNOSIS — B00.1 RECURRENT COLD SORES: ICD-10-CM

## 2020-09-21 DIAGNOSIS — L40.50 PSORIATIC ARTHRITIS (H): ICD-10-CM

## 2020-09-21 RX ORDER — OXYCODONE AND ACETAMINOPHEN 5; 325 MG/1; MG/1
1 TABLET ORAL EVERY 4 HOURS PRN
Qty: 30 TABLET | Refills: 0 | Status: CANCELLED | OUTPATIENT
Start: 2020-09-21

## 2020-09-21 NOTE — TELEPHONE ENCOUNTER
Reason For Call:   Chief Complaint   Patient presents with     Refill Request            Medication Name, Dose and Monthly Quantity:   Oxycodone with APAP (Percocet): Dose 5-325mg Schedule 1 tablet Q4H PRN Monthly Quantity 30     Diagnosis requiring opiates:   Psoriatic arthritis (H) [L40.50]       Recurrent cold sores [B00.1]           Problem List Updated:   Yes    Opioid Agreement On File - [facility] PAIN CONTRACT ID# [id number]:  No    Last Urine Drug Screen (at least once every 12 months) Date:   none  Unexpected Results:   N/A    [place]  Data Reviewed (at least once every 3 months) Date:   09/21/2020    Unexpected Results:    No.    Last Fill Date:   08/31/2020  Last Visit with PCP:   09/14/2020  Future Visits with PCP:   No.    Processing:   Larry GIRALDO CMA at 1:16 PM on 9/21/2020.

## 2020-09-22 RX ORDER — OXYCODONE AND ACETAMINOPHEN 5; 325 MG/1; MG/1
1 TABLET ORAL EVERY 4 HOURS PRN
Qty: 30 TABLET | Refills: 0 | Status: SHIPPED | OUTPATIENT
Start: 2020-09-30 | End: 2020-10-20

## 2020-10-08 ENCOUNTER — MYC MEDICAL ADVICE (OUTPATIENT)
Dept: RHEUMATOLOGY | Facility: CLINIC | Age: 35
End: 2020-10-08

## 2020-10-08 DIAGNOSIS — L40.50 PSORIATIC ARTHRITIS (H): Primary | ICD-10-CM

## 2020-10-08 NOTE — TELEPHONE ENCOUNTER
Left message for Chrissy to return my call in regards to her mychart message / swollen left knee.     Will also send her mychart message.    Alysa Juarez MSN, RN  Rheumatology RN Care Coordinator  [facility]

## 2020-10-09 RX ORDER — PREDNISONE 5 MG/1
TABLET ORAL
Qty: 45 TABLET | Refills: 1 | Status: SHIPPED | OUTPATIENT
Start: 2020-10-09 | End: 2022-12-02

## 2020-10-09 NOTE — TELEPHONE ENCOUNTER
Left message for Adriana to return my call but also sent her mychart message.     Alysa Juarez MSN, RN  Rheumatology RN Care Coordinator  [facility]

## 2020-10-12 ENCOUNTER — MYC REFILL (OUTPATIENT)
Dept: RHEUMATOLOGY | Facility: CLINIC | Age: 35
End: 2020-10-12

## 2020-10-12 ENCOUNTER — MYC REFILL (OUTPATIENT)
Dept: INTERNAL MEDICINE | Facility: CLINIC | Age: 35
End: 2020-10-12

## 2020-10-12 DIAGNOSIS — Z79.4 TYPE 2 DIABETES MELLITUS WITHOUT COMPLICATION, WITH LONG-TERM CURRENT USE OF INSULIN (H): ICD-10-CM

## 2020-10-12 DIAGNOSIS — L40.50 PSORIATIC ARTHRITIS (H): ICD-10-CM

## 2020-10-12 DIAGNOSIS — E11.9 TYPE 2 DIABETES MELLITUS WITHOUT COMPLICATION, WITH LONG-TERM CURRENT USE OF INSULIN (H): ICD-10-CM

## 2020-10-12 RX ORDER — PROCHLORPERAZINE 25 MG/1
1 SUPPOSITORY RECTAL
Qty: 12 EACH | Refills: 3 | Status: SHIPPED | OUTPATIENT
Start: 2020-10-12 | End: 2021-10-25

## 2020-10-12 RX ORDER — APREMILAST 30 MG/1
30 TABLET, FILM COATED ORAL 2 TIMES DAILY
Qty: 180 TABLET | Refills: 2 | Status: CANCELLED | OUTPATIENT
Start: 2020-10-12

## 2020-10-12 NOTE — TELEPHONE ENCOUNTER
Patient Requested  traMADol (ULTRAM) 50 MG tablet  Last Fill  09/21/2020  Last Office Visit  05/28/2020  Next Office Visit  Nothing scheduled   Checked  10/12/2020    DX: Psoriatic arthritis     Pharmacy: [facility] #[id number] [place], [place] - [address] AT [facility] [address] JAUN GIRALDO CMA at 4:30 PM on 10/12/2020.

## 2020-10-16 ENCOUNTER — TELEPHONE (OUTPATIENT)
Dept: RHEUMATOLOGY | Facility: CLINIC | Age: 35
End: 2020-10-16

## 2020-10-16 RX ORDER — TRAMADOL HYDROCHLORIDE 50 MG/1
50 TABLET ORAL EVERY 8 HOURS PRN
Qty: 60 TABLET | Refills: 0 | Status: SHIPPED | OUTPATIENT
Start: 2020-10-16 | End: 2020-11-17

## 2020-10-16 NOTE — TELEPHONE ENCOUNTER
Prior Authorization Approval    Authorization Effective Date: 10/16/2020  Authorization Expiration Date: 10/16/2021  Medication: OTEZLA - APPROVED  Approved Dose/Quantity:  60 FOR 30 DAYS   Reference #:     Insurance Company: [employer] - Phone [phone] Fax [phone]  Expected CoPay:       CoPay Card Available:      Foundation Assistance Needed:    Which [facility] filling the prescription [facility] for [facility] [address]  Pharmacy Notified: Yes  Patient Notified: Yes      * PER PHONE CALL WITH JAILYN

## 2020-10-20 ENCOUNTER — MYC REFILL (OUTPATIENT)
Dept: INTERNAL MEDICINE | Facility: CLINIC | Age: 35
End: 2020-10-20

## 2020-10-20 DIAGNOSIS — B00.1 RECURRENT COLD SORES: ICD-10-CM

## 2020-10-20 DIAGNOSIS — L40.50 PSORIATIC ARTHRITIS (H): ICD-10-CM

## 2020-10-20 RX ORDER — OXYCODONE AND ACETAMINOPHEN 5; 325 MG/1; MG/1
1 TABLET ORAL EVERY 4 HOURS PRN
Qty: 30 TABLET | Refills: 0 | Status: SHIPPED | OUTPATIENT
Start: 2020-10-30 | End: 2020-11-22

## 2020-10-27 ENCOUNTER — MYC REFILL (OUTPATIENT)
Dept: RHEUMATOLOGY | Facility: CLINIC | Age: 35
End: 2020-10-27

## 2020-10-27 DIAGNOSIS — L40.50 PSORIATIC ARTHRITIS (H): ICD-10-CM

## 2020-10-28 RX ORDER — METHYLPREDNISOLONE 4 MG/1
TABLET ORAL
Qty: 35 TABLET | Refills: 1 | Status: SHIPPED | OUTPATIENT
Start: 2020-10-28 | End: 2021-04-26

## 2020-10-28 NOTE — TELEPHONE ENCOUNTER
methylPREDNISolone (MEDROL) 4 MG tablet      Last Written Prescription Date:  3/27/20  Last Fill Quantity: 35,   # refills: 1  Last Virtual Visit : 8/25/20 recommended follow up in 4 months  Future Office visit:  None scheduled    Routing refill request to provider for review/approval because:  Drug not on the [facility], [facility] or [facility] refill protocol

## 2020-11-08 ENCOUNTER — MYC REFILL (OUTPATIENT)
Dept: INTERNAL MEDICINE | Facility: CLINIC | Age: 35
End: 2020-11-08

## 2020-11-08 DIAGNOSIS — Z79.4 TYPE 2 DIABETES MELLITUS WITHOUT COMPLICATION, WITH LONG-TERM CURRENT USE OF INSULIN (H): ICD-10-CM

## 2020-11-08 DIAGNOSIS — E11.9 TYPE 2 DIABETES MELLITUS WITHOUT COMPLICATION, WITH LONG-TERM CURRENT USE OF INSULIN (H): ICD-10-CM

## 2020-11-09 RX ORDER — PROCHLORPERAZINE 25 MG/1
1 SUPPOSITORY RECTAL CONTINUOUS
Qty: 1 EACH | Refills: 0 | Status: SHIPPED | OUTPATIENT
Start: 2020-11-09 | End: 2021-02-07

## 2020-11-17 ENCOUNTER — MYC REFILL (OUTPATIENT)
Dept: INTERNAL MEDICINE | Facility: CLINIC | Age: 35
End: 2020-11-17

## 2020-11-17 DIAGNOSIS — L40.50 PSORIATIC ARTHRITIS (H): ICD-10-CM

## 2020-11-18 RX ORDER — TRAMADOL HYDROCHLORIDE 50 MG/1
50 TABLET ORAL EVERY 8 HOURS PRN
Qty: 60 TABLET | Refills: 0 | Status: SHIPPED | OUTPATIENT
Start: 2020-11-18 | End: 2020-12-14

## 2020-11-18 NOTE — TELEPHONE ENCOUNTER
Patient Requested  traMADol (ULTRAM) 50 MG tablet  Last Fill  10/16/2020  Last Office Visit  05/28/2020  Next Office Visit  Nothing scheduled   Checked  11/18/2020    DX: Psoriatic arthritis     Pharmacy: [facility] #[id number] [place], [place] - [address] AT [facility] OF [address] at 10:38 AM on 11/18/2020.

## 2020-11-22 ENCOUNTER — MYC REFILL (OUTPATIENT)
Dept: INTERNAL MEDICINE | Facility: CLINIC | Age: 35
End: 2020-11-22

## 2020-11-22 DIAGNOSIS — L40.50 PSORIATIC ARTHRITIS (H): ICD-10-CM

## 2020-11-22 DIAGNOSIS — B00.1 RECURRENT COLD SORES: ICD-10-CM

## 2020-11-23 RX ORDER — OXYCODONE AND ACETAMINOPHEN 5; 325 MG/1; MG/1
1 TABLET ORAL EVERY 4 HOURS PRN
Qty: 30 TABLET | Refills: 0 | Status: SHIPPED | OUTPATIENT
Start: 2020-11-28 | End: 2020-12-20

## 2020-11-23 NOTE — TELEPHONE ENCOUNTER
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    Last Fill Date:   10/30/2020  Last Visit with PCP:   09/14/2020  Future Visits with PCP:   No.    Processing:   Larry GIRALDO CMA at 9:04 PM on 11/22/2020.

## 2020-12-01 DIAGNOSIS — E10.9 TYPE 1 DIABETES MELLITUS WITHOUT COMPLICATION (H): ICD-10-CM

## 2020-12-02 RX ORDER — INSULIN DETEMIR 100 [IU]/ML
INJECTION, SOLUTION SUBCUTANEOUS
Qty: 45 ML | Refills: 11 | Status: SHIPPED | OUTPATIENT
Start: 2020-12-02

## 2020-12-02 NOTE — TELEPHONE ENCOUNTER
insulin detemir (LEVEMIR FLEXTOUCH) 100 UNIT/ML pen  Last Written Prescription Date:  4/22/19  Last Fill Quantity: 60ml,   # refills: 11  Last Office Visit : 11/25/19  Future Office visit:  None    Scheduling has been notified to contact the pt for appointment.      Routing refill request to provider for review/approval because: who is filling this? Last signed by Divine but is an endo pt..if endo .endo triage to fill.

## 2020-12-14 ENCOUNTER — MYC REFILL (OUTPATIENT)
Dept: INTERNAL MEDICINE | Facility: CLINIC | Age: 35
End: 2020-12-14

## 2020-12-14 DIAGNOSIS — L40.50 PSORIATIC ARTHRITIS (H): ICD-10-CM

## 2020-12-15 RX ORDER — TRAMADOL HYDROCHLORIDE 50 MG/1
50 TABLET ORAL EVERY 8 HOURS PRN
Qty: 60 TABLET | Refills: 0 | Status: SHIPPED | OUTPATIENT
Start: 2020-12-15 | End: 2021-01-13

## 2020-12-15 NOTE — TELEPHONE ENCOUNTER
Patient Requested  traMADol (ULTRAM) 50 MG tablet  Last Fill  11/18/2020  Last Office Visit  05/28/2020  Next Office Visit  Nothing scheduled   Checked  12/15/2020    DX: Psoriatic arthritis     Pharmacy: [facility] #[id number] [place], [place] - [address] AT [facility] OF [address] at 8:22 AM on 12/15/2020.

## 2020-12-20 ENCOUNTER — MYC REFILL (OUTPATIENT)
Dept: INTERNAL MEDICINE | Facility: CLINIC | Age: 35
End: 2020-12-20

## 2020-12-20 DIAGNOSIS — L40.50 PSORIATIC ARTHRITIS (H): ICD-10-CM

## 2020-12-20 DIAGNOSIS — B00.1 RECURRENT COLD SORES: ICD-10-CM

## 2020-12-21 RX ORDER — OXYCODONE AND ACETAMINOPHEN 5; 325 MG/1; MG/1
1 TABLET ORAL EVERY 4 HOURS PRN
Qty: 30 TABLET | Refills: 0 | Status: SHIPPED | OUTPATIENT
Start: 2020-12-21 | End: 2021-01-20

## 2020-12-21 NOTE — TELEPHONE ENCOUNTER
Reason For Call:   Chief Complaint   Patient presents with     Refill Request            Medication Name, Dose and Monthly Quantity:   Oxycodone with APAP (Percocet): Dose 5-325mg Schedule 1 tablet Q4H PRN Monthly Quantity 30     Diagnosis requiring opiates:   Psoriatic arthritis (H) [L40.50]       Recurrent cold sores [B00.1]           Problem List Updated:   Yes    Opioid Agreement On File - [facility] PAIN CONTRACT ID# [id number]:  No    Last Urine Drug Screen (at least once every 12 months) Date:   none  Unexpected Results:   N/A    [place]  Data Reviewed (at least once every 3 months) Date:   12/21/2020    Unexpected Results:    No.    Last Fill Date:   11/28/2020  Last Visit with PCP:   09/14/2020  Future Visits with PCP:   No.    Processing:   Larry GIRALDO CMA at 11:35 AM on 12/21/2020.

## 2020-12-23 DIAGNOSIS — L40.50 PSORIATIC ARTHRITIS (H): ICD-10-CM

## 2020-12-28 NOTE — TELEPHONE ENCOUNTER
secukinumab (COSENTYX SENSOREADY PEN) 150 MG/ML Sensoready pen      Last Written Prescription Date:  8/25/2020  Last Fill Quantity: 2 each,   # refills: 5  Last Office Visit : 8/25/2020  Future Office visit:  12/29/2020    Routing refill request to provider for review/approval because:  Medication not on the Rheumatology refill protocol.

## 2020-12-28 NOTE — PROGRESS NOTES
[facility]  Rheumatology Clinic-telephone  Rene Granados MD  2020     Name: Chrissy Dixon  MRN: [id number]  Age: 34 year old  : 1985  Referring provider: Nando Haskins    Assessment and Plan:  # Psoriatic arthritis (polyarthritis, scalp and pustular psoriasis; failed Humira and leflunomide, dx'd post-partum in 2015):   Patient persistent improvement in knee mobility with reduced swelling and pain. Psoriasis is controlled. Video exam shows no inflammatory joint or skin changes. Laboratory evaluation in 2020 showed creatinine, transaminases, and CBC all normal with the exception of a hemoglobin of 10.9, down from hemoglobin of 11.9 noted in 2019.  TSH was 0.82.  Hemoglobin A1c was 8.3, up from 2019.     Psoriatic arthritis continues responsive to combination Otezla and cosentyx.  Disease had been unresponsive to Stelara, and unresponsive to anti-TNF ,was greatly improved in , 3 months after starting anti-IL17.     I recommend continuing Cosentyx 300 mg subcutaneous once monthly. I recommend using combined Otezla 30 mg twice daily with great caution in the time of coronavirus.  However if insurance coverage will disallow Cosentyx, I recommend substitution of guselkumab, to continue with otezla.    Plan:  1. Continue Otezla 30 mg twice daily. Recheck complete blood count, creatinine, LFTs in the next week.  2. Continue Cosentyx 300 mg monthly. If Cosentyx cannot be used, I recommend substitution of guselkumab:  100 mg by prefilled syringe at time 0, week 4, and every 8 weeks thereafter. Do not start this medication until at least 30 days have elapsed since the last dose of Cosentyx.  3. I advise coronavirus vaccine receipt as soon as possible due to increased risk of complications from viral infection related to pre-existing conditions of diabetes and rheumatic disease.    # Type 1 diabetes mellitus:   Hemoglobin A1c remained above 8 at last check in .   Patient following with Endocrinology. Return to clinic in 4 month.     # Sciatica:  Now improved after epidural injection.     Follow-up: Return in about 4 months    HPI:   Chrissy Dixon follows up for psoriatic arthritis. I last evaluated the patient on Augus 24, 2020, during a virtual visit.  Psoriatic arthritis was judged improved.  I recommended continuing Cosentyx 300 mg monthly, in combination with Otezla 30 mg twice daily.  I prescribed a Medrol taper.    Background History:  She has had psoriasis since birth (presented with diffuse rash at birth), was diagnosed with psoriasis at age 11, and developed arthritis in her 20s. She was on Enbrel at age 21 but developed infections such as pneumonia, sinusitis, and mono, and stopped it within 7 months. She was then on methotrexate from 2011 until February 2012, but did not tolerate this due to GI upset. She switched to sulfasalazine 2/2012 and tolerated it well at 1000 mg thrice a day, but had decreasing efficacy as of 11/1/13. She started leflunomide in 10-13 but stopped in 1-14 due to GI intolerance. She started Humira in 1-14, held in 7-14 during pregnancy, restarted in 7-15 for flaring disease but then discontinued due to worsened psoriasis and lack of efficacy. Leflunomide was then restarted but loose stools developed. Leflunomide was stopped and Otezla was started on 9/11/15. She started Stelara in fall 2015 but stopped due to cost and inconvenience in early 2016. She has continued with Otezla monotherapy through present. She restarted Stelara in 12-18.  Stelara was discontinued in late 2019 for lack of benefit.  She started Cosentyx in November 2019 with excellent relief of joint pain.  Combination Cosentyx and Otezla was begun in early 2020. Guselkumab substituted for cosentyx in 1-2021.    Interval history 12-:  Her health is overall good.   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 Her diabetes control has  "suffered.    There is intermittent knee swelling, R > L. No warmth or redness. Knees feel better when she moves, but she has next-day stiffness after activity. She has cut back on ibuprofen and changed to tylenol for primary relief of pain. No early morning stiffness     Psoriasis continues well-controlled; she thinks that combination otezla and cosentyx is very important for this control.  She is concerned that insurance will withdraw coverage for cosentyx in 2021.     Interval history 08-:    She has been \"pretty good\". Reports reduced flares of pain in her knees. She does note some cycling of symptoms with knee tightness/stiffness about 1 week before she is due to take cosentyx. With the combination of otezla/cosentyx she is overall much better. NO need for medrol.    Still has intermittent back pain, will start PTx in the next few weeks. She has some radiculopathic symptoms in the left foot.    No new skin symptoms. +minor waxing/waning of plaques behind L ear    Hx 11-19:  Today, she reports that her knee mobility is 80% improved. She states that she has only had 1 flare in her knee since starting secukinumab. She does note that she sometimes has swelling in her toes and fingers, but this does not limit her. She states that she has been able to walk daily now that her knee pain is much better.    She explains that the introduction of secukinumab injections seems to be the most effective of her managements as she noticed resolution of knee symptoms very shortly after her first injection. She continued Otezla, did try a lower dosage, and, after lowering the dosage, she experienced a recurrence of psoriasis plaques. Other than this Otezla dosing change, her psoriasis has been mild and scant. She has also been using triamcinolone cream.    She states that this month marks her third dose of Cosentyx. She explains that one week prior to receiving this injection, she hs increased morning stiffness in her hands " and feet. She notes that, two days after receiving this Cosentyx injection, her morning stiffness is much better.     She reports that she has not been on prednisone or other steroids since shortly after her last visit with me. She explains that her blood sugars have been much easier to control now that she is off of prednisone.     She reports that her last knee injection or aspiration took place around 3 months ago.      Review of Systems:   Pertinent items are noted in HPI or as below, remainder of complete ROS is negative.      No recent problems with hearing or vision. No swallowing problems.   No breathing difficulty, shortness of breath, coughing, or wheezing.  No chest pain or palpitations.  No heart burn, indigestion, abdominal pain, nausea, vomiting, diarrhea.  No urination problems, no bloody, cloudy urine, no dysuria.  No numbing, tingling, weakness.  No headaches or confusion.  No easy bleeding or bruising.     Active Medications:   Current Outpatient Medications:      apremilast (OTEZLA) 30 MG tablet, Take 1 tablet (30 mg) by mouth 2 times daily, Disp: 180 tablet, Rfl: 1     blood glucose monitoring (ACCU-CHEK SMARTVIEW) test strip, Use to test blood sugar 6-8 times daily or as directed., Disp: 700 each, Rfl: 1     blood glucose monitoring (NO BRAND SPECIFIED) meter device kit, Use to test blood sugar 6 to 8 times daily or as directed. Okay to dispense One Touch Verio products per patient's insurance., Disp: 1 kit, Rfl: 0     cholecalciferol (VITAMIN D3) 1000 UNIT tablet, Take 1 tablet (1,000 Units) by mouth daily, Disp: 90 tablet, Rfl: 1     clobetasol (TEMOVATE) 0.05 % external solution, Apply topically 2 times daily (Patient taking differently: Apply topically 2 times daily as needed ), Disp: 50 mL, Rfl: 6     Continuous Blood Gluc Sensor (DEXCOM G6 SENSOR) MISC, 1 Package by Device route every 10 days, Disp: 9 each, Rfl: 1     Continuous Blood Gluc Sensor (DEXCOM G6 SENSOR) MISC, 1 Device by Device  route every 10 days, Disp: 9 each, Rfl: 2     Continuous Blood Gluc Transmit (DEXCOM G6 TRANSMITTER) MISC, Inject 1 each Subcutaneous continuous, Disp: 1 each, Rfl: 1     Fluocinolone Acetonide (DERMA-SMOOTHE/FS SCALP) 0.01 % OIL, Apply to scalp at bedtime, then wear showercap, rinse off in morning. (Patient taking differently: Apply to scalp at bedtime, then wear showercap, rinse off in morning as needed.), Disp: 118 mL, Rfl: 3     fluticasone (FLONASE) 50 MCG/ACT spray, Spray 2 sprays into both nostrils daily, Disp: 48 g, Rfl: 3     ibuprofen (ADVIL/MOTRIN) 600 MG tablet, Take 1 tablet (600 mg) by mouth every 8 hours as needed for moderate pain, Disp: 270 tablet, Rfl: 0     insulin aspart (NOVOLOG FLEXPEN) 100 UNIT/ML pen, Use three times daily with meals, currently 30 units daily, Disp: 30 mL, Rfl: 5     insulin detemir (LEVEMIR FLEXTOUCH) 100 UNIT/ML pen, Inject 18 Units Subcutaneous 2 times daily, Disp: 60 mL, Rfl: 11     insulin pen needle 31G X 8 MM, Use  3-4 pen needles daily or as directed., Disp: 300 each, Rfl: 4     metFORMIN (GLUCOPHAGE) 500 MG tablet, Take 2 tablets (1,000 mg) by mouth 2 times daily (with meals) Labs due for further refills., Disp: 360 tablet, Rfl: 3     ONETOUCH LANCETS MISC, Use to test blood sugar 6 to 8 times daily or as directed. Okay to dispense One Touch Verio products per patient's insurance., Disp: 700 each, Rfl: 1     oxyCODONE-acetaminophen (PERCOCET) 5-325 MG tablet, Take 1 tablet by mouth every 4 hours as needed for severe pain (must last 30 days from dispense date), Disp: 30 tablet, Rfl: 0     secukinumab (COSENTYX SENSOREADY PEN) 150 MG/ML Sensoready pen, Inject 1 mL (150 mg) Subcutaneous every 28 days Hold for signs of infection, and seek medical attention., Disp: 1 each, Rfl: 5     secukinumab (COSENTYX) 150 MG/ML prefilled syringe, Inject 1 mL (150 mg) Subcutaneous once a week At weeks 0, 1, 2, 3, and 4 and every 4 weeks there after loading dose., Disp: 5 Syringe, Rfl:  0     traMADol (ULTRAM) 50 MG tablet, Take 1 tablet (50 mg) by mouth every 8 hours as needed for moderate pain . Must last 30 days, Disp: 60 tablet, Rfl: 0     triamcinolone (KENALOG) 0.1 % cream, Apply topically 2 times daily (Patient taking differently: Apply topically 2 times daily as needed ), Disp: 453.6 g, Rfl: 1     valACYclovir (VALTREX) 500 MG tablet, Take 1 tablet (500 mg) by mouth daily as needed, Disp: 60 tablet, Rfl: 3     blood glucose monitoring (NO BRAND SPECIFIED) test strip, Use to test blood sugars 6 to 8 times daily or as directed. Okay to dispense One Touch Verio per patient's insurance., Disp: 700 strip, Rfl: 1     ferrous gluconate (FERGON) 324 (38 FE) MG tablet, Take 1 tablet (324 mg) by mouth every other day, Disp: 100 tablet, Rfl: 11     naloxone (NARCAN) 4 MG/0.1ML nasal spray, Spray 1 spray (4 mg) into one nostril alternating nostrils once as needed for opioid reversal every 2-3 minutes until assistance arrives (Patient not taking: Reported on 11/22/2019), Disp: 0.2 mL, Rfl: 1     ondansetron (ZOFRAN-ODT) 4 MG ODT tab, Take 1-2 tablets (4-8 mg) by mouth every 8 hours as needed for nausea, Disp: 4 tablet, Rfl: 0     predniSONE (DELTASONE) 10 MG tablet, Take 1 tablet (10 mg) by mouth daily As needed for flares only, Disp: 30 tablet, Rfl: 1     ustekinumab (STELARA) 45 MG/0.5ML SOSY, Inject 0.5 mLs (45 mg) Subcutaneous every 3 months Hold for signs of infection, and seek medical attention. (Patient not taking: Reported on 11/22/2019), Disp: 1 Syringe, Rfl: 1     ustekinumab (STELARA) 45 MG/0.5ML SOSY, Inject 0.5 mLs (45 mg) Subcutaneous every 3 months, Disp: 3 Syringe, Rfl: 1  No current facility-administered medications for this visit.     Allergies:  Diagn[employer] X-[employer]   Betadine   Levofloxacin   Penicillins   Pneumococcal Vaccines   Prenatal Vit-Fe Fumarate-Fa  Tdap    Past Medical History:  Diabetes    Psoriatic arthritis   Diabetes mellitus type 2, controlled   Psoriasis  arthropathia   Malaise and fatigue  Iron deficiency anemia due to chronic blood loss  Right knee pain    Past Surgical History:  Arthroscopy knee with meniscectomy, right (11/20/2018)  AS Hysteroscopy Surgical W/Endometrial, bilateral (2017)   Tonsillar abscess    Family History:    The patient's family history includes Connective Tissue Disorder in her father; Diabetes in her maternal grandfather, maternal uncle, maternal uncle, and mother; Hypertension in her maternal grandfather, maternal grandmother, and mother; Prostate Cancer in her maternal grandfather; Thyroid Disease in her mother.    Social History:  The patient reports that she quit smoking about 12 years ago. Her smoking use included cigarettes. She has a 5.00 pack-year smoking history. She has never used smokeless tobacco. She reports that she does not drink alcohol or use drugs.   PCP: Nando Haskins  Marital Status:      Physical Exam:   There were no vitals taken for this visit.   Wt Readings from Last 4 Encounters:   09/14/20 50.8 kg (112 lb)   11/25/19 50.3 kg (110 lb 14.4 oz)   11/22/19 49.7 kg (109 lb 9.6 oz)   08/02/19 49.1 kg (108 lb 3.2 oz)     Eyes: Normal EOM, PERRLA, vision, conjunctiva, sclera.  ENT: Normal external ears, nose, hearing, lips, teeth, gums, throat. No mucous membrane lesions, normal saliva pool.  Neck: No mass or thyroid enlargement.  Resp: breathing unlaboreed  MS: The TMJ, neck, shoulder, elbow, wrist, MCP/PIP/DIP were normal.  Skin: No nail pitting, alopecia, rash, nodules, or lesions. No flaking or redness along the hairline or in the ears. No psoriasis over the back.  Neuro: Normal cranial nerves  Psych: Normal judgement, orientation, memory, affect.     Laboratory:   RHEUM RESULTS Latest Ref Rng & Units 8/2/2019 11/25/2019 7/29/2020   SED RATE 0 - 20 mm/h - - -   CRP, INFLAMMATION 0.0 - 8.0 mg/L <2.9 - -   CK TOTAL 32 - 200 U/L - - -   RHEUMATOID FACTOR 0 - 14 IU/mL - - -   CHALO SCREEN BY EIA <1.0 - - -    AST 0 - 45 U/L 9 12 9   ALT 0 - 50 U/L 19 19 16   ALBUMIN 3.4 - 5.0 g/dL - - -   WBC 4.0 - 11.0 10e9/L 7.7 6.8 5.8   RBC 3.8 - 5.2 10e12/L 4.70 4.49 4.37   HGB 11.7 - 15.7 g/dL 11.9 11.4(L) 10.9(L)   HCT 35.0 - 47.0 % 39.3 36.5 34.5(L)   MCV 78 - 100 fl 84 81 79   MCHC 31.5 - 36.5 g/dL 30.3(L) 31.2(L) 31.6   RDW 10.0 - 15.0 % 15.8(H) 15.3(H) 14.9    - 450 10e9/L 357 358 342   CREATININE 0.52 - 1.04 mg/dL - 0.60 0.43(L)   GFR ESTIMATE, IF BLACK >60 mL/min/[1.73:m2] - >90 >90   GFR ESTIMATE >60 mL/min/[1.73:m2] - >90 >90   HEPATITIS C ANTIBODY NEG - - -     Rheumatoid Factor   Date Value Ref Range Status   08/11/2011 11 0 - 14 IU/mL Final     Cyclic Cit Pept IgG/IgA   Date Value Ref Range Status   08/11/2011 <20  Interpretation:  Negative <20 UNITS Final     CHALO Screen by EIA   Date Value Ref Range Status   04/09/2014 <1.0  Interpretation:  Negative <1.0 Final     Hepatitis B Core Ana Rosa   Date Value Ref Range Status   08/02/2019 Nonreactive NR^Nonreactive Final     Antistreptolysin O   Date Value Ref Range Status   09/11/2015 109 0 - 120 IU/mL Final     Comment:     A single ASO analysis may not be meaningful due to the variability of ASO   values   within the normal population.  Both clinical and laboratory findings should   be   considered in reaching a diagnosis.  A single analysis may be less   informative   than a repeat analysis showing a change.       Quantiferon-TB Gold Plus Result   Date Value Ref Range Status   12/26/2018 Negative NEG^Negative Final     Comment:     No interferon gamma response to M.tuberculosis antigens was detected.   Infection with M.tuberculosis is unlikely, however a single negative result   does not exclude infection. In patients at high risk for infection, a second   test should be considered  in accordance with the 2017 ATS/IDSA/CDC Clinical Practice Guidelines for   Diagnosis of Tuberculosis in Adults and Children [Angela DIXON et   al.Clin.Infect.Dis. 2017 64(2):111-115].    "    TB1 Ag minus Nil Value   Date Value Ref Range Status   12/26/2018 0.00 IU/mL Final     TB2 Ag minus Nil Value   Date Value Ref Range Status   12/26/2018 0.00 IU/mL Final     Mitogen minus Nil Result   Date Value Ref Range Status   12/26/2018 >10.00 IU/mL Final     Nil Result   Date Value Ref Range Status   12/26/2018 0.02 IU/mL Final     Neutrophil Cytoplasmic IgG Antibody   Date Value Ref Range Status   05/31/2013   Final    <1:20  Reference range: <1:20  (Note)  The ANCA IFA is <1:20; therefore, no further testing will  be performed.  INTERPRETIVE INFORMATION: Anti-Neutrophil Cyto Ab, IgG    Neutrophil Cytoplasmic Antibodies (C-ANCA = granular  cytoplasmic staining, P-ANCA = perinuclear staining) are  found in the serum of over 90 percent of patients with  certain necrotizing systemic vasculitides, and usually in  less than 5 percent of patients with collagen vascular  disease or arthritis.  Performed by [facility],  [address] [place] [id number] [phone]  www.[employer], Nataly Roth MD, Lab. Director     Patient would like to discuss options for medication to replace Cosentyx - as her insurance will no longer cover it and she can not afford it. They sent her a list of alternatives to try.    She states that her pain is mainly from swollen knees and L hand.    Chrissy Dixon is a 35 year old female who is being evaluated via a billable video visit.      The patient has been notified of following:     \"This video visit will be conducted via a call between you and your physician/provider. We have found that certain health care needs can be provided without the need for an in-person physical exam.  This service lets us provide the care you need with a video conversation.  If a prescription is necessary we can send it directly to your pharmacy.  If lab work is needed we can place an order for that and you can then stop by our lab to have the test done at a later time.    Video visits are " "billed at different rates depending on your insurance coverage.  Please reach out to your insurance provider with any questions.    If during the course of the call the physician/provider feels a video visit is not appropriate, you will not be charged for this service.\"    Patient has given verbal consent for Video visit? Yes  How would you like to obtain your AVS? MyChart    Will anyone else be joining your video visit? No        Video-Visit Details    Type of service:  Video Visit    Video Start Time: 10:11 AM  Video End Time: 10:47 AM    Originating Location (pt. Location): Home    Distant Location (provider location):  [facility] [place]     Platform used for Video Visit: Burak Granados MD        "

## 2020-12-29 ENCOUNTER — VIRTUAL VISIT (OUTPATIENT)
Dept: RHEUMATOLOGY | Facility: CLINIC | Age: 35
End: 2020-12-29
Attending: INTERNAL MEDICINE
Payer: COMMERCIAL

## 2020-12-29 DIAGNOSIS — L40.50 PSORIATIC ARTHRITIS (H): Primary | ICD-10-CM

## 2020-12-29 PROCEDURE — 99214 OFFICE O/P EST MOD 30 MIN: CPT | Mod: 95 | Performed by: INTERNAL MEDICINE

## 2020-12-29 RX ORDER — SECUKINUMAB 150 MG/ML
300 INJECTION SUBCUTANEOUS
Qty: 2 ML | Refills: 5 | Status: SHIPPED | OUTPATIENT
Start: 2020-12-29 | End: 2020-12-29 | Stop reason: ALTCHOICE

## 2020-12-29 NOTE — PATIENT INSTRUCTIONS
Diagnosis:  1. Psoriatic arthritis: Superior overall control of joint and skin disease has been achieved with use of combination Cosentyx and Otezla in 2020. Exam shows no psoriasis, and no inflammatory joint disease today. I recommend continuing combination Cosentyx and Otezla. However if insurance coverage will disallow Cosentyx, I recommend substitution of guselkumab, to continue with otezla.    Plan:  1. Continue Otezla 30 mg twice daily. Recheck complete blood count, creatinine, LFTs in the next week.  2. Continue Cosentyx 300 mg monthly. If Cosentyx cannot be used, I recommend substitution of guselkumab:  100 mg by prefilled syringe at time 0, week 4, and every 8 weeks thereafter. Do not start this medication until at least 30 days have elapsed since the last dose of Cosentyx.  3. I advise coronavirus vaccine receipt as soon as possible due to increased risk of complications from viral infection related to pre-existing conditions of diabetes and rheumatic disease.

## 2020-12-29 NOTE — LETTER
2020       RE: Chrissy Dixon  [address] [facility] [address]     Dear Colleague,    Thank you for referring your patient, Chrissy Dixon, to the [facility] RHEUMATOLOGY [facility] [place] [facility]. Please see a copy of my visit note below.    [facility]  Rheumatology Clinic-telephone  Rene Granados MD  2020     Name: Chrissy Dixon  MRN: [id number]  Age: 34 year old  : 1985  Referring provider: Nando Haskins    Assessment and Plan:  # Psoriatic arthritis (polyarthritis, scalp and pustular psoriasis; failed Humira and leflunomide, dx'd post-partum in ):   Patient persistent improvement in knee mobility with reduced swelling and pain. Psoriasis is controlled. Video exam shows no inflammatory joint or skin changes. Laboratory evaluation in 2020 showed creatinine, transaminases, and CBC all normal with the exception of a hemoglobin of 10.9, down from hemoglobin of 11.9 noted in 2019.  TSH was 0.82.  Hemoglobin A1c was 8.3, up from 2019.     Psoriatic arthritis continues responsive to combination Otezla and cosentyx.  Disease had been unresponsive to Stelara, and unresponsive to anti-TNF ,was greatly improved in -, 3 months after starting anti-IL17.     I recommend continuing Cosentyx 300 mg subcutaneous once monthly. I recommend using combined Otezla 30 mg twice daily with great caution in the time of coronavirus.  However if insurance coverage will disallow Cosentyx, I recommend substitution of guselkumab, to continue with otezla.    Plan:  1. Continue Otezla 30 mg twice daily. Recheck complete blood count, creatinine, LFTs in the next week.  2. Continue Cosentyx 300 mg monthly. If Cosentyx cannot be used, I recommend substitution of guselkumab:  100 mg by prefilled syringe at time 0, week 4, and every 8 weeks thereafter. Do not start this medication until at least 30 days have elapsed since  the last dose of Cosentyx.  3. I advise coronavirus vaccine receipt as soon as possible due to increased risk of complications from viral infection related to pre-existing conditions of diabetes and rheumatic disease.    # Type 1 diabetes mellitus:   Hemoglobin A1c remained above 8 at last check in 9-2020.  Patient following with Endocrinology. Return to clinic in 4 month.     # Sciatica:  Now improved after epidural injection.     Follow-up: Return in about 4 months    HPI:   Chrissy Dixon follows up for psoriatic arthritis. I last evaluated the patient on Augus 24, 2020, during a virtual visit.  Psoriatic arthritis was judged improved.  I recommended continuing Cosentyx 300 mg monthly, in combination with Otezla 30 mg twice daily.  I prescribed a Medrol taper.    Background History:  She has had psoriasis since birth (presented with diffuse rash at birth), was diagnosed with psoriasis at age 11, and developed arthritis in her 20s. She was on Enbrel at age 21 but developed infections such as pneumonia, sinusitis, and mono, and stopped it within 7 months. She was then on methotrexate from 2011 until February 2012, but did not tolerate this due to GI upset. She switched to sulfasalazine 2/2012 and tolerated it well at 1000 mg thrice a day, but had decreasing efficacy as of 11/1/13. She started leflunomide in 10-13 but stopped in 1-14 due to GI intolerance. She started Humira in 1-14, held in 7-14 during pregnancy, restarted in 7-15 for flaring disease but then discontinued due to worsened psoriasis and lack of efficacy. Leflunomide was then restarted but loose stools developed. Leflunomide was stopped and Otezla was started on 9/11/15. She started Stelara in fall 2015 but stopped due to cost and inconvenience in early 2016. She has continued with Otezla monotherapy through present. She restarted Stelara in 12-18.  Stelara was discontinued in late 2019 for lack of benefit.  She started Cosentyx in November 2019  "with excellent relief of joint pain.  Combination Cosentyx and Otezla was begun in early 2020. Guselkumab substituted for cosentyx in 1-2021.    Interval history 12-:  Her health is overall good.   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 Her diabetes control has suffered.    There is intermittent knee swelling, R > L. No warmth or redness. Knees feel better when she moves, but she has next-day stiffness after activity. She has cut back on ibuprofen and changed to tylenol for primary relief of pain. No early morning stiffness     Psoriasis continues well-controlled; she thinks that combination otezla and cosentyx is very important for this control.  She is concerned that insurance will withdraw coverage for cosentyx in 2021.     Interval history 08-:    She has been \"pretty good\". Reports reduced flares of pain in her knees. She does note some cycling of symptoms with knee tightness/stiffness about 1 week before she is due to take cosentyx. With the combination of otezla/cosentyx she is overall much better. NO need for medrol.    Still has intermittent back pain, will start PTx in the next few weeks. She has some radiculopathic symptoms in the left foot.    No new skin symptoms. +minor waxing/waning of plaques behind L ear    Hx 11-19:  Today, she reports that her knee mobility is 80% improved. She states that she has only had 1 flare in her knee since starting secukinumab. She does note that she sometimes has swelling in her toes and fingers, but this does not limit her. She states that she has been able to walk daily now that her knee pain is much better.    She explains that the introduction of secukinumab injections seems to be the most effective of her managements as she noticed resolution of knee symptoms very shortly after her first injection. She continued Otezla, did try a lower dosage, and, after lowering the dosage, she experienced a recurrence of psoriasis " plaques. Other than this Otezla dosing change, her psoriasis has been mild and scant. She has also been using triamcinolone cream.    She states that this month marks her third dose of Cosentyx. She explains that one week prior to receiving this injection, she hs increased morning stiffness in her hands and feet. She notes that, two days after receiving this Cosentyx injection, her morning stiffness is much better.     She reports that she has not been on prednisone or other steroids since shortly after her last visit with me. She explains that her blood sugars have been much easier to control now that she is off of prednisone.     She reports that her last knee injection or aspiration took place around 3 months ago.      Review of Systems:   Pertinent items are noted in HPI or as below, remainder of complete ROS is negative.      No recent problems with hearing or vision. No swallowing problems.   No breathing difficulty, shortness of breath, coughing, or wheezing.  No chest pain or palpitations.  No heart burn, indigestion, abdominal pain, nausea, vomiting, diarrhea.  No urination problems, no bloody, cloudy urine, no dysuria.  No numbing, tingling, weakness.  No headaches or confusion.  No easy bleeding or bruising.     Active Medications:   Current Outpatient Medications:      apremilast (OTEZLA) 30 MG tablet, Take 1 tablet (30 mg) by mouth 2 times daily, Disp: 180 tablet, Rfl: 1     blood glucose monitoring (ACCU-CHEK SMARTVIEW) test strip, Use to test blood sugar 6-8 times daily or as directed., Disp: 700 each, Rfl: 1     blood glucose monitoring (NO BRAND SPECIFIED) meter device kit, Use to test blood sugar 6 to 8 times daily or as directed. Okay to dispense One Touch Verio products per patient's insurance., Disp: 1 kit, Rfl: 0     cholecalciferol (VITAMIN D3) 1000 UNIT tablet, Take 1 tablet (1,000 Units) by mouth daily, Disp: 90 tablet, Rfl: 1     clobetasol (TEMOVATE) 0.05 % external solution, Apply  topically 2 times daily (Patient taking differently: Apply topically 2 times daily as needed ), Disp: 50 mL, Rfl: 6     Continuous Blood Gluc Sensor (DEXCOM G6 SENSOR) MISC, 1 Package by Device route every 10 days, Disp: 9 each, Rfl: 1     Continuous Blood Gluc Sensor (DEXCOM G6 SENSOR) MISC, 1 Device by Device route every 10 days, Disp: 9 each, Rfl: 2     Continuous Blood Gluc Transmit (DEXCOM G6 TRANSMITTER) MISC, Inject 1 each Subcutaneous continuous, Disp: 1 each, Rfl: 1     Fluocinolone Acetonide (DERMA-SMOOTHE/FS SCALP) 0.01 % OIL, Apply to scalp at bedtime, then wear showercap, rinse off in morning. (Patient taking differently: Apply to scalp at bedtime, then wear showercap, rinse off in morning as needed.), Disp: 118 mL, Rfl: 3     fluticasone (FLONASE) 50 MCG/ACT spray, Spray 2 sprays into both nostrils daily, Disp: 48 g, Rfl: 3     ibuprofen (ADVIL/MOTRIN) 600 MG tablet, Take 1 tablet (600 mg) by mouth every 8 hours as needed for moderate pain, Disp: 270 tablet, Rfl: 0     insulin aspart (NOVOLOG FLEXPEN) 100 UNIT/ML pen, Use three times daily with meals, currently 30 units daily, Disp: 30 mL, Rfl: 5     insulin detemir (LEVEMIR FLEXTOUCH) 100 UNIT/ML pen, Inject 18 Units Subcutaneous 2 times daily, Disp: 60 mL, Rfl: 11     insulin pen needle 31G X 8 MM, Use  3-4 pen needles daily or as directed., Disp: 300 each, Rfl: 4     metFORMIN (GLUCOPHAGE) 500 MG tablet, Take 2 tablets (1,000 mg) by mouth 2 times daily (with meals) Labs due for further refills., Disp: 360 tablet, Rfl: 3     ONETOUCH LANCETS MISC, Use to test blood sugar 6 to 8 times daily or as directed. Okay to dispense One Touch Verio products per patient's insurance., Disp: 700 each, Rfl: 1     oxyCODONE-acetaminophen (PERCOCET) 5-325 MG tablet, Take 1 tablet by mouth every 4 hours as needed for severe pain (must last 30 days from dispense date), Disp: 30 tablet, Rfl: 0     secukinumab (COSENTYX SENSOREADY PEN) 150 MG/ML Sensoready pen, Inject 1  mL (150 mg) Subcutaneous every 28 days Hold for signs of infection, and seek medical attention., Disp: 1 each, Rfl: 5     secukinumab (COSENTYX) 150 MG/ML prefilled syringe, Inject 1 mL (150 mg) Subcutaneous once a week At weeks 0, 1, 2, 3, and 4 and every 4 weeks there after loading dose., Disp: 5 Syringe, Rfl: 0     traMADol (ULTRAM) 50 MG tablet, Take 1 tablet (50 mg) by mouth every 8 hours as needed for moderate pain . Must last 30 days, Disp: 60 tablet, Rfl: 0     triamcinolone (KENALOG) 0.1 % cream, Apply topically 2 times daily (Patient taking differently: Apply topically 2 times daily as needed ), Disp: 453.6 g, Rfl: 1     valACYclovir (VALTREX) 500 MG tablet, Take 1 tablet (500 mg) by mouth daily as needed, Disp: 60 tablet, Rfl: 3     blood glucose monitoring (NO BRAND SPECIFIED) test strip, Use to test blood sugars 6 to 8 times daily or as directed. Okay to dispense One Touch Verio per patient's insurance., Disp: 700 strip, Rfl: 1     ferrous gluconate (FERGON) 324 (38 FE) MG tablet, Take 1 tablet (324 mg) by mouth every other day, Disp: 100 tablet, Rfl: 11     naloxone (NARCAN) 4 MG/0.1ML nasal spray, Spray 1 spray (4 mg) into one nostril alternating nostrils once as needed for opioid reversal every 2-3 minutes until assistance arrives (Patient not taking: Reported on 11/22/2019), Disp: 0.2 mL, Rfl: 1     ondansetron (ZOFRAN-ODT) 4 MG ODT tab, Take 1-2 tablets (4-8 mg) by mouth every 8 hours as needed for nausea, Disp: 4 tablet, Rfl: 0     predniSONE (DELTASONE) 10 MG tablet, Take 1 tablet (10 mg) by mouth daily As needed for flares only, Disp: 30 tablet, Rfl: 1     ustekinumab (STELARA) 45 MG/0.5ML SOSY, Inject 0.5 mLs (45 mg) Subcutaneous every 3 months Hold for signs of infection, and seek medical attention. (Patient not taking: Reported on 11/22/2019), Disp: 1 Syringe, Rfl: 1     ustekinumab (STELARA) 45 MG/0.5ML SOSY, Inject 0.5 mLs (45 mg) Subcutaneous every 3 months, Disp: 3 Syringe, Rfl: 1  No  current facility-administered medications for this visit.     Allergies:  Diagn[employer]   Betadine   Levofloxacin   Penicillins   Pneumococcal Vaccines   Prenatal Vit-Fe Fumarate-Fa  Tdap    Past Medical History:  Diabetes    Psoriatic arthritis   Diabetes mellitus type 2, controlled   Psoriasis arthropathia   Malaise and fatigue  Iron deficiency anemia due to chronic blood loss  Right knee pain    Past Surgical History:  Arthroscopy knee with meniscectomy, right (11/20/2018)  AS Hysteroscopy Surgical W/Endometrial, bilateral (2017)   Tonsillar abscess    Family History:    The patient's family history includes Connective Tissue Disorder in her father; Diabetes in her maternal grandfather, maternal uncle, maternal uncle, and mother; Hypertension in her maternal grandfather, maternal grandmother, and mother; Prostate Cancer in her maternal grandfather; Thyroid Disease in her mother.    Social History:  The patient reports that she quit smoking about 12 years ago. Her smoking use included cigarettes. She has a 5.00 pack-year smoking history. She has never used smokeless tobacco. She reports that she does not drink alcohol or use drugs.   PCP: Nando Haskins  Marital Status:      Physical Exam:   There were no vitals taken for this visit.   Wt Readings from Last 4 Encounters:   09/14/20 50.8 kg (112 lb)   11/25/19 50.3 kg (110 lb 14.4 oz)   11/22/19 49.7 kg (109 lb 9.6 oz)   08/02/19 49.1 kg (108 lb 3.2 oz)     Eyes: Normal EOM, PERRLA, vision, conjunctiva, sclera.  ENT: Normal external ears, nose, hearing, lips, teeth, gums, throat. No mucous membrane lesions, normal saliva pool.  Neck: No mass or thyroid enlargement.  Resp: breathing unlaboreed  MS: The TMJ, neck, shoulder, elbow, wrist, MCP/PIP/DIP were normal.  Skin: No nail pitting, alopecia, rash, nodules, or lesions. No flaking or redness along the hairline or in the ears. No psoriasis over the back.  Neuro: Normal cranial  nerves  Psych: Normal judgement, orientation, memory, affect.     Laboratory:   RHEUM RESULTS Latest Ref Rng & Units 8/2/2019 11/25/2019 7/29/2020   SED RATE 0 - 20 mm/h - - -   CRP, INFLAMMATION 0.0 - 8.0 mg/L <2.9 - -   CK TOTAL 32 - 200 U/L - - -   RHEUMATOID FACTOR 0 - 14 IU/mL - - -   CHALO SCREEN BY EIA <1.0 - - -   AST 0 - 45 U/L 9 12 9   ALT 0 - 50 U/L 19 19 16   ALBUMIN 3.4 - 5.0 g/dL - - -   WBC 4.0 - 11.0 10e9/L 7.7 6.8 5.8   RBC 3.8 - 5.2 10e12/L 4.70 4.49 4.37   HGB 11.7 - 15.7 g/dL 11.9 11.4(L) 10.9(L)   HCT 35.0 - 47.0 % 39.3 36.5 34.5(L)   MCV 78 - 100 fl 84 81 79   MCHC 31.5 - 36.5 g/dL 30.3(L) 31.2(L) 31.6   RDW 10.0 - 15.0 % 15.8(H) 15.3(H) 14.9    - 450 10e9/L 357 358 342   CREATININE 0.52 - 1.04 mg/dL - 0.60 0.43(L)   GFR ESTIMATE, IF BLACK >60 mL/min/[1.73:m2] - >90 >90   GFR ESTIMATE >60 mL/min/[1.73:m2] - >90 >90   HEPATITIS C ANTIBODY NEG - - -     Rheumatoid Factor   Date Value Ref Range Status   08/11/2011 11 0 - 14 IU/mL Final     Cyclic Cit Pept IgG/IgA   Date Value Ref Range Status   08/11/2011 <20  Interpretation:  Negative <20 UNITS Final     CHALO Screen by EIA   Date Value Ref Range Status   04/09/2014 <1.0  Interpretation:  Negative <1.0 Final     Hepatitis B Core Ana Rosa   Date Value Ref Range Status   08/02/2019 Nonreactive NR^Nonreactive Final     Antistreptolysin O   Date Value Ref Range Status   09/11/2015 109 0 - 120 IU/mL Final     Comment:     A single ASO analysis may not be meaningful due to the variability of ASO   values   within the normal population.  Both clinical and laboratory findings should   be   considered in reaching a diagnosis.  A single analysis may be less   informative   than a repeat analysis showing a change.       Quantiferon-TB Gold Plus Result   Date Value Ref Range Status   12/26/2018 Negative NEG^Negative Final     Comment:     No interferon gamma response to M.tuberculosis antigens was detected.   Infection with M.tuberculosis is unlikely, however  "a single negative result   does not exclude infection. In patients at high risk for infection, a second   test should be considered  in accordance with the 2017 ATS/IDSA/CDC Clinical Practice Guidelines for   Diagnosis of Tuberculosis in Adults and Children [Angela DIXON et   al.Clin.Infect.Dis. 2017 64(2):111-115].       TB1 Ag minus Nil Value   Date Value Ref Range Status   12/26/2018 0.00 IU/mL Final     TB2 Ag minus Nil Value   Date Value Ref Range Status   12/26/2018 0.00 IU/mL Final     Mitogen minus Nil Result   Date Value Ref Range Status   12/26/2018 >10.00 IU/mL Final     Nil Result   Date Value Ref Range Status   12/26/2018 0.02 IU/mL Final     Neutrophil Cytoplasmic IgG Antibody   Date Value Ref Range Status   05/31/2013   Final    <1:20  Reference range: <1:20  (Note)  The ANCA IFA is <1:20; therefore, no further testing will  be performed.  INTERPRETIVE INFORMATION: Anti-Neutrophil Cyto Ab, IgG    Neutrophil Cytoplasmic Antibodies (C-ANCA = granular  cytoplasmic staining, P-ANCA = perinuclear staining) are  found in the serum of over 90 percent of patients with  certain necrotizing systemic vasculitides, and usually in  less than 5 percent of patients with collagen vascular  disease or arthritis.  Performed by [facility],  [address] [place] [id number] [phone]  [email or web address], Nataly Roth MD, Lab. Director     Patient would like to discuss options for medication to replace Cosentyx - as her insurance will no longer cover it and she can not afford it. They sent her a list of alternatives to try.    She states that her pain is mainly from swollen knees and L hand.    Chrissy Dixon is a 35 year old female who is being evaluated via a billable video visit.      The patient has been notified of following:     \"This video visit will be conducted via a call between you and your physician/provider. We have found that certain health care needs can be provided without the need for an " "in-person physical exam.  This service lets us provide the care you need with a video conversation.  If a prescription is necessary we can send it directly to your pharmacy.  If lab work is needed we can place an order for that and you can then stop by our lab to have the test done at a later time.    Video visits are billed at different rates depending on your insurance coverage.  Please reach out to your insurance provider with any questions.    If during the course of the call the physician/provider feels a video visit is not appropriate, you will not be charged for this service.\"    Patient has given verbal consent for Video visit? Yes  How would you like to obtain your AVS? MyChart    Will anyone else be joining your video visit? No        Video-Visit Details    Type of service:  Video Visit    Video Start Time: 10:11 AM  Video End Time: 10:47 AM    Originating Location (pt. Location): Home    Distant Location (provider location):  [facility] RHEUMATOLOGY [facility]IC [place]     Platform used for Video Visit: Burak Granados MD          "

## 2020-12-29 NOTE — TELEPHONE ENCOUNTER
Guselkumab 100mg/prefilled syringes have been placed and will route to Dr. Granados to sign.    Alysa Juarez MSN, RN  Rheumatology RN Care Coordinator  [facility]        ----- Message from Rene Granados MD sent at 12/29/2020 11:21 AM CST -----  Regarding: RE: switch Rx  Yes!  ----- Message -----  From: Jason Brannon  Sent: 12/29/2020  11:01 AM CST  To: Rene Granados MD, Mana Valenzuela, #  Subject: RE: switch Rx                                    Hey guys,   It is possible we can do a PA on the Cosentyx after 1/1/21.  Would it be preferred to to keep her on Cosentyx?     [facility] Pharmacy Liaison  P:[phone]  ----- Message -----  From: Rene Granados MD  Sent: 12/29/2020  10:40 AM CST  To: Mana Valenzuela, Alysa Juarez, RN  Subject: switch Rx                                        Patient has excellent control of psoriatic arthritis with combination Cosentyx and Otezla. Patient reports that her insurance will no longer cover Cosentyx after January 1, 2021. Accordingly, I recommend substitution of guselkumab for Cosentyx. Cosentyx may be discontinued. Guselkumab orders need to be placed please: 100 mg prefilled syringe subcutaneously at time 0, then again at 4 weeks, and then every 8 weeks thereafter with 3 refills.    Please contact patient to discuss patient assistance program for assistance with co-pay. Thank you!

## 2021-01-04 ENCOUNTER — TELEPHONE (OUTPATIENT)
Dept: RHEUMATOLOGY | Facility: CLINIC | Age: 36
End: 2021-01-04

## 2021-01-04 NOTE — TELEPHONE ENCOUNTER
PA Initiation    Medication: COSENTYX   Insurance Company: [employer] - Phone [phone] Fax [phone]  Pharmacy Filling the Rx: [address]  Filling Pharmacy Phone:    Filling Pharmacy Fax:    Start Date: 1/4/2021    Chrissy Dixon (García: [id number])

## 2021-01-05 NOTE — TELEPHONE ENCOUNTER
Prior Authorization Approval    Authorization Effective Date: 1/4/2021  Authorization Expiration Date: 1/4/2022  Medication: COSENTYX - Approved   Approved Dose/Quantity:  2ml every 28 days   Reference #:     Insurance Company: "[employer] - Phone [phone] Fax [phone]  Expected CoPay:       CoPay Card Available:      Foundation Assistance Needed:    Which Pharmacy is filling the prescription (Not needed for infusion/clinic administered[facility] [place] [facility] [address] [place] [address]  Pharmacy Notified: No  Patient Notified: No

## 2021-01-07 ENCOUNTER — TELEPHONE (OUTPATIENT)
Dept: ENDOCRINOLOGY | Facility: CLINIC | Age: 36
End: 2021-01-07

## 2021-01-07 NOTE — TELEPHONE ENCOUNTER
[facility] schedule follow up appt, LVM to schedule follow up appt with Dr. Pérez for next available

## 2021-01-11 ENCOUNTER — TELEPHONE (OUTPATIENT)
Dept: RHEUMATOLOGY | Facility: CLINIC | Age: 36
End: 2021-01-11

## 2021-01-11 ENCOUNTER — MYC MEDICAL ADVICE (OUTPATIENT)
Dept: RHEUMATOLOGY | Facility: CLINIC | Age: 36
End: 2021-01-11

## 2021-01-11 DIAGNOSIS — L40.50 PSORIATIC ARTHRITIS (H): ICD-10-CM

## 2021-01-11 RX ORDER — SECUKINUMAB 150 MG/ML
300 INJECTION SUBCUTANEOUS
Qty: 2 EACH | Refills: 5 | Status: SHIPPED | OUTPATIENT
Start: 2021-01-11 | End: 2021-04-20

## 2021-01-11 NOTE — TELEPHONE ENCOUNTER
Left message for Adriana to return my call in regards to the below message but will also send her mychart as well.             I recommend that the patient stay on Cosentyx; she has done very well on it, and I see no reason to risk losing control of the inflammatory arthritis now that the Cosentyx has been approved.  Please contact the patient to make sure that she is okay with this plan, Alysa.  Cosentyx should be continued at current dose once a month.         I recommend continuing Cosentyx 300 mg subcutaneous once monthly. I recommend using combined Otezla 30 mg twice daily with great caution in the time of coronavirus.  However if insurance coverage will disallow Cosentyx, I recommend substitution of guselkumab, to continue with otezla.     Plan:  1. Continue Otezla 30 mg twice daily. Recheck complete blood count, creatinine, LFTs in the next week.  2. Continue Cosentyx 300 mg monthly. If Cosentyx cannot be used, I recommend substitution of guselkumab:  100 mg by prefilled syringe at time 0, week 4, and every 8 weeks thereafter. Do not start this medication until at least 30 days have elapsed since the last dose of Cosentyx.  3. I advise coronavirus vaccine receipt as soon as possible due to increased risk of complications from viral infection related to pre-existing conditions of diabetes and rheumatic disease.             Alysa Juarez MSN, RN  Rheumatology RN Care Coordinator  [facility]

## 2021-01-12 ENCOUNTER — TELEPHONE (OUTPATIENT)
Dept: ENDOCRINOLOGY | Facility: CLINIC | Age: 36
End: 2021-01-12

## 2021-01-12 NOTE — TELEPHONE ENCOUNTER
Spoke with patient, she is currently getting her prescription filled and having her diabetes controlled by her primary physician. Patient will call back to schedule an appt with our providers and diabetic educators later on once she's closer to meeting her deductible.

## 2021-01-13 ENCOUNTER — MYC REFILL (OUTPATIENT)
Dept: INTERNAL MEDICINE | Facility: CLINIC | Age: 36
End: 2021-01-13

## 2021-01-13 DIAGNOSIS — L40.50 PSORIATIC ARTHRITIS (H): ICD-10-CM

## 2021-01-13 RX ORDER — TRAMADOL HYDROCHLORIDE 50 MG/1
50 TABLET ORAL EVERY 8 HOURS PRN
Qty: 60 TABLET | Refills: 0 | Status: SHIPPED | OUTPATIENT
Start: 2021-01-13 | End: 2021-02-09

## 2021-01-13 NOTE — TELEPHONE ENCOUNTER
Patient Requested  traMADol (ULTRAM) 50 MG tablet  Last Fill  12/15/2020  Last Office Visit  05/28/2020  Next Office Visit  Nothing scheduled   Checked  01/13/2021    DX: Psoriatic arthritis     Pharmacy: [facility] #[id number], [place] - [address] AT [facility] [address] GIRALDO CMA at 7:28 AM on 1/13/2021.

## 2021-01-20 ENCOUNTER — MYC REFILL (OUTPATIENT)
Dept: INTERNAL MEDICINE | Facility: CLINIC | Age: 36
End: 2021-01-20

## 2021-01-20 DIAGNOSIS — L40.50 PSORIATIC ARTHRITIS (H): ICD-10-CM

## 2021-01-20 DIAGNOSIS — B00.1 RECURRENT COLD SORES: ICD-10-CM

## 2021-01-20 NOTE — TELEPHONE ENCOUNTER
Reason For Call:   Chief Complaint   Patient presents with     Refill Request            Medication Name, Dose and Monthly Quantity:   Oxycodone with APAP (Percocet): Dose 5-325mg Schedule 1 tablet Q4H PRN Monthly Quantity 30     Diagnosis requiring opiates:   Psoriatic arthritis (H) [L40.50]       Recurrent cold sores [B00.1]           Problem List Updated:   Yes    Opioid Agreement On File - [facility] PAIN CONTRACT ID# [id number]:  No    Last Urine Drug Screen (at least once every 12 months) Date:   none  Unexpected Results:   N/A    [place]  Data Reviewed (at least once every 3 months) Date:   01/20/2021    Unexpected Results:    No.    Last Fill Date:   12/21/2020  Last Visit with PCP:   09/14/2020  Future Visits with PCP:   No.    Processing:   Larry GIRALDO CMA at 12:52 PM on 1/20/2021.

## 2021-01-21 RX ORDER — OXYCODONE AND ACETAMINOPHEN 5; 325 MG/1; MG/1
1 TABLET ORAL EVERY 4 HOURS PRN
Qty: 30 TABLET | Refills: 0 | Status: SHIPPED | OUTPATIENT
Start: 2021-01-21 | End: 2021-02-22

## 2021-02-07 ENCOUNTER — MYC REFILL (OUTPATIENT)
Dept: INTERNAL MEDICINE | Facility: CLINIC | Age: 36
End: 2021-02-07

## 2021-02-07 DIAGNOSIS — Z79.4 TYPE 2 DIABETES MELLITUS WITHOUT COMPLICATION, WITH LONG-TERM CURRENT USE OF INSULIN (H): ICD-10-CM

## 2021-02-07 DIAGNOSIS — E11.9 TYPE 2 DIABETES MELLITUS WITHOUT COMPLICATION, WITH LONG-TERM CURRENT USE OF INSULIN (H): ICD-10-CM

## 2021-02-08 RX ORDER — PROCHLORPERAZINE 25 MG/1
1 SUPPOSITORY RECTAL CONTINUOUS
Qty: 1 EACH | Refills: 3 | Status: SHIPPED | OUTPATIENT
Start: 2021-02-08 | End: 2021-12-28

## 2021-02-09 ENCOUNTER — MYC REFILL (OUTPATIENT)
Dept: INTERNAL MEDICINE | Facility: CLINIC | Age: 36
End: 2021-02-09

## 2021-02-09 DIAGNOSIS — L40.50 PSORIATIC ARTHRITIS (H): ICD-10-CM

## 2021-02-10 NOTE — TELEPHONE ENCOUNTER
Patient Requested  traMADol (ULTRAM) 50 MG tablet  Last Fill  01/13/2021  Last Office Visit  05/28/2020  Next Office Visit  Nothing scheduled   Checked  02/10/2021    DX: Psoriatic arthritis     Pharmacy: [facility] #[id number] - [place], [place] - [address] AT [facility] OF [address] GIRAL[address] CMA at 1:10 PM on 2/10/2021.

## 2021-02-11 RX ORDER — TRAMADOL HYDROCHLORIDE 50 MG/1
50 TABLET ORAL EVERY 8 HOURS PRN
Qty: 60 TABLET | Refills: 0 | Status: SHIPPED | OUTPATIENT
Start: 2021-02-11 | End: 2021-03-11

## 2021-03-11 ENCOUNTER — MYC REFILL (OUTPATIENT)
Dept: INTERNAL MEDICINE | Facility: CLINIC | Age: 36
End: 2021-03-11

## 2021-03-11 DIAGNOSIS — L40.50 PSORIATIC ARTHRITIS (H): ICD-10-CM

## 2021-03-11 NOTE — TELEPHONE ENCOUNTER
Reason For Call:   Chief Complaint   Patient presents with     Refill Request            Medication Name, Dose and Monthly Quantity:   Oxycodone with APAP (Percocet): Dose 5-325mg Schedule 1 tablet Q4H PRN Monthly Quantity 30     Diagnosis requiring opiates:   Psoriatic arthritis (H) [L40.50]       Recurrent cold sores [B00.1]           Problem List Updated:   Yes    Opioid Agreement On File - [facility] PAIN CONTRACT ID# [id number]:  No    Last Urine Drug Screen (at least once every 12 months) Date:   none  Unexpected Results:   N/A    [place]  Data Reviewed (at least once every 3 months) Date:   03/11/2021    Unexpected Results:    No.    Last Fill Date:   02/11/2021  Last Visit with PCP:   09/14/2020  Future Visits with PCP:   No.    Processing:   Larry GIRALDO CMA at 8:11 AM on 3/11/2021.

## 2021-03-12 RX ORDER — TRAMADOL HYDROCHLORIDE 50 MG/1
50 TABLET ORAL EVERY 8 HOURS PRN
Qty: 60 TABLET | Refills: 0 | Status: SHIPPED | OUTPATIENT
Start: 2021-03-12 | End: 2021-04-12

## 2021-03-22 ENCOUNTER — MYC REFILL (OUTPATIENT)
Dept: INTERNAL MEDICINE | Facility: CLINIC | Age: 36
End: 2021-03-22

## 2021-03-22 DIAGNOSIS — L40.50 PSORIATIC ARTHRITIS (H): ICD-10-CM

## 2021-03-22 DIAGNOSIS — B00.1 RECURRENT COLD SORES: ICD-10-CM

## 2021-03-23 RX ORDER — OXYCODONE AND ACETAMINOPHEN 5; 325 MG/1; MG/1
1 TABLET ORAL EVERY 4 HOURS PRN
Qty: 30 TABLET | Refills: 0 | Status: SHIPPED | OUTPATIENT
Start: 2021-03-23 | End: 2021-04-21

## 2021-03-23 NOTE — TELEPHONE ENCOUNTER
Reason For Call:   Chief Complaint   Patient presents with     Refill Request            Medication Name, Dose and Monthly Quantity:   Oxycodone with APAP (Percocet): Dose 5-325mg Schedule 1 tablet Q4H PRN Monthly Quantity 30     Diagnosis requiring opiates:   Psoriatic arthritis (H) [L40.50]       Recurrent cold sores [B00.1]           Problem List Updated:   Yes    Opioid Agreement On File - [facility] PAIN CONTRACT ID# [id number]:  No    Last Urine Drug Screen (at least once every 12 months) Date:   none  Unexpected Results:   N/A    [place]  Data Reviewed (at least once every 3 months) Date:   03/23/2021    Unexpected Results:    No.    Last Fill Date:   02/23/2021  Last Visit with PCP:   09/14/2020  Future Visits with PCP:   No.    Processing:   Larry GIRALDO CMA at 9:17 AM on 3/23/2021.

## 2021-04-11 ENCOUNTER — HEALTH MAINTENANCE LETTER (OUTPATIENT)
Age: 36
End: 2021-04-11

## 2021-04-12 ENCOUNTER — MYC REFILL (OUTPATIENT)
Dept: INTERNAL MEDICINE | Facility: CLINIC | Age: 36
End: 2021-04-12

## 2021-04-12 DIAGNOSIS — L40.50 PSORIATIC ARTHRITIS (H): ICD-10-CM

## 2021-04-12 RX ORDER — TRAMADOL HYDROCHLORIDE 50 MG/1
50 TABLET ORAL EVERY 8 HOURS PRN
Qty: 60 TABLET | Refills: 0 | Status: SHIPPED | OUTPATIENT
Start: 2021-04-12 | End: 2021-05-07

## 2021-04-12 NOTE — TELEPHONE ENCOUNTER
Patient Requested  traMADol (ULTRAM) 50 MG tablet  Last Fill  03/12/2021  Last Office Visit  05/28/2020  Next Office Visit  Nothing scheduled   Checked  04/12/2021    DX: Psoriatic arthritis     Pharmacy: [facility] #[id number] - [place], [place] - [address] AT [facility] OF [address] GIRALDO CMA at 7:40 AM on 4/12/2021.

## 2021-04-20 ENCOUNTER — VIRTUAL VISIT (OUTPATIENT)
Dept: RHEUMATOLOGY | Facility: CLINIC | Age: 36
End: 2021-04-20
Attending: INTERNAL MEDICINE
Payer: COMMERCIAL

## 2021-04-20 DIAGNOSIS — L40.50 PSORIATIC ARTHRITIS (H): ICD-10-CM

## 2021-04-20 DIAGNOSIS — E10.9 TYPE 1 DIABETES MELLITUS WITHOUT COMPLICATION (H): Primary | ICD-10-CM

## 2021-04-20 PROCEDURE — 99214 OFFICE O/P EST MOD 30 MIN: CPT | Mod: 95 | Performed by: INTERNAL MEDICINE

## 2021-04-20 RX ORDER — SECUKINUMAB 150 MG/ML
300 INJECTION SUBCUTANEOUS
Qty: 2 ML | Refills: 5 | Status: SHIPPED | OUTPATIENT
Start: 2021-04-20 | End: 2021-10-11

## 2021-04-20 RX ORDER — APREMILAST 30 MG/1
30 TABLET, FILM COATED ORAL 2 TIMES DAILY
Qty: 180 TABLET | Refills: 5 | Status: SHIPPED | OUTPATIENT
Start: 2021-04-20 | End: 2021-12-09

## 2021-04-20 NOTE — LETTER
2021       RE: Chrissy Dixon  [address] [facility] [address]     Dear Colleague,    Thank you for referring your patient, Chrissy Dixon, to the [facility] [place] at [facility]. Please see a copy of my visit note below.    [facility]  Rheumatology Clinic-remote  Rene Granados MD  2021     Name: Chrissy Dixon  MRN: [id number]  Age: 34 year old  : 1985  Referring provider: Nando Haskins    Assessment and Plan:  # Psoriatic arthritis (polyarthritis, scalp and pustular psoriasis; failed Humira and leflunomide, dx'd post-partum in ):   Patient persistent improvement in knee mobility with reduced swelling and pain. Psoriasis is controlled. Video exam shows no inflammatory joint or skin changes. Laboratory evaluation in 2020 showed creatinine, transaminases, and CBC all normal with the exception of a hemoglobin of 10.9, down from hemoglobin of 11.9 noted in 2019.  TSH was 0.82.  Hemoglobin A1c was 8.3, up from 2019.     Psoriatic arthritis continues responsive to combination Otezla and cosentyx.  Disease had been unresponsive to Stelara, and unresponsive to anti-TNF ,was greatly improved in , 3 months after starting anti-IL17.     I recommend continuing  combination therapy due to superior coverage that not only suppresses her symptoms dramatically, but allows her to function well enough to perform full-time work and maintain parenting responsibilities and function.    Plan:  1. Continue Otezla 30 mg twice daily. Recheck complete blood count, creatinine, LFTs in the next week.  2. Continue Cosentyx 300 mg monthly.    ### If Cosentyx cannot be used due to coverage rules, I recommend substitution of guselkumab:  100 mg by prefilled syringe at time 0, week 4, and every 8 weeks thereafter. Do not start  until at least 30 days have elapsed since the last dose of  Cosentyx.    # Type 1 diabetes mellitus:   Hemoglobin A1c remained above 8 at last check in 9-2020.  Patient following with Endocrinology.     # Sciatica:  improved after epidural injection.     Follow-up: Return in about 6 months    HPI:   Chrissy Dixon follows up for psoriatic arthritis. I last evaluated the patient on   Dec, 2020, during a virtual visit.  Psoriatic arthritis was judged improved.  I recommended continuing Cosentyx 300 mg monthly, in combination with Otezla 30 mg twice daily.    Background History:  She has had psoriasis since birth (presented with diffuse rash at birth), was diagnosed with psoriasis at age 11, and developed arthritis in her 20s. She was on Enbrel at age 21 but developed infections such as pneumonia, sinusitis, and mono, and stopped it within 7 months. She was then on methotrexate from 2011 until February 2012, but did not tolerate this due to GI upset. She switched to sulfasalazine 2/2012 and tolerated it well at 1000 mg thrice a day, but had decreasing efficacy as of 11/1/13. She started leflunomide in 10-13 but stopped in 1-14 due to GI intolerance. She started Humira in 1-14, held in 7-14 during pregnancy, restarted in 7-15 for flaring disease but then discontinued due to worsened psoriasis and lack of efficacy. Leflunomide was then restarted but loose stools developed. Leflunomide was stopped and Otezla was started on 9/11/15. She started Stelara in fall 2015 but stopped due to cost and inconvenience in early 2016. She has continued with Otezla monotherapy through present. She restarted Stelara in 12-18.  Stelara was discontinued in late 2019 for lack of benefit.  She started Cosentyx in November 2019 with excellent relief of joint pain.  Combination Cosentyx and Otezla was begun in early 2020. Guselkumab substituted for cosentyx in 1-2021.    Interval history 04-:    She has encountered insurance resistance to timely cosentyx shipping. After > 6 weeks hiatus, she  "developed a flare of joint pain, focussed in the knees. Short courses of prednisone have kept knee joint swelling at bay. KNee pain is \"achy\" heaviness, worse with bending. She has continued otezla throughout.     There is 10 days of increased knee symptoms prior to cosentyx. Symptoms improve after 1-2 days following the shot    She has not had psoriasis at all.    She had both doses of Covid vaccine in 4-2021.    Interval history 12-:  Her health is overall good.   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 Her diabetes control has suffered.    There is intermittent knee swelling, R > L. No warmth or redness. Knees feel better when she moves, but she has next-day stiffness after activity. She has cut back on ibuprofen and changed to tylenol for primary relief of pain. No early morning stiffness     Psoriasis continues well-controlled; she thinks that combination otezla and cosentyx is very important for this control.  She is concerned that insurance will withdraw coverage for cosentyx in 2021.     Interval history 08-:    She has been \"pretty good\". Reports reduced flares of pain in her knees. She does note some cycling of symptoms with knee tightness/stiffness about 1 week before she is due to take cosentyx. With the combination of otezla/cosentyx she is overall much better. NO need for medrol.    Still has intermittent back pain, will start PTx in the next few weeks. She has some radiculopathic symptoms in the left foot.    No new skin symptoms. +minor waxing/waning of plaques behind L ear    Hx 11-19:  Today, she reports that her knee mobility is 80% improved. She states that she has only had 1 flare in her knee since starting secukinumab. She does note that she sometimes has swelling in her toes and fingers, but this does not limit her. She states that she has been able to walk daily now that her knee pain is much better.    She explains that the introduction of " secukinumab injections seems to be the most effective of her managements as she noticed resolution of knee symptoms very shortly after her first injection. She continued Otezla, did try a lower dosage, and, after lowering the dosage, she experienced a recurrence of psoriasis plaques. Other than this Otezla dosing change, her psoriasis has been mild and scant. She has also been using triamcinolone cream.    She states that this month marks her third dose of Cosentyx. She explains that one week prior to receiving this injection, she hs increased morning stiffness in her hands and feet. She notes that, two days after receiving this Cosentyx injection, her morning stiffness is much better.     She reports that she has not been on prednisone or other steroids since shortly after her last visit with me. She explains that her blood sugars have been much easier to control now that she is off of prednisone.     She reports that her last knee injection or aspiration took place around 3 months ago.      Review of Systems:   Pertinent items are noted in HPI or as below, remainder of complete ROS is negative.      No recent problems with hearing or vision. No swallowing problems.   No breathing difficulty, shortness of breath, coughing, or wheezing.  No chest pain or palpitations.  No heart burn, indigestion, abdominal pain, nausea, vomiting, diarrhea.  No urination problems, no bloody, cloudy urine, no dysuria.  No numbing, tingling, weakness.  No headaches or confusion.  No easy bleeding or bruising.     Active Medications:   Current Outpatient Medications:      apremilast (OTEZLA) 30 MG tablet, Take 1 tablet (30 mg) by mouth 2 times daily, Disp: 180 tablet, Rfl: 1     blood glucose monitoring (ACCU-CHEK SMARTVIEW) test strip, Use to test blood sugar 6-8 times daily or as directed., Disp: 700 each, Rfl: 1     blood glucose monitoring (NO BRAND SPECIFIED) meter device kit, Use to test blood sugar 6 to 8 times daily or as  directed. Okay to dispense One Touch Verio products per patient's insurance., Disp: 1 kit, Rfl: 0     cholecalciferol (VITAMIN D3) 1000 UNIT tablet, Take 1 tablet (1,000 Units) by mouth daily, Disp: 90 tablet, Rfl: 1     clobetasol (TEMOVATE) 0.05 % external solution, Apply topically 2 times daily (Patient taking differently: Apply topically 2 times daily as needed ), Disp: 50 mL, Rfl: 6     Continuous Blood Gluc Sensor (DEXCOM G6 SENSOR) MISC, 1 Package by Device route every 10 days, Disp: 9 each, Rfl: 1     Continuous Blood Gluc Sensor (DEXCOM G6 SENSOR) MISC, 1 Device by Device route every 10 days, Disp: 9 each, Rfl: 2     Continuous Blood Gluc Transmit (DEXCOM G6 TRANSMITTER) MISC, Inject 1 each Subcutaneous continuous, Disp: 1 each, Rfl: 1     Fluocinolone Acetonide (DERMA-SMOOTHE/FS SCALP) 0.01 % OIL, Apply to scalp at bedtime, then wear showercap, rinse off in morning. (Patient taking differently: Apply to scalp at bedtime, then wear showercap, rinse off in morning as needed.), Disp: 118 mL, Rfl: 3     fluticasone (FLONASE) 50 MCG/ACT spray, Spray 2 sprays into both nostrils daily, Disp: 48 g, Rfl: 3     ibuprofen (ADVIL/MOTRIN) 600 MG tablet, Take 1 tablet (600 mg) by mouth every 8 hours as needed for moderate pain, Disp: 270 tablet, Rfl: 0     insulin aspart (NOVOLOG FLEXPEN) 100 UNIT/ML pen, Use three times daily with meals, currently 30 units daily, Disp: 30 mL, Rfl: 5     insulin detemir (LEVEMIR FLEXTOUCH) 100 UNIT/ML pen, Inject 18 Units Subcutaneous 2 times daily, Disp: 60 mL, Rfl: 11     insulin pen needle 31G X 8 MM, Use  3-4 pen needles daily or as directed., Disp: 300 each, Rfl: 4     metFORMIN (GLUCOPHAGE) 500 MG tablet, Take 2 tablets (1,000 mg) by mouth 2 times daily (with meals) Labs due for further refills., Disp: 360 tablet, Rfl: 3     ONETOUCH LANCETS MISC, Use to test blood sugar 6 to 8 times daily or as directed. Okay to dispense One Touch Verio products per patient's insurance., Disp:  700 each, Rfl: 1     oxyCODONE-acetaminophen (PERCOCET) 5-325 MG tablet, Take 1 tablet by mouth every 4 hours as needed for severe pain (must last 30 days from dispense date), Disp: 30 tablet, Rfl: 0     secukinumab (COSENTYX SENSOREADY PEN) 150 MG/ML Sensoready pen, Inject 1 mL (150 mg) Subcutaneous every 28 days Hold for signs of infection, and seek medical attention., Disp: 1 each, Rfl: 5     secukinumab (COSENTYX) 150 MG/ML prefilled syringe, Inject 1 mL (150 mg) Subcutaneous once a week At weeks 0, 1, 2, 3, and 4 and every 4 weeks there after loading dose., Disp: 5 Syringe, Rfl: 0     traMADol (ULTRAM) 50 MG tablet, Take 1 tablet (50 mg) by mouth every 8 hours as needed for moderate pain . Must last 30 days, Disp: 60 tablet, Rfl: 0     triamcinolone (KENALOG) 0.1 % cream, Apply topically 2 times daily (Patient taking differently: Apply topically 2 times daily as needed ), Disp: 453.6 g, Rfl: 1     valACYclovir (VALTREX) 500 MG tablet, Take 1 tablet (500 mg) by mouth daily as needed, Disp: 60 tablet, Rfl: 3     blood glucose monitoring (NO BRAND SPECIFIED) test strip, Use to test blood sugars 6 to 8 times daily or as directed. Okay to dispense One Touch Verio per patient's insurance., Disp: 700 strip, Rfl: 1     ferrous gluconate (FERGON) 324 (38 FE) MG tablet, Take 1 tablet (324 mg) by mouth every other day, Disp: 100 tablet, Rfl: 11     naloxone (NARCAN) 4 MG/0.1ML nasal spray, Spray 1 spray (4 mg) into one nostril alternating nostrils once as needed for opioid reversal every 2-3 minutes until assistance arrives (Patient not taking: Reported on 11/22/2019), Disp: 0.2 mL, Rfl: 1     ondansetron (ZOFRAN-ODT) 4 MG ODT tab, Take 1-2 tablets (4-8 mg) by mouth every 8 hours as needed for nausea, Disp: 4 tablet, Rfl: 0     predniSONE (DELTASONE) 10 MG tablet, Take 1 tablet (10 mg) by mouth daily As needed for flares only, Disp: 30 tablet, Rfl: 1     ustekinumab (STELARA) 45 MG/0.5ML SOSY, Inject 0.5 mLs (45 mg)  Subcutaneous every 3 months Hold for signs of infection, and seek medical attention. (Patient not taking: Reported on 11/22/2019), Disp: 1 Syringe, Rfl: 1     ustekinumab (STELARA) 45 MG/0.5ML SOSY, Inject 0.5 mLs (45 mg) Subcutaneous every 3 months, Disp: 3 Syringe, Rfl: 1  No current facility-administered medications for this visit.     Allergies:  Diagn[employer]   Betadine   Levofloxacin   Penicillins   Pneumococcal Vaccines   Prenatal Vit-Fe Fumarate-Fa  Tdap    Past Medical History:  Diabetes    Psoriatic arthritis   Diabetes mellitus type 2, controlled   Psoriasis arthropathia   Malaise and fatigue  Iron deficiency anemia due to chronic blood loss  Right knee pain    Past Surgical History:  Arthroscopy knee with meniscectomy, right (11/20/2018)  AS Hysteroscopy Surgical W/Endometrial, bilateral (2017)   Tonsillar abscess    Family History:    The patient's family history includes Connective Tissue Disorder in her father; Diabetes in her maternal grandfather, maternal uncle, maternal uncle, and mother; Hypertension in her maternal grandfather, maternal grandmother, and mother; Prostate Cancer in her maternal grandfather; Thyroid Disease in her mother.    Social History:  The patient reports that she quit smoking about 12 years ago. Her smoking use included cigarettes. She has a 5.00 pack-year smoking history. She has never used smokeless tobacco. She reports that she does not drink alcohol or use drugs.   PCP: Nando Haskins  Marital Status:      Physical Exam:   There were no vitals taken for this visit.   Wt Readings from Last 4 Encounters:   09/14/20 50.8 kg (112 lb)   11/25/19 50.3 kg (110 lb 14.4 oz)   11/22/19 49.7 kg (109 lb 9.6 oz)   08/02/19 49.1 kg (108 lb 3.2 oz)     Eyes: Normal EOM, PERRLA, vision, conjunctiva, sclera.  ENT: Normal external ears, nose, hearing, lips, teeth, gums, throat. No mucous membrane lesions, normal saliva pool.  Neck: No mass or thyroid  enlargement.  Resp: breathing unlaboreed  MS: The TMJ, neck, shoulder, elbow, wrist, MCP/PIP/DIP were normal. Knees with normal range of motion.  Skin: No nail pitting, alopecia, rash, nodules, or lesions. No flaking or redness along the hairline or in the ears. No psoriasis over the back.  Neuro: Normal cranial nerves  Psych: Normal judgement, orientation, memory, affect.     Laboratory:   RHEUM RESULTS Latest Ref Rng & Units 8/2/2019 11/25/2019 7/29/2020   SED RATE 0 - 20 mm/h - - -   CRP, INFLAMMATION 0.0 - 8.0 mg/L <2.9 - -   CK TOTAL 32 - 200 U/L - - -   RHEUMATOID FACTOR 0 - 14 IU/mL - - -   CHALO SCREEN BY EIA <1.0 - - -   AST 0 - 45 U/L 9 12 9   ALT 0 - 50 U/L 19 19 16   ALBUMIN 3.4 - 5.0 g/dL - - -   WBC 4.0 - 11.0 10e9/L 7.7 6.8 5.8   RBC 3.8 - 5.2 10e12/L 4.70 4.49 4.37   HGB 11.7 - 15.7 g/dL 11.9 11.4(L) 10.9(L)   HCT 35.0 - 47.0 % 39.3 36.5 34.5(L)   MCV 78 - 100 fl 84 81 79   MCHC 31.5 - 36.5 g/dL 30.3(L) 31.2(L) 31.6   RDW 10.0 - 15.0 % 15.8(H) 15.3(H) 14.9    - 450 10e9/L 357 358 342   CREATININE 0.52 - 1.04 mg/dL - 0.60 0.43(L)   GFR ESTIMATE, IF BLACK >60 mL/min/[1.73:m2] - >90 >90   GFR ESTIMATE >60 mL/min/[1.73:m2] - >90 >90   HEPATITIS C ANTIBODY NEG - - -     Rheumatoid Factor   Date Value Ref Range Status   08/11/2011 11 0 - 14 IU/mL Final     Cyclic Cit Pept IgG/IgA   Date Value Ref Range Status   08/11/2011 <20  Interpretation:  Negative <20 UNITS Final     CHALO Screen by EIA   Date Value Ref Range Status   04/09/2014 <1.0  Interpretation:  Negative <1.0 Final     Hepatitis B Core Ana Rosa   Date Value Ref Range Status   08/02/2019 Nonreactive NR^Nonreactive Final     Antistreptolysin O   Date Value Ref Range Status   09/11/2015 109 0 - 120 IU/mL Final     Comment:     A single ASO analysis may not be meaningful due to the variability of ASO   values   within the normal population.  Both clinical and laboratory findings should   be   considered in reaching a diagnosis.  A single analysis may  be less   informative   than a repeat analysis showing a change.       Quantiferon-TB Gold Plus Result   Date Value Ref Range Status   12/26/2018 Negative NEG^Negative Final     Comment:     No interferon gamma response to M.tuberculosis antigens was detected.   Infection with M.tuberculosis is unlikely, however a single negative result   does not exclude infection. In patients at high risk for infection, a second   test should be considered  in accordance with the 2017 ATS/IDSA/CDC Clinical Practice Guidelines for   Diagnosis of Tuberculosis in Adults and Children [Stefann DESTINY et   al.Clin.Infect.Dis. 2017 64(2):111-115].       TB1 Ag minus Nil Value   Date Value Ref Range Status   12/26/2018 0.00 IU/mL Final     TB2 Ag minus Nil Value   Date Value Ref Range Status   12/26/2018 0.00 IU/mL Final     Mitogen minus Nil Result   Date Value Ref Range Status   12/26/2018 >10.00 IU/mL Final     Nil Result   Date Value Ref Range Status   12/26/2018 0.02 IU/mL Final     Neutrophil Cytoplasmic IgG Antibody   Date Value Ref Range Status   05/31/2013   Final    <1:20  Reference range: <1:20  (Note)  The ANCA IFA is <1:20; therefore, no further testing will  be performed.  INTERPRETIVE INFORMATION: Anti-Neutrophil Cyto Ab, IgG    Neutrophil Cytoplasmic Antibodies (C-ANCA = granular  cytoplasmic staining, P-ANCA = perinuclear staining) are  found in the serum of over 90 percent of patients with  certain necrotizing systemic vasculitides, and usually in  less than 5 percent of patients with collagen vascular  disease or arthritis.  Performed by [facility],  [address] [place] [id number] [phone]  [email or web address], Nataly Roth MD, Lab. Director     Adriana is a 35 year old who is being evaluated via a billable video visit.      How would you like to obtain your AVS? MyChart    Will anyone else be joining your video visit? No      Video Start Time: 3:00 PM  Video-Visit Details    Type of service:  Video  Visit    Video End Time:3:30 PM    Originating Location (pt. Location): Home    Distant Location (provider location):  [facility] [place]     Platform used for Video Visit: Burak        Again, thank you for allowing me to participate in the care of your patient.      Sincerely,    Rene Granados MD

## 2021-04-20 NOTE — PATIENT INSTRUCTIONS
Diagnosis:  1. Psoriatic arthritis: Superior overall control of joint and skin disease has been achieved with use of combination Cosentyx and Otezla in 2020. Exam shows no psoriasis.  I recommend continuing combination Cosentyx and Otezla.  2.  Diabetes mellitus: Recheck hemoglobin A1c, creatinine, and urine protein.  Follow with endocrinology.    Plan:  1. Continue Otezla 30 mg twice daily. Recheck complete blood count, creatinine, LFTs in the next week.  2. Continue Cosentyx 300 mg monthly. If Cosentyx cannot be used due to coverage rules, I recommend substitution of guselkumab:  100 mg by prefilled syringe at time 0, week 4, and every 8 weeks thereafter. Do not start this medication until at least 30 days have elapsed since the last dose of Cosentyx.

## 2021-04-20 NOTE — PROGRESS NOTES
[facility]  [facility]  Rene Granados MD  2021     Name: Chrissy Dixon  MRN: [id number]  Age: 34 year old  : 1985  Referring provider: Nando Haskins    Assessment and Plan:  # Psoriatic arthritis (polyarthritis, scalp and pustular psoriasis; failed Humira and leflunomide, dx'd post-partum in ):   Patient persistent improvement in knee mobility with reduced swelling and pain. Psoriasis is controlled. Video exam shows no inflammatory joint or skin changes. Laboratory evaluation in 2020 showed creatinine, transaminases, and CBC all normal with the exception of a hemoglobin of 10.9, down from hemoglobin of 11.9 noted in 2019.  TSH was 0.82.  Hemoglobin A1c was 8.3, up from 2019.     Psoriatic arthritis continues responsive to combination Otezla and cosentyx.  Disease had been unresponsive to Stelara, and unresponsive to anti-TNF ,was greatly improved in , 3 months after starting anti-IL17.     I recommend continuing  combination therapy due to superior coverage that not only suppresses her symptoms dramatically, but allows her to function well enough to perform full-time work and maintain parenting responsibilities and function.    Plan:  1. Continue Otezla 30 mg twice daily. Recheck complete blood count, creatinine, LFTs in the next week.  2. Continue Cosentyx 300 mg monthly.    ### If Cosentyx cannot be used due to coverage rules, I recommend substitution of guselkumab:  100 mg by prefilled syringe at time 0, week 4, and every 8 weeks thereafter. Do not start  until at least 30 days have elapsed since the last dose of Cosentyx.    # Type 1 diabetes mellitus:   Hemoglobin A1c remained above 8 at last check in .  Patient following with Endocrinology.     # Sciatica:  improved after epidural injection.     Follow-up: Return in about 6 months    HPI:   Chrissy Dixon follows up for psoriatic arthritis. I last evaluated the patient on  "  Dec, 2020, during a virtual visit.  Psoriatic arthritis was judged improved.  I recommended continuing Cosentyx 300 mg monthly, in combination with Otezla 30 mg twice daily.    Background History:  She has had psoriasis since birth (presented with diffuse rash at birth), was diagnosed with psoriasis at age 11, and developed arthritis in her 20s. She was on Enbrel at age 21 but developed infections such as pneumonia, sinusitis, and mono, and stopped it within 7 months. She was then on methotrexate from 2011 until February 2012, but did not tolerate this due to GI upset. She switched to sulfasalazine 2/2012 and tolerated it well at 1000 mg thrice a day, but had decreasing efficacy as of 11/1/13. She started leflunomide in 10-13 but stopped in 1-14 due to GI intolerance. She started Humira in 1-14, held in 7-14 during pregnancy, restarted in 7-15 for flaring disease but then discontinued due to worsened psoriasis and lack of efficacy. Leflunomide was then restarted but loose stools developed. Leflunomide was stopped and Otezla was started on 9/11/15. She started Stelara in fall 2015 but stopped due to cost and inconvenience in early 2016. She has continued with Otezla monotherapy through present. She restarted Stelara in 12-18.  Stelara was discontinued in late 2019 for lack of benefit.  She started Cosentyx in November 2019 with excellent relief of joint pain.  Combination Cosentyx and Otezla was begun in early 2020. Guselkumab substituted for cosentyx in 1-2021.    Interval history 04-:    She has encountered insurance resistance to timely cosentyx shipping. After > 6 weeks hiatus, she developed a flare of joint pain, focussed in the knees. Short courses of prednisone have kept knee joint swelling at bay. KNee pain is \"achy\" heaviness, worse with bending. She has continued otezla throughout.     There is 10 days of increased knee symptoms prior to cosentyx. Symptoms improve after 1-2 days following the " "shot    She has not had psoriasis at all.    She had both doses of Covid vaccine in 4-2021.    Interval history 12-:  Her health is overall good.   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 Her diabetes control has suffered.    There is intermittent knee swelling, R > L. No warmth or redness. Knees feel better when she moves, but she has next-day stiffness after activity. She has cut back on ibuprofen and changed to tylenol for primary relief of pain. No early morning stiffness     Psoriasis continues well-controlled; she thinks that combination otezla and cosentyx is very important for this control.  She is concerned that insurance will withdraw coverage for cosentyx in 2021.     Interval history 08-:    She has been \"pretty good\". Reports reduced flares of pain in her knees. She does note some cycling of symptoms with knee tightness/stiffness about 1 week before she is due to take cosentyx. With the combination of otezla/cosentyx she is overall much better. NO need for medrol.    Still has intermittent back pain, will start PTx in the next few weeks. She has some radiculopathic symptoms in the left foot.    No new skin symptoms. +minor waxing/waning of plaques behind L ear    Hx 11-19:  Today, she reports that her knee mobility is 80% improved. She states that she has only had 1 flare in her knee since starting secukinumab. She does note that she sometimes has swelling in her toes and fingers, but this does not limit her. She states that she has been able to walk daily now that her knee pain is much better.    She explains that the introduction of secukinumab injections seems to be the most effective of her managements as she noticed resolution of knee symptoms very shortly after her first injection. She continued Otezla, did try a lower dosage, and, after lowering the dosage, she experienced a recurrence of psoriasis plaques. Other than this Otezla dosing change, " her psoriasis has been mild and scant. She has also been using triamcinolone cream.    She states that this month marks her third dose of Cosentyx. She explains that one week prior to receiving this injection, she hs increased morning stiffness in her hands and feet. She notes that, two days after receiving this Cosentyx injection, her morning stiffness is much better.     She reports that she has not been on prednisone or other steroids since shortly after her last visit with me. She explains that her blood sugars have been much easier to control now that she is off of prednisone.     She reports that her last knee injection or aspiration took place around 3 months ago.      Review of Systems:   Pertinent items are noted in HPI or as below, remainder of complete ROS is negative.      No recent problems with hearing or vision. No swallowing problems.   No breathing difficulty, shortness of breath, coughing, or wheezing.  No chest pain or palpitations.  No heart burn, indigestion, abdominal pain, nausea, vomiting, diarrhea.  No urination problems, no bloody, cloudy urine, no dysuria.  No numbing, tingling, weakness.  No headaches or confusion.  No easy bleeding or bruising.     Active Medications:   Current Outpatient Medications:      apremilast (OTEZLA) 30 MG tablet, Take 1 tablet (30 mg) by mouth 2 times daily, Disp: 180 tablet, Rfl: 1     blood glucose monitoring (ACCU-CHEK SMARTVIEW) test strip, Use to test blood sugar 6-8 times daily or as directed., Disp: 700 each, Rfl: 1     blood glucose monitoring (NO BRAND SPECIFIED) meter device kit, Use to test blood sugar 6 to 8 times daily or as directed. Okay to dispense One Touch Verio products per patient's insurance., Disp: 1 kit, Rfl: 0     cholecalciferol (VITAMIN D3) 1000 UNIT tablet, Take 1 tablet (1,000 Units) by mouth daily, Disp: 90 tablet, Rfl: 1     clobetasol (TEMOVATE) 0.05 % external solution, Apply topically 2 times daily (Patient taking differently:  Apply topically 2 times daily as needed ), Disp: 50 mL, Rfl: 6     Continuous Blood Gluc Sensor (DEXCOM G6 SENSOR) MISC, 1 Package by Device route every 10 days, Disp: 9 each, Rfl: 1     Continuous Blood Gluc Sensor (DEXCOM G6 SENSOR) MISC, 1 Device by Device route every 10 days, Disp: 9 each, Rfl: 2     Continuous Blood Gluc Transmit (DEXCOM G6 TRANSMITTER) MISC, Inject 1 each Subcutaneous continuous, Disp: 1 each, Rfl: 1     Fluocinolone Acetonide (DERMA-SMOOTHE/FS SCALP) 0.01 % OIL, Apply to scalp at bedtime, then wear showercap, rinse off in morning. (Patient taking differently: Apply to scalp at bedtime, then wear showercap, rinse off in morning as needed.), Disp: 118 mL, Rfl: 3     fluticasone (FLONASE) 50 MCG/ACT spray, Spray 2 sprays into both nostrils daily, Disp: 48 g, Rfl: 3     ibuprofen (ADVIL/MOTRIN) 600 MG tablet, Take 1 tablet (600 mg) by mouth every 8 hours as needed for moderate pain, Disp: 270 tablet, Rfl: 0     insulin aspart (NOVOLOG FLEXPEN) 100 UNIT/ML pen, Use three times daily with meals, currently 30 units daily, Disp: 30 mL, Rfl: 5     insulin detemir (LEVEMIR FLEXTOUCH) 100 UNIT/ML pen, Inject 18 Units Subcutaneous 2 times daily, Disp: 60 mL, Rfl: 11     insulin pen needle 31G X 8 MM, Use  3-4 pen needles daily or as directed., Disp: 300 each, Rfl: 4     metFORMIN (GLUCOPHAGE) 500 MG tablet, Take 2 tablets (1,000 mg) by mouth 2 times daily (with meals) Labs due for further refills., Disp: 360 tablet, Rfl: 3     ONETOUCH LANCETS MISC, Use to test blood sugar 6 to 8 times daily or as directed. Okay to dispense One Touch Verio products per patient's insurance., Disp: 700 each, Rfl: 1     oxyCODONE-acetaminophen (PERCOCET) 5-325 MG tablet, Take 1 tablet by mouth every 4 hours as needed for severe pain (must last 30 days from dispense date), Disp: 30 tablet, Rfl: 0     secukinumab (COSENTYX SENSOREADY PEN) 150 MG/ML Sensoready pen, Inject 1 mL (150 mg) Subcutaneous every 28 days Hold for signs  of infection, and seek medical attention., Disp: 1 each, Rfl: 5     secukinumab (COSENTYX) 150 MG/ML prefilled syringe, Inject 1 mL (150 mg) Subcutaneous once a week At weeks 0, 1, 2, 3, and 4 and every 4 weeks there after loading dose., Disp: 5 Syringe, Rfl: 0     traMADol (ULTRAM) 50 MG tablet, Take 1 tablet (50 mg) by mouth every 8 hours as needed for moderate pain . Must last 30 days, Disp: 60 tablet, Rfl: 0     triamcinolone (KENALOG) 0.1 % cream, Apply topically 2 times daily (Patient taking differently: Apply topically 2 times daily as needed ), Disp: 453.6 g, Rfl: 1     valACYclovir (VALTREX) 500 MG tablet, Take 1 tablet (500 mg) by mouth daily as needed, Disp: 60 tablet, Rfl: 3     blood glucose monitoring (NO BRAND SPECIFIED) test strip, Use to test blood sugars 6 to 8 times daily or as directed. Okay to dispense One Touch Verio per patient's insurance., Disp: 700 strip, Rfl: 1     ferrous gluconate (FERGON) 324 (38 FE) MG tablet, Take 1 tablet (324 mg) by mouth every other day, Disp: 100 tablet, Rfl: 11     naloxone (NARCAN) 4 MG/0.1ML nasal spray, Spray 1 spray (4 mg) into one nostril alternating nostrils once as needed for opioid reversal every 2-3 minutes until assistance arrives (Patient not taking: Reported on 11/22/2019), Disp: 0.2 mL, Rfl: 1     ondansetron (ZOFRAN-ODT) 4 MG ODT tab, Take 1-2 tablets (4-8 mg) by mouth every 8 hours as needed for nausea, Disp: 4 tablet, Rfl: 0     predniSONE (DELTASONE) 10 MG tablet, Take 1 tablet (10 mg) by mouth daily As needed for flares only, Disp: 30 tablet, Rfl: 1     ustekinumab (STELARA) 45 MG/0.5ML SOSY, Inject 0.5 mLs (45 mg) Subcutaneous every 3 months Hold for signs of infection, and seek medical attention. (Patient not taking: Reported on 11/22/2019), Disp: 1 Syringe, Rfl: 1     ustekinumab (STELARA) 45 MG/0.5ML SOSY, Inject 0.5 mLs (45 mg) Subcutaneous every 3 months, Disp: 3 Syringe, Rfl: 1  No current facility-administered medications for this visit.      Allergies:  Diagn[employer]   Betadine   Levofloxacin   Penicillins   Pneumococcal Vaccines   Prenatal Vit-Fe Fumarate-Fa  Tdap    Past Medical History:  Diabetes    Psoriatic arthritis   Diabetes mellitus type 2, controlled   Psoriasis arthropathia   Malaise and fatigue  Iron deficiency anemia due to chronic blood loss  Right knee pain    Past Surgical History:  Arthroscopy knee with meniscectomy, right (11/20/2018)  AS Hysteroscopy Surgical W/Endometrial, bilateral (2017)   Tonsillar abscess    Family History:    The patient's family history includes Connective Tissue Disorder in her father; Diabetes in her maternal grandfather, maternal uncle, maternal uncle, and mother; Hypertension in her maternal grandfather, maternal grandmother, and mother; Prostate Cancer in her maternal grandfather; Thyroid Disease in her mother.    Social History:  The patient reports that she quit smoking about 12 years ago. Her smoking use included cigarettes. She has a 5.00 pack-year smoking history. She has never used smokeless tobacco. She reports that she does not drink alcohol or use drugs.   PCP: Nando Haskins  Marital Status:      Physical Exam:   There were no vitals taken for this visit.   Wt Readings from Last 4 Encounters:   09/14/20 50.8 kg (112 lb)   11/25/19 50.3 kg (110 lb 14.4 oz)   11/22/19 49.7 kg (109 lb 9.6 oz)   08/02/19 49.1 kg (108 lb 3.2 oz)     Eyes: Normal EOM, PERRLA, vision, conjunctiva, sclera.  ENT: Normal external ears, nose, hearing, lips, teeth, gums, throat. No mucous membrane lesions, normal saliva pool.  Neck: No mass or thyroid enlargement.  Resp: breathing unlaboreed  MS: The TMJ, neck, shoulder, elbow, wrist, MCP/PIP/DIP were normal. Knees with normal range of motion.  Skin: No nail pitting, alopecia, rash, nodules, or lesions. No flaking or redness along the hairline or in the ears. No psoriasis over the back.  Neuro: Normal cranial nerves  Psych: Normal judgement,  orientation, memory, affect.     Laboratory:   RHEUM RESULTS Latest Ref Rng & Units 8/2/2019 11/25/2019 7/29/2020   SED RATE 0 - 20 mm/h - - -   CRP, INFLAMMATION 0.0 - 8.0 mg/L <2.9 - -   CK TOTAL 32 - 200 U/L - - -   RHEUMATOID FACTOR 0 - 14 IU/mL - - -   CHALO SCREEN BY EIA <1.0 - - -   AST 0 - 45 U/L 9 12 9   ALT 0 - 50 U/L 19 19 16   ALBUMIN 3.4 - 5.0 g/dL - - -   WBC 4.0 - 11.0 10e9/L 7.7 6.8 5.8   RBC 3.8 - 5.2 10e12/L 4.70 4.49 4.37   HGB 11.7 - 15.7 g/dL 11.9 11.4(L) 10.9(L)   HCT 35.0 - 47.0 % 39.3 36.5 34.5(L)   MCV 78 - 100 fl 84 81 79   MCHC 31.5 - 36.5 g/dL 30.3(L) 31.2(L) 31.6   RDW 10.0 - 15.0 % 15.8(H) 15.3(H) 14.9    - 450 10e9/L 357 358 342   CREATININE 0.52 - 1.04 mg/dL - 0.60 0.43(L)   GFR ESTIMATE, IF BLACK >60 mL/min/[1.73:m2] - >90 >90   GFR ESTIMATE >60 mL/min/[1.73:m2] - >90 >90   HEPATITIS C ANTIBODY NEG - - -     Rheumatoid Factor   Date Value Ref Range Status   08/11/2011 11 0 - 14 IU/mL Final     Cyclic Cit Pept IgG/IgA   Date Value Ref Range Status   08/11/2011 <20  Interpretation:  Negative <20 UNITS Final     CHALO Screen by EIA   Date Value Ref Range Status   04/09/2014 <1.0  Interpretation:  Negative <1.0 Final     Hepatitis B Core Ana Rosa   Date Value Ref Range Status   08/02/2019 Nonreactive NR^Nonreactive Final     Antistreptolysin O   Date Value Ref Range Status   09/11/2015 109 0 - 120 IU/mL Final     Comment:     A single ASO analysis may not be meaningful due to the variability of ASO   values   within the normal population.  Both clinical and laboratory findings should   be   considered in reaching a diagnosis.  A single analysis may be less   informative   than a repeat analysis showing a change.       Quantiferon-TB Gold Plus Result   Date Value Ref Range Status   12/26/2018 Negative NEG^Negative Final     Comment:     No interferon gamma response to M.tuberculosis antigens was detected.   Infection with M.tuberculosis is unlikely, however a single negative result   does  not exclude infection. In patients at high risk for infection, a second   test should be considered  in accordance with the 2017 ATS/IDSA/CDC Clinical Practice Guidelines for   Diagnosis of Tuberculosis in Adults and Children [Lewinsohn DM et   al.Clin.Infect.Dis. 2017 64(2):111-115].       TB1 Ag minus Nil Value   Date Value Ref Range Status   12/26/2018 0.00 IU/mL Final     TB2 Ag minus Nil Value   Date Value Ref Range Status   12/26/2018 0.00 IU/mL Final     Mitogen minus Nil Result   Date Value Ref Range Status   12/26/2018 >10.00 IU/mL Final     Nil Result   Date Value Ref Range Status   12/26/2018 0.02 IU/mL Final     Neutrophil Cytoplasmic IgG Antibody   Date Value Ref Range Status   05/31/2013   Final    <1:20  Reference range: <1:20  (Note)  The ANCA IFA is <1:20; therefore, no further testing will  be performed.  INTERPRETIVE INFORMATION: Anti-Neutrophil Cyto Ab, IgG    Neutrophil Cytoplasmic Antibodies (C-ANCA = granular  cytoplasmic staining, P-ANCA = perinuclear staining) are  found in the serum of over 90 percent of patients with  certain necrotizing systemic vasculitides, and usually in  less than 5 percent of patients with collagen vascular  disease or arthritis.  Performed by [facility],  [address] [place] [id number] [phone]  [email or web address], Nataly Roth MD, Lab. Director     Adriana is a 35 year old who is being evaluated via a billable video visit.      How would you like to obtain your AVS? MyChart    Will anyone else be joining your video visit? No      Video Start Time: 3:00 PM  Video-Visit Details    Type of service:  Video Visit    Video End Time:3:30 PM    Originating Location (pt. Location): Home    Distant Location (provider location):  [facility] [place]     Platform used for Video Visit: Nobl

## 2021-04-21 ENCOUNTER — TELEPHONE (OUTPATIENT)
Dept: RHEUMATOLOGY | Facility: CLINIC | Age: 36
End: 2021-04-21

## 2021-04-21 ENCOUNTER — MYC REFILL (OUTPATIENT)
Dept: INTERNAL MEDICINE | Facility: CLINIC | Age: 36
End: 2021-04-21

## 2021-04-21 DIAGNOSIS — L40.50 PSORIATIC ARTHRITIS (H): ICD-10-CM

## 2021-04-21 DIAGNOSIS — B00.1 RECURRENT COLD SORES: ICD-10-CM

## 2021-04-22 RX ORDER — OXYCODONE AND ACETAMINOPHEN 5; 325 MG/1; MG/1
1 TABLET ORAL EVERY 4 HOURS PRN
Qty: 30 TABLET | Refills: 0 | Status: SHIPPED | OUTPATIENT
Start: 2021-04-22 | End: 2021-04-29

## 2021-04-22 NOTE — TELEPHONE ENCOUNTER
Reason For Call:   Chief Complaint   Patient presents with     Refill Request            Medication Name, Dose and Monthly Quantity:   Oxycodone with APAP (Percocet): Dose 5-325mg Schedule 1 tablet Q4H PRN Monthly Quantity 30     Diagnosis requiring opiates:   Psoriatic arthritis (H) [L40.50]       Recurrent cold sores [B00.1]           Problem List Updated:   Yes    Opioid Agreement On File - [facility] PAIN CONTRACT ID# [id number]:  No    Last Urine Drug Screen (at least once every 12 months) Date:   none  Unexpected Results:   N/A    [place]  Data Reviewed (at least once every 3 months) Date:   04/22/2021    Unexpected Results:    No.    Last Fill Date:   03/23/2021  Last Visit with PCP:   09/14/2020  Future Visits with PCP:   No.    Processing:   Larry GIRALDO CMA at 7:18 AM on 4/22/2021.

## 2021-04-26 ENCOUNTER — MYC MEDICAL ADVICE (OUTPATIENT)
Dept: RHEUMATOLOGY | Facility: CLINIC | Age: 36
End: 2021-04-26

## 2021-04-26 DIAGNOSIS — L40.50 PSORIATIC ARTHRITIS (H): ICD-10-CM

## 2021-04-26 RX ORDER — METHYLPREDNISOLONE 4 MG/1
TABLET ORAL
Qty: 35 TABLET | Refills: 1 | Status: SHIPPED | OUTPATIENT
Start: 2021-04-26 | End: 2021-08-02

## 2021-04-26 NOTE — TELEPHONE ENCOUNTER
Methylprednisolone 4Mg       Last Written Prescription Date:  10-  Last Fill Quantity: 35,   # refills: 1  Last Office Visit : 4-  Future Office visit:  none    Routing refill request to provider for review/approval because:  Not on protocol.        Kathleen M Doege RN

## 2021-04-29 ENCOUNTER — MYC REFILL (OUTPATIENT)
Dept: INTERNAL MEDICINE | Facility: CLINIC | Age: 36
End: 2021-04-29

## 2021-04-29 DIAGNOSIS — L40.50 PSORIATIC ARTHRITIS (H): ICD-10-CM

## 2021-04-29 DIAGNOSIS — B00.1 RECURRENT COLD SORES: ICD-10-CM

## 2021-04-30 RX ORDER — OXYCODONE AND ACETAMINOPHEN 5; 325 MG/1; MG/1
1 TABLET ORAL EVERY 4 HOURS PRN
Qty: 30 TABLET | Refills: 0 | Status: SHIPPED | OUTPATIENT
Start: 2021-04-30 | End: 2021-05-24

## 2021-05-04 DIAGNOSIS — E10.9 TYPE 1 DIABETES MELLITUS WITHOUT COMPLICATION (H): ICD-10-CM

## 2021-05-04 DIAGNOSIS — L40.50 PSORIATIC ARTHRITIS (H): ICD-10-CM

## 2021-05-04 LAB
ALBUMIN UR-MCNC: NEGATIVE MG/DL
ALT SERPL W P-5'-P-CCNC: 22 U/L (ref 0–50)
APPEARANCE UR: CLEAR
AST SERPL W P-5'-P-CCNC: 7 U/L (ref 0–45)
BILIRUB UR QL STRIP: NEGATIVE
COLOR UR AUTO: YELLOW
CREAT SERPL-MCNC: 0.51 MG/DL (ref 0.52–1.04)
CREAT UR-MCNC: 46 MG/DL
ERYTHROCYTE [DISTWIDTH] IN BLOOD BY AUTOMATED COUNT: 17 % (ref 10–15)
GFR SERPL CREATININE-BSD FRML MDRD: >90 ML/MIN/{1.73_M2}
GLUCOSE UR STRIP-MCNC: 500 MG/DL
HCT VFR BLD AUTO: 36.4 % (ref 35–47)
HGB BLD-MCNC: 11.3 G/DL (ref 11.7–15.7)
HGB UR QL STRIP: NEGATIVE
KETONES UR STRIP-MCNC: ABNORMAL MG/DL
LEUKOCYTE ESTERASE UR QL STRIP: NEGATIVE
MCH RBC QN AUTO: 24.1 PG (ref 26.5–33)
MCHC RBC AUTO-ENTMCNC: 31 G/DL (ref 31.5–36.5)
MCV RBC AUTO: 78 FL (ref 78–100)
NITRATE UR QL: NEGATIVE
PH UR STRIP: 5 PH (ref 5–7)
PLATELET # BLD AUTO: 424 10E9/L (ref 150–450)
PROT UR-MCNC: <0.05 G/L
PROT/CREAT 24H UR: NORMAL G/G CR (ref 0–0.2)
RBC # BLD AUTO: 4.69 10E12/L (ref 3.8–5.2)
SOURCE: ABNORMAL
SP GR UR STRIP: 1.02 (ref 1–1.03)
UROBILINOGEN UR STRIP-ACNC: 0.2 EU/DL (ref 0.2–1)
WBC # BLD AUTO: 7.6 10E9/L (ref 4–11)

## 2021-05-04 PROCEDURE — 84450 TRANSFERASE (AST) (SGOT): CPT | Performed by: INTERNAL MEDICINE

## 2021-05-04 PROCEDURE — 84156 ASSAY OF PROTEIN URINE: CPT | Performed by: INTERNAL MEDICINE

## 2021-05-04 PROCEDURE — 82565 ASSAY OF CREATININE: CPT | Performed by: INTERNAL MEDICINE

## 2021-05-04 PROCEDURE — 81003 URINALYSIS AUTO W/O SCOPE: CPT | Performed by: INTERNAL MEDICINE

## 2021-05-04 PROCEDURE — 85027 COMPLETE CBC AUTOMATED: CPT | Performed by: INTERNAL MEDICINE

## 2021-05-04 PROCEDURE — 84460 ALANINE AMINO (ALT) (SGPT): CPT | Performed by: INTERNAL MEDICINE

## 2021-05-04 PROCEDURE — 36415 COLL VENOUS BLD VENIPUNCTURE: CPT | Performed by: INTERNAL MEDICINE

## 2021-05-07 ENCOUNTER — MYC REFILL (OUTPATIENT)
Dept: INTERNAL MEDICINE | Facility: CLINIC | Age: 36
End: 2021-05-07

## 2021-05-07 DIAGNOSIS — L40.50 PSORIATIC ARTHRITIS (H): ICD-10-CM

## 2021-05-10 RX ORDER — TRAMADOL HYDROCHLORIDE 50 MG/1
50 TABLET ORAL EVERY 8 HOURS PRN
Qty: 60 TABLET | Refills: 0 | Status: SHIPPED | OUTPATIENT
Start: 2021-05-10 | End: 2021-06-07

## 2021-05-24 ENCOUNTER — MYC REFILL (OUTPATIENT)
Dept: INTERNAL MEDICINE | Facility: CLINIC | Age: 36
End: 2021-05-24

## 2021-05-24 DIAGNOSIS — L40.50 PSORIATIC ARTHRITIS (H): ICD-10-CM

## 2021-05-24 DIAGNOSIS — B00.1 RECURRENT COLD SORES: ICD-10-CM

## 2021-05-25 RX ORDER — OXYCODONE AND ACETAMINOPHEN 5; 325 MG/1; MG/1
1 TABLET ORAL EVERY 4 HOURS PRN
Qty: 30 TABLET | Refills: 0 | Status: SHIPPED | OUTPATIENT
Start: 2021-05-29 | End: 2021-06-21

## 2021-05-25 NOTE — TELEPHONE ENCOUNTER
Reason For Call:   Chief Complaint   Patient presents with     Refill Request            Medication Name, Dose and Monthly Quantity:   Oxycodone with APAP (Percocet): Dose 5-325mg Schedule 1 tablet Q4H PRN Monthly Quantity 30     Diagnosis requiring opiates:   Psoriatic arthritis (H) [L40.50]       Recurrent cold sores [B00.1]           Problem List Updated:   Yes    Opioid Agreement On File - [facility] PAIN CONTRACT ID# [id number]:  No    Last Urine Drug Screen (at least once every 12 months) Date:   none  Unexpected Results:   N/A    [place]  Data Reviewed (at least once every 3 months) Date:   05/25/2021    Unexpected Results:    No.    Last Fill Date:   04/30/2021  Last Visit with PCP:   09/14/2020  Future Visits with PCP:   No.    Processing:   Larry GIRALDO CMA at 1:39 PM on 5/25/2021.

## 2021-05-31 VITALS — WEIGHT: 111 LBS | BODY MASS INDEX: 19.66 KG/M2

## 2021-06-03 ENCOUNTER — RECORDS - HEALTHEAST (OUTPATIENT)
Dept: ADMINISTRATIVE | Facility: CLINIC | Age: 36
End: 2021-06-03

## 2021-06-07 ENCOUNTER — MYC REFILL (OUTPATIENT)
Dept: INTERNAL MEDICINE | Facility: CLINIC | Age: 36
End: 2021-06-07

## 2021-06-07 DIAGNOSIS — L40.50 PSORIATIC ARTHRITIS (H): ICD-10-CM

## 2021-06-07 RX ORDER — TRAMADOL HYDROCHLORIDE 50 MG/1
50 TABLET ORAL EVERY 8 HOURS PRN
Qty: 60 TABLET | Refills: 0 | Status: SHIPPED | OUTPATIENT
Start: 2021-06-09 | End: 2021-06-30

## 2021-06-07 NOTE — TELEPHONE ENCOUNTER
Patient Requested  traMADol (ULTRAM) 50 MG tablet  Last Fill  05/010/2021  Last Office Visit  05/28/2020  Next Office Visit  Nothing scheduled   Checked  06/07/2021    DX: Psoriatic arthritis     Pharmacy: [id number] - [place], [place] - [address] AT [facility] [address]TANESHA GIRALDO CMA at 7:04 AM on 6/7/2021.

## 2021-06-11 NOTE — PROGRESS NOTES
Assessment/Plan:   Left leg swelling and calf pain.  She has a flare of underlying psoriatic arthritis affecting both knees but the left leg has had more swelling and calf pain and tender lumps in the posterior knee and upper calf region.  She has been communicating with her rheumatologist who recommended she be seen to rule out blood clot.  No foot swelling, negative dulce's sign.  No lower calf tenderness or definitely palpable venous cords.  There is a knee effusion with swelling around the upper half of the lower leg.  No redness or warmth.  Mildly restricted full extension of the left knee due to pain and swelling/stiffness. No CP or SOB. VSS,  O2 sats 100%.  I feel likelihood of DVT is low.     Patient declined recommendation to have venous ultrasound performed tonight - she was scheduled across the parking lot at [facility] for about 15 minutes from when order placed but she did not want to have it done afterall.  Discussed the risk of DVT and PE including death from PE and she elected to leave the clinic and plans to follow up with her primary specialists at the [facility] tomorrow.  She stated she wanted to get home to her children and wasn't feeling well - upset stomach and low blood sugar (type 1 diabetes).  She declined any juice or crackers stating she had something with her to take. She checked her blood sugar herself in the clinic She felt overwhelmed and just wanted to go home.  She declined assistance returning to  the lobby and stated  was coming.  She signed a Leave Against Medical Advice form witnessed by myself and a CA. Encouraged return to ER if increased leg pain or swelling or CP or shortness of breath.       Subjective:      Chrissy Dixon is a 31 y.o. female who presents with left leg swelling and calf pain.  She has underlying psoriatic arthritis and is currently experiencing a flare involving both knees.  The left knee has been more swollen and stiff and she had trouble fully  extending it today which alarmed her.  She feels tender nodules behind the knee.  She has been communicating via email with her rheumatologist who recommended she come in to rule out DVT.  No prior history of DVT or PE.  She has not been bedridden or traveled recently.  She has no swelling of her feet or ankles.  No CP or SOB.  She has no primary care but sees three specialists at the [facility].  No fever or URI or cough, no ST or rash. She has underlying type I diabetes.  She feels very stressed.      Allergies   Allergen Reactions     Penicillins Hives     Povidone-Iodine Swelling     Had reaction with urethral cathetar, swelling.  Pt states unknown if from iodine or catheter.  Had allergy testing done and no latex allergy or any other allergy related to catheter known.     Prenatal Vit-Iron Fum-Folic Ac Hives     Current Outpatient Prescriptions on File Prior to Visit   Medication Sig Dispense Refill     ACCU-CHEK SMARTVIEW TEST STRIP Strp USE 6 TO 8 STRIPS DAILY 800 strip 0     calcipotriene (DOVONEX) 0.005 % topical solution        cholecalciferol, vitamin D3, (VITAMIN D3) 1,000 unit capsule Take 1,000 Units by mouth daily.       clobetasol (TEMOVATE) 0.05 % external solution        fluocinolone (DERMA-SMOOTHE) 0.01 % external oil        fluticasone (FLONASE) 50 mcg/actuation nasal spray 1 spray into each nostril daily as needed.        ibuprofen (ADVIL,MOTRIN) 600 MG tablet        insulin lispro (HUMALOG KWIKPEN) 100 unit/mL pen Use 3 times daily with meals, up to 30 units/day 30 mL 1     LANCETS (ACCU-CHEK FASTCLIX MISC) daily. Use 1 lancet       LEVEMIR FLEXTOUCH 100 unit/mL (3 mL) pen        metFORMIN (GLUCOPHAGE) 500 MG tablet TAKE 2 TABLETS BY MOUTH TWICE DAILY WITH MEALS 360 tablet 0     methotrexate 2.5 MG tablet Take 2.5 mg by mouth. Take 6 tablets once a week       NEEDLES, INSULIN DISPOSABLE (PEN NEEDLE MISC) Use 3-4 times daily. 31G X 6MM       OTEZLA 30 mg Tab        oxyCODONE-acetaminophen (PERCOCET)  "5-325 mg per tablet        pen needle, diabetic (BD INSULIN PEN NEEDLE UF SHORT) 31 gauge x 5/16\" Ndle Use 3-4 times daily with insulin pens 200 each 3     predniSONE (DELTASONE) 10 MG tablet Take 20 mg by mouth daily.       traMADol (ULTRAM) 50 mg tablet        triamcinolone (KENALOG) 0.1 % cream        URINE ACETONE TEST,STRIPS (KETOSTIX MISC) 1 strip every morning. With first morning urine.       ustekinumab (STELARA) 45 mg/0.5 mL Syrg Inject 45 mg under the skin every 3 (three) months.       No current facility-administered medications on file prior to visit.      Patient Active Problem List   Diagnosis     Psoriatic arthropathy     Current use of insulin     Maternal pregestational diabetes, classes B through R     Vaginal delivery     IDDM (insulin dependent diabetes mellitus)       Objective:     /60 (Patient Site: Right Arm, Patient Position: Sitting, Cuff Size: Adult Regular)  Pulse 80  Temp 98.6  F (37  C) (Oral)   Resp 18  Wt 111 lb (50.3 kg)  LMP 06/20/2017 (Exact Date)  SpO2 100%  Breastfeeding? No  BMI 19.05 kg/m2    Physical  General Appearance: Alert, cooperative, no distress.  Did become tearful and adamant about leaving later.   Head: Normocephalic, without obvious abnormality, atraumatic  Eyes: Conjunctivae are normal.  Lungs: Clear to auscultation bilaterally, respirations unlabored  Heart: Regular rate and rhythm  Extremities: there is swelling/effusion across both knees, less so the ankles L>R no redness or warmth of the joints.  Slightly limited extension at left knee likely due to effusion.  Mild tenderness posterior left knee and upper calf.  No palpable venous cords.  No swelling of foot or ankle or lower leg.  No redness or rash on lower leg.  Normal pedal pulse and cap refill, normal sensation.  Negative dulce's sign.    Psychiatric: Patient has a normal though slightly anxious mood and affect.          "

## 2021-06-21 ENCOUNTER — MYC REFILL (OUTPATIENT)
Dept: INTERNAL MEDICINE | Facility: CLINIC | Age: 36
End: 2021-06-21

## 2021-06-21 DIAGNOSIS — L40.50 PSORIATIC ARTHRITIS (H): ICD-10-CM

## 2021-06-21 DIAGNOSIS — B00.1 RECURRENT COLD SORES: ICD-10-CM

## 2021-06-21 RX ORDER — OXYCODONE AND ACETAMINOPHEN 5; 325 MG/1; MG/1
1 TABLET ORAL EVERY 4 HOURS PRN
Qty: 30 TABLET | Refills: 0 | Status: SHIPPED | OUTPATIENT
Start: 2021-06-21 | End: 2021-07-19

## 2021-06-21 NOTE — TELEPHONE ENCOUNTER
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    Last Fill Date:   05/29/2021  Last Visit with PCP:   09/14/2020  Future Visits with PCP:   No.    Processing:   Larry GIRALDO CMA at 7:41 AM on 6/21/2021.

## 2021-06-30 ENCOUNTER — MYC REFILL (OUTPATIENT)
Dept: INTERNAL MEDICINE | Facility: CLINIC | Age: 36
End: 2021-06-30

## 2021-06-30 DIAGNOSIS — L40.50 PSORIATIC ARTHRITIS (H): ICD-10-CM

## 2021-07-01 RX ORDER — TRAMADOL HYDROCHLORIDE 50 MG/1
50 TABLET ORAL EVERY 8 HOURS PRN
Qty: 60 TABLET | Refills: 0 | Status: SHIPPED | OUTPATIENT
Start: 2021-07-09 | End: 2021-08-03

## 2021-07-01 NOTE — TELEPHONE ENCOUNTER
Patient Requested  traMADol (ULTRAM) 50 MG tablet  Last Fill  06/09/2021  Last Office Visit  05/28/2020  Next Office Visit  Nothing scheduled   Checked  07/01/2021    DX: Psoriatic arthritis     Pharmacy: [facility] #[id number] [facility], [place] - [address] AT [facility] [address] GIRALDO CMA at 6:56 AM on 7/1/2021.

## 2021-07-19 ENCOUNTER — MYC REFILL (OUTPATIENT)
Dept: INTERNAL MEDICINE | Facility: CLINIC | Age: 36
End: 2021-07-19

## 2021-07-19 DIAGNOSIS — L40.50 PSORIATIC ARTHRITIS (H): ICD-10-CM

## 2021-07-19 DIAGNOSIS — B00.1 RECURRENT COLD SORES: ICD-10-CM

## 2021-07-20 RX ORDER — OXYCODONE AND ACETAMINOPHEN 5; 325 MG/1; MG/1
1 TABLET ORAL EVERY 4 HOURS PRN
Qty: 30 TABLET | Refills: 0 | Status: SHIPPED | OUTPATIENT
Start: 2021-07-20 | End: 2021-08-16

## 2021-07-20 NOTE — TELEPHONE ENCOUNTER
Reason For Call:   Chief Complaint   Patient presents with     Refill Request            Medication Name, Dose and Monthly Quantity:   Oxycodone with APAP (Percocet): Dose 5-325mg Schedule 1 tablet Q4H PRN Monthly Quantity 30     Diagnosis requiring opiates:   Psoriatic arthritis (H) [L40.50]       Recurrent cold sores [B00.1]           Problem List Updated:   Yes    Opioid Agreement On File - [facility] PAIN CONTRACT ID# [id number]:  No    Last Urine Drug Screen (at least once every 12 months) Date:   none  Unexpected Results:   N/A    [place]  Data Reviewed (at least once every 3 months) Date:   07/20/2021    Unexpected Results:    No.    Last Fill Date:   06/21/2021  Last Visit with PCP:   09/14/2020  Future Visits with PCP:   No.    Processing:   Larry GIRALDO CMA at 7:37 AM on 7/20/2021.

## 2021-07-29 DIAGNOSIS — L40.50 PSORIATIC ARTHRITIS (H): ICD-10-CM

## 2021-08-02 RX ORDER — METHYLPREDNISOLONE 4 MG/1
TABLET ORAL
Qty: 35 TABLET | Refills: 1 | Status: SHIPPED | OUTPATIENT
Start: 2021-08-02 | End: 2022-08-04

## 2021-08-02 NOTE — TELEPHONE ENCOUNTER
methylPREDNISolone (MEDROL) 4 MG tablet    3 tabs daily for 1 week, then 2 tabs daily for 1 week, then off.    Last Written Prescription Date:  4/26/21  Last Fill Quantity: 35,   # refills: 1  Last Office Visit : 4/20/21  Future Office visit: none    Routing refill request to provider for review/approval because:  Drug not on the refill protocol

## 2021-08-03 ENCOUNTER — MYC REFILL (OUTPATIENT)
Dept: INTERNAL MEDICINE | Facility: CLINIC | Age: 36
End: 2021-08-03

## 2021-08-03 DIAGNOSIS — L40.50 PSORIATIC ARTHRITIS (H): ICD-10-CM

## 2021-08-04 RX ORDER — TRAMADOL HYDROCHLORIDE 50 MG/1
50 TABLET ORAL EVERY 8 HOURS PRN
Qty: 60 TABLET | Refills: 0 | Status: SHIPPED | OUTPATIENT
Start: 2021-08-04 | End: 2021-08-31

## 2021-08-04 NOTE — TELEPHONE ENCOUNTER
Patient Requested  traMADol (ULTRAM) 50 MG tablet  Last Fill  07/09/2021  Last Office Visit  05/28/2020  Next Office Visit  Nothing scheduled   Checked  08/04/2021    DX: Psoriatic arthritis     Pharmacy: [facility] #[id number] - [place], [place] - [address] AT [facility] OF [address] GIRALDO CMA at 7:46 AM on 8/4/2021.

## 2021-08-16 ENCOUNTER — MYC REFILL (OUTPATIENT)
Dept: INTERNAL MEDICINE | Facility: CLINIC | Age: 36
End: 2021-08-16

## 2021-08-16 DIAGNOSIS — B00.1 RECURRENT COLD SORES: ICD-10-CM

## 2021-08-16 DIAGNOSIS — L40.50 PSORIATIC ARTHRITIS (H): ICD-10-CM

## 2021-08-16 RX ORDER — OXYCODONE AND ACETAMINOPHEN 5; 325 MG/1; MG/1
1 TABLET ORAL EVERY 4 HOURS PRN
Qty: 30 TABLET | Refills: 0 | Status: SHIPPED | OUTPATIENT
Start: 2021-08-16 | End: 2021-09-15

## 2021-08-16 NOTE — TELEPHONE ENCOUNTER
Reason For Call:   Chief Complaint   Patient presents with     Refill Request            Medication Name, Dose and Monthly Quantity:   Oxycodone with APAP (Percocet): Dose 5-325mg Schedule 1 tablet Q4H PRN Monthly Quantity 30     Diagnosis requiring opiates:   Psoriatic arthritis (H) [L40.50]       Recurrent cold sores [B00.1]           Problem List Updated:   Yes    Opioid Agreement On File - [facility] PAIN CONTRACT ID# [id number]:  No    Last Urine Drug Screen (at least once every 12 months) Date:   none  Unexpected Results:   N/A    [place]  Data Reviewed (at least once every 3 months) Date:   08/16/2021    Unexpected Results:    No.    Last Fill Date:   07/20/2021  Last Visit with PCP:   09/14/2020  Future Visits with PCP:   No.    Processing:   Larry Bernard RN at 12:32 PM on 8/16/2021.

## 2021-08-26 ENCOUNTER — TRANSFERRED RECORDS (OUTPATIENT)
Dept: HEALTH INFORMATION MANAGEMENT | Facility: CLINIC | Age: 36
End: 2021-08-26

## 2021-08-31 ENCOUNTER — MYC REFILL (OUTPATIENT)
Dept: INTERNAL MEDICINE | Facility: CLINIC | Age: 36
End: 2021-08-31

## 2021-08-31 DIAGNOSIS — L40.50 PSORIATIC ARTHRITIS (H): ICD-10-CM

## 2021-09-01 RX ORDER — TRAMADOL HYDROCHLORIDE 50 MG/1
50 TABLET ORAL EVERY 8 HOURS PRN
Qty: 60 TABLET | Refills: 0 | Status: SHIPPED | OUTPATIENT
Start: 2021-09-01 | End: 2021-10-07

## 2021-09-01 NOTE — TELEPHONE ENCOUNTER
Patient Requested  traMADol (ULTRAM) 50 MG tablet  Last Fill  08/04/2021  Last Office Visit  09/14/2020  Next Office Visit  Nothing scheduled   Checked  09/01/2021    DX: Psoriatic arthritis     Pharmacy: [facility] #[id number] [facility], [place] - [address] AT [facility] [address]    JUANCHO Bernard RN at 7:00 AM on 9/1/2021.

## 2021-09-07 DIAGNOSIS — Z79.4 TYPE 2 DIABETES MELLITUS WITHOUT COMPLICATION, WITH LONG-TERM CURRENT USE OF INSULIN (H): ICD-10-CM

## 2021-09-07 DIAGNOSIS — E11.9 TYPE 2 DIABETES MELLITUS WITHOUT COMPLICATION, WITH LONG-TERM CURRENT USE OF INSULIN (H): ICD-10-CM

## 2021-09-08 ENCOUNTER — MYC MEDICAL ADVICE (OUTPATIENT)
Dept: INTERNAL MEDICINE | Facility: CLINIC | Age: 36
End: 2021-09-08

## 2021-09-09 ENCOUNTER — TELEPHONE (OUTPATIENT)
Dept: INTERNAL MEDICINE | Facility: CLINIC | Age: 36
End: 2021-09-09

## 2021-09-09 NOTE — TELEPHONE ENCOUNTER
metFORMIN (GLUCOPHAGE) 500 MG tabletTake 2 tablets (1,000 mg) by mouth 2 times daily (with meals  Last Written Prescription Date:  9/14/20  Last Fill Quantity: 360,   # refills: 3  Last Office Visit : 9/14/20 ( annual)  Future Office visit:  None> recommend 6-12 months. Scheduling has been notified to contact the pt for appointment.      90 day refill x 1 per protocol  A!C due, last result reviewed by Dr Haskins:  9/14/20  A1C 8.3*

## 2021-09-09 NOTE — TELEPHONE ENCOUNTER
----- Message from Sulma Mancini RN sent at 9/8/2021  9:22 PM CDT -----  Regarding: [facility] annual due  Please call to schedule.    Thanks    I do not need any follow up on the scheduling of this appointment unless the patient will no longer be receiving care in our clinic.

## 2021-09-15 ENCOUNTER — MYC REFILL (OUTPATIENT)
Dept: INTERNAL MEDICINE | Facility: CLINIC | Age: 36
End: 2021-09-15

## 2021-09-15 DIAGNOSIS — L40.50 PSORIATIC ARTHRITIS (H): ICD-10-CM

## 2021-09-15 DIAGNOSIS — B00.1 RECURRENT COLD SORES: ICD-10-CM

## 2021-09-15 RX ORDER — OXYCODONE AND ACETAMINOPHEN 5; 325 MG/1; MG/1
1 TABLET ORAL EVERY 4 HOURS PRN
Qty: 30 TABLET | Refills: 0 | Status: SHIPPED | OUTPATIENT
Start: 2021-09-15 | End: 2021-10-11

## 2021-09-15 NOTE — TELEPHONE ENCOUNTER
Reason For Call:   Chief Complaint   Patient presents with     Refill Request            Medication Name, Dose and Monthly Quantity:   Oxycodone with APAP (Percocet): Dose 5-325mg Schedule 1 tablet Q4H PRN Monthly Quantity 30     Diagnosis requiring opiates:   Psoriatic arthritis (H) [L40.50]       Recurrent cold sores [B00.1]           Problem List Updated:   Yes    Opioid Agreement On File - [facility] PAIN CONTRACT ID# [id number]:  No    Last Urine Drug Screen (at least once every 12 months) Date:   none  Unexpected Results:   N/A    [place]  Data Reviewed (at least once every 3 months) Date:   09/15/2021    Unexpected Results:    No.    Last Fill Date:   08/16/2021  Last Visit with PCP:   09/14/2020  Future Visits with PCP:   No.    Processing:   Larry Bernard RN at 11:32 AM on 9/15/2021.

## 2021-09-25 ENCOUNTER — HEALTH MAINTENANCE LETTER (OUTPATIENT)
Age: 36
End: 2021-09-25

## 2021-09-30 NOTE — PATIENT INSTRUCTIONS
[facility] Medication Refill Request Information:  * Please contact your pharmacy regarding ANY request for medication refills.  ** [facility] Prescription Fax = [phone]  * Please allow 3 business days for routine medication refills.  * Please allow 5 business days for controlled substance medication refills.     [facility] Test Result notification information:  *You will be notified with in 7-10 days of your appointment day regarding the results of your test.  If you are on MyChart you will be notified as soon as the provider has reviewed the results and signed off on them.    [facility] [phone]     
None

## 2021-10-04 ENCOUNTER — MYC REFILL (OUTPATIENT)
Dept: INTERNAL MEDICINE | Facility: CLINIC | Age: 36
End: 2021-10-04

## 2021-10-04 DIAGNOSIS — L40.50 PSORIATIC ARTHRITIS (H): ICD-10-CM

## 2021-10-04 RX ORDER — TRAMADOL HYDROCHLORIDE 50 MG/1
50 TABLET ORAL EVERY 8 HOURS PRN
Qty: 60 TABLET | Refills: 0 | Status: CANCELLED | OUTPATIENT
Start: 2021-10-04

## 2021-10-07 DIAGNOSIS — L40.50 PSORIATIC ARTHRITIS (H): ICD-10-CM

## 2021-10-11 ENCOUNTER — MYC REFILL (OUTPATIENT)
Dept: INTERNAL MEDICINE | Facility: CLINIC | Age: 36
End: 2021-10-11

## 2021-10-11 DIAGNOSIS — B00.1 RECURRENT COLD SORES: ICD-10-CM

## 2021-10-11 DIAGNOSIS — L40.50 PSORIATIC ARTHRITIS (H): ICD-10-CM

## 2021-10-11 RX ORDER — SECUKINUMAB 150 MG/ML
300 INJECTION SUBCUTANEOUS
Qty: 2 ML | Refills: 3 | Status: SHIPPED | OUTPATIENT
Start: 2021-10-11 | End: 2021-12-09

## 2021-10-11 RX ORDER — OXYCODONE AND ACETAMINOPHEN 5; 325 MG/1; MG/1
1 TABLET ORAL EVERY 4 HOURS PRN
Qty: 30 TABLET | Refills: 0 | Status: SHIPPED | OUTPATIENT
Start: 2021-10-11 | End: 2021-11-08

## 2021-10-11 NOTE — TELEPHONE ENCOUNTER
secukinumab (COSENTYX SENSOREADY PEN) 150 MG/ML Sensoready pen      Last Written Prescription Date:  4-20-21  Last Fill Quantity: 2 ml,   # refills: 5  Last Office Visit : 4-20-21  Future Office visit:  12-9-21    Routing refill request to provider for review/approval because:  Med not on protocol

## 2021-10-11 NOTE — TELEPHONE ENCOUNTER
Reason For Call:   Chief Complaint   Patient presents with     Refill Request            Medication Name, Dose and Monthly Quantity:   Oxycodone with APAP (Percocet): Dose 5-325mg Schedule 1 tablet Q4H PRN Monthly Quantity 30     Diagnosis requiring opiates:   Psoriatic arthritis (H) [L40.50]       Recurrent cold sores [B00.1]           Problem List Updated:   Yes    Opioid Agreement On File - [facility] PAIN CONTRACT ID# [id number]:  No    Last Urine Drug Screen (at least once every 12 months) Date:   none  Unexpected Results:   N/A    [place]  Data Reviewed (at least once every 3 months) Date:   10/11/2021    Unexpected Results:    No.    Last Fill Date:   09/15/2021  Last Visit with PCP:   09/14/2020  Future Visits with PCP:   No.    Processing:   Larry Bernard RN at 7:56 AM on 10/11/2021.

## 2021-10-18 ENCOUNTER — MYC MEDICAL ADVICE (OUTPATIENT)
Dept: RHEUMATOLOGY | Facility: CLINIC | Age: 36
End: 2021-10-18

## 2021-10-18 DIAGNOSIS — L40.50 PSORIATIC ARTHRITIS (H): Primary | ICD-10-CM

## 2021-10-18 RX ORDER — METHYLPREDNISOLONE 4 MG/1
TABLET ORAL
Qty: 48 TABLET | Refills: 1 | Status: SHIPPED | OUTPATIENT
Start: 2021-10-18 | End: 2021-12-09

## 2021-10-19 NOTE — TELEPHONE ENCOUNTER
Impression:  PsA flare    Plan:  Medrol 12 mg daily X 7, then 8 mg daily X 7. Report poor response to Rheumatology

## 2021-10-20 NOTE — TELEPHONE ENCOUNTER
Dr Granados response relayed to pt via [employer].    ERIC MoyaN, RN  Rheumatology Care Coordinator  [facility]

## 2021-10-25 DIAGNOSIS — Z79.4 TYPE 2 DIABETES MELLITUS WITHOUT COMPLICATION, WITH LONG-TERM CURRENT USE OF INSULIN (H): ICD-10-CM

## 2021-10-25 DIAGNOSIS — E11.9 TYPE 2 DIABETES MELLITUS WITHOUT COMPLICATION, WITH LONG-TERM CURRENT USE OF INSULIN (H): ICD-10-CM

## 2021-10-25 RX ORDER — PROCHLORPERAZINE 25 MG/1
1 SUPPOSITORY RECTAL
Qty: 12 EACH | Refills: 0 | Status: SHIPPED | OUTPATIENT
Start: 2021-10-25 | End: 2022-02-25

## 2021-10-25 ASSESSMENT — ENCOUNTER SYMPTOMS
MUSCLE WEAKNESS: 0
BOWEL INCONTINENCE: 0
NUMBNESS: 1
BLOATING: 0
POLYPHAGIA: 0
BLOOD IN STOOL: 0
SKIN CHANGES: 0
POLYDIPSIA: 0
POOR WOUND HEALING: 0
RECTAL PAIN: 0
SWOLLEN GLANDS: 0
PARALYSIS: 0
DIARRHEA: 0
ALTERED TEMPERATURE REGULATION: 0
JOINT SWELLING: 1
WEIGHT LOSS: 0
INSOMNIA: 1
NAUSEA: 1
ARTHRALGIAS: 1
MUSCLE CRAMPS: 0
MYALGIAS: 0
HEARTBURN: 0
WEIGHT GAIN: 0
DISTURBANCES IN COORDINATION: 0
SEIZURES: 0
LOSS OF CONSCIOUSNESS: 0
NERVOUS/ANXIOUS: 1
FEVER: 0
TINGLING: 1
HEADACHES: 1
FATIGUE: 1
BRUISES/BLEEDS EASILY: 1
CHILLS: 0
JAUNDICE: 0
VOMITING: 0
DECREASED CONCENTRATION: 1
TREMORS: 0
PANIC: 0
NAIL CHANGES: 0
NECK PAIN: 0
DIZZINESS: 0
SPEECH CHANGE: 0
DEPRESSION: 0
HALLUCINATIONS: 0
INCREASED ENERGY: 1
MEMORY LOSS: 0
CONSTIPATION: 0
BACK PAIN: 1
STIFFNESS: 1
DECREASED APPETITE: 0
ABDOMINAL PAIN: 1
NIGHT SWEATS: 0
WEAKNESS: 1

## 2021-10-26 ENCOUNTER — LAB (OUTPATIENT)
Dept: LAB | Facility: CLINIC | Age: 36
End: 2021-10-26
Payer: COMMERCIAL

## 2021-10-26 ENCOUNTER — OFFICE VISIT (OUTPATIENT)
Dept: INTERNAL MEDICINE | Facility: CLINIC | Age: 36
End: 2021-10-26
Payer: COMMERCIAL

## 2021-10-26 VITALS
WEIGHT: 104.1 LBS | DIASTOLIC BLOOD PRESSURE: 86 MMHG | BODY MASS INDEX: 18.45 KG/M2 | RESPIRATION RATE: 16 BRPM | SYSTOLIC BLOOD PRESSURE: 130 MMHG | TEMPERATURE: 98 F | HEART RATE: 117 BPM | OXYGEN SATURATION: 99 % | HEIGHT: 63 IN

## 2021-10-26 DIAGNOSIS — J31.0 RHINITIS MEDICAMENTOSA: ICD-10-CM

## 2021-10-26 DIAGNOSIS — D50.0 IRON DEFICIENCY ANEMIA DUE TO CHRONIC BLOOD LOSS: Primary | ICD-10-CM

## 2021-10-26 DIAGNOSIS — Z79.4 CONTROLLED TYPE 2 DIABETES MELLITUS WITHOUT COMPLICATION, WITH LONG-TERM CURRENT USE OF INSULIN (H): ICD-10-CM

## 2021-10-26 DIAGNOSIS — E05.90 SUBCLINICAL HYPERTHYROIDISM: ICD-10-CM

## 2021-10-26 DIAGNOSIS — E11.9 CONTROLLED TYPE 2 DIABETES MELLITUS WITHOUT COMPLICATION, WITH LONG-TERM CURRENT USE OF INSULIN (H): ICD-10-CM

## 2021-10-26 DIAGNOSIS — L40.50 PSORIATIC ARTHRITIS (H): ICD-10-CM

## 2021-10-26 DIAGNOSIS — T48.5X5A RHINITIS MEDICAMENTOSA: ICD-10-CM

## 2021-10-26 DIAGNOSIS — D50.0 IRON DEFICIENCY ANEMIA DUE TO CHRONIC BLOOD LOSS: ICD-10-CM

## 2021-10-26 DIAGNOSIS — Z79.4 TYPE 2 DIABETES MELLITUS WITHOUT COMPLICATION, WITH LONG-TERM CURRENT USE OF INSULIN (H): ICD-10-CM

## 2021-10-26 DIAGNOSIS — E11.9 TYPE 2 DIABETES MELLITUS WITHOUT COMPLICATION, WITH LONG-TERM CURRENT USE OF INSULIN (H): ICD-10-CM

## 2021-10-26 LAB
ALBUMIN SERPL-MCNC: 4.2 G/DL (ref 3.4–5)
ALP SERPL-CCNC: 72 U/L (ref 40–150)
ALT SERPL W P-5'-P-CCNC: 24 U/L (ref 0–50)
ANION GAP SERPL CALCULATED.3IONS-SCNC: 8 MMOL/L (ref 3–14)
AST SERPL W P-5'-P-CCNC: 12 U/L (ref 0–45)
BASOPHILS # BLD AUTO: 0 10E3/UL (ref 0–0.2)
BASOPHILS NFR BLD AUTO: 0 %
BILIRUB SERPL-MCNC: 0.2 MG/DL (ref 0.2–1.3)
BUN SERPL-MCNC: 10 MG/DL (ref 7–30)
CALCIUM SERPL-MCNC: 9.4 MG/DL (ref 8.5–10.1)
CHLORIDE BLD-SCNC: 100 MMOL/L (ref 94–109)
CO2 SERPL-SCNC: 28 MMOL/L (ref 20–32)
CREAT SERPL-MCNC: 0.47 MG/DL (ref 0.52–1.04)
EOSINOPHIL # BLD AUTO: 0 10E3/UL (ref 0–0.7)
EOSINOPHIL NFR BLD AUTO: 0 %
ERYTHROCYTE [DISTWIDTH] IN BLOOD BY AUTOMATED COUNT: 16.7 % (ref 10–15)
FERRITIN SERPL-MCNC: 3 NG/ML (ref 12–150)
GFR SERPL CREATININE-BSD FRML MDRD: >90 ML/MIN/1.73M2
GLUCOSE BLD-MCNC: 175 MG/DL (ref 70–99)
HBA1C MFR BLD: 10.6 % (ref 0–5.6)
HCT VFR BLD AUTO: 38.5 % (ref 35–47)
HGB BLD-MCNC: 11.9 G/DL (ref 11.7–15.7)
IMM GRANULOCYTES # BLD: 0 10E3/UL
IMM GRANULOCYTES NFR BLD: 0 %
IRON SATN MFR SERPL: 5 % (ref 15–46)
IRON SERPL-MCNC: 20 UG/DL (ref 35–180)
LYMPHOCYTES # BLD AUTO: 1.3 10E3/UL (ref 0.8–5.3)
LYMPHOCYTES NFR BLD AUTO: 16 %
MCH RBC QN AUTO: 23.9 PG (ref 26.5–33)
MCHC RBC AUTO-ENTMCNC: 30.9 G/DL (ref 31.5–36.5)
MCV RBC AUTO: 77 FL (ref 78–100)
MONOCYTES # BLD AUTO: 0.2 10E3/UL (ref 0–1.3)
MONOCYTES NFR BLD AUTO: 2 %
NEUTROPHILS # BLD AUTO: 6.7 10E3/UL (ref 1.6–8.3)
NEUTROPHILS NFR BLD AUTO: 82 %
NRBC # BLD AUTO: 0 10E3/UL
NRBC BLD AUTO-RTO: 0 /100
PLATELET # BLD AUTO: 431 10E3/UL (ref 150–450)
POTASSIUM BLD-SCNC: 4 MMOL/L (ref 3.4–5.3)
PROT SERPL-MCNC: 7.3 G/DL (ref 6.8–8.8)
RBC # BLD AUTO: 4.98 10E6/UL (ref 3.8–5.2)
SODIUM SERPL-SCNC: 136 MMOL/L (ref 133–144)
T4 FREE SERPL-MCNC: 1.2 NG/DL (ref 0.76–1.46)
TIBC SERPL-MCNC: 430 UG/DL (ref 240–430)
TSH SERPL DL<=0.005 MIU/L-ACNC: 0.35 MU/L (ref 0.4–4)
WBC # BLD AUTO: 8.2 10E3/UL (ref 4–11)

## 2021-10-26 PROCEDURE — 99000 SPECIMEN HANDLING OFFICE-LAB: CPT | Performed by: PATHOLOGY

## 2021-10-26 PROCEDURE — 83036 HEMOGLOBIN GLYCOSYLATED A1C: CPT | Performed by: PATHOLOGY

## 2021-10-26 PROCEDURE — 83550 IRON BINDING TEST: CPT | Performed by: PATHOLOGY

## 2021-10-26 PROCEDURE — 84439 ASSAY OF FREE THYROXINE: CPT | Performed by: PATHOLOGY

## 2021-10-26 PROCEDURE — 36415 COLL VENOUS BLD VENIPUNCTURE: CPT | Performed by: PATHOLOGY

## 2021-10-26 PROCEDURE — 90471 IMMUNIZATION ADMIN: CPT | Performed by: INTERNAL MEDICINE

## 2021-10-26 PROCEDURE — 84481 FREE ASSAY (FT-3): CPT | Mod: 90 | Performed by: PATHOLOGY

## 2021-10-26 PROCEDURE — 80050 GENERAL HEALTH PANEL: CPT | Performed by: PATHOLOGY

## 2021-10-26 PROCEDURE — 90686 IIV4 VACC NO PRSV 0.5 ML IM: CPT | Performed by: INTERNAL MEDICINE

## 2021-10-26 PROCEDURE — 82728 ASSAY OF FERRITIN: CPT | Performed by: PATHOLOGY

## 2021-10-26 PROCEDURE — 99395 PREV VISIT EST AGE 18-39: CPT | Mod: 25 | Performed by: INTERNAL MEDICINE

## 2021-10-26 RX ORDER — INSULIN ASPART 100 [IU]/ML
INJECTION, SOLUTION INTRAVENOUS; SUBCUTANEOUS
Qty: 30 ML | Refills: 5 | Status: SHIPPED | OUTPATIENT
Start: 2021-10-26 | End: 2022-03-17

## 2021-10-26 RX ORDER — FLUTICASONE PROPIONATE 50 MCG
2 SPRAY, SUSPENSION (ML) NASAL DAILY
Qty: 48 G | Refills: 11 | Status: SHIPPED | OUTPATIENT
Start: 2021-10-26 | End: 2022-12-02

## 2021-10-26 RX ORDER — VALACYCLOVIR HYDROCHLORIDE 500 MG/1
500 TABLET, FILM COATED ORAL DAILY PRN
Qty: 90 TABLET | Refills: 3 | Status: SHIPPED | OUTPATIENT
Start: 2021-10-26 | End: 2022-12-02

## 2021-10-26 ASSESSMENT — PAIN SCALES - GENERAL: PAINLEVEL: NO PAIN (0)

## 2021-10-26 ASSESSMENT — MIFFLIN-ST. JEOR: SCORE: 1136.32

## 2021-10-26 NOTE — PROGRESS NOTES
HPI  36-year-old presents today for physical examination.  She has been doing reasonably well although she continues to have flares in her arthritis and is on periodic prednisone bursts for these flares.  She is in the midst of 1 now.  She does adjust her insulin and this seems to help keep the blood sugar and check while she is taking the prednisone.  Otherwise she has been experiencing some increased numbness and tingling in the right hand.  This is in the 1st through 4th fingers it tends to bother her periodically it tends to be relieved by shaking and stretching the hand.  Does not wake her up at night but she is aware of it if she wakes up at night and when she gets up in the morning.  Otherwise her psoriasis is largely been in remission with no skin changes.  Tolerating her present pain medication well and has had no side effects or ill effects related to this.  Past Medical History:   Diagnosis Date     Diabetes      Other psoriasis      Polyarthritis     probable psoriatic arthritis     Past Surgical History:   Procedure Laterality Date     ARTHROSCOPY KNEE WITH MENISCECTOMY Right 11/20/2018    Procedure: Examination Under Anesthesia Right Knee, Right Knee Arthroscopy, Partial Meniscectomy, Chondroplasty, Cyst Decompression, Synovial Biopsy;  Surgeon: Sean Pate MD;  Location: [facility] OR     AS HYSTEROSCOPY, SURGICAL; W/ ENDOMETRIAL ABLATION, ANY METHOD Bilateral 2017    endometrial ablation with bilater TL     SURGICAL HISTORY OF -       .  Tonsillar abscess     Family History   Problem Relation Age of Onset     Thyroid Disease Mother      Diabetes Mother      Hypertension Mother      Connective Tissue Disorder Father         Psoriasis     Diabetes Maternal Grandfather      Hypertension Maternal Grandfather      Prostate Cancer Maternal Grandfather      Diabetes Maternal Uncle      Diabetes Maternal Uncle      Hypertension Maternal Grandmother      Asthma No family hx of      Asthma Mother       Colon Cancer Mother      Chronic Obstructive Pulmonary Disease Mother      Arthritis Father      Vision Loss Father      Diabetes Maternal Uncle      Diabetes Maternal Grandmother      Social History     Socioeconomic History     Marital status:      Spouse name: Not on file     Number of children: Not on file     Years of education: Not on file     Highest education level: Not on file   Occupational History     Occupation: Dental Assist.     Employer: UNKNOWN   Tobacco Use     Smoking status: Former Smoker     Packs/day: 1.00     Years: 5.00     Pack years: 5.00     Types: Cigarettes     Quit date: 2007     Years since quittin.9     Smokeless tobacco: Never Used   Substance and Sexual Activity     Alcohol use: No     Alcohol/week: 1.7 standard drinks     Types: 2 Standard drinks or equivalent per week     Drug use: No     Sexual activity: Yes     Partners: Male     Birth control/protection: I.U.D.     Comment: Mirena   Other Topics Concern      Service Not Asked     Blood Transfusions Not Asked     Caffeine Concern Not Asked     Occupational Exposure Not Asked     Hobby Hazards Not Asked     Sleep Concern Not Asked     Stress Concern Not Asked     Weight Concern Not Asked     Special Diet Not Asked     Back Care Not Asked     Exercise No     Bike Helmet Not Asked     Seat Belt Not Asked     Self-Exams Not Asked     Parent/sibling w/ CABG, MI or angioplasty before 65F 55M? Not Asked   Social History Narrative    How much exercise per week? No    How much calcium per day? Diet      How much caffeine per day? Coffee  2    How much vitamin D per day? Diet    Do you/your family wear seatbelts?  Yes    Do you/your family use safety helmets? No    Do you/your family use sunscreen? Yes    Do you/your family keep firearms in the home? No    Do you/your family have a smoke detector(s)? Yes        Do you feel safe in your home? Yes    Has anyone ever touched you in an unwanted manner? No     Explain           Social Determinants of Health     Financial Resource Strain:      Difficulty of Paying Living Expenses:    Food Insecurity:      Worried About Running Out of Food in the Last Year:      Ran Out of Food in the Last Year:    Transportation Needs:      Lack of Transportation (Medical):      Lack of Transportation (Non-Medical):    Physical Activity:      Days of Exercise per Week:      Minutes of Exercise per Session:    Stress:      Feeling of Stress :    Social Connections:      Frequency of Communication with Friends and Family:      Frequency of Social Gatherings with Friends and Family:      Attends Sabianism Services:      Active Member of Clubs or Organizations:      Attends Club or Organization Meetings:      Marital Status:    Intimate Partner Violence:      Fear of Current or Ex-Partner:      Emotionally Abused:      Physically Abused:      Sexually Abused:      Answers for HPI/ROS submitted by the patient on 10/25/2021  General Symptoms: Yes  Skin Symptoms: Yes  HENT Symptoms: No  EYE SYMPTOMS: No  HEART SYMPTOMS: No  LUNG SYMPTOMS: No  INTESTINAL SYMPTOMS: Yes  URINARY SYMPTOMS: No  GYNECOLOGIC SYMPTOMS: No  BREAST SYMPTOMS: No  SKELETAL SYMPTOMS: Yes  BLOOD SYMPTOMS: Yes  NERVOUS SYSTEM SYMPTOMS: Yes  MENTAL HEALTH SYMPTOMS: Yes  Fever: No  Loss of appetite: No  Weight loss: No  Weight gain: No  Fatigue: Yes  Night sweats: No  Chills: No  Increased stress: Yes  Excessive hunger: No  Excessive thirst: No  Feeling hot or cold when others believe the temperature is normal: No  Loss of height: No  Post-operative complications: No  Surgical site pain: No  Hallucinations: No  Change in or Loss of Energy: Yes  Hyperactivity: No  Confusion: No  Changes in hair: No  Changes in moles/birth marks: No  Itching: No  Rashes: No  Changes in nails: No  Acne: No  Hair in places you don't want it: No  Change in facial hair: No  Warts: No  Non-healing sores: No  Scarring: No  Flaking of skin: No  Color changes of  "hands/feet in cold : Yes  Sun sensitivity: No  Skin thickening: No  Heart burn or indigestion: No  Nausea: Yes  Vomiting: No  Abdominal pain: Yes  Bloating: No  Constipation: No  Diarrhea: No  Blood in stool: No  Black stools: Yes  Rectal or Anal pain: No  Fecal incontinence: No  Yellowing of skin or eyes: No  Vomit with blood: No  Change in stools: Yes  Back pain: Yes  Muscle aches: No  Neck pain: No  Swollen joints: Yes  Joint pain: Yes  Bone pain: No  Muscle cramps: No  Muscle weakness: No  Joint stiffness: Yes  Bone fracture: No  Anemia: Yes  Swollen glands: No  Easy bleeding or bruising: Yes  Edema or swelling: Yes  Trouble with coordination: No  Dizziness or trouble with balance: No  Fainting or black-out spells: No  Memory loss: No  Headache: Yes  Seizures: No  Speech problems: No  Tingling: Yes  Tremor: No  Weakness: Yes  Difficulty walking: No  Paralysis: No  Numbness: Yes  Nervous or Anxious: Yes  Depression: No  Trouble sleeping: Yes  Trouble thinking or concentrating: Yes  Mood changes: No  Panic attacks: No      Exam:  /86 (BP Location: Right arm, Patient Position: Sitting, Cuff Size: Adult Regular)   Pulse 117   Temp 98  F (36.7  C) (Oral)   Resp 16   Ht 1.6 m (5' 3\")   Wt 47.2 kg (104 lb 1.6 oz)   SpO2 99%   BMI 18.44 kg/m    104 lbs 1.6 oz  PHYSICAL EXAMINATION:   The patient is alert, oriented with a clear sensorium.   Skin shows no lesions or rashes and good turgor.   Head is normocephalic and atraumatic.   Eyes show PERRLA.   Ears show normal TMs bilaterally.   Mouth shows clear oral mucosa.   Neck shows no nodes, thyromegaly or bruits.   Back is nontender.   Lungs are clear to percussion and auscultation.   Heart shows normal S1 and S2 without murmur or gallop.   Abdomen is soft, nontender without masses or organomegaly.   Extremities show no edema and no evidence of active synovitis.  She has positive Phalen's test on the right she has negative Tinel's sign over the median nerve and " normal sensation intact distally in the fingertips  Neurologic examination shows cranial nerves II-XII intact. Motor shows normal strength. Reflexes are full and symmetrical.   Labs reviewed:  Results for orders placed or performed in visit on 10/26/21   Iron & Iron Binding Capacity     Status: Abnormal   Result Value Ref Range    Iron 20 (L) 35 - 180 ug/dL    Iron Binding Capacity 430 240 - 430 ug/dL    Iron Sat Index 5 (L) 15 - 46 %   Ferritin     Status: Abnormal   Result Value Ref Range    Ferritin 3 (L) 12 - 150 ng/mL   Comprehensive metabolic panel     Status: Abnormal   Result Value Ref Range    Sodium 136 133 - 144 mmol/L    Potassium 4.0 3.4 - 5.3 mmol/L    Chloride 100 94 - 109 mmol/L    Carbon Dioxide (CO2) 28 20 - 32 mmol/L    Anion Gap 8 3 - 14 mmol/L    Urea Nitrogen 10 7 - 30 mg/dL    Creatinine 0.47 (L) 0.52 - 1.04 mg/dL    Calcium 9.4 8.5 - 10.1 mg/dL    Glucose 175 (H) 70 - 99 mg/dL    Alkaline Phosphatase 72 40 - 150 U/L    AST 12 0 - 45 U/L    ALT 24 0 - 50 U/L    Protein Total 7.3 6.8 - 8.8 g/dL    Albumin 4.2 3.4 - 5.0 g/dL    Bilirubin Total 0.2 0.2 - 1.3 mg/dL    GFR Estimate >90 >60 mL/min/1.73m2   Hemoglobin A1c     Status: Abnormal   Result Value Ref Range    Hemoglobin A1C 10.6 (H) 0.0 - 5.6 %   TSH with free T4 reflex     Status: Abnormal   Result Value Ref Range    TSH 0.35 (L) 0.40 - 4.00 mU/L   CBC with platelets and differential     Status: Abnormal   Result Value Ref Range    WBC Count 8.2 4.0 - 11.0 10e3/uL    RBC Count 4.98 3.80 - 5.20 10e6/uL    Hemoglobin 11.9 11.7 - 15.7 g/dL    Hematocrit 38.5 35.0 - 47.0 %    MCV 77 (L) 78 - 100 fL    MCH 23.9 (L) 26.5 - 33.0 pg    MCHC 30.9 (L) 31.5 - 36.5 g/dL    RDW 16.7 (H) 10.0 - 15.0 %    Platelet Count 431 150 - 450 10e3/uL    % Neutrophils 82 %    % Lymphocytes 16 %    % Monocytes 2 %    % Eosinophils 0 %    % Basophils 0 %    % Immature Granulocytes 0 %    NRBCs per 100 WBC 0 <1 /100    Absolute Neutrophils 6.7 1.6 - 8.3 10e3/uL     Absolute Lymphocytes 1.3 0.8 - 5.3 10e3/uL    Absolute Monocytes 0.2 0.0 - 1.3 10e3/uL    Absolute Eosinophils 0.0 0.0 - 0.7 10e3/uL    Absolute Basophils 0.0 0.0 - 0.2 10e3/uL    Absolute Immature Granulocytes 0.0 <=0.0 10e3/uL    Absolute NRBCs 0.0 10e3/uL   T4 free     Status: Normal   Result Value Ref Range    Free T4 1.20 0.76 - 1.46 ng/dL   CBC with Platelets & Differential     Status: Abnormal    Narrative    The following orders were created for panel order CBC with Platelets & Differential.  Procedure                               Abnormality         Status                     ---------                               -----------         ------                     CBC with platelets and d...[[id number]]  Abnormal            Final result                 Please view results for these tests on the individual orders.         ASSESSMENT  1 diabetes mellitus poor control  2 Right carpel tunnel syndrome  3 psoriasis and  psoriatic arthritis with flares  4 Chronic pain on tramadol and oxycodone (17.5 ME/day)  5 Iron deficiency anemia needs further evaluation  6 Subclinical hyperthyroidism needs F/U recheck in 3 months      Plan  Are going to reassess her labs today including her iron studies and CBC.  I am going to have her try an exercise regimen for the carpal tunnel and I gave her some instructions in this regard.  If no improvement will get an EMG and consider injection.  We will continue her on her present medications and these were refilled.  We will reassess her diabetes today.    This note was completed using Dragon voice recognition software.      Nando Haskins MD  General Internal Medicine  [facility]  [phone]

## 2021-10-26 NOTE — NURSING NOTE
Chief Complaint   Patient presents with     Physical     Patient comes in for a physical exam.          Juliano Rueda MA on 10/26/2021 at 1:01 PM

## 2021-10-27 ENCOUNTER — MYC MEDICAL ADVICE (OUTPATIENT)
Dept: INTERNAL MEDICINE | Facility: CLINIC | Age: 36
End: 2021-10-27

## 2021-10-27 DIAGNOSIS — E11.9 TYPE 2 DIABETES MELLITUS WITHOUT COMPLICATION, WITH LONG-TERM CURRENT USE OF INSULIN (H): Primary | ICD-10-CM

## 2021-10-27 DIAGNOSIS — E11.9 CONTROLLED TYPE 2 DIABETES MELLITUS WITHOUT COMPLICATION, WITH LONG-TERM CURRENT USE OF INSULIN (H): ICD-10-CM

## 2021-10-27 DIAGNOSIS — Z79.4 CONTROLLED TYPE 2 DIABETES MELLITUS WITHOUT COMPLICATION, WITH LONG-TERM CURRENT USE OF INSULIN (H): ICD-10-CM

## 2021-10-27 DIAGNOSIS — Z79.4 TYPE 2 DIABETES MELLITUS WITHOUT COMPLICATION, WITH LONG-TERM CURRENT USE OF INSULIN (H): Primary | ICD-10-CM

## 2021-10-29 LAB — T3FREE SERPL-MCNC: 2.7 PG/ML (ref 2.3–4.2)

## 2021-11-01 ENCOUNTER — MYC REFILL (OUTPATIENT)
Dept: INTERNAL MEDICINE | Facility: CLINIC | Age: 36
End: 2021-11-01

## 2021-11-01 DIAGNOSIS — L40.50 PSORIATIC ARTHRITIS (H): ICD-10-CM

## 2021-11-02 RX ORDER — TRAMADOL HYDROCHLORIDE 50 MG/1
50 TABLET ORAL EVERY 8 HOURS PRN
Qty: 60 TABLET | Refills: 0 | Status: SHIPPED | OUTPATIENT
Start: 2021-11-06 | End: 2021-11-29

## 2021-11-08 ENCOUNTER — MYC REFILL (OUTPATIENT)
Dept: INTERNAL MEDICINE | Facility: CLINIC | Age: 36
End: 2021-11-08
Payer: COMMERCIAL

## 2021-11-08 DIAGNOSIS — L40.50 PSORIATIC ARTHRITIS (H): ICD-10-CM

## 2021-11-08 DIAGNOSIS — B00.1 RECURRENT COLD SORES: ICD-10-CM

## 2021-11-08 RX ORDER — OXYCODONE AND ACETAMINOPHEN 5; 325 MG/1; MG/1
1 TABLET ORAL EVERY 4 HOURS PRN
Qty: 30 TABLET | Refills: 0 | Status: SHIPPED | OUTPATIENT
Start: 2021-11-10 | End: 2021-11-15

## 2021-11-08 NOTE — TELEPHONE ENCOUNTER
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    Last Fill Date:   10/11/2021  Last Visit with PCP:   10/26/2021  Future Visits with PCP:   No.    Processing:   Larry Bernard RN at 8:12 AM on 11/8/2021.

## 2021-11-15 ENCOUNTER — MYC REFILL (OUTPATIENT)
Dept: INTERNAL MEDICINE | Facility: CLINIC | Age: 36
End: 2021-11-15
Payer: COMMERCIAL

## 2021-11-15 DIAGNOSIS — B00.1 RECURRENT COLD SORES: ICD-10-CM

## 2021-11-15 DIAGNOSIS — L40.50 PSORIATIC ARTHRITIS (H): ICD-10-CM

## 2021-11-15 RX ORDER — OXYCODONE AND ACETAMINOPHEN 5; 325 MG/1; MG/1
1 TABLET ORAL EVERY 4 HOURS PRN
Qty: 30 TABLET | Refills: 0 | Status: SHIPPED | OUTPATIENT
Start: 2021-11-15 | End: 2021-12-06

## 2021-11-29 ENCOUNTER — MYC REFILL (OUTPATIENT)
Dept: INTERNAL MEDICINE | Facility: CLINIC | Age: 36
End: 2021-11-29
Payer: COMMERCIAL

## 2021-11-29 DIAGNOSIS — L40.50 PSORIATIC ARTHRITIS (H): ICD-10-CM

## 2021-11-30 RX ORDER — TRAMADOL HYDROCHLORIDE 50 MG/1
50 TABLET ORAL EVERY 8 HOURS PRN
Qty: 60 TABLET | Refills: 0 | Status: SHIPPED | OUTPATIENT
Start: 2021-11-30 | End: 2021-12-27

## 2021-12-06 ENCOUNTER — MYC REFILL (OUTPATIENT)
Dept: INTERNAL MEDICINE | Facility: CLINIC | Age: 36
End: 2021-12-06
Payer: COMMERCIAL

## 2021-12-06 DIAGNOSIS — B00.1 RECURRENT COLD SORES: ICD-10-CM

## 2021-12-06 DIAGNOSIS — L40.50 PSORIATIC ARTHRITIS (H): ICD-10-CM

## 2021-12-07 RX ORDER — OXYCODONE AND ACETAMINOPHEN 5; 325 MG/1; MG/1
1 TABLET ORAL EVERY 4 HOURS PRN
Qty: 30 TABLET | Refills: 0 | Status: SHIPPED | OUTPATIENT
Start: 2021-12-07 | End: 2022-01-04

## 2021-12-08 NOTE — PROGRESS NOTES
[facility]  Rheumatology Clinic  Rene Granados MD  2021     Name: Chrissy Dixon  MRN: [id number]  Age: 36 year old    : 1985  Referring provider: Nando Haskins    Assessment and Plan:  # Psoriatic arthritis (polyarthritis, scalp and pustular psoriasis; failed Humira and leflunomide, dx'd post-partum in 2015):   Patient notes persistent improvement in knee mobility with reduced swelling and pain. Psoriasis is controlled.  Recurrent hand and finger pain displays Cosentyx dosing cycle associations.  Video exam shows no inflammatory joint or skin changes. Laboratory evaluation on 2021 showed normal comprehensive metabolic panel; hemoglobin A1c was 10.6, markedly elevated; iron studies showed saturation index of only 5.  TSH was suppressed at 0.35; CBC showed MCV of 77, but normal hemoglobin     Psoriatic arthritis continues responsive to combination Otezla and cosentyx.  Disease was formerly unresponsive to Stelara, and unresponsive to anti-TNF, and minimally improved with single use Cosentyx or Otezla. Skin and joint disease, and steroid requirements,  were greatly improved in , after starting combination rx.     I recommend continuing  combination therapy due to superior coverage that not only suppresses skin and joint symptoms dramatically, but allows her to function well enough to perform full-time work and maintain parenting responsibilities and function.    Plan:  1. Continue Otezla 30 mg twice daily. Recheck complete blood count, creatinine, LFTs in the next week.  2. Continue Cosentyx 300 mg monthly. If Cosentyx cannot be used due to coverage rules, I recommend substitution of guselkumab:  100 mg by prefilled syringe at time 0, week 4, and every 8 weeks thereafter. Do not start this medication until at least 30 days have elapsed since the last dose of Cosentyx.    3. If combination therapy proves difficult to sustain, I recommend consideration of xeljanz or rinvoq for  "inflammatory arthritis.  4.  Use short courses of Medrol (12 mg/day with taper to off over the course of 1 week) very sparingly to address inflammatory joint pain during \"gaps\" in Cosentyx or Otezla prescription.    # Type 1 diabetes mellitus:   Hemoglobin A1c remained above 8 at last check in 10-21.  Patient following with Endocrinology.     # Sciatica:  improved after epidural injection.     Follow-up: Return in about 6 months    HPI:   Chrissy Zack follows up for psoriatic arthritis. I last evaluated the patient on   4-2021. Psoriatic arthritis was judged improved.  I recommended continuing Cosentyx 300 mg monthly, in combination with Otezla 30 mg twice daily.    Background History:  She has had psoriasis since birth (presented with diffuse rash at birth), was diagnosed with psoriasis at age 11, and developed arthritis in her 20s. She was on Enbrel at age 21 but developed infections such as pneumonia, sinusitis, and mono, and stopped it within 7 months. She was then on methotrexate from 2011 until February 2012, but did not tolerate this due to GI upset. She switched to sulfasalazine 2/2012 and tolerated it well at 1000 mg thrice a day, but had decreasing efficacy as of 11/1/13. She started leflunomide in 10-13 but stopped in 1-14 due to GI intolerance. She started Humira in 1-14, held in 7-14 during pregnancy, restarted in 7-15 for flaring disease but then discontinued due to worsened psoriasis and lack of efficacy. Leflunomide was then restarted but loose stools developed. Leflunomide was stopped and Otezla was started on 9/11/15. She started Stelara in fall 2015 but stopped due to cost and inconvenience in early 2016. She has continued with Otezla monotherapy through present. She restarted Stelara in 12-18.  Stelara was discontinued in late 2019 for lack of benefit.  She started Cosentyx in November 2019 with excellent relief of joint pain.  Combination Cosentyx and Otezla was begun in early 2020. " "Guselkumab substituted for cosentyx in 1-2021.    Interval history 12-    She is overall doing well. She does note increased breakthrough inflammation affecting her hands. There is swelling of the L 5th and R 3 PIP joints, intermittent.    Knee inflammation is still there, consistent but not severe. She uses low dose medrol for increased knee swelling; in the last 2 months, she has used medrol most days, when cosentyx has been inconsistent. A/nkles and toes are painful especially when sits for a long time at work. She has continued otezla and cosentyx combination. 2-4 days before each cosentx, joint pain escalates.  No psoriasis at all since starting otezla.    She has encountered consistent insurance resistance to timely cosentyx shipping.    She is working with Dr. Haskins to work up low iron saturation index.     Interval history 04-:    She has encountered insurance resistance to timely cosentyx shipping. After > 6 weeks hiatus, she developed a flare of joint pain, focussed in the knees. Short courses of prednisone have kept knee joint swelling at bay. KNee pain is \"achy\" heaviness, worse with bending. She has continued otezla throughout.     There is 10 days of increased knee symptoms prior to cosentyx. Symptoms improve after 1-2 days following the shot    She has not had psoriasis at all.    She had both doses of Covid vaccine in 4-2021.    Blood sugar has been \"horrible\" with wide swings in BG, assocaited with inconsistent eating.    Interval history 12-:  Her health is overall good.   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 Her diabetes control has suffered.    There is intermittent knee swelling, R > L. No warmth or redness. Knees feel better when she moves, but she has next-day stiffness after activity. She has cut back on ibuprofen and changed to tylenol for primary relief of pain. No early morning stiffness     Psoriasis continues well-controlled; " "she thinks that combination otezla and cosentyx is very important for this control.  She is concerned that insurance will withdraw coverage for cosentyx in 2021.     Interval history 08-:    She has been \"pretty good\". Reports reduced flares of pain in her knees. She does note some cycling of symptoms with knee tightness/stiffness about 1 week before she is due to take cosentyx. With the combination of otezla/cosentyx she is overall much better. NO need for medrol.    Still has intermittent back pain, will start PTx in the next few weeks. She has some radiculopathic symptoms in the left foot.    No new skin symptoms. +minor waxing/waning of plaques behind L ear    Hx 11-19:  Today, she reports that her knee mobility is 80% improved. She states that she has only had 1 flare in her knee since starting secukinumab. She does note that she sometimes has swelling in her toes and fingers, but this does not limit her. She states that she has been able to walk daily now that her knee pain is much better.    She explains that the introduction of secukinumab injections seems to be the most effective of her managements as she noticed resolution of knee symptoms very shortly after her first injection. She continued Otezla, did try a lower dosage, and, after lowering the dosage, she experienced a recurrence of psoriasis plaques. Other than this Otezla dosing change, her psoriasis has been mild and scant. She has also been using triamcinolone cream.    She states that this month marks her third dose of Cosentyx. She explains that one week prior to receiving this injection, she hs increased morning stiffness in her hands and feet. She notes that, two days after receiving this Cosentyx injection, her morning stiffness is much better.     She reports that she has not been on prednisone or other steroids since shortly after her last visit with me. She explains that her blood sugars have been much easier to control now that she " is off of prednisone.     She reports that her last knee injection or aspiration took place around 3 months ago.      Review of Systems:   Pertinent items are noted in HPI or as below, remainder of complete ROS is negative.      No recent problems with hearing or vision. No swallowing problems.   No breathing difficulty, shortness of breath, coughing, or wheezing.  No chest pain or palpitations.  No heart burn, indigestion, abdominal pain, nausea, vomiting, diarrhea.  No urination problems, no bloody, cloudy urine, no dysuria.  No numbing, tingling, weakness.  No headaches or confusion.  No easy bleeding or bruising.     Active Medications:   Current Outpatient Medications   Medication Sig     apremilast (OTEZLA) 30 MG tablet Take 1 tablet (30 mg) by mouth 2 times daily     cholecalciferol (VITAMIN D3) 1000 UNIT tablet Take 1 tablet (1,000 Units) by mouth daily     clobetasol (TEMOVATE) 0.05 % external solution Apply topically 2 times daily (Patient taking differently: Apply topically 2 times daily as needed )     Continuous Blood Gluc Sensor (DEXCOM G6 SENSOR) MISC 1 Device by Device route every 10 days     Continuous Blood Gluc Sensor (DEXCOM G6 SENSOR) MISC 1 Package by Device route every 10 days     Continuous Blood Gluc Transmit (DEXCOM G6 TRANSMITTER) MISC Inject 1 each Subcutaneous continuous     Fluocinolone Acetonide (DERMA-SMOOTHE/FS SCALP) 0.01 % OIL Apply to scalp at bedtime, then wear showercap, rinse off in morning. (Patient taking differently: Apply to scalp at bedtime, then wear showercap, rinse off in morning as needed.)     fluticasone (FLONASE) 50 MCG/ACT nasal spray Spray 2 sprays into both nostrils daily     ibuprofen (ADVIL/MOTRIN) 600 MG tablet Take 1 tablet (600 mg) by mouth every 8 hours as needed for moderate pain     insulin aspart (NOVOLOG FLEXPEN) 100 UNIT/ML pen Use three times daily with meals, currently 30 units daily     insulin detemir (LEVEMIR FLEXTOUCH) 100 UNIT/ML pen Inject 18  Units Subcutaneous 2 times daily     insulin pen needle 31G X 8 MM Use  3-4 pen needles daily or as directed.     LEVEMIR FLEXTOUCH 100 UNIT/ML pen INJECT 30 UNITS UNDER THE SKIN EVERY MORNING BEFORE BREAKFAST     metFORMIN (GLUCOPHAGE) 500 MG tablet Take 2 tablets (1,000 mg) by mouth 2 times daily (with meals) Please call [phone] for annual appointment     methylPREDNISolone (MEDROL) 4 MG tablet Take 3 tabs daily for 7 days, then 2 tabs daily for 7 days.     methylPREDNISolone (MEDROL) 4 MG tablet TAKE 3 TABLETS DAILY BY MOUTH FOR 1 WEEK THEN 2 TABLETS DAILY FOR 1 WEEK THEN OFF     ONETOUCH LANCETS MISC Use to test blood sugar 6 to 8 times daily or as directed. Okay to dispense One Touch Verio products per patient's insurance.     oxyCODONE-acetaminophen (PERCOCET) 5-325 MG tablet Take 1 tablet by mouth every 4 hours as needed for severe pain (must last 30 days from dispense date)     secukinumab (COSENTYX SENSOREADY PEN) 150 MG/ML Sensoready pen Inject 2 mLs (300 mg) Subcutaneous every 28 days Hold for signs of infection, and seek medical attention.     traMADol (ULTRAM) 50 MG tablet Take 1 tablet (50 mg) by mouth every 8 hours as needed for moderate pain . Must last 30 days     triamcinolone (KENALOG) 0.1 % cream Apply topically 2 times daily (Patient taking differently: Apply topically 2 times daily as needed )     valACYclovir (VALTREX) 500 MG tablet Take 1 tablet (500 mg) by mouth daily as needed     predniSONE (DELTASONE) 5 MG tablet Take 4 tabs daily for 7 days, then 3 tabs daily for 7 days. (Patient not taking: Reported on 12/9/2021)     No current facility-administered medications for this visit.         Allergies:  Diagn[employer]   Betadine   Levofloxacin   Penicillins   Pneumococcal Vaccines   Prenatal Vit-Fe Fumarate-Fa  Tdap    Past Medical History:  Diabetes    Psoriatic arthritis   Diabetes mellitus type 2, controlled   Psoriasis arthropathia   Malaise and fatigue  Iron deficiency  anemia due to chronic blood loss  Right knee pain    Past Surgical History:  Arthroscopy knee with meniscectomy, right (11/20/2018)  AS Hysteroscopy Surgical W/Endometrial, bilateral (2017)   Tonsillar abscess    Family History:    The patient's family history includes Connective Tissue Disorder in her father; Diabetes in her maternal grandfather, maternal uncle, maternal uncle, and mother; Hypertension in her maternal grandfather, maternal grandmother, and mother; Prostate Cancer in her maternal grandfather; Thyroid Disease in her mother.    Social History:  The patient reports that she quit smoking about 12 years ago. Her smoking use included cigarettes. She has a 5.00 pack-year smoking history. She has never used smokeless tobacco. She reports that she does not drink alcohol or use drugs.   PCP: Nando Haskins  Marital Status:      Physical Exam:   There were no vitals taken for this visit.   Wt Readings from Last 4 Encounters:   10/26/21 47.2 kg (104 lb 1.6 oz)   09/14/20 50.8 kg (112 lb)   11/25/19 50.3 kg (110 lb 14.4 oz)   11/22/19 49.7 kg (109 lb 9.6 oz)     Eyes: Normal EOM, PERRLA, vision, conjunctiva, sclera.  ENT: Normal external ears, nose, hearing, lips, teeth, gums, throat. No mucous membrane lesions, normal saliva pool.  Neck: No mass or thyroid enlargement.  Resp: breathing unlaboreed  MS: The TMJ, neck, shoulder, elbow, wrist, MCP/PIP/DIP were normal. Knees with normal range of motion.  Skin: No nail pitting, alopecia, rash, nodules, or lesions. No flaking or redness along the hairline or in the ears. No psoriasis over the back.  Neuro: Normal cranial nerves  Psych: Normal judgement, orientation, memory, affect.     Laboratory:   RHEUM RESULTS Latest Ref Rng & Units 7/1/2008 3/14/2011 3/15/2011   ALBUMIN 3.4 - 5.0 g/dL 4.4 - -   ALT 0 - 50 U/L 11 59(H) 51(H)   AST 0 - 45 U/L 18 91(H) 55(H)   ANACAS <3.00 U - - -   ANCA - - - -   CK TOTAL 32 - 200 U/L 50 - -   CREATININE 0.52 - 1.04  mg/dL 0.71 0.69 0.66   CRP 0.0 - 8.0 mg/L - - -   JOHN <1.0 - - -   GFR ESTIMATE, IF BLACK >60 mL/min/[1.73:m2] Not Calculated >90 >90   GFR ESTIMATE >60 mL/min/1.73m2 Not Calculated >90 >90   HEMATOCRIT 35.0 - 47.0 % 43.0 33.9(L) 33.0(L)   HEMOGLOBIN 11.7 - 15.7 g/dL 14.3 11.4(L) 11.1(L)   HEPBANG (Negative) - - -   HCVAB NEG - - -   WBC 4.0 - 11.0 10e3/uL 7.9 9.9 7.9   RBC 3.80 - 5.20 10e6/uL 4.89 3.94 3.88   RDW 10.0 - 15.0 % 12.3 12.6 13.1   MCHC 31.5 - 36.5 g/dL 33.3 33.7 33.7   MCV 78 - 100 fL 88 86 85   PLATELET COUNT 150 - 450 10e3/uL 451(H) 123(L) 111(L)   RHEUMATOID FACTOR 0 - 14 IU/mL - - -   ESR 0 - 20 mm/h - - -   QUANTIFERON-TB GOLD PLUS RESULT NEG:Negative - - -     Rheumatoid Factor   Date Value Ref Range Status   08/11/2011 11 0 - 14 IU/mL Final     Cyclic Cit Pept IgG/IgA   Date Value Ref Range Status   08/11/2011 <20  Interpretation:  Negative <20 UNITS Final     CHALO Screen by EIA   Date Value Ref Range Status   04/09/2014 <1.0  Interpretation:  Negative <1.0 Final     Hepatitis B Core Ana Rosa   Date Value Ref Range Status   08/02/2019 Nonreactive NR^Nonreactive Final     Antistreptolysin O   Date Value Ref Range Status   09/11/2015 109 0 - 120 IU/mL Final     Comment:     A single ASO analysis may not be meaningful due to the variability of ASO   values   within the normal population.  Both clinical and laboratory findings should   be   considered in reaching a diagnosis.  A single analysis may be less   informative   than a repeat analysis showing a change.       Quantiferon-TB Gold Plus Result   Date Value Ref Range Status   12/26/2018 Negative NEG^Negative Final     Comment:     No interferon gamma response to M.tuberculosis antigens was detected.   Infection with M.tuberculosis is unlikely, however a single negative result   does not exclude infection. In patients at high risk for infection, a second   test should be considered  in accordance with the 2017 ATS/IDSA/CDC Clinical Practice  Guidelines for   Diagnosis of Tuberculosis in Adults and Children [Angela DIXON et   al.Clin.Infect.Dis. 2017 64(2):111-115].       TB1 Ag minus Nil Value   Date Value Ref Range Status   12/26/2018 0.00 IU/mL Final     TB2 Ag minus Nil Value   Date Value Ref Range Status   12/26/2018 0.00 IU/mL Final     Mitogen minus Nil Result   Date Value Ref Range Status   12/26/2018 >10.00 IU/mL Final     Nil Result   Date Value Ref Range Status   12/26/2018 0.02 IU/mL Final     Neutrophil Cytoplasmic IgG Antibody   Date Value Ref Range Status   05/31/2013   Final    <1:20  Reference range: <1:20  (Note)  The ANCA IFA is <1:20; therefore, no further testing will  be performed.  INTERPRETIVE INFORMATION: Anti-Neutrophil Cyto Ab, IgG    Neutrophil Cytoplasmic Antibodies (C-ANCA = granular  cytoplasmic staining, P-ANCA = perinuclear staining) are  found in the serum of over 90 percent of patients with  certain necrotizing systemic vasculitides, and usually in  less than 5 percent of patients with collagen vascular  disease or arthritis.  Performed by [facility],  [address] [place] [id number] [phone]  [email or web address], Nataly Roth MD, Lab. Director

## 2021-12-09 ENCOUNTER — VIRTUAL VISIT (OUTPATIENT)
Dept: RHEUMATOLOGY | Facility: CLINIC | Age: 36
End: 2021-12-09
Payer: COMMERCIAL

## 2021-12-09 VITALS — WEIGHT: 115 LBS | BODY MASS INDEX: 20.38 KG/M2 | HEIGHT: 63 IN

## 2021-12-09 DIAGNOSIS — L40.50 PSORIATIC ARTHRITIS (H): ICD-10-CM

## 2021-12-09 PROCEDURE — 99214 OFFICE O/P EST MOD 30 MIN: CPT | Performed by: INTERNAL MEDICINE

## 2021-12-09 RX ORDER — METHYLPREDNISOLONE 4 MG/1
TABLET ORAL
Qty: 48 TABLET | Refills: 3 | Status: SHIPPED | OUTPATIENT
Start: 2021-12-09 | End: 2022-05-17

## 2021-12-09 RX ORDER — SECUKINUMAB 150 MG/ML
300 INJECTION SUBCUTANEOUS
Qty: 2 ML | Refills: 3 | Status: SHIPPED | OUTPATIENT
Start: 2021-12-09 | End: 2022-12-02

## 2021-12-09 RX ORDER — APREMILAST 30 MG/1
30 TABLET, FILM COATED ORAL 2 TIMES DAILY
Qty: 180 TABLET | Refills: 5 | Status: SHIPPED | OUTPATIENT
Start: 2021-12-09 | End: 2022-08-04

## 2021-12-09 ASSESSMENT — PAIN SCALES - GENERAL: PAINLEVEL: MODERATE PAIN (5)

## 2021-12-09 ASSESSMENT — MIFFLIN-ST. JEOR: SCORE: 1185.77

## 2021-12-09 NOTE — NURSING NOTE
"Chief Complaint   Patient presents with     RECHECK     Psoriatic arthritis       Vitals:    12/09/21 1047   Weight: 52.2 kg (115 lb)   Height: 1.6 m (5' 3\")       Body mass index is 20.37 kg/m .    Sultana Dahl MA  "

## 2021-12-09 NOTE — PATIENT INSTRUCTIONS
"Diagnosis:  1. Psoriatic arthritis: Superior overall control of joint and skin disease continues with combination Cosentyx and Otezla in 2020. Video exam shows no psoriasis.  I recommend continuing combination Cosentyx and Otezla.    2.  Diabetes mellitus: Hemoglobin A1c is markedly elevated according to blood work in October 2021.  Follow with endocrinology and primary care Dr. Haskins, start use of insulin pump, and limit use of Medrol.  3. Low iron stores: There is no ruby anemia, but I agree with Dr. Haskins's plans for work-up.    Plan:  1. Continue Otezla 30 mg twice daily. Recheck complete blood count, creatinine, LFTs in the next week.  2. Continue Cosentyx 300 mg monthly. If Cosentyx cannot be used due to coverage rules, I recommend substitution of guselkumab:  100 mg by prefilled syringe at time 0, week 4, and every 8 weeks thereafter. Do not start this medication until at least 30 days have elapsed since the last dose of Cosentyx.    3. If combination therapy proves difficult to sustain, I recommend consideration of xeljanz or rinvoq for inflammatory arthritis.  4.  Use short courses of Medrol (12 mg/day with taper to off over the course of 1 week) very sparingly to address inflammatory joint pain during \"gaps\" in Cosentyx or Otezla prescription.    "

## 2021-12-13 NOTE — PROGRESS NOTES
Outcome for 12/13/21 9:41 AM: Left Voicemail    Outcome for 12/13/21 2:37 PM: Left Voicemail    Outcome for 12/13/21 2:38 PM: mychart sent to patient requesting data also dexcom invitation sent via email.   Outcome for 12/13/21 6:43 PM: Data emailed to provider

## 2021-12-14 ENCOUNTER — PRE VISIT (OUTPATIENT)
Dept: ENDOCRINOLOGY | Facility: CLINIC | Age: 36
End: 2021-12-14

## 2021-12-14 ENCOUNTER — VIRTUAL VISIT (OUTPATIENT)
Dept: ENDOCRINOLOGY | Facility: CLINIC | Age: 36
End: 2021-12-14
Attending: INTERNAL MEDICINE
Payer: COMMERCIAL

## 2021-12-14 ENCOUNTER — TELEPHONE (OUTPATIENT)
Dept: ENDOCRINOLOGY | Facility: CLINIC | Age: 36
End: 2021-12-14

## 2021-12-14 DIAGNOSIS — Z79.4 TYPE 2 DIABETES MELLITUS WITH HYPERGLYCEMIA, WITH LONG-TERM CURRENT USE OF INSULIN (H): Primary | ICD-10-CM

## 2021-12-14 DIAGNOSIS — E11.65 TYPE 2 DIABETES MELLITUS WITH HYPERGLYCEMIA, WITH LONG-TERM CURRENT USE OF INSULIN (H): Primary | ICD-10-CM

## 2021-12-14 PROCEDURE — 99215 OFFICE O/P EST HI 40 MIN: CPT | Mod: 95 | Performed by: STUDENT IN AN ORGANIZED HEALTH CARE EDUCATION/TRAINING PROGRAM

## 2021-12-14 PROCEDURE — 99417 PROLNG OP E/M EACH 15 MIN: CPT | Performed by: STUDENT IN AN ORGANIZED HEALTH CARE EDUCATION/TRAINING PROGRAM

## 2021-12-14 RX ORDER — SEMAGLUTIDE 1.34 MG/ML
INJECTION, SOLUTION SUBCUTANEOUS
Qty: 3 MG | Refills: 1 | Status: SHIPPED | OUTPATIENT
Start: 2021-12-14 | End: 2022-03-17

## 2021-12-14 ASSESSMENT — ENCOUNTER SYMPTOMS
DIFFICULTY URINATING: 0
INSOMNIA: 1
CHILLS: 0
FATIGUE: 1
BLOOD IN STOOL: 1
HOT FLASHES: 0
SKIN CHANGES: 0
DECREASED LIBIDO: 0
NERVOUS/ANXIOUS: 1
MYALGIAS: 0
POLYPHAGIA: 0
INCREASED ENERGY: 1
HALLUCINATIONS: 0
BLOATING: 1
DECREASED APPETITE: 0
MUSCLE CRAMPS: 0
ABDOMINAL PAIN: 1
HEMATURIA: 0
DEPRESSION: 0
WEIGHT LOSS: 0
NAIL CHANGES: 0
NECK PAIN: 0
DYSURIA: 1
DIARRHEA: 1
DECREASED CONCENTRATION: 1
POLYDIPSIA: 0
JAUNDICE: 0
BACK PAIN: 1
CONSTIPATION: 0
ALTERED TEMPERATURE REGULATION: 0
STIFFNESS: 1
FEVER: 0
ARTHRALGIAS: 1
WEIGHT GAIN: 0
MUSCLE WEAKNESS: 0
NIGHT SWEATS: 1
JOINT SWELLING: 1
HEARTBURN: 1
FLANK PAIN: 0
NAUSEA: 1
VOMITING: 0
POOR WOUND HEALING: 0
BOWEL INCONTINENCE: 0
RECTAL PAIN: 0
PANIC: 0

## 2021-12-14 NOTE — TELEPHONE ENCOUNTER
Pt needs 4 week follow up with . Left message on cell regarding follow up and left endo number for pt to return call.     Taylor Myhre

## 2021-12-14 NOTE — PATIENT INSTRUCTIONS
It was nice meeting you!    Schedule a lab appointment  Start Ozempic Inject 0.25 mg weekly for 4 weeks then 0.5 mg weekly    Continue levemir 26 units in the morning  Decrease evening levemir to 12 units  Increase: insulin to carb to 1:10g on days off methyl prednisone                  insulin to carb to 1:5 g on days taking 16 mg methyl                    insulin to carb to 1:7g on days taking 8 or 6 mg methyl prednisone    On days using methylpred:  -Insulin sliding scale (high dose)(take the follow units of insulin IN ADDITION to your prescribed mealtime insulin)  141-165 1 units  165-190 2 units  191-215 3 units  215-240 4 units  241-265 5 units  265-290 6 units  291-315 7 units  315-340 8 units  341-365 9 units  365-390 10 units       >390 11 units and call clinic for advice.       On days off prednisone:  Insulin sliding scale (low dose)(take the follow units of insulin IN ADDITION to your prescribed mealtime insulin)  141-190 1 units  191-240 2 units  241-290 3 units  291-340 4 units  341-390 5 units  391-440 6 units  441-490 7 units  >490      8 units and call clinic for advise.

## 2021-12-14 NOTE — PROGRESS NOTES
Adriana is a 36 year old who is being evaluated via a billable video visit.     Patient reviewed all allergies and medications on [employer] and states all are correct.     How would you like to obtain your AVS? MyChart  If the video visit is dropped, the invitation should be resent by: Send to e-mail at: [email or web address]  Will anyone else be joining your video visit? No    Endocrinology Clinic Visit 12/14/2021    NAME:  Chrissy Dixon  PCP:  Nando Haskins  MRN:  [id number]  Reason for Consult:  Uncontrolled DM2  Requesting Provider:  Nando Haskins    Video-Visit Details     Type of service:  Video Visit     Video Start Time: 12:39 pm  Video End Time: 1:25 pm     I spent a total of 90 minutes on the date of encounter reviewing medical records, evaluating the patient, coordinating care and documenting in the EHR, as detailed above.        Platform used for Video Visit: [employer]      Chief Complaint     Chief Complaint   Patient presents with     Video Visit     Refferal from Dr. Haskins       History of Present Illness     Chrissy Dixon is a 36 year old female who is seen in clinic for uncontrolled MD2    The patient was diagnosed with type 2 diabetes diagnosed at 25 y.o during her pregancy at the screening OGGT ( gestational DM), started on insulin right away. After delivery insulin was stopped, shortly 4 month postpartum she had hyperglycemia symptoms and went back on insulin, MDI. She has been on MDI for the past 10 years now. She was also metformin and remains on it. No other anti-DM that she tried.     She has psoriatic arthritis using methylpred for flares using it for 4-7 days every other month. Usually uses 8-16 mg ( depending on swelling) for 2 days, 8- 6mg for 2 days then 4 mg  for2  days  She failed multiple meds for her psoriatic arthritis and now working with her rheumatologist to find alternative and decrease her steroid intake. She reported she has been on steroid on days more  than off of it especially the past month.     Previous A1C in records reviewed.     Lab Results   Component Value Date    A1C 10.6 10/26/2021    A1C 8.3 09/14/2020    A1C 8.0 08/02/2019    A1C 8.0 04/22/2019    A1C 8.2 06/26/2018    A1C 8.2 03/20/2017       Weight is   Wt Readings from Last 4 Encounters:   12/09/21 52.2 kg (115 lb)   10/26/21 47.2 kg (104 lb 1.6 oz)   09/14/20 50.8 kg (112 lb)   11/25/19 50.3 kg (110 lb 14.4 oz)     Diabetes Care/Complications:   Eyes: no DR, last exam 1 month ago  Kidneys: last checked 9/2020  Nerves: she has mild tingling in feet, also has baker cyst.   Smoking: no   Blood Pressure: no   Lipids: ldl 67 on 8/2018  Macrovascular: no   Hypoglycemia: not recently due to steroid use    Previous DM related Hospitalization:  No    Current treatment strategy:  levemir 26u in am and 17 unit(s) in pm  novolog 1u:15 g pre meals tid qac average 4-6 units per meal  She use sliding scale insulin 1 hour after meal, uses 1-2 units no specific ssi.  Metformin 2 g daily      Blood Glucose Monitoring:                She was off steroid on thanks giving:            Diet: she is more like a snacker. 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 She has 2 kids ( 10 y.o and 6 y.o). she does not smoke, alcohol rare.    Problem List     Patient Active Problem List   Diagnosis     CARDIOVASCULAR SCREENING; LDL GOAL LESS THAN 160     Psoriatic arthritis (H)     Diabetes mellitus type 2, controlled (H)     Psoriasis arthropathica (H)     Malaise and fatigue     Iron deficiency anemia due to chronic blood loss     Right knee pain     Aftercare following surgery of the musculoskeletal system         Medications     Current Outpatient Medications   Medication     semaglutide (OZEMPIC, 0.25 OR 0.5 MG/DOSE,) 2 MG/1.5ML SOPN pen     apremilast (OTEZLA) 30 MG tablet     cholecalciferol (VITAMIN D3) 1000 UNIT tablet     clobetasol (TEMOVATE) 0.05 % external solution     Continuous Blood Gluc Sensor (DEXCOM G6 SENSOR) MISC     Continuous Blood Gluc Sensor (DEXCOM G6 SENSOR) MISC     Continuous Blood Gluc Transmit (DEXCOM G6 TRANSMITTER) MISC     Fluocinolone Acetonide (DERMA-SMOOTHE/FS SCALP) 0.01 % OIL     fluticasone (FLONASE) 50 MCG/ACT nasal spray     ibuprofen (ADVIL/MOTRIN) 600 MG tablet     insulin aspart (NOVOLOG FLEXPEN) 100 UNIT/ML pen     insulin detemir (LEVEMIR FLEXTOUCH) 100 UNIT/ML pen     insulin pen needle 31G X 8 MM     LEVEMIR FLEXTOUCH 100 UNIT/ML pen     metFORMIN (GLUCOPHAGE) 500 MG tablet     methylPREDNISolone (MEDROL) 4 MG tablet     methylPREDNISolone (MEDROL) 4 MG tablet     ONETOUCH LANCETS MISC     oxyCODONE-acetaminophen (PERCOCET) 5-325 MG tablet     predniSONE (DELTASONE) 5 MG tablet     secukinumab (COSENTYX SENSOREADY PEN) 150 MG/ML Sensoready pen     traMADol (ULTRAM) 50 MG tablet     triamcinolone (KENALOG) 0.1 % cream     valACYclovir (VALTREX) 500 MG tablet     No current facility-administered medications for this visit.        Allergies     Allergies   Allergen Reactions     Diagnostic X-Ray Materials Swelling     Betadine [Povidone Iodine] Swelling     Levofloxacin Other (See Comments)     High blood sugars, doesn't feel well at all      Penicillins Hives     Pneumococcal Vaccines Swelling     Prenatal Vit-Fe Fumarate-Fa [Cavan-Folate Ob] Hives     Tdap [Daptacel] Swelling       Medical / Surgical History     Past Medical History:   Diagnosis Date     Diabetes      Other psoriasis      Polyarthritis     probable psoriatic arthritis     Past Surgical History:   Procedure Laterality Date     ARTHROSCOPY KNEE WITH MENISCECTOMY Right 11/20/2018    Procedure: Examination Under  Anesthesia Right Knee, Right Knee Arthroscopy, Partial Meniscectomy, Chondroplasty, Cyst Decompression, Synovial Biopsy;  Surgeon: Sean Pate MD;  Location: [facility] OR     AS HYSTEROSCOPY, SURGICAL; W/ ENDOMETRIAL ABLATION, ANY METHOD Bilateral 2017    endometrial ablation with bilater TL     SURGICAL HISTORY OF -       .  Tonsillar abscess       Social History     Social History     Socioeconomic History     Marital status:      Spouse name: Not on file     Number of children: Not on file     Years of education: Not on file     Highest education level: Not on file   Occupational History     Occupation: Dental Assist.     Employer: UNKNOWN   Tobacco Use     Smoking status: Former Smoker     Packs/day: 1.00     Years: 5.00     Pack years: 5.00     Types: Cigarettes     Quit date: 2007     Years since quittin.1     Smokeless tobacco: Never Used   Substance and Sexual Activity     Alcohol use: No     Alcohol/week: 1.7 standard drinks     Types: 2 Standard drinks or equivalent per week     Drug use: No     Sexual activity: Yes     Partners: Male     Birth control/protection: I.U.D.     Comment: Mirena   Other Topics Concern      Service Not Asked     Blood Transfusions Not Asked     Caffeine Concern Not Asked     Occupational Exposure Not Asked     Hobby Hazards Not Asked     Sleep Concern Not Asked     Stress Concern Not Asked     Weight Concern Not Asked     Special Diet Not Asked     Back Care Not Asked     Exercise No     Bike Helmet Not Asked     Seat Belt Not Asked     Self-Exams Not Asked     Parent/sibling w/ CABG, MI or angioplasty before 65F 55M? Not Asked   Social History Narrative    How much exercise per week? No    How much calcium per day? Diet      How much caffeine per day? Coffee  2    How much vitamin D per day? Diet    Do you/your family wear seatbelts?  Yes    Do you/your family use safety helmets? No    Do you/your family use sunscreen? Yes    Do you/your  family keep firearms in the home? No    Do you/your family have a smoke detector(s)? Yes        Do you feel safe in your home? Yes    Has anyone ever touched you in an unwanted manner? No     Explain          Social Determinants of Health     Financial Resource Strain: Not on file   Food Insecurity: Not on file   Transportation Needs: Not on file   Physical Activity: Not on file   Stress: Not on file   Social Connections: Not on file   Intimate Partner Violence: Not on file   Housing Stability: Not on file       Family History     Family History   Problem Relation Age of Onset     Thyroid Disease Mother      Diabetes Mother      Hypertension Mother      Connective Tissue Disorder Father         Psoriasis     Diabetes Maternal Grandfather      Hypertension Maternal Grandfather      Prostate Cancer Maternal Grandfather      Diabetes Maternal Uncle      Diabetes Maternal Uncle      Hypertension Maternal Grandmother      Asthma No family hx of      Asthma Mother      Colon Cancer Mother      Chronic Obstructive Pulmonary Disease Mother      Arthritis Father      Vision Loss Father      Diabetes Maternal Uncle      Diabetes Maternal Grandmother        ROS     12 ROS completed, pertinent positive and negative in HPI    Physical Exam   There were no vitals taken for this visit.   GENERAL: Healthy, alert and no distress  EYES: Eyes grossly normal to inspection.  No discharge or erythema, or obvious scleral/conjunctival abnormalities.  RESP: No audible wheeze, cough, or visible cyanosis.  No visible retractions or increased work of breathing.    SKIN: Visible skin clear. No significant rash, abnormal pigmentation or lesions.  NEURO: Cranial nerves grossly intact.  Mentation and speech appropriate for age.  PSYCH: Mentation appears normal, affect normal/bright, judgement and insight intact, normal speech and appearance well-groomed.    Labs/Imaging     Pertinent Labs were reviewed and updated in [facility].  Radiology Results were   reviewed and updated in EPIC.    Summary of recent findings:   Lab Results   Component Value Date    A1C 10.6 10/26/2021    A1C 8.3 09/14/2020    A1C 8.0 08/02/2019    A1C 8.0 04/22/2019    A1C 8.2 06/26/2018    A1C 8.2 03/20/2017       TSH   Date Value Ref Range Status   10/26/2021 0.35 (L) 0.40 - 4.00 mU/L Final   09/14/2020 0.82 0.40 - 4.00 mU/L Final   08/20/2018 0.84 0.40 - 4.00 mU/L Final   03/20/2017 0.51 0.40 - 4.00 mU/L Final   05/18/2016 1.32 0.40 - 4.00 mU/L Final   10/11/2013 2.23 0.4 - 5.0 mU/L Final     Free T4   Date Value Ref Range Status   10/26/2021 1.20 0.76 - 1.46 ng/dL Final       Creatinine   Date Value Ref Range Status   10/26/2021 0.47 (L) 0.52 - 1.04 mg/dL Final   05/04/2021 0.51 (L) 0.52 - 1.04 mg/dL Final       Recent Labs   Lab Test 08/20/18  1200 07/07/17  1712   CHOL 158 143   HDL 71 66   LDL 67 65   TRIG 100 63       No results found for: [id number], [id number], [id number]    I personally reviewed the patient's outside records from [facility] EMR and Care Everywhere. Summary of pertinent findings in HPI.    Impression / Plan     1. Diabetes Mellitus: Type 2  2. Steroid induced hyperglycemia    She has uncontrolled dm2 with significant insulin resistance due to steroid use. We discussed the importance of working with her rheumatologist to find alternative treatment for her psoriatic arthritis is avoiding steroid use.   I reviewed her Dexcom she reported she has been on varying doses of methylprednisolone for the past month except on Thanksgiving she was off of it. I reviewed her blood sugar trend during Thanksgiving, she had significant peaks of hyperglycemia at mealtimes and fasting low blood sugar. Otherwise the rest of the days , she has a trend of daytime and nighttime hyperglycemia with peaks on mealtime.  We discussed different carb ratio regimen depending on steroid dose as below. We discussed adding a GLP-1 agonist to help with insulin resistance and decrease insulin requirements. I  reviewed with her GLP-1 including stomach upset nausea association with pancreatitis and MTC in rats.  She has an upcoming appointment with CDE. She is interested in getting a pump, she is overwhelmed with the amount of injection she is doing. We discussed that pump is not of less work to manage her diabetes but might be an option for her.       Plan:   Start Ozempic Inject 0.25 mg weekly for 4 weeks then 0.5 mg weekly    Continue levemir 26 units in the morning  Decrease evening levemir to 12 units  Increase: insulin to carb to 1:10g on days off methyl prednisone                  insulin to carb to 1:5 g on days taking 16 mg methyl  prednisone                                insulin to carb to 1:7g on days taking 8 or 6 mg methyl prednisone  High sliding scale insulin On days using methylpred.  Low sliding scale insulin on days off steroids.       2. Diabetes Complications: no. Due for urine microalb     3. Blood Pressure Management: Blood pressure is controlled. Currently is  on pharmacotherapy for this.     4.Lipid Management: Per the new ACC/JENISE/NHLBI guidelines, statins are recommended for individuals with diabetes aged 40-75 with LDL  without ASCVD, and for any individual with ASCVD. Currently the patient is not on a statin. No indication.     5. Smoking Status: Patient Pt is smoke free..     Review of the result(s) of each unique test - A1c and diabetes related labs.  90 minutes spent on the date of the encounter doing chart review, history and exam, documentation and further activities per the note          Test and/or medications prescribed today:  Orders Placed This Encounter   Procedures     TSH     T4 free     C-peptide     Glucose     Albumin Random Urine Quantitative with Creat Ratio     Lipid panel reflex to direct LDL Fasting         Follow up: 1 month      Abdon Arcos MD  Endocrinology, Diabetes and Metabolism  [facility]    Review of the result(s) of each unique test - A1c in  records  75 minutes spent on the date of the encounter doing chart review, history and exam, documentation and further activities per the note

## 2021-12-14 NOTE — LETTER
12/14/2021       RE: Chrissy Dixon  [address] [facility] [address]     Dear Colleague,    Thank you for referring your patient, Chrissy Dixon, to the [facility] ENDOCRINOLOGY CLINIC [place] [facility]. Please see a copy of my visit note below.    Outcome for 12/13/21 9:41 AM: Left Voicemail    Outcome for 12/13/21 2:37 PM: Left Voicemail    Outcome for 12/13/21 2:38 PM: mychart sent to patient requesting data also dexcom invitation sent via email.   Outcome for 12/13/21 6:43 PM: Data emailed to provider              Adriana is a 36 year old who is being evaluated via a billable video visit.     Patient reviewed all allergies and medications on mychart and states all are correct.     How would you like to obtain your AVS? MyChart  If the video visit is dropped, the invitation should be resent by: Send to e-mail at: [email or web address]  Will anyone else be joining your video visit? No    Endocrinology Clinic Visit 12/14/2021    NAME:  Chrissy Dixon  PCP:  Nando Haskins  MRN:  [id number]  Reason for Consult:  Uncontrolled DM2  Requesting Provider:  Nando Haskins    Video-Visit Details     Type of service:  Video Visit     Video Start Time: 12:39 pm  Video End Time: 1:25 pm     I spent a total of 90 minutes on the date of encounter reviewing medical records, evaluating the patient, coordinating care and documenting in the EHR, as detailed above.        Platform used for Video Visit: Thirsty      Chief Complaint     Chief Complaint   Patient presents with     Video Visit     Refferal from Dr. Haskins       History of Present Illness     Chrissy Dixon is a 36 year old female who is seen in clinic for uncontrolled MD2    The patient was diagnosed with type 2 diabetes diagnosed at 25 y.o during her pregancy at the screening OGGT ( gestational DM), started on insulin right away. After delivery insulin was stopped,  shortly 4 month postpartum she had hyperglycemia symptoms and went back on insulin, MDI. She has been on MDI for the past 10 years now. She was also metformin and remains on it. No other anti-DM that she tried.     She has psoriatic arthritis using methylpred for flares using it for 4-7 days every other month. Usually uses 8-16 mg ( depending on swelling) for 2 days, 8- 6mg for 2 days then 4 mg  for2  days  She failed multiple meds for her psoriatic arthritis and now working with her rheumatologist to find alternative and decrease her steroid intake. She reported she has been on steroid on days more than off of it especially the past month.     Previous A1C in records reviewed.     Lab Results   Component Value Date    A1C 10.6 10/26/2021    A1C 8.3 09/14/2020    A1C 8.0 08/02/2019    A1C 8.0 04/22/2019    A1C 8.2 06/26/2018    A1C 8.2 03/20/2017       Weight is   Wt Readings from Last 4 Encounters:   12/09/21 52.2 kg (115 lb)   10/26/21 47.2 kg (104 lb 1.6 oz)   09/14/20 50.8 kg (112 lb)   11/25/19 50.3 kg (110 lb 14.4 oz)     Diabetes Care/Complications:   Eyes: no DR, last exam 1 month ago  Kidneys: last checked 9/2020  Nerves: she has mild tingling in feet, also has baker cyst.   Smoking: no   Blood Pressure: no   Lipids: ldl 67 on 8/2018  Macrovascular: no   Hypoglycemia: not recently due to steroid use    Previous DM related Hospitalization:  No    Current treatment strategy:  levemir 26u in am and 17 unit(s) in pm  novolog 1u:15 g pre meals tid qac average 4-6 units per meal  She use sliding scale insulin 1 hour after meal, uses 1-2 units no specific ssi.  Metformin 2 g daily      Blood Glucose Monitoring:                She was off steroid on thanks giving:            Diet: she is more like a snacker. 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 She has 2 kids ( 10 y.o and 6 y.o). she does not smoke, alcohol rare.    Problem List     Patient Active Problem List   Diagnosis     CARDIOVASCULAR SCREENING; LDL GOAL LESS THAN 160     Psoriatic arthritis (H)     Diabetes mellitus type 2, controlled (H)     Psoriasis arthropathica (H)     Malaise and fatigue     Iron deficiency anemia due to chronic blood loss     Right knee pain     Aftercare following surgery of the musculoskeletal system        Medications     Current Outpatient Medications   Medication     semaglutide (OZEMPIC, 0.25 OR 0.5 MG/DOSE,) 2 MG/1.5ML SOPN pen     apremilast (OTEZLA) 30 MG tablet     cholecalciferol (VITAMIN D3) 1000 UNIT tablet     clobetasol (TEMOVATE) 0.05 % external solution     Continuous Blood Gluc Sensor (DEXCOM G6 SENSOR) MISC     Continuous Blood Gluc Sensor (DEXCOM G6 SENSOR) MISC     Continuous Blood Gluc Transmit (DEXCOM G6 TRANSMITTER) MISC     Fluocinolone Acetonide (DERMA-SMOOTHE/FS SCALP) 0.01 % OIL     fluticasone (FLONASE) 50 MCG/ACT nasal spray     ibuprofen (ADVIL/MOTRIN) 600 MG tablet     insulin aspart (NOVOLOG FLEXPEN) 100 UNIT/ML pen     insulin detemir (LEVEMIR FLEXTOUCH) 100 UNIT/ML pen     insulin pen needle 31G X 8 MM     LEVEMIR FLEXTOUCH 100 UNIT/ML pen     metFORMIN (GLUCOPHAGE) 500 MG tablet     methylPREDNISolone (MEDROL) 4 MG tablet     methylPREDNISolone (MEDROL) 4 MG tablet     ONETOUCH LANCETS MISC     oxyCODONE-acetaminophen (PERCOCET) 5-325 MG tablet     predniSONE (DELTASONE) 5 MG tablet     secukinumab (COSENTYX SENSOREADY PEN) 150 MG/ML Sensoready pen     traMADol (ULTRAM) 50 MG tablet     triamcinolone (KENALOG) 0.1 % cream     valACYclovir (VALTREX) 500 MG tablet     No current facility-administered medications for this visit.        Allergies     Allergies   Allergen Reactions      Diagnostic X-Ray Materials Swelling     Betadine [Povidone Iodine] Swelling     Levofloxacin Other (See Comments)     High blood sugars, doesn't feel well at all      Penicillins Hives     Pneumococcal Vaccines Swelling     Prenatal Vit-Fe Fumarate-Fa [Cavan-Folate Ob] Hives     Tdap [Daptacel] Swelling       Medical / Surgical History     Past Medical History:   Diagnosis Date     Diabetes      Other psoriasis      Polyarthritis     probable psoriatic arthritis     Past Surgical History:   Procedure Laterality Date     ARTHROSCOPY KNEE WITH MENISCECTOMY Right 2018    Procedure: Examination Under Anesthesia Right Knee, Right Knee Arthroscopy, Partial Meniscectomy, Chondroplasty, Cyst Decompression, Synovial Biopsy;  Surgeon: Sean Pate MD;  Location: [facility] OR     AS HYSTEROSCOPY, SURGICAL; W/ ENDOMETRIAL ABLATION, ANY METHOD Bilateral 2017    endometrial ablation with bilater TL     SURGICAL HISTORY OF -       .  Tonsillar abscess       Social History     Social History     Socioeconomic History     Marital status:      Spouse name: Not on file     Number of children: Not on file     Years of education: Not on file     Highest education level: Not on file   Occupational History     Occupation: Dental Assist.     Employer: UNKNOWN   Tobacco Use     Smoking status: Former Smoker     Packs/day: 1.00     Years: 5.00     Pack years: 5.00     Types: Cigarettes     Quit date: 2007     Years since quittin.1     Smokeless tobacco: Never Used   Substance and Sexual Activity     Alcohol use: No     Alcohol/week: 1.7 standard drinks     Types: 2 Standard drinks or equivalent per week     Drug use: No     Sexual activity: Yes     Partners: Male     Birth control/protection: I.U.D.     Comment: Mirena   Other Topics Concern      Service Not Asked     Blood Transfusions Not Asked     Caffeine Concern Not Asked     Occupational Exposure Not Asked     Hobby Hazards Not Asked     Sleep  Concern Not Asked     Stress Concern Not Asked     Weight Concern Not Asked     Special Diet Not Asked     Back Care Not Asked     Exercise No     Bike Helmet Not Asked     Seat Belt Not Asked     Self-Exams Not Asked     Parent/sibling w/ CABG, MI or angioplasty before 65F 55M? Not Asked   Social History Narrative    How much exercise per week? No    How much calcium per day? Diet      How much caffeine per day? Coffee  2    How much vitamin D per day? Diet    Do you/your family wear seatbelts?  Yes    Do you/your family use safety helmets? No    Do you/your family use sunscreen? Yes    Do you/your family keep firearms in the home? No    Do you/your family have a smoke detector(s)? Yes        Do you feel safe in your home? Yes    Has anyone ever touched you in an unwanted manner? No     Explain          Social Determinants of Health     Financial Resource Strain: Not on file   Food Insecurity: Not on file   Transportation Needs: Not on file   Physical Activity: Not on file   Stress: Not on file   Social Connections: Not on file   Intimate Partner Violence: Not on file   Housing Stability: Not on file       Family History     Family History   Problem Relation Age of Onset     Thyroid Disease Mother      Diabetes Mother      Hypertension Mother      Connective Tissue Disorder Father         Psoriasis     Diabetes Maternal Grandfather      Hypertension Maternal Grandfather      Prostate Cancer Maternal Grandfather      Diabetes Maternal Uncle      Diabetes Maternal Uncle      Hypertension Maternal Grandmother      Asthma No family hx of      Asthma Mother      Colon Cancer Mother      Chronic Obstructive Pulmonary Disease Mother      Arthritis Father      Vision Loss Father      Diabetes Maternal Uncle      Diabetes Maternal Grandmother        ROS     12 ROS completed, pertinent positive and negative in HPI    Physical Exam   There were no vitals taken for this visit.   GENERAL: Healthy, alert and no distress  EYES:  Eyes grossly normal to inspection.  No discharge or erythema, or obvious scleral/conjunctival abnormalities.  RESP: No audible wheeze, cough, or visible cyanosis.  No visible retractions or increased work of breathing.    SKIN: Visible skin clear. No significant rash, abnormal pigmentation or lesions.  NEURO: Cranial nerves grossly intact.  Mentation and speech appropriate for age.  PSYCH: Mentation appears normal, affect normal/bright, judgement and insight intact, normal speech and appearance well-groomed.    Labs/Imaging     Pertinent Labs were reviewed and updated in [facility].  Radiology Results were  reviewed and updated in EPIC.    Summary of recent findings:   Lab Results   Component Value Date    A1C 10.6 10/26/2021    A1C 8.3 09/14/2020    A1C 8.0 08/02/2019    A1C 8.0 04/22/2019    A1C 8.2 06/26/2018    A1C 8.2 03/20/2017       TSH   Date Value Ref Range Status   10/26/2021 0.35 (L) 0.40 - 4.00 mU/L Final   09/14/2020 0.82 0.40 - 4.00 mU/L Final   08/20/2018 0.84 0.40 - 4.00 mU/L Final   03/20/2017 0.51 0.40 - 4.00 mU/L Final   05/18/2016 1.32 0.40 - 4.00 mU/L Final   10/11/2013 2.23 0.4 - 5.0 mU/L Final     Free T4   Date Value Ref Range Status   10/26/2021 1.20 0.76 - 1.46 ng/dL Final       Creatinine   Date Value Ref Range Status   10/26/2021 0.47 (L) 0.52 - 1.04 mg/dL Final   05/04/2021 0.51 (L) 0.52 - 1.04 mg/dL Final       Recent Labs   Lab Test 08/20/18  1200 07/07/17  1712   CHOL 158 143   HDL 71 66   LDL 67 65   TRIG 100 63       No results found for: [id number], [id number], [id number]    I personally reviewed the patient's outside records from [facility] EMR and Care Everywhere. Summary of pertinent findings in HPI.    Impression / Plan     1. Diabetes Mellitus: Type 2  2. Steroid induced hyperglycemia    She has uncontrolled dm2 with significant insulin resistance due to steroid use. We discussed the importance of working with her rheumatologist to find alternative treatment for her psoriatic arthritis  is avoiding steroid use.   I reviewed her Dexcom she reported she has been on varying doses of methylprednisolone for the past month except on Thanksgiving she was off of it. I reviewed her blood sugar trend during Thanksgiving, she had significant peaks of hyperglycemia at mealtimes and fasting low blood sugar. Otherwise the rest of the days , she has a trend of daytime and nighttime hyperglycemia with peaks on mealtime.  We discussed different carb ratio regimen depending on steroid dose as below. We discussed adding a GLP-1 agonist to help with insulin resistance and decrease insulin requirements. I reviewed with her GLP-1 including stomach upset nausea association with pancreatitis and MTC in rats.  She has an upcoming appointment with CDE. She is interested in getting a pump, she is overwhelmed with the amount of injection she is doing. We discussed that pump is not of less work to manage her diabetes but might be an option for her.       Plan:   Start Ozempic Inject 0.25 mg weekly for 4 weeks then 0.5 mg weekly    Continue levemir 26 units in the morning  Decrease evening levemir to 12 units  Increase: insulin to carb to 1:10g on days off methyl prednisone                  insulin to carb to 1:5 g on days taking 16 mg methyl  prednisone                                insulin to carb to 1:7g on days taking 8 or 6 mg methyl prednisone  High sliding scale insulin On days using methylpred.  Low sliding scale insulin on days off steroids.       2. Diabetes Complications: no. Due for urine microalb     3. Blood Pressure Management: Blood pressure is controlled. Currently is  on pharmacotherapy for this.     4.Lipid Management: Per the new ACC/JENISE/NHLBI guidelines, statins are recommended for individuals with diabetes aged 40-75 with LDL  without ASCVD, and for any individual with ASCVD. Currently the patient is not on a statin. No indication.     5. Smoking Status: Patient Pt is smoke free..     Review of the  result(s) of each unique test - A1c and diabetes related labs.  90 minutes spent on the date of the encounter doing chart review, history and exam, documentation and further activities per the note          Test and/or medications prescribed today:  Orders Placed This Encounter   Procedures     TSH     T4 free     C-peptide     Glucose     Albumin Random Urine Quantitative with Creat Ratio     Lipid panel reflex to direct LDL Fasting         Follow up: 1 month      Abdon Arcos MD  Endocrinology, Diabetes and Metabolism  [facility]    Review of the result(s) of each unique test - A1c in records  75 minutes spent on the date of the encounter doing chart review, history and exam, documentation and further activities per the note

## 2021-12-27 ENCOUNTER — MYC REFILL (OUTPATIENT)
Dept: INTERNAL MEDICINE | Facility: CLINIC | Age: 36
End: 2021-12-27
Payer: COMMERCIAL

## 2021-12-27 DIAGNOSIS — L40.50 PSORIATIC ARTHRITIS (H): ICD-10-CM

## 2021-12-28 DIAGNOSIS — Z79.4 TYPE 2 DIABETES MELLITUS WITHOUT COMPLICATION, WITH LONG-TERM CURRENT USE OF INSULIN (H): ICD-10-CM

## 2021-12-28 DIAGNOSIS — E11.9 TYPE 2 DIABETES MELLITUS WITHOUT COMPLICATION, WITH LONG-TERM CURRENT USE OF INSULIN (H): ICD-10-CM

## 2021-12-28 RX ORDER — PROCHLORPERAZINE 25 MG/1
1 SUPPOSITORY RECTAL CONTINUOUS
Qty: 1 EACH | Refills: 3 | Status: SHIPPED | OUTPATIENT
Start: 2021-12-28 | End: 2022-12-26

## 2021-12-28 RX ORDER — TRAMADOL HYDROCHLORIDE 50 MG/1
50 TABLET ORAL EVERY 8 HOURS PRN
Qty: 60 TABLET | Refills: 0 | Status: SHIPPED | OUTPATIENT
Start: 2021-12-28 | End: 2022-01-25

## 2021-12-28 NOTE — TELEPHONE ENCOUNTER
10/26/2021  [facility] LLCNando MD    Internal Medicine     Continuous Blood Gluc Transmit (DEXCOM G6 TRANSMITTER) MISC

## 2022-01-04 ENCOUNTER — MYC REFILL (OUTPATIENT)
Dept: INTERNAL MEDICINE | Facility: CLINIC | Age: 37
End: 2022-01-04
Payer: COMMERCIAL

## 2022-01-04 DIAGNOSIS — L40.50 PSORIATIC ARTHRITIS (H): ICD-10-CM

## 2022-01-04 DIAGNOSIS — B00.1 RECURRENT COLD SORES: ICD-10-CM

## 2022-01-05 RX ORDER — OXYCODONE AND ACETAMINOPHEN 5; 325 MG/1; MG/1
1 TABLET ORAL EVERY 4 HOURS PRN
Qty: 30 TABLET | Refills: 0 | Status: SHIPPED | OUTPATIENT
Start: 2022-01-14 | End: 2022-02-04

## 2022-01-06 ENCOUNTER — TELEPHONE (OUTPATIENT)
Dept: ENDOCRINOLOGY | Facility: CLINIC | Age: 37
End: 2022-01-06
Payer: COMMERCIAL

## 2022-01-06 NOTE — TELEPHONE ENCOUNTER
Called pt - pt on ozempic at 0.25 mg weekly and feeling a little sick -message left for patient.  MyChart letter sent.  Patient should continue on the Ozempic at 0.25 mg weekly until she feels better and then she can increase to 0.5 mg weekly (after she has been on the Ozempic at 0.25 mg weekly for at least 4 weeks).  If she feels that she cannot tolerate the Ozempic, she should stop and let us know.

## 2022-01-15 ENCOUNTER — PATIENT OUTREACH (OUTPATIENT)
Dept: ENDOCRINOLOGY | Facility: CLINIC | Age: 37
End: 2022-01-15
Payer: COMMERCIAL

## 2022-01-15 NOTE — PROGRESS NOTES
Attempted to reach patient to schedule follow up in the Endocrinology Clinic.  PAULO and Erica message sent.     Schedule with Dr. Abdon Arcos with labs

## 2022-01-25 ENCOUNTER — MYC REFILL (OUTPATIENT)
Dept: INTERNAL MEDICINE | Facility: CLINIC | Age: 37
End: 2022-01-25
Payer: COMMERCIAL

## 2022-01-25 DIAGNOSIS — L40.50 PSORIATIC ARTHRITIS (H): ICD-10-CM

## 2022-01-28 RX ORDER — TRAMADOL HYDROCHLORIDE 50 MG/1
50 TABLET ORAL EVERY 8 HOURS PRN
Qty: 60 TABLET | Refills: 0 | Status: SHIPPED | OUTPATIENT
Start: 2022-01-28 | End: 2022-02-17

## 2022-02-01 ENCOUNTER — LAB (OUTPATIENT)
Dept: LAB | Facility: CLINIC | Age: 37
End: 2022-02-01
Payer: COMMERCIAL

## 2022-02-01 DIAGNOSIS — L40.50 PSORIATIC ARTHRITIS (H): ICD-10-CM

## 2022-02-01 DIAGNOSIS — Z79.4 TYPE 2 DIABETES MELLITUS WITH HYPERGLYCEMIA, WITH LONG-TERM CURRENT USE OF INSULIN (H): ICD-10-CM

## 2022-02-01 DIAGNOSIS — D50.0 IRON DEFICIENCY ANEMIA DUE TO CHRONIC BLOOD LOSS: ICD-10-CM

## 2022-02-01 DIAGNOSIS — E11.9 CONTROLLED TYPE 2 DIABETES MELLITUS WITHOUT COMPLICATION, WITH LONG-TERM CURRENT USE OF INSULIN (H): ICD-10-CM

## 2022-02-01 DIAGNOSIS — E11.65 TYPE 2 DIABETES MELLITUS WITH HYPERGLYCEMIA, WITH LONG-TERM CURRENT USE OF INSULIN (H): ICD-10-CM

## 2022-02-01 DIAGNOSIS — Z79.4 CONTROLLED TYPE 2 DIABETES MELLITUS WITHOUT COMPLICATION, WITH LONG-TERM CURRENT USE OF INSULIN (H): ICD-10-CM

## 2022-02-01 DIAGNOSIS — E05.90 SUBCLINICAL HYPERTHYROIDISM: ICD-10-CM

## 2022-02-01 LAB
CREAT UR-MCNC: 280 MG/DL
ERYTHROCYTE [DISTWIDTH] IN BLOOD BY AUTOMATED COUNT: 17.5 % (ref 10–15)
HBA1C MFR BLD: 8.9 % (ref 0–5.6)
HCT VFR BLD AUTO: 38 % (ref 35–47)
HGB BLD-MCNC: 12 G/DL (ref 11.7–15.7)
MCH RBC QN AUTO: 24.7 PG (ref 26.5–33)
MCHC RBC AUTO-ENTMCNC: 31.6 G/DL (ref 31.5–36.5)
MCV RBC AUTO: 78 FL (ref 78–100)
MICROALBUMIN UR-MCNC: 5.82 MG/DL (ref 0–1.99)
MICROALBUMIN/CREAT UR: 20.8 MG/G CR
PLATELET # BLD AUTO: 382 10E3/UL (ref 150–450)
RBC # BLD AUTO: 4.85 10E6/UL (ref 3.8–5.2)
WBC # BLD AUTO: 7.7 10E3/UL (ref 4–11)

## 2022-02-01 PROCEDURE — 80061 LIPID PANEL: CPT

## 2022-02-01 PROCEDURE — 86364 TISS TRNSGLTMNASE EA IG CLAS: CPT

## 2022-02-01 PROCEDURE — 82043 UR ALBUMIN QUANTITATIVE: CPT

## 2022-02-01 PROCEDURE — 83036 HEMOGLOBIN GLYCOSYLATED A1C: CPT

## 2022-02-01 PROCEDURE — 84443 ASSAY THYROID STIM HORMONE: CPT

## 2022-02-01 PROCEDURE — 84681 ASSAY OF C-PEPTIDE: CPT

## 2022-02-01 PROCEDURE — 36415 COLL VENOUS BLD VENIPUNCTURE: CPT

## 2022-02-01 PROCEDURE — 82947 ASSAY GLUCOSE BLOOD QUANT: CPT

## 2022-02-01 PROCEDURE — 85027 COMPLETE CBC AUTOMATED: CPT

## 2022-02-01 PROCEDURE — 84439 ASSAY OF FREE THYROXINE: CPT

## 2022-02-02 LAB
CHOLEST SERPL-MCNC: 159 MG/DL
FASTING STATUS PATIENT QL REPORTED: NO
FASTING STATUS PATIENT QL REPORTED: NO
GLUCOSE BLD-MCNC: 108 MG/DL (ref 70–125)
HDLC SERPL-MCNC: 60 MG/DL
LDLC SERPL CALC-MCNC: 89 MG/DL
T4 FREE SERPL-MCNC: 1 NG/DL (ref 0.7–1.8)
TRIGL SERPL-MCNC: 51 MG/DL
TSH SERPL DL<=0.005 MIU/L-ACNC: 0.31 UIU/ML (ref 0.3–5)

## 2022-02-03 LAB
C PEPTIDE SERPL-MCNC: <0.1 NG/ML (ref 0.9–6.9)
TTG IGA SER-ACNC: 1.2 U/ML
TTG IGG SER-ACNC: 0.7 U/ML

## 2022-02-04 ENCOUNTER — MYC REFILL (OUTPATIENT)
Dept: INTERNAL MEDICINE | Facility: CLINIC | Age: 37
End: 2022-02-04
Payer: COMMERCIAL

## 2022-02-04 ENCOUNTER — MYC MEDICAL ADVICE (OUTPATIENT)
Dept: INTERNAL MEDICINE | Facility: CLINIC | Age: 37
End: 2022-02-04
Payer: COMMERCIAL

## 2022-02-04 DIAGNOSIS — B00.1 RECURRENT COLD SORES: ICD-10-CM

## 2022-02-04 DIAGNOSIS — L40.50 PSORIATIC ARTHRITIS (H): ICD-10-CM

## 2022-02-04 DIAGNOSIS — D50.0 IRON DEFICIENCY ANEMIA DUE TO CHRONIC BLOOD LOSS: Primary | ICD-10-CM

## 2022-02-04 RX ORDER — OXYCODONE AND ACETAMINOPHEN 5; 325 MG/1; MG/1
1 TABLET ORAL EVERY 4 HOURS PRN
Qty: 30 TABLET | Refills: 0 | Status: SHIPPED | OUTPATIENT
Start: 2022-02-12 | End: 2022-03-02

## 2022-02-17 ENCOUNTER — MYC REFILL (OUTPATIENT)
Dept: INTERNAL MEDICINE | Facility: CLINIC | Age: 37
End: 2022-02-17
Payer: COMMERCIAL

## 2022-02-17 DIAGNOSIS — L40.50 PSORIATIC ARTHRITIS (H): ICD-10-CM

## 2022-02-18 ENCOUNTER — TELEPHONE (OUTPATIENT)
Dept: RHEUMATOLOGY | Facility: CLINIC | Age: 37
End: 2022-02-18
Payer: COMMERCIAL

## 2022-02-18 RX ORDER — TRAMADOL HYDROCHLORIDE 50 MG/1
50 TABLET ORAL EVERY 8 HOURS PRN
Qty: 60 TABLET | Refills: 0 | Status: SHIPPED | OUTPATIENT
Start: 2022-02-26 | End: 2022-03-31

## 2022-02-18 NOTE — TELEPHONE ENCOUNTER
LVM for PT to call [phone] to schedule f/u appt with Dr. Granados.  I did mention schedule is pushed out to 7.7

## 2022-02-22 DIAGNOSIS — Z79.4 TYPE 2 DIABETES MELLITUS WITHOUT COMPLICATION, WITH LONG-TERM CURRENT USE OF INSULIN (H): ICD-10-CM

## 2022-02-22 DIAGNOSIS — E11.9 TYPE 2 DIABETES MELLITUS WITHOUT COMPLICATION, WITH LONG-TERM CURRENT USE OF INSULIN (H): ICD-10-CM

## 2022-02-22 RX ORDER — PROCHLORPERAZINE 25 MG/1
SUPPOSITORY RECTAL
Status: CANCELLED | OUTPATIENT
Start: 2022-02-22

## 2022-02-25 NOTE — TELEPHONE ENCOUNTER
Continuous Blood Gluc Sensor (DEXCOM G6 SENSOR) MISC  1 Device by Device route every 10 days       Last Written Prescription Date:  10/25/21  Last Fill Quantity: 12 ea,   # refills: 0  Last Office Visit : 10/26/21   Future Office visit: none Primary Care.      Pended. Routed to Primary Care. Please advise if Endo or Primary will be approving refills. Thank you.

## 2022-03-01 ENCOUNTER — TELEPHONE (OUTPATIENT)
Dept: RHEUMATOLOGY | Facility: CLINIC | Age: 37
End: 2022-03-01
Payer: COMMERCIAL

## 2022-03-01 DIAGNOSIS — L40.50 PSORIATIC ARTHRITIS (H): Primary | ICD-10-CM

## 2022-03-01 RX ORDER — PROCHLORPERAZINE 25 MG/1
SUPPOSITORY RECTAL
Qty: 3 EACH | Refills: 2 | Status: SHIPPED | OUTPATIENT
Start: 2022-03-01 | End: 2022-03-17

## 2022-03-01 NOTE — TELEPHONE ENCOUNTER
"Order for Golimumab entered per Dr. Granados's instruction after secukinumab (Cosentyx) authorization was denied. :  \"I recommend golimumab 50 mg subcutaneous once every 28 days, 3 RF for psoriatic arthritis.\"   Placed call to patient with Dr. Granados's recommendation above, left VM [facility].   Morenita Stearns RN  Adult Rheumatology Clinic     "

## 2022-03-02 NOTE — TELEPHONE ENCOUNTER
PA Initiation    Medication: Simponi- Pending  Insurance Company: MEDICA - Phone [phone] Fax [phone]  Ph[facility] Filling the Rx: [facility] [place] [place] - [address] [place] [address]  Filling Pharmacy Phone:    Filling Pharmacy Fax:    Start Date: 3/1/2022

## 2022-03-02 NOTE — TELEPHONE ENCOUNTER
WILLIAMM (2nd) for PT to call [phone] to schedule f/u appt with Dr. Granados.  I did mention schedule is pushed out to 7.28

## 2022-03-03 ENCOUNTER — TELEPHONE (OUTPATIENT)
Dept: GASTROENTEROLOGY | Facility: CLINIC | Age: 37
End: 2022-03-03
Payer: COMMERCIAL

## 2022-03-03 NOTE — TELEPHONE ENCOUNTER
Screening Questions  BlueKIND OF PREP RedLOCATION [review exclusion criteria] GreenSEDATION TYPE    1. Have you had a positive covid test in the last 90 days? Y - 1/7/22     2. Are you active on mychart? Y    3. What insurance is in the chart? MEDICA     3.  Ordering/Referring Provider: INDU ALAN    4. BMI 16.8 [BMI OVER 40-EXTENDED PREP]  If greater than 40 review exclusion criteria [PAC APPT IF @ [facility]]    5.  Respiratory Screening :  [If yes to any of the following HOSPITAL setting only]     Do you use daily home oxygen? N    Do you have mod to severe Obstructive Sleep Apnea? N  [OKAY @ [facility] SH PH RI]   Do you have Pulmonary Hypertension? N     Do you have UNCONTROLLED asthma? N      6. Have you had a heart or lung transplant? N      7. Are you currently on dialysis? N [ If yes, G-PREP & HOSPITAL setting only]     8. Do you have chronic kidney disease? N [ If yes, G-PREP ]    9. Have you had a stroke or Transient ischemic attack (TIA) within 6 months? N (If yes, do not schedule at [facility])    10. In the past 6 months, have you had any heart related issues including cardiomyopathy or heart attack? N        If yes, did it require cardiac stenting or other implantable device?       11. Do you have any implantable devices in your body (pacemaker, defib, LVAD)? N (If yes, schedule at [facility])  DEXCOM SENSOR, REMOVABLE  12. Do you take nitroglycerin? N   If yes, how often?   (if yes, HOSPITAL setting ONLY)    13. Are you currently taking any blood thinners? N   [IF YES, INFORM PATIENT TO FOLLOW UP W/ ORDERING PROVIDER FOR BRIDGING INSTRUCTIONS]     14. Are you a diabetic? Y - TYPE 1   [ If yes, G-PREP ]    15. [FEMALES] Are you currently pregnant? N    If yes, how many weeks?     16. Are you taking any prescription pain medications on a routine schedule?  Y - TRAMADOL  [ If yes, EXTENDED PREP.] [If yes, MAC]    17. Do you have any chemical dependencies such as alcohol, street drugs, or methadone?  N [If yes,  MAC]    18. Do you have any history of post-traumatic stress syndrome, severe anxiety or history of psychosis?  N  [If yes, MAC]    19. Do you transfer independently?  Y    20.  Do you have any issues with constipation?  N  [ If yes, EXTENDED PREP.]    21. Preferred LOCAL Pharmacy for Pre Prescription       [id number] [address] AT [facility] [address]  Scheduling Details      Caller : Chrissy Dixon    (Please ask for phone number if not scheduled by patient)    Type of Procedure Scheduled: DOUBLE  Which Colonoscopy Prep was Sent?: ZULEMA BUENROSTRO CF PATIENTS & GROEN'S PATIENTS NEEDS EXTENDED PREP  Surgeon: NARINDER HERBERT  Date of Procedure: 4/29/22  Location: [facility]      Sedation Type: MAC  Conscious Sedation- Needs  for 6 hours after the procedure  MAC/General-Needs  for 24 hours after procedure    Pre-op Required at [facility] [place] [place] and OR for MAC sedation:   (advise patient they will need a pre-op prior to procedure -)      Informed patient they will need an adult  Y  Cannot take any type of public or medical transportation alone    Pre-Procedure Covid test to be completed at [facility] or Externally: Y    Confirmed Nurse will call to complete assessment Y

## 2022-03-14 ASSESSMENT — ENCOUNTER SYMPTOMS
DOUBLE VISION: 0
FEVER: 0
EYE PAIN: 0
CONSTIPATION: 0
DIARRHEA: 1
WEIGHT GAIN: 0
FATIGUE: 1
POLYPHAGIA: 0
HEARTBURN: 1
JOINT SWELLING: 1
NERVOUS/ANXIOUS: 1
ABDOMINAL PAIN: 1
TASTE DISTURBANCE: 1
STIFFNESS: 1
MUSCLE WEAKNESS: 1
TROUBLE SWALLOWING: 0
NAUSEA: 1
MYALGIAS: 0
CHILLS: 0
DECREASED CONCENTRATION: 1
DECREASED APPETITE: 0
RECTAL PAIN: 0
VOMITING: 0
NECK MASS: 0
BACK PAIN: 1
PANIC: 0
EYE WATERING: 0
ALTERED TEMPERATURE REGULATION: 0
ARTHRALGIAS: 1
EYE IRRITATION: 1
POLYDIPSIA: 0
NECK PAIN: 0
SMELL DISTURBANCE: 0
BLOOD IN STOOL: 0
SINUS PAIN: 0
BOWEL INCONTINENCE: 0
HALLUCINATIONS: 0
DEPRESSION: 0
WEIGHT LOSS: 0
BLOATING: 0
SWOLLEN GLANDS: 0
INCREASED ENERGY: 0
BRUISES/BLEEDS EASILY: 0
JAUNDICE: 0
SORE THROAT: 0
SINUS CONGESTION: 1
HOARSE VOICE: 0
NIGHT SWEATS: 0
EYE REDNESS: 0
INSOMNIA: 0
MUSCLE CRAMPS: 1

## 2022-03-15 DIAGNOSIS — Z11.59 ENCOUNTER FOR SCREENING FOR OTHER VIRAL DISEASES: Primary | ICD-10-CM

## 2022-03-15 NOTE — TELEPHONE ENCOUNTER
Prior Authorization Approval    Authorization Effective Date: 1/30/2022  Authorization Expiration Date: 8/29/2022  Medication: Simponi- Approved  Approved Dose/Quantity: UD  Reference #: CMM Key: [id number]   Insurance Company: MEDICA - Phone [phone] Fax [phone]  Expected CoPay:       CoPay Card Available:      Foundation Assistance Needed:    Which Pharmacy is filling the prescription (Not needed for infusion/clinic administered): [facility] [place] - [address] [place] [address]  Pharmacy Notified: Yes  Patient Notified: Yes

## 2022-03-15 NOTE — PROGRESS NOTES
Chrissy Dixon  is being evaluated via a billable video visit.      How would you like to obtain your AVS? Exim[employer]  For the video visit, send the invitation by: Text to cell phone: [phone]  Will anyone else be joining your video visit? No    Regina Martinez on 3/17/2022 at 9:15 AM    Outcome for 03/15/22 10:01 AM: Left Voicemail    Outcome for 03/15/22 10:01 AM: Artoo message sent   Regina Michelle on 3/15/2022 at 10:01 AM  Outcome for 03/15/22 2:28 PM: Left Voicemail    Regina Martinez on 3/15/2022 at 2:28 PM  Outcome for 03/16/22 9:01 AM: Dexcom emailed to provider  Melinda Harrell, TOBIN

## 2022-03-17 ENCOUNTER — TELEPHONE (OUTPATIENT)
Dept: CARDIOLOGY | Facility: CLINIC | Age: 37
End: 2022-03-17

## 2022-03-17 ENCOUNTER — VIRTUAL VISIT (OUTPATIENT)
Dept: ENDOCRINOLOGY | Facility: CLINIC | Age: 37
End: 2022-03-17
Payer: COMMERCIAL

## 2022-03-17 DIAGNOSIS — Z79.4 CONTROLLED TYPE 2 DIABETES MELLITUS WITHOUT COMPLICATION, WITH LONG-TERM CURRENT USE OF INSULIN (H): Primary | ICD-10-CM

## 2022-03-17 DIAGNOSIS — E11.9 TYPE 2 DIABETES MELLITUS WITHOUT COMPLICATION, WITH LONG-TERM CURRENT USE OF INSULIN (H): ICD-10-CM

## 2022-03-17 DIAGNOSIS — Z79.4 CONTROLLED TYPE 2 DIABETES MELLITUS WITHOUT COMPLICATION, WITH LONG-TERM CURRENT USE OF INSULIN (H): ICD-10-CM

## 2022-03-17 DIAGNOSIS — Z79.4 TYPE 2 DIABETES MELLITUS WITH HYPERGLYCEMIA, WITH LONG-TERM CURRENT USE OF INSULIN (H): Primary | ICD-10-CM

## 2022-03-17 DIAGNOSIS — E11.65 TYPE 2 DIABETES MELLITUS WITH HYPERGLYCEMIA, WITH LONG-TERM CURRENT USE OF INSULIN (H): Primary | ICD-10-CM

## 2022-03-17 DIAGNOSIS — Z79.4 TYPE 2 DIABETES MELLITUS WITHOUT COMPLICATION, WITH LONG-TERM CURRENT USE OF INSULIN (H): ICD-10-CM

## 2022-03-17 DIAGNOSIS — E11.9 CONTROLLED TYPE 2 DIABETES MELLITUS WITHOUT COMPLICATION, WITH LONG-TERM CURRENT USE OF INSULIN (H): ICD-10-CM

## 2022-03-17 DIAGNOSIS — E11.9 CONTROLLED TYPE 2 DIABETES MELLITUS WITHOUT COMPLICATION, WITH LONG-TERM CURRENT USE OF INSULIN (H): Primary | ICD-10-CM

## 2022-03-17 PROCEDURE — 99215 OFFICE O/P EST HI 40 MIN: CPT | Mod: 95 | Performed by: PHYSICIAN ASSISTANT

## 2022-03-17 RX ORDER — INSULIN ASPART 100 [IU]/ML
INJECTION, SOLUTION INTRAVENOUS; SUBCUTANEOUS
Qty: 30 ML | Refills: 4 | COMMUNITY
Start: 2022-03-17 | End: 2023-11-01

## 2022-03-17 RX ORDER — INSULIN DETEMIR 100 [IU]/ML
INJECTION, SOLUTION SUBCUTANEOUS
Qty: 60 ML | Refills: 3 | Status: SHIPPED | OUTPATIENT
Start: 2022-03-17

## 2022-03-17 RX ORDER — SEMAGLUTIDE 1.34 MG/ML
INJECTION, SOLUTION SUBCUTANEOUS
Qty: 3 MG | Refills: 3 | COMMUNITY
Start: 2022-03-17 | End: 2022-03-31

## 2022-03-17 RX ORDER — HUMAN INSULIN 100 [IU]/ML
INJECTION, SUSPENSION SUBCUTANEOUS
Qty: 15 ML | Refills: 3 | Status: SHIPPED | OUTPATIENT
Start: 2022-03-17

## 2022-03-17 RX ORDER — PROCHLORPERAZINE 25 MG/1
SUPPOSITORY RECTAL
Qty: 3 EACH | Refills: 11 | Status: SHIPPED | OUTPATIENT
Start: 2022-03-17 | End: 2022-12-02

## 2022-03-17 NOTE — LETTER
3/17/2022       RE: Chrissy Dixon  [address] [facility] [address]     Dear Colleague,    Thank you for referring your patient, Chrissy Dixon, to the [facility] [place] [facility]. Please see a copy of my visit note below.    Chrissy Dixon  is being evaluated via a billable video visit.      How would you like to obtain your AVS? Doculogy  For the video visit, send the invitation by: Text to cell phone: [phone]  Will anyone else be joining your video visit? No    Regina Martinez on 3/17/2022 at 9:15 AM    Outcome for 03/15/22 10:01 AM: Left Voicemail    Outcome for 03/15/22 10:01 AM: FlightOffice message sent   Regina Martinez on 3/15/2022 at 10:01 AM  Outcome for 03/15/22 2:28 PM: Left Voicemail    Regina Martinez on 3/15/2022 at 2:28 PM  Outcome for 03/16/22 9:01 AM: Dexcom emailed to provider  TOBIN Rubin                  Due to the COVID 19 pandemic this visit was converted to a video visit in order to help prevent spread of infection in this patient and the general population.    Time of start: 9:30 am  Time of end: 9:56 am  Total duration of video visit: 26 minutes.    HPI  Chrissy Dixon is a 36 year old female with dx of type 2 diabetes mellitus. Video visit for diabetes follow up today.  Pt had a virtual visit with Dr. Abdon Arcos in Dec 2021.  Apparently she was dx with type 2 diabetes at age 25 during pregnancy and was treated with insulin. Insulin was discontinued after delivery.   Approx 4 months postpartum she was hyperglycemic and resumed insulin.  She tells me she has been on MDI for 10 years.  She has sx suggestive of mild neuropathy in her feet and her urine microalbuminuria was + in 2/2022 and had been negative in the past.   She denies any known hx of retinopathy.  Her hx is also significant for psoriatic arthritis requiring steroids often once a month per patient.  For her  diabetes, she is currently taking Levemir 24 units subcutaneous each am and 5 units subcutaneous each pm. She reduced her pm Levemir dose once she started Ozempic stating she had low blood sugars in the 40-50 range overnight and fasting.  She is also taking Novolog 1 unit/15 gms CHO with meals plus correction, Metformin 2000 mg daily and Ozempic 0.5 mg once a week.  Most recent A1C was 8.9 % on 2/1/2022.  Her previous A1C was 10.6 % in Oct 2021.  I reviewed and scanned her DexcomG6 sensor download data in her note below.  Her blood sugars are high both day and night.  She states her last Methylprednisone dose was last Thursday. Her blood sugars are still high this week.  On ROS today, she is very thin on video.  She denies blurred vision, polydipsia or polyuria. Energy level ok per patient.  She had COVID19 + in Jan 2022 and was very ill and lost 10 lbs.  Reports intermittent headaches since COVID19 infection.  Mild nausea the day after taking Ozempic. No recent vomiting.  Hx of chronic abd pain which she states she had prior to starting Ozempic.  Some swelling of legs from her arthritis per patient.  Denies open sores or foot ulcers.  She has mild tingling/numbness in toes.  Denies SOB at rest, cough, fever, chills, chest pain, diarrhea, dysuria or hematuria.  Pt is s/p tubal ligation.  Adriana received the COVID vaccine and COVID booster.    Diabetes Care  Retinopathy: none per patient; seen by Oph 2-3 months ago.  Nephropathy:urine microalbuminuria was + in 2/2022 and negative in past. Will plan to recheck.  Neuropathy:mild sx in feet.  Foot Exam: no exam today.  Taking aspirin: no.  Lipids: LDL 89 in 2/2022.  CAD: no.  Mental health: denies depression.  Insulin: Basal insulin BID and meal time insulin with correction.  DM meds: Ozempic and Metformin.  Testing: DexcomG6 sensor.        ROS  See under HPI.    Allergies  Allergies   Allergen Reactions     Diagnostic X-Ray Materials Swelling     Betadine [Povidone  Iodine] Swelling     Levofloxacin Other (See Comments)     High blood sugars, doesn't feel well at all      Penicillins Hives     Pneumococcal Vaccines Swelling     Prenatal Vit-Fe Fumarate-Fa [Cavan-Folate Ob] Hives     Tdap [Daptacel] Swelling       Medications  Current Outpatient Medications   Medication Sig Dispense Refill     Continuous Blood Gluc Sensor (DEXCOM G6 SENSOR) MISC 1 Device by Device route every 10 days 3 each 11     insulin aspart (NOVOLOG FLEXPEN) 100 UNIT/ML pen Inject 1 unit/12 gms CHO with meals plus correction. Pt uses approx 35 units in 24 hrs. 30 mL 4     insulin detemir (LEVEMIR FLEXTOUCH) 100 UNIT/ML pen Inject 24 units subcutaneous each am and 8 units subcutaneous each pm. 60 mL 3     insulin  UNIT/ML injection Only use when taking steroids. If steroid dose is 12-16 mg take NPH 10 units subcutaneous in am and if steroid dose 6-8 mg take NPH 6 units subcutaneous in am. 15 mL 3     metFORMIN (GLUCOPHAGE) 500 MG tablet Take 2 tablets (1,000 mg) by mouth 2 times daily (with meals) 360 tablet 3     semaglutide (OZEMPIC, 0.25 OR 0.5 MG/DOSE,) 2 MG/1.5ML SOPN pen Inject 0.5 mg weekly. 3 mg 3     apremilast (OTEZLA) 30 MG tablet Take 1 tablet (30 mg) by mouth 2 times daily 180 tablet 5     cholecalciferol (VITAMIN D3) 1000 UNIT tablet Take 1 tablet (1,000 Units) by mouth daily 90 tablet 1     clobetasol (TEMOVATE) 0.05 % external solution Apply topically 2 times daily (Patient taking differently: Apply topically 2 times daily as needed ) 50 mL 6     Continuous Blood Gluc Transmit (DEXCOM G6 TRANSMITTER) MISC Inject 1 each Subcutaneous continuous 1 each 3     Fluocinolone Acetonide (DERMA-SMOOTHE/FS SCALP) 0.01 % OIL Apply to scalp at bedtime, then wear showercap, rinse off in morning. (Patient taking differently: Apply to scalp at bedtime, then wear showercap, rinse off in morning as needed.) 118 mL 3     fluticasone (FLONASE) 50 MCG/ACT nasal spray Spray 2 sprays into both nostrils daily  48 g 11     golimumab (SIMPONI) 50 MG/0.5ML pre-filled syringe injection Inject 0.5 mLs (50 mg) Subcutaneous every 28 days Hold for signs of infection, and seek medical attention. 0.5 mL 6     ibuprofen (ADVIL/MOTRIN) 600 MG tablet Take 1 tablet (600 mg) by mouth every 8 hours as needed for moderate pain 270 tablet 0     insulin pen needle 31G X 8 MM Use  3-4 pen needles daily or as directed. 300 each 4     LEVEMIR FLEXTOUCH 100 UNIT/ML pen INJECT 30 UNITS UNDER THE SKIN EVERY MORNING BEFORE BREAKFAST 45 mL 11     methylPREDNISolone (MEDROL) 4 MG tablet Take 3 tabs daily for 7 days, then 2 tabs daily for 7 days. 48 tablet 3     methylPREDNISolone (MEDROL) 4 MG tablet TAKE 3 TABLETS DAILY BY MOUTH FOR 1 WEEK THEN 2 TABLETS DAILY FOR 1 WEEK THEN OFF 35 tablet 1     ONETOUCH LANCETS MISC Use to test blood sugar 6 to 8 times daily or as directed. Okay to dispense One Touch Verio products per patient's insurance. 700 each 1     oxyCODONE-acetaminophen (PERCOCET) 5-325 MG tablet Take 1 tablet by mouth every 4 hours as needed for severe pain (must last 30 days from dispense date) 30 tablet 0     predniSONE (DELTASONE) 5 MG tablet Take 4 tabs daily for 7 days, then 3 tabs daily for 7 days. (Patient not taking: Reported on 12/9/2021) 45 tablet 1     secukinumab (COSENTYX SENSOREADY PEN) 150 MG/ML Sensoready pen Inject 2 mLs (300 mg) Subcutaneous every 28 days Hold for signs of infection, and seek medical attention. 2 mL 3     traMADol (ULTRAM) 50 MG tablet Take 1 tablet (50 mg) by mouth every 8 hours as needed for moderate pain . Must last 30 days 60 tablet 0     triamcinolone (KENALOG) 0.1 % cream Apply topically 2 times daily (Patient taking differently: Apply topically 2 times daily as needed ) 453.6 g 1     valACYclovir (VALTREX) 500 MG tablet Take 1 tablet (500 mg) by mouth daily as needed 90 tablet 3       Family History  family history includes Arthritis in her father; Asthma in her mother; Chronic Obstructive  Pulmonary Disease in her mother; Colon Cancer in her mother; Connective Tissue Disorder in her father; Diabetes in her maternal grandfather, maternal grandmother, maternal uncle, maternal uncle, maternal uncle, and mother; Hypertension in her maternal grandfather, maternal grandmother, and mother; Prostate Cancer in her maternal grandfather; Thyroid Disease in her mother; Vision Loss in her father.    Social History   reports that she quit smoking about 14 years ago. Her smoking use included cigarettes. She has a 5.00 pack-year smoking history. She has never used smokeless tobacco. She reports that she does not drink alcohol and does not use drugs.     Past Medical History  Past Medical History:   Diagnosis Date     Diabetes      Other psoriasis      Polyarthritis     probable psoriatic arthritis       Past Surgical History:   Procedure Laterality Date     ARTHROSCOPY KNEE WITH MENISCECTOMY Right 11/20/2018    Procedure: Examination Under Anesthesia Right Knee, Right Knee Arthroscopy, Partial Meniscectomy, Chondroplasty, Cyst Decompression, Synovial Biopsy;  Surgeon: Sean Pate MD;  Location: [facility] OR     AS HYSTEROSCOPY, SURGICAL; W/ ENDOMETRIAL ABLATION, ANY METHOD Bilateral 2017    endometrial ablation with bilater TL     SURGICAL HISTORY OF -       .  Tonsillar abscess       Physical Exam    No exam today.    RESULTS  Creatinine   Date Value Ref Range Status   10/26/2021 0.47 (L) 0.52 - 1.04 mg/dL Final   05/04/2021 0.51 (L) 0.52 - 1.04 mg/dL Final     GFR Estimate   Date Value Ref Range Status   10/26/2021 >90 >60 mL/min/1.73m2 Final     Comment:     As of July 11, 2021, eGFR is calculated by the CKD-EPI creatinine equation, without race adjustment. eGFR can be influenced by muscle mass, exercise, and diet. The reported eGFR is an estimation only and is only applicable if the renal function is stable.   05/04/2021 >90 >60 mL/min/[1.73_m2] Final     Comment:     Non  GFR  Calc  Starting 2018, serum creatinine based estimated GFR (eGFR) will be   calculated using the Chronic Kidney Disease Epidemiology Collaboration   (CKD-EPI) equation.       Hemoglobin A1C POCT   Date Value Ref Range Status   2020 8.3 (H) 0 - 5.6 % Final     Comment:     Normal <5.7% Prediabetes 5.7-6.4%  Diabetes 6.5% or higher - adopted from ADA   consensus guidelines.       Hemoglobin A1C   Date Value Ref Range Status   2022 8.9 (H) 0.0 - 5.6 % Final     Comment:     Normal <5.7%   Prediabetes 5.7-6.4%    Diabetes 6.5% or higher     Note: Adopted from ADA consensus guidelines.     Potassium   Date Value Ref Range Status   10/26/2021 4.0 3.4 - 5.3 mmol/L Final   2018 4.0 3.4 - 5.3 mmol/L Final     ALT   Date Value Ref Range Status   10/26/2021 24 0 - 50 U/L Final   2021 22 0 - 50 U/L Final     AST   Date Value Ref Range Status   10/26/2021 12 0 - 45 U/L Final   2021 7 0 - 45 U/L Final     TSH   Date Value Ref Range Status   2022 0.31 0.30 - 5.00 uIU/mL Final   10/26/2021 0.35 (L) 0.40 - 4.00 mU/L Final   2020 0.82 0.40 - 4.00 mU/L Final     Free T4   Date Value Ref Range Status   2022 1.00 0.70 - 1.80 ng/dL Final     Comment:     Performance of the Free T4 test has not been established with  specimens (<= 2 months of age).         Cholesterol   Date Value Ref Range Status   2022 159 <=199 mg/dL Final   2018 158 <200 mg/dL Final   2017 143 <200 mg/dL Final     HDL Cholesterol   Date Value Ref Range Status   2018 71 >49 mg/dL Final   2017 66 >49 mg/dL Final     Direct Measure HDL   Date Value Ref Range Status   2022 60 >=50 mg/dL Final     Comment:     HDL Cholesterol Reference Range:     0-2 years:   No reference ranges established for patients under 2 years old  at [facility] for lipid analytes.    2-8 years:  Greater than 45 mg/dL     18 years and older:   Female: Greater than or equal to 50 mg/dL    Male:   Greater than or equal to 40 mg/dL     LDL Cholesterol Calculated   Date Value Ref Range Status   02/01/2022 89 <=129 mg/dL Final   08/20/2018 67 <100 mg/dL Final     Comment:     Desirable:       <100 mg/dl   07/07/2017 65 <100 mg/dL Final     Comment:     Desirable:       <100 mg/dl     Triglycerides   Date Value Ref Range Status   02/01/2022 51 <=149 mg/dL Final   08/20/2018 100 <150 mg/dL Final   07/07/2017 63 <150 mg/dL Final     Cholesterol/HDL Ratio   Date Value Ref Range Status   11/11/2011 2.6 0.0 - 5.0 Final         ASSESSMENT/PLAN:    1.  TYPE 2 DIABETES MELLITUS: Hx of type 2 diabetes mellitus with sx of mild neuropathy in feet and recent + urine microalbuminuria.  I reviewed and scanned her DexcomG6 sensor data in her chart and her blood sugar readings are too high.  She finished taking Methylprednisone last week and her blood sugars remain high.  For now, she is to remain on Levemir 24 units subcutaneous each am and increase Levemir 8 units subcutaneous at bedtime.  I had her change her Novolog I/C ratio to 1:12 ( she has been using 1:15 ), plus correction insulin 1 units/50 for BG > 140.  She is to remain on Metformin 2000 mg daily and Ozempic 0.5 mg subcutaneous once a week.  If she takes steroids in the future and if the steroid dose is between 12-16 units she is to take NPH 10 units subcutaneous in the am and if steroid dose is between 6-8 units she is to take NPH 6 units in the am.  I will refer her to diabetes ed to discuss use of an insulin pump. She can use a different basal pattern when she receives steroids.  Also checking MATT Ab today.  Pt states she was seen by Oph 2-3 months ago without diabetic retinopathy.  Mild sx of neuropathy in feet. Denies foot ulcers.  Her urine microalbuminuria was + in 2/2022 and negative in the past. Will recheck.  No vitals today.  Pt received the COVID vaccine and COVID booster.  She also received the flu vaccine this season.    2.  PSORIATIC  ARTHRITIS: Requires steroids approx 1 x per month per patient.    3.  FOLLOW UP: With me on 4/5/2022 and in late April 2022.  Referral for diabetes ed placed today.  MATT Ab and urine microalbuminuria ordered today.    Time spent reviewing chart, labs and DexcomG6 sensor download data today = 10 minutes.  Time for video visit today = 26 minutes.  Time for documentation today = 15 minutes.    TOTAL TIME FOR VISIT TODAY = 51 minutes.    Morenita Bledsoe PA-C

## 2022-03-17 NOTE — NURSING NOTE
Patient denies any changes since echeck-in regarding medication and allergies and states all information entered during echeck-in remains accurate.  Regina Martinez on 3/17/2022 at 9:14 AM

## 2022-03-17 NOTE — TELEPHONE ENCOUNTER
Left detailed voicemail and MyChart message sent with request for clarification on vial and syringe administration. Clinic number provided.   Esther Riggins RN on 3/17/2022 at 1:35 PM       RE      Caller: Unspecified (Today, 11:05 AM)  Apparently NPH pen is not covered, only Novolin NPH.   Can you check with pt to see if she knows how to use a vial/syringe.

## 2022-03-17 NOTE — PROGRESS NOTES
Due to the COVID 19 pandemic this visit was converted to a video visit in order to help prevent spread of infection in this patient and the general population.    Time of start: 9:30 am  Time of end: 9:56 am  Total duration of video visit: 26 minutes.    HPI  Chrissy Dixon is a 36 year old female with dx of type 2 diabetes mellitus. Video visit for diabetes follow up today.  Pt had a virtual visit with Dr. Abdon Arcos in Dec 2021.  Apparently she was dx with type 2 diabetes at age 25 during pregnancy and was treated with insulin. Insulin was discontinued after delivery.   Approx 4 months postpartum she was hyperglycemic and resumed insulin.  She tells me she has been on MDI for 10 years.  She has sx suggestive of mild neuropathy in her feet and her urine microalbuminuria was + in 2/2022 and had been negative in the past.   She denies any known hx of retinopathy.  Her hx is also significant for psoriatic arthritis requiring steroids often once a month per patient.  For her diabetes, she is currently taking Levemir 24 units subcutaneous each am and 5 units subcutaneous each pm. She reduced her pm Levemir dose once she started Ozempic stating she had low blood sugars in the 40-50 range overnight and fasting.  She is also taking Novolog 1 unit/15 gms CHO with meals plus correction, Metformin 2000 mg daily and Ozempic 0.5 mg once a week.  Most recent A1C was 8.9 % on 2/1/2022.  Her previous A1C was 10.6 % in Oct 2021.  I reviewed and scanned her DexcomG6 sensor download data in her note below.  Her blood sugars are high both day and night.  She states her last Methylprednisone dose was last Thursday. Her blood sugars are still high this week.  On ROS today, she is very thin on video.  She denies blurred vision, polydipsia or polyuria. Energy level ok per patient.  She had COVID19 + in Jan 2022 and was very ill and lost 10 lbs.  Reports intermittent headaches since COVID19 infection.  Mild nausea the day after  taking Ozempic. No recent vomiting.  Hx of chronic abd pain which she states she had prior to starting Ozempic.  Some swelling of legs from her arthritis per patient.  Denies open sores or foot ulcers.  She has mild tingling/numbness in toes.  Denies SOB at rest, cough, fever, chills, chest pain, diarrhea, dysuria or hematuria.  Pt is s/p tubal ligation.  Adriana received the COVID vaccine and COVID booster.    Diabetes Care  Retinopathy: none per patient; seen by Oph 2-3 months ago.  Nephropathy:urine microalbuminuria was + in 2/2022 and negative in past. Will plan to recheck.  Neuropathy:mild sx in feet.  Foot Exam: no exam today.  Taking aspirin: no.  Lipids: LDL 89 in 2/2022.  CAD: no.  Mental health: denies depression.  Insulin: Basal insulin BID and meal time insulin with correction.  DM meds: Ozempic and Metformin.  Testing: DexcomG6 sensor.        ROS  See under HPI.    Allergies  Allergies   Allergen Reactions     Diagnostic X-Ray Materials Swelling     Betadine [Povidone Iodine] Swelling     Levofloxacin Other (See Comments)     High blood sugars, doesn't feel well at all      Penicillins Hives     Pneumococcal Vaccines Swelling     Prenatal Vit-Fe Fumarate-Fa [Cavan-Folate Ob] Hives     Tdap [Daptacel] Swelling       Medications  Current Outpatient Medications   Medication Sig Dispense Refill     Continuous Blood Gluc Sensor (DEXCOM G6 SENSOR) MISC 1 Device by Device route every 10 days 3 each 11     insulin aspart (NOVOLOG FLEXPEN) 100 UNIT/ML pen Inject 1 unit/12 gms CHO with meals plus correction. Pt uses approx 35 units in 24 hrs. 30 mL 4     insulin detemir (LEVEMIR FLEXTOUCH) 100 UNIT/ML pen Inject 24 units subcutaneous each am and 8 units subcutaneous each pm. 60 mL 3     insulin  UNIT/ML injection Only use when taking steroids. If steroid dose is 12-16 mg take NPH 10 units subcutaneous in am and if steroid dose 6-8 mg take NPH 6 units subcutaneous in am. 15 mL 3     metFORMIN (GLUCOPHAGE) 500  MG tablet Take 2 tablets (1,000 mg) by mouth 2 times daily (with meals) 360 tablet 3     semaglutide (OZEMPIC, 0.25 OR 0.5 MG/DOSE,) 2 MG/1.5ML SOPN pen Inject 0.5 mg weekly. 3 mg 3     apremilast (OTEZLA) 30 MG tablet Take 1 tablet (30 mg) by mouth 2 times daily 180 tablet 5     cholecalciferol (VITAMIN D3) 1000 UNIT tablet Take 1 tablet (1,000 Units) by mouth daily 90 tablet 1     clobetasol (TEMOVATE) 0.05 % external solution Apply topically 2 times daily (Patient taking differently: Apply topically 2 times daily as needed ) 50 mL 6     Continuous Blood Gluc Transmit (DEXCOM G6 TRANSMITTER) MISC Inject 1 each Subcutaneous continuous 1 each 3     Fluocinolone Acetonide (DERMA-SMOOTHE/FS SCALP) 0.01 % OIL Apply to scalp at bedtime, then wear showercap, rinse off in morning. (Patient taking differently: Apply to scalp at bedtime, then wear showercap, rinse off in morning as needed.) 118 mL 3     fluticasone (FLONASE) 50 MCG/ACT nasal spray Spray 2 sprays into both nostrils daily 48 g 11     golimumab (SIMPONI) 50 MG/0.5ML pre-filled syringe injection Inject 0.5 mLs (50 mg) Subcutaneous every 28 days Hold for signs of infection, and seek medical attention. 0.5 mL 6     ibuprofen (ADVIL/MOTRIN) 600 MG tablet Take 1 tablet (600 mg) by mouth every 8 hours as needed for moderate pain 270 tablet 0     insulin pen needle 31G X 8 MM Use  3-4 pen needles daily or as directed. 300 each 4     LEVEMIR FLEXTOUCH 100 UNIT/ML pen INJECT 30 UNITS UNDER THE SKIN EVERY MORNING BEFORE BREAKFAST 45 mL 11     methylPREDNISolone (MEDROL) 4 MG tablet Take 3 tabs daily for 7 days, then 2 tabs daily for 7 days. 48 tablet 3     methylPREDNISolone (MEDROL) 4 MG tablet TAKE 3 TABLETS DAILY BY MOUTH FOR 1 WEEK THEN 2 TABLETS DAILY FOR 1 WEEK THEN OFF 35 tablet 1     ONETOUCH LANCETS MISC Use to test blood sugar 6 to 8 times daily or as directed. Okay to dispense One Touch Verio products per patient's insurance. 700 each 1      oxyCODONE-acetaminophen (PERCOCET) 5-325 MG tablet Take 1 tablet by mouth every 4 hours as needed for severe pain (must last 30 days from dispense date) 30 tablet 0     predniSONE (DELTASONE) 5 MG tablet Take 4 tabs daily for 7 days, then 3 tabs daily for 7 days. (Patient not taking: Reported on 12/9/2021) 45 tablet 1     secukinumab (COSENTYX SENSOREADY PEN) 150 MG/ML Sensoready pen Inject 2 mLs (300 mg) Subcutaneous every 28 days Hold for signs of infection, and seek medical attention. 2 mL 3     traMADol (ULTRAM) 50 MG tablet Take 1 tablet (50 mg) by mouth every 8 hours as needed for moderate pain . Must last 30 days 60 tablet 0     triamcinolone (KENALOG) 0.1 % cream Apply topically 2 times daily (Patient taking differently: Apply topically 2 times daily as needed ) 453.6 g 1     valACYclovir (VALTREX) 500 MG tablet Take 1 tablet (500 mg) by mouth daily as needed 90 tablet 3       Family History  family history includes Arthritis in her father; Asthma in her mother; Chronic Obstructive Pulmonary Disease in her mother; Colon Cancer in her mother; Connective Tissue Disorder in her father; Diabetes in her maternal grandfather, maternal grandmother, maternal uncle, maternal uncle, maternal uncle, and mother; Hypertension in her maternal grandfather, maternal grandmother, and mother; Prostate Cancer in her maternal grandfather; Thyroid Disease in her mother; Vision Loss in her father.    Social History   reports that she quit smoking about 14 years ago. Her smoking use included cigarettes. She has a 5.00 pack-year smoking history. She has never used smokeless tobacco. She reports that she does not drink alcohol and does not use drugs.     Past Medical History  Past Medical History:   Diagnosis Date     Diabetes      Other psoriasis      Polyarthritis     probable psoriatic arthritis       Past Surgical History:   Procedure Laterality Date     ARTHROSCOPY KNEE WITH MENISCECTOMY Right 11/20/2018    Procedure:  Examination Under Anesthesia Right Knee, Right Knee Arthroscopy, Partial Meniscectomy, Chondroplasty, Cyst Decompression, Synovial Biopsy;  Surgeon: Sean Pate MD;  Location: [facility] OR     AS HYSTEROSCOPY, SURGICAL; W/ ENDOMETRIAL ABLATION, ANY METHOD Bilateral 2017    endometrial ablation with bilater TL     SURGICAL HISTORY OF -       .  Tonsillar abscess       Physical Exam    No exam today.    RESULTS  Creatinine   Date Value Ref Range Status   10/26/2021 0.47 (L) 0.52 - 1.04 mg/dL Final   05/04/2021 0.51 (L) 0.52 - 1.04 mg/dL Final     GFR Estimate   Date Value Ref Range Status   10/26/2021 >90 >60 mL/min/1.73m2 Final     Comment:     As of July 11, 2021, eGFR is calculated by the CKD-EPI creatinine equation, without race adjustment. eGFR can be influenced by muscle mass, exercise, and diet. The reported eGFR is an estimation only and is only applicable if the renal function is stable.   05/04/2021 >90 >60 mL/min/[1.73_m2] Final     Comment:     Non  GFR Calc  Starting 12/18/2018, serum creatinine based estimated GFR (eGFR) will be   calculated using the Chronic Kidney Disease Epidemiology Collaboration   (CKD-EPI) equation.       Hemoglobin A1C POCT   Date Value Ref Range Status   09/14/2020 8.3 (H) 0 - 5.6 % Final     Comment:     Normal <5.7% Prediabetes 5.7-6.4%  Diabetes 6.5% or higher - adopted from ADA   consensus guidelines.       Hemoglobin A1C   Date Value Ref Range Status   02/01/2022 8.9 (H) 0.0 - 5.6 % Final     Comment:     Normal <5.7%   Prediabetes 5.7-6.4%    Diabetes 6.5% or higher     Note: Adopted from ADA consensus guidelines.     Potassium   Date Value Ref Range Status   10/26/2021 4.0 3.4 - 5.3 mmol/L Final   08/20/2018 4.0 3.4 - 5.3 mmol/L Final     ALT   Date Value Ref Range Status   10/26/2021 24 0 - 50 U/L Final   05/04/2021 22 0 - 50 U/L Final     AST   Date Value Ref Range Status   10/26/2021 12 0 - 45 U/L Final   05/04/2021 7 0 - 45 U/L Final     TSH    Date Value Ref Range Status   2022 0.31 0.30 - 5.00 uIU/mL Final   10/26/2021 0.35 (L) 0.40 - 4.00 mU/L Final   2020 0.82 0.40 - 4.00 mU/L Final     Free T4   Date Value Ref Range Status   2022 1.00 0.70 - 1.80 ng/dL Final     Comment:     Performance of the Free T4 test has not been established with  specimens (<= 2 months of age).         Cholesterol   Date Value Ref Range Status   2022 159 <=199 mg/dL Final   2018 158 <200 mg/dL Final   2017 143 <200 mg/dL Final     HDL Cholesterol   Date Value Ref Range Status   2018 71 >49 mg/dL Final   2017 66 >49 mg/dL Final     Direct Measure HDL   Date Value Ref Range Status   2022 60 >=50 mg/dL Final     Comment:     HDL Cholesterol Reference Range:     0-2 years:   No reference ranges established for patients under 2 years old  at [facility] for lipid analytes.    2-8 years:  Greater than 45 mg/dL     18 years and older:   Female: Greater than or equal to 50 mg/dL   Male:   Greater than or equal to 40 mg/dL     LDL Cholesterol Calculated   Date Value Ref Range Status   2022 89 <=129 mg/dL Final   2018 67 <100 mg/dL Final     Comment:     Desirable:       <100 mg/dl   2017 65 <100 mg/dL Final     Comment:     Desirable:       <100 mg/dl     Triglycerides   Date Value Ref Range Status   2022 51 <=149 mg/dL Final   2018 100 <150 mg/dL Final   2017 63 <150 mg/dL Final     Cholesterol/HDL Ratio   Date Value Ref Range Status   2011 2.6 0.0 - 5.0 Final         ASSESSMENT/PLAN:    1.  TYPE 2 DIABETES MELLITUS: Hx of type 2 diabetes mellitus with sx of mild neuropathy in feet and recent + urine microalbuminuria.  I reviewed and scanned her DexcomG6 sensor data in her chart and her blood sugar readings are too high.  She finished taking Methylprednisone last week and her blood sugars remain high.  For now, she is to remain on Levemir 24 units subcutaneous each am  and increase Levemir 8 units subcutaneous at bedtime.  I had her change her Novolog I/C ratio to 1:12 ( she has been using 1:15 ), plus correction insulin 1 units/50 for BG > 140.  She is to remain on Metformin 2000 mg daily and Ozempic 0.5 mg subcutaneous once a week.  If she takes steroids in the future and if the steroid dose is between 12-16 units she is to take NPH 10 units subcutaneous in the am and if steroid dose is between 6-8 units she is to take NPH 6 units in the am.  I will refer her to diabetes ed to discuss use of an insulin pump. She can use a different basal pattern when she receives steroids.  Also checking MATT Ab today.  Pt states she was seen by Oph 2-3 months ago without diabetic retinopathy.  Mild sx of neuropathy in feet. Denies foot ulcers.  Her urine microalbuminuria was + in 2/2022 and negative in the past. Will recheck.  No vitals today.  Pt received the COVID vaccine and COVID booster.  She also received the flu vaccine this season.    2.  PSORIATIC ARTHRITIS: Requires steroids approx 1 x per month per patient.    3.  FOLLOW UP: With me on 4/5/2022 and in late April 2022.  Referral for diabetes ed placed today.  MATT Ab and urine microalbuminuria ordered today.    Time spent reviewing chart, labs and DexcomG6 sensor download data today = 10 minutes.  Time for video visit today = 26 minutes.  Time for documentation today = 15 minutes.    TOTAL TIME FOR VISIT TODAY = 51 minutes.    Morenita Bledsoe PA-C

## 2022-03-18 ENCOUNTER — TELEPHONE (OUTPATIENT)
Dept: EDUCATION SERVICES | Facility: CLINIC | Age: 37
End: 2022-03-18
Payer: COMMERCIAL

## 2022-03-18 NOTE — TELEPHONE ENCOUNTER
Diabetes Educator Note:     Left message for Chrissy to call to get appointment scheduled with one of the educators.   Waiting for call back.     Gayatri Frost, ERICN, RN, [facility]  Certified Diabetes Care and   [facility] Diabetes  [facility] [phone]  Phone [phone]

## 2022-03-21 ENCOUNTER — TELEPHONE (OUTPATIENT)
Dept: ENDOCRINOLOGY | Facility: CLINIC | Age: 37
End: 2022-03-21
Payer: COMMERCIAL

## 2022-03-21 NOTE — TELEPHONE ENCOUNTER
Schedule per Morenita Bledsoe 3/17 checkout notes.    Check Out Comments: Appt with me on 4/5/2022 at noon- ok to add on. Appt with me last week in April 2022. Lab ordered. Please schedule pt to see CDE- referral placed

## 2022-03-21 NOTE — TELEPHONE ENCOUNTER
Patient scheduled on 4/5 with Morenita Bledsoe but due to financial concerns would like to hold off on scheduling rest of appointments. Requested call back in April for May scheduling.

## 2022-03-28 ENCOUNTER — MYC REFILL (OUTPATIENT)
Dept: INTERNAL MEDICINE | Facility: CLINIC | Age: 37
End: 2022-03-28
Payer: COMMERCIAL

## 2022-03-28 DIAGNOSIS — L40.50 PSORIATIC ARTHRITIS (H): ICD-10-CM

## 2022-03-28 RX ORDER — TRAMADOL HYDROCHLORIDE 50 MG/1
50 TABLET ORAL EVERY 8 HOURS PRN
Qty: 60 TABLET | Refills: 0 | Status: CANCELLED | OUTPATIENT
Start: 2022-03-28

## 2022-03-31 ENCOUNTER — MYC REFILL (OUTPATIENT)
Dept: INTERNAL MEDICINE | Facility: CLINIC | Age: 37
End: 2022-03-31
Payer: COMMERCIAL

## 2022-03-31 DIAGNOSIS — L40.50 PSORIATIC ARTHRITIS (H): ICD-10-CM

## 2022-03-31 DIAGNOSIS — E11.9 DIABETES MELLITUS, TYPE 2 (H): ICD-10-CM

## 2022-03-31 DIAGNOSIS — E11.65 TYPE 2 DIABETES MELLITUS WITH HYPERGLYCEMIA, WITH LONG-TERM CURRENT USE OF INSULIN (H): ICD-10-CM

## 2022-03-31 DIAGNOSIS — Z79.4 TYPE 2 DIABETES MELLITUS WITH HYPERGLYCEMIA, WITH LONG-TERM CURRENT USE OF INSULIN (H): ICD-10-CM

## 2022-03-31 RX ORDER — SEMAGLUTIDE 1.34 MG/ML
INJECTION, SOLUTION SUBCUTANEOUS
Qty: 3 MG | Refills: 3 | Status: SHIPPED | OUTPATIENT
Start: 2022-03-31 | End: 2022-12-02

## 2022-03-31 RX ORDER — TRAMADOL HYDROCHLORIDE 50 MG/1
50 TABLET ORAL EVERY 8 HOURS PRN
Qty: 60 TABLET | Refills: 0 | Status: CANCELLED | OUTPATIENT
Start: 2022-03-31

## 2022-03-31 NOTE — TELEPHONE ENCOUNTER
[facility] Call Center    Phone Message    May a detailed message be left on voicemail: yes     Reason for Call: Other: Patient is calling on the status of her medications. Please call back when its ready.     Action Taken: Message routed to:  [facility] JEMIMA    Travel Screening: Not Applicable

## 2022-03-31 NOTE — TELEPHONE ENCOUNTER
insulin pen needle 31G X 8 MM      Last Written Prescription Date:  11/9/18 by Dr Haskins  Last Fill Quantity: 300 each,   # refills: 4  Last Office Visit : 3/17/22  Future Office visit:  5/5/22    Routing refill request to provider for review/approval because:  Not prescribed by requested provider    semaglutide (OZEMPIC, 0.25 OR 0.5 MG/DOSE,) 2 MG/1.5ML SOPN pen      Historical entry  Last Office Visit : 3/17/22  Future Office visit:  5/5/22    Routing refill request to provider for review/approval because:  Medication is reported/historical

## 2022-03-31 NOTE — TELEPHONE ENCOUNTER
[facility] Call Center    Phone Message    May a detailed message be left on voicemail: yes     Reason for Call: Medication Refill Request    Has the patient contacted the pharmacy for the refill? Yes   Name of medication being requested: traMADol (ULTRAM) 50 MG tablet    Provider who prescribed the medication: Dr. Haskins  Pharmacy: [facility]  Date medication is needed: 3/31/22   Pt needing this script today, pt is out, pt sent over multiple [employer] messages regarding this refill      Action Taken: Message routed to:  [facility] alexi

## 2022-03-31 NOTE — TELEPHONE ENCOUNTER
[facility] Call Center    Phone Message    May a detailed message be left on voicemail: yes     Reason for Call: Medication Refill Request    Has the patient contacted the pharmacy for the refill? Yes   Name of medication being requested:  Pen needles & semaglutide (OZEMPIC, 0.25 OR 0.5 MG/DOSE,) 2 MG/1.5ML SOPN pen    Provider who prescribed the medication: Ladi    Pharmacy: [id number] [address] [place] [facility] [address]    Date medication is needed: pt will need within the week.      Action Taken: Message routed to:  [facility] endo    Travel Screening: Not Applicable

## 2022-03-31 NOTE — TELEPHONE ENCOUNTER
traMADol (ULTRAM) 50 MG tablet  Last Written Prescription Date:   2/26/2022  Last Fill Quantity: 60,   # refills: 0  Last Office Visit :  10/26/2021  Future Office visit:   4/5/2022    Routing refill request to provider for review/approval because:  Drug not on the [facility], [facility] or [facility] refill protocol or controlled substance      Reyna Chacko RN  Central Triage Red Flags/Med Refills

## 2022-04-01 RX ORDER — TRAMADOL HYDROCHLORIDE 50 MG/1
50 TABLET ORAL EVERY 8 HOURS PRN
Qty: 60 TABLET | Refills: 0 | Status: SHIPPED | OUTPATIENT
Start: 2022-04-01 | End: 2022-04-22

## 2022-04-04 ENCOUNTER — MYC REFILL (OUTPATIENT)
Dept: INTERNAL MEDICINE | Facility: CLINIC | Age: 37
End: 2022-04-04
Payer: COMMERCIAL

## 2022-04-04 DIAGNOSIS — L40.50 PSORIATIC ARTHRITIS (H): ICD-10-CM

## 2022-04-04 DIAGNOSIS — B00.1 RECURRENT COLD SORES: ICD-10-CM

## 2022-04-04 RX ORDER — OXYCODONE AND ACETAMINOPHEN 5; 325 MG/1; MG/1
1 TABLET ORAL EVERY 4 HOURS PRN
Qty: 30 TABLET | Refills: 0 | Status: CANCELLED | OUTPATIENT
Start: 2022-04-04

## 2022-04-05 RX ORDER — OXYCODONE AND ACETAMINOPHEN 5; 325 MG/1; MG/1
1 TABLET ORAL EVERY 4 HOURS PRN
Qty: 30 TABLET | Refills: 0 | Status: SHIPPED | OUTPATIENT
Start: 2022-04-11 | End: 2022-05-02

## 2022-04-07 ENCOUNTER — LAB (OUTPATIENT)
Dept: LAB | Facility: CLINIC | Age: 37
End: 2022-04-07
Payer: COMMERCIAL

## 2022-04-07 DIAGNOSIS — Z79.4 TYPE 2 DIABETES MELLITUS WITH HYPERGLYCEMIA, WITH LONG-TERM CURRENT USE OF INSULIN (H): ICD-10-CM

## 2022-04-07 DIAGNOSIS — E11.65 TYPE 2 DIABETES MELLITUS WITH HYPERGLYCEMIA, WITH LONG-TERM CURRENT USE OF INSULIN (H): ICD-10-CM

## 2022-04-07 LAB
CREAT UR-MCNC: 74 MG/DL
MICROALBUMIN UR-MCNC: 1.96 MG/DL (ref 0–1.99)
MICROALBUMIN/CREAT UR: 26.5 MG/G CR

## 2022-04-07 PROCEDURE — 86341 ISLET CELL ANTIBODY: CPT | Mod: 90

## 2022-04-07 PROCEDURE — 36415 COLL VENOUS BLD VENIPUNCTURE: CPT

## 2022-04-07 PROCEDURE — 99000 SPECIMEN HANDLING OFFICE-LAB: CPT

## 2022-04-07 PROCEDURE — 82043 UR ALBUMIN QUANTITATIVE: CPT

## 2022-04-11 LAB — GAD65 AB SER IA-ACNC: <5 IU/ML

## 2022-04-21 NOTE — TELEPHONE ENCOUNTER
0700-Received report from off going nurse  Pt resting quietly in bed, breathing unlabored  0800-Pt confirms a good nights sleep, is calm and cooperative throughout assessment  Pt denies SI/HI/VH/AH, depression, anxiety, and pian  Pt talks about his family session today, and is hopefull he will be discharged afterward  Pt is visible on the unit, goes to groups, and is compliant with meds and meals  All needs met, will continue to monitor for pt safety via Q 7 min checks  Pt called back, informed waiting on signature.

## 2022-04-22 ENCOUNTER — MYC REFILL (OUTPATIENT)
Dept: INTERNAL MEDICINE | Facility: CLINIC | Age: 37
End: 2022-04-22
Payer: COMMERCIAL

## 2022-04-22 ENCOUNTER — TELEPHONE (OUTPATIENT)
Dept: GASTROENTEROLOGY | Facility: CLINIC | Age: 37
End: 2022-04-22

## 2022-04-22 DIAGNOSIS — D50.0 IRON DEFICIENCY ANEMIA DUE TO CHRONIC BLOOD LOSS: Primary | ICD-10-CM

## 2022-04-22 DIAGNOSIS — L40.50 PSORIATIC ARTHRITIS (H): ICD-10-CM

## 2022-04-22 RX ORDER — BISACODYL 5 MG/1
TABLET, DELAYED RELEASE ORAL
Qty: 4 TABLET | Refills: 0 | Status: SHIPPED | OUTPATIENT
Start: 2022-04-22 | End: 2022-05-26

## 2022-04-22 RX ORDER — TRAMADOL HYDROCHLORIDE 50 MG/1
50 TABLET ORAL EVERY 8 HOURS PRN
Qty: 60 TABLET | Refills: 0 | Status: SHIPPED | OUTPATIENT
Start: 2022-04-30 | End: 2022-05-25

## 2022-04-22 NOTE — TELEPHONE ENCOUNTER
Patient scheduled for colonoscopy/EGD on 4/29/22.     Covid test scheduled: 4/26/22    Arrival time: 0730    Facility location: [facility]     Sedation type: MAC     Indication for procedure: anemia     Anticoagulations? no     Bowel prep recommendation: Deborah d/t DM. Patient does have narcotic listed. Verify bowel habits.     Golytely prep sent to [id number] - [place], [place] - [address] AT [facility] [address] [facility] pharmacy. Prep instructions sent via [employer].     Pre visit planning completed.    Chrissy Altamirano RN

## 2022-04-22 NOTE — TELEPHONE ENCOUNTER
Pre assessment questions completed for upcoming colonoscopy/EGD procedure scheduled on 4/29/22    COVID test scheduled 4/26/22    Reviewed procedural arrival time 0730 and facility location [facility].     Designated  policy reviewed. Instructed to have someone stay 24 hours post procedure.     Reviewed Golytely prep instructions with patient. No fiber/iron supplements or foods that contain nuts/seeds 7 days prior to procedure.     Patient verbalized understanding and had no questions or concerns at this time.    Chrissy Altamirano RN

## 2022-04-26 ENCOUNTER — LAB (OUTPATIENT)
Dept: LAB | Facility: CLINIC | Age: 37
End: 2022-04-26
Attending: INTERNAL MEDICINE
Payer: COMMERCIAL

## 2022-04-26 DIAGNOSIS — Z11.59 ENCOUNTER FOR SCREENING FOR OTHER VIRAL DISEASES: ICD-10-CM

## 2022-04-26 PROCEDURE — U0005 INFEC AGEN DETEC AMPLI PROBE: HCPCS

## 2022-04-26 PROCEDURE — U0003 INFECTIOUS AGENT DETECTION BY NUCLEIC ACID (DNA OR RNA); SEVERE ACUTE RESPIRATORY SYNDROME CORONAVIRUS 2 (SARS-COV-2) (CORONAVIRUS DISEASE [COVID-19]), AMPLIFIED PROBE TECHNIQUE, MAKING USE OF HIGH THROUGHPUT TECHNOLOGIES AS DESCRIBED BY CMS-2020-01-R: HCPCS

## 2022-04-27 LAB — SARS-COV-2 RNA RESP QL NAA+PROBE: NEGATIVE

## 2022-04-29 ENCOUNTER — HOSPITAL ENCOUNTER (OUTPATIENT)
Facility: AMBULATORY SURGERY CENTER | Age: 37
Discharge: HOME | End: 2022-04-29
Attending: INTERNAL MEDICINE
Payer: COMMERCIAL

## 2022-04-29 ENCOUNTER — ANESTHESIA EVENT (OUTPATIENT)
Dept: SURGERY | Facility: AMBULATORY SURGERY CENTER | Age: 37
End: 2022-04-29
Payer: COMMERCIAL

## 2022-04-29 ENCOUNTER — ANESTHESIA (OUTPATIENT)
Dept: SURGERY | Facility: AMBULATORY SURGERY CENTER | Age: 37
End: 2022-04-29
Payer: COMMERCIAL

## 2022-04-29 VITALS
HEART RATE: 108 BPM | HEIGHT: 63 IN | SYSTOLIC BLOOD PRESSURE: 109 MMHG | DIASTOLIC BLOOD PRESSURE: 73 MMHG | RESPIRATION RATE: 16 BRPM | BODY MASS INDEX: 15.95 KG/M2 | OXYGEN SATURATION: 100 % | WEIGHT: 90 LBS | TEMPERATURE: 97.6 F

## 2022-04-29 VITALS — HEART RATE: 96 BPM

## 2022-04-29 LAB
COLONOSCOPY: NORMAL
GLUCOSE BLDC GLUCOMTR-MCNC: 250 MG/DL (ref 70–99)
GLUCOSE BLDC GLUCOMTR-MCNC: 251 MG/DL (ref 70–99)
GLUCOSE BLDC GLUCOMTR-MCNC: 273 MG/DL (ref 70–99)
HCG UR QL: NEGATIVE
INTERNAL QC OK POCT: NORMAL
POCT KIT EXPIRATION DATE: NORMAL
POCT KIT LOT NUMBER: NORMAL
UPPER GI ENDOSCOPY: NORMAL

## 2022-04-29 PROCEDURE — 88305 TISSUE EXAM BY PATHOLOGIST: CPT | Mod: TC | Performed by: INTERNAL MEDICINE

## 2022-04-29 PROCEDURE — 81025 URINE PREGNANCY TEST: CPT | Performed by: PATHOLOGY

## 2022-04-29 PROCEDURE — 88305 TISSUE EXAM BY PATHOLOGIST: CPT | Mod: 26 | Performed by: PATHOLOGY

## 2022-04-29 PROCEDURE — 43239 EGD BIOPSY SINGLE/MULTIPLE: CPT

## 2022-04-29 PROCEDURE — 45380 COLONOSCOPY AND BIOPSY: CPT

## 2022-04-29 PROCEDURE — 82962 GLUCOSE BLOOD TEST: CPT | Performed by: PATHOLOGY

## 2022-04-29 RX ORDER — HALOPERIDOL 5 MG/ML
INJECTION INTRAMUSCULAR PRN
Status: DISCONTINUED | OUTPATIENT
Start: 2022-04-29 | End: 2022-04-29

## 2022-04-29 RX ORDER — ONDANSETRON 2 MG/ML
4 INJECTION INTRAMUSCULAR; INTRAVENOUS ONCE
Status: COMPLETED | OUTPATIENT
Start: 2022-04-29 | End: 2022-04-29

## 2022-04-29 RX ORDER — PROPOFOL 10 MG/ML
INJECTION, EMULSION INTRAVENOUS PRN
Status: DISCONTINUED | OUTPATIENT
Start: 2022-04-29 | End: 2022-04-29

## 2022-04-29 RX ORDER — SIMETHICONE
LIQUID (ML) MISCELLANEOUS PRN
Status: DISCONTINUED | OUTPATIENT
Start: 2022-04-29 | End: 2022-04-29 | Stop reason: HOSPADM

## 2022-04-29 RX ORDER — SODIUM CHLORIDE, SODIUM LACTATE, POTASSIUM CHLORIDE, CALCIUM CHLORIDE 600; 310; 30; 20 MG/100ML; MG/100ML; MG/100ML; MG/100ML
INJECTION, SOLUTION INTRAVENOUS CONTINUOUS PRN
Status: DISCONTINUED | OUTPATIENT
Start: 2022-04-29 | End: 2022-04-29

## 2022-04-29 RX ORDER — ONDANSETRON 2 MG/ML
4 INJECTION INTRAMUSCULAR; INTRAVENOUS
Status: DISCONTINUED | OUTPATIENT
Start: 2022-04-29 | End: 2022-04-30 | Stop reason: HOSPADM

## 2022-04-29 RX ORDER — PROPOFOL 10 MG/ML
INJECTION, EMULSION INTRAVENOUS CONTINUOUS PRN
Status: DISCONTINUED | OUTPATIENT
Start: 2022-04-29 | End: 2022-04-29

## 2022-04-29 RX ORDER — LIDOCAINE HYDROCHLORIDE 20 MG/ML
INJECTION, SOLUTION INFILTRATION; PERINEURAL PRN
Status: DISCONTINUED | OUTPATIENT
Start: 2022-04-29 | End: 2022-04-29

## 2022-04-29 RX ADMIN — LIDOCAINE HYDROCHLORIDE 60 MG: 20 INJECTION, SOLUTION INFILTRATION; PERINEURAL at 08:18

## 2022-04-29 RX ADMIN — ONDANSETRON 4 MG: 2 INJECTION INTRAMUSCULAR; INTRAVENOUS at 07:35

## 2022-04-29 RX ADMIN — PROPOFOL 50 MG: 10 INJECTION, EMULSION INTRAVENOUS at 08:28

## 2022-04-29 RX ADMIN — PROPOFOL 30 MG: 10 INJECTION, EMULSION INTRAVENOUS at 08:41

## 2022-04-29 RX ADMIN — SODIUM CHLORIDE, SODIUM LACTATE, POTASSIUM CHLORIDE, CALCIUM CHLORIDE: 600; 310; 30; 20 INJECTION, SOLUTION INTRAVENOUS at 08:17

## 2022-04-29 RX ADMIN — HALOPERIDOL 1 MG: 5 INJECTION INTRAMUSCULAR at 08:16

## 2022-04-29 RX ADMIN — PROPOFOL 30 MG: 10 INJECTION, EMULSION INTRAVENOUS at 08:21

## 2022-04-29 RX ADMIN — PROPOFOL 50 MG: 10 INJECTION, EMULSION INTRAVENOUS at 08:30

## 2022-04-29 RX ADMIN — PROPOFOL 150 MCG/KG/MIN: 10 INJECTION, EMULSION INTRAVENOUS at 08:21

## 2022-04-29 RX ADMIN — PROPOFOL 20 MG: 10 INJECTION, EMULSION INTRAVENOUS at 08:24

## 2022-04-29 RX ADMIN — PROPOFOL 50 MG: 10 INJECTION, EMULSION INTRAVENOUS at 08:38

## 2022-04-29 NOTE — ANESTHESIA CARE TRANSFER NOTE
Patient: Chrissy Dixon    Procedure: Procedure(s):  ESOPHAGOGASTRODUODENOSCOPY, WITH BIOPSY  COLONOSCOPY, WITH BIOPSY       Diagnosis: Iron deficiency anemia due to chronic blood loss [D50.0]  Diagnosis Additional Information: No value filed.    Anesthesia Type:   No value filed.     Note:      Level of Consciousness: awake  Oxygen Supplementation: room air    Independent Airway: airway patency satisfactory and stable  Dentition: dentition unchanged  Vital Signs Stable: post-procedure vital signs reviewed and stable  Report to RN Given: handoff report given  Patient transferred to: Phase II    Handoff Report: Identifed the Patient, Identified the Reponsible Provider, Reviewed the pertinent medical history, Discussed the surgical course, Reviewed Intra-OP anesthesia mangement and issues during anesthesia, Set expectations for post-procedure period and Allowed opportunity for questions and acknowledgement of understanding      Vitals:  Vitals Value Taken Time   /73 04/29/22 0915   Temp 36.4  C (97.6  F) 04/29/22 0915   Pulse 108 04/29/22 0915   Resp 16 04/29/22 0915   SpO2 100 % 04/29/22 0915       Electronically Signed By: JOHN Valenzuela CRNA  April 29, 2022  9:35 AM

## 2022-04-29 NOTE — ANESTHESIA POSTPROCEDURE EVALUATION
Patient: Chrissy Dixon    Procedure: Procedure(s):  ESOPHAGOGASTRODUODENOSCOPY, WITH BIOPSY  COLONOSCOPY, WITH BIOPSY       Anesthesia Type:  MAC    Note:  Disposition: Outpatient   Postop Pain Control: Uneventful            Sign Out: Well controlled pain   PONV: No   Neuro/Psych: Uneventful            Sign Out: Acceptable/Baseline neuro status   Airway/Respiratory: Uneventful            Sign Out: Acceptable/Baseline resp. status   CV/Hemodynamics: Uneventful            Sign Out: Acceptable CV status; No obvious hypovolemia; No obvious fluid overload   Other NRE: NONE   DID A NON-ROUTINE EVENT OCCUR? No           Last vitals:  Vitals Value Taken Time   /73 04/29/22 0915   Temp 36.4  C (97.6  F) 04/29/22 0915   Pulse 108 04/29/22 0915   Resp 16 04/29/22 0915   SpO2 100 % 04/29/22 0915       Electronically Signed By: Ramsey Mason MD  April 29, 2022  2:24 PM

## 2022-04-29 NOTE — ANESTHESIA PREPROCEDURE EVALUATION
Anesthesia Pre-Procedure Evaluation    Patient: Chrissy Dixon   MRN: [id number] : 1985        Procedure : Procedure(s):  ESOPHAGOGASTRODUODENOSCOPY, WITH BIOPSY  COLONOSCOPY          Past Medical History:   Diagnosis Date     Diabetes      Other psoriasis      Polyarthritis     probable psoriatic arthritis      Past Surgical History:   Procedure Laterality Date     ARTHROSCOPY KNEE WITH MENISCECTOMY Right 2018    Procedure: Examination Under Anesthesia Right Knee, Right Knee Arthroscopy, Partial Meniscectomy, Chondroplasty, Cyst Decompression, Synovial Biopsy;  Surgeon: Sean Pate MD;  Location: [facility] OR     AS HYSTEROSCOPY, SURGICAL; W/ ENDOMETRIAL ABLATION, ANY METHOD Bilateral 2017    endometrial ablation with bilater TL     SURGICAL HISTORY OF -       .  Tonsillar abscess      Allergies   Allergen Reactions     Diagnostic X-Ray Materials Swelling     Betadine [Povidone Iodine] Swelling     Levofloxacin Other (See Comments)     High blood sugars, doesn't feel well at all      Penicillins Hives     Pneumococcal Vaccines Swelling     Prenatal Vit-Fe Fumarate-Fa [Cavan-Folate Ob] Hives     Tdap [Daptacel] Swelling      Social History     Tobacco Use     Smoking status: Former Smoker     Packs/day: 1.00     Years: 5.00     Pack years: 5.00     Types: Cigarettes     Quit date: 2007     Years since quittin.5     Smokeless tobacco: Never Used   Substance Use Topics     Alcohol use: No     Alcohol/week: 1.7 standard drinks     Types: 2 Standard drinks or equivalent per week      Wt Readings from Last 1 Encounters:   22 40.8 kg (90 lb)        Anesthesia Evaluation   Pt has had prior anesthetic.     No history of anesthetic complications       ROS/MED HX  ENT/Pulmonary:  - neg pulmonary ROS     Neurologic:  - neg neurologic ROS     Cardiovascular:  - neg cardiovascular ROS     METS/Exercise Tolerance:     Hematologic:  - neg hematologic  ROS     Musculoskeletal:   (+)  arthritis,     GI/Hepatic: Comment: Chronic nausea, vomiting, weight loss      Renal/Genitourinary:  - neg Renal ROS     Endo:     (+) type I DM, Using insulin,     Psychiatric/Substance Use:  - neg psychiatric ROS     Infectious Disease:  - neg infectious disease ROS     Malignancy:  - neg malignancy ROS     Other:  - neg other ROS          Physical Exam    Airway  airway exam normal           Respiratory Devices and Support         Dental  no notable dental history         Cardiovascular   cardiovascular exam normal          Pulmonary   pulmonary exam normal                OUTSIDE LABS:  CBC:   Lab Results   Component Value Date    WBC 7.7 02/01/2022    WBC 8.2 10/26/2021    HGB 12.0 02/01/2022    HGB 11.9 10/26/2021    HCT 38.0 02/01/2022    HCT 38.5 10/26/2021     02/01/2022     10/26/2021     BMP:   Lab Results   Component Value Date     10/26/2021     08/20/2018    POTASSIUM 4.0 10/26/2021    POTASSIUM 4.0 08/20/2018    CHLORIDE 100 10/26/2021    CHLORIDE 101 08/20/2018    CO2 28 10/26/2021    CO2 24 08/20/2018    BUN 10 10/26/2021    BUN 9 08/20/2018    CR 0.47 (L) 10/26/2021    CR 0.51 (L) 05/04/2021     (H) 04/29/2022     (H) 04/29/2022     COAGS: No results found for: PTT, INR, FIBR  POC:   Lab Results   Component Value Date     (H) 11/20/2018    HCG Negative 04/29/2022     HEPATIC:   Lab Results   Component Value Date    ALBUMIN 4.2 10/26/2021    PROTTOTAL 7.3 10/26/2021    ALT 24 10/26/2021    AST 12 10/26/2021    ALKPHOS 72 10/26/2021    BILITOTAL 0.2 10/26/2021     OTHER:   Lab Results   Component Value Date    A1C 8.9 (H) 02/01/2022    LESA 9.4 10/26/2021    MAG 5.0 (H) 03/16/2011    LIPASE 55 09/12/2011    AMYLASE 53 09/12/2011    TSH 0.31 02/01/2022    T4 1.00 02/01/2022    CRP <2.9 08/02/2019    SED 10 04/30/2018       Anesthesia Plan    ASA Status:  2   NPO Status:  NPO Appropriate    Anesthesia Type: MAC.     - Reason for MAC: straight local not  clinically adequate   Induction: N/a.   Maintenance: TIVA.        Consents    Anesthesia Plan(s) and associated risks, benefits, and realistic alternatives discussed. Questions answered and patient/representative(s) expressed understanding.    - Discussed:     - Discussed with:  Patient         Postoperative Care       PONV prophylaxis: Ondansetron (or other 5HT-3), Droperidol or Haldol     Comments:                Michael Gastelum MD

## 2022-05-02 ENCOUNTER — MYC REFILL (OUTPATIENT)
Dept: INTERNAL MEDICINE | Facility: CLINIC | Age: 37
End: 2022-05-02
Payer: COMMERCIAL

## 2022-05-02 DIAGNOSIS — L40.50 PSORIATIC ARTHRITIS (H): ICD-10-CM

## 2022-05-02 DIAGNOSIS — B00.1 RECURRENT COLD SORES: ICD-10-CM

## 2022-05-02 LAB
PATH REPORT.COMMENTS IMP SPEC: NORMAL
PATH REPORT.COMMENTS IMP SPEC: NORMAL
PATH REPORT.FINAL DX SPEC: NORMAL
PATH REPORT.GROSS SPEC: NORMAL
PATH REPORT.MICROSCOPIC SPEC OTHER STN: NORMAL
PATH REPORT.RELEVANT HX SPEC: NORMAL
PHOTO IMAGE: NORMAL

## 2022-05-03 RX ORDER — OXYCODONE AND ACETAMINOPHEN 5; 325 MG/1; MG/1
1 TABLET ORAL EVERY 4 HOURS PRN
Qty: 30 TABLET | Refills: 0 | Status: SHIPPED | OUTPATIENT
Start: 2022-05-11 | End: 2022-06-09

## 2022-05-03 NOTE — TELEPHONE ENCOUNTER
Controlled Substance Refill Request for oxyCODONE-acetaminophen (PERCOCET) 5-325 MG tablet    Last refill: 4/11/22    Last clinic visit: 10/26/21      checked on 5/3/22

## 2022-05-23 ENCOUNTER — MYC REFILL (OUTPATIENT)
Dept: INTERNAL MEDICINE | Facility: CLINIC | Age: 37
End: 2022-05-23
Payer: COMMERCIAL

## 2022-05-23 DIAGNOSIS — L40.50 PSORIATIC ARTHRITIS (H): ICD-10-CM

## 2022-05-23 RX ORDER — TRAMADOL HYDROCHLORIDE 50 MG/1
50 TABLET ORAL EVERY 8 HOURS PRN
Qty: 60 TABLET | Refills: 0 | Status: CANCELLED | OUTPATIENT
Start: 2022-05-23

## 2022-05-25 ENCOUNTER — MYC REFILL (OUTPATIENT)
Dept: INTERNAL MEDICINE | Facility: CLINIC | Age: 37
End: 2022-05-25
Payer: COMMERCIAL

## 2022-05-25 DIAGNOSIS — L40.50 PSORIATIC ARTHRITIS (H): ICD-10-CM

## 2022-05-26 RX ORDER — TRAMADOL HYDROCHLORIDE 50 MG/1
50 TABLET ORAL EVERY 8 HOURS PRN
Qty: 60 TABLET | Refills: 0 | Status: SHIPPED | OUTPATIENT
Start: 2022-05-26 | End: 2022-06-20

## 2022-06-03 ENCOUNTER — MYC REFILL (OUTPATIENT)
Dept: INTERNAL MEDICINE | Facility: CLINIC | Age: 37
End: 2022-06-03
Payer: COMMERCIAL

## 2022-06-03 DIAGNOSIS — B00.1 RECURRENT COLD SORES: ICD-10-CM

## 2022-06-03 DIAGNOSIS — L40.50 PSORIATIC ARTHRITIS (H): ICD-10-CM

## 2022-06-03 RX ORDER — OXYCODONE AND ACETAMINOPHEN 5; 325 MG/1; MG/1
1 TABLET ORAL EVERY 4 HOURS PRN
Qty: 30 TABLET | Refills: 0 | Status: CANCELLED | OUTPATIENT
Start: 2022-06-03

## 2022-06-06 ENCOUNTER — MYC REFILL (OUTPATIENT)
Dept: INTERNAL MEDICINE | Facility: CLINIC | Age: 37
End: 2022-06-06
Payer: COMMERCIAL

## 2022-06-06 DIAGNOSIS — L40.50 PSORIATIC ARTHRITIS (H): ICD-10-CM

## 2022-06-06 DIAGNOSIS — B00.1 RECURRENT COLD SORES: ICD-10-CM

## 2022-06-06 RX ORDER — OXYCODONE AND ACETAMINOPHEN 5; 325 MG/1; MG/1
1 TABLET ORAL EVERY 4 HOURS PRN
Qty: 30 TABLET | Refills: 0 | Status: CANCELLED | OUTPATIENT
Start: 2022-06-06

## 2022-06-08 ENCOUNTER — MYC REFILL (OUTPATIENT)
Dept: INTERNAL MEDICINE | Facility: CLINIC | Age: 37
End: 2022-06-08
Payer: COMMERCIAL

## 2022-06-08 DIAGNOSIS — B00.1 RECURRENT COLD SORES: ICD-10-CM

## 2022-06-08 DIAGNOSIS — L40.50 PSORIATIC ARTHRITIS (H): ICD-10-CM

## 2022-06-08 RX ORDER — OXYCODONE AND ACETAMINOPHEN 5; 325 MG/1; MG/1
1 TABLET ORAL EVERY 4 HOURS PRN
Qty: 30 TABLET | Refills: 0 | Status: CANCELLED | OUTPATIENT
Start: 2022-06-08

## 2022-06-09 ENCOUNTER — MYC REFILL (OUTPATIENT)
Dept: INTERNAL MEDICINE | Facility: CLINIC | Age: 37
End: 2022-06-09
Payer: COMMERCIAL

## 2022-06-09 DIAGNOSIS — B00.1 RECURRENT COLD SORES: ICD-10-CM

## 2022-06-09 DIAGNOSIS — L40.50 PSORIATIC ARTHRITIS (H): ICD-10-CM

## 2022-06-10 ENCOUNTER — MYC MEDICAL ADVICE (OUTPATIENT)
Dept: INTERNAL MEDICINE | Facility: CLINIC | Age: 37
End: 2022-06-10
Payer: COMMERCIAL

## 2022-06-10 RX ORDER — OXYCODONE AND ACETAMINOPHEN 5; 325 MG/1; MG/1
1 TABLET ORAL EVERY 4 HOURS PRN
Qty: 30 TABLET | Refills: 0 | Status: SHIPPED | OUTPATIENT
Start: 2022-06-10 | End: 2022-07-05

## 2022-06-16 NOTE — TELEPHONE ENCOUNTER
traMADol (ULTRAM) 50 MG tablet          The original prescription was reordered on 5/26/2022 by Nando Haskins MD. Renewing this prescription may not be appropriate.     Kerry Sanz LPN 6/16/2022 7:29 AM

## 2022-06-20 ENCOUNTER — MYC REFILL (OUTPATIENT)
Dept: INTERNAL MEDICINE | Facility: CLINIC | Age: 37
End: 2022-06-20
Payer: COMMERCIAL

## 2022-06-20 DIAGNOSIS — L40.50 PSORIATIC ARTHRITIS (H): ICD-10-CM

## 2022-06-20 NOTE — TELEPHONE ENCOUNTER
oxyCODONE-acetaminophen (PERCOCET) 5-325 MG tablet          The original prescription was discontinued on 4/5/2022 by Nando Haskins MD for the following reason: Reorder. Renewing this prescription may not be appropriate.

## 2022-06-20 NOTE — TELEPHONE ENCOUNTER
traMADol (ULTRAM) 50 MG tablet          The original prescription was discontinued on 4/1/2022 by Nando Haskins MD for the following reason: Reorder. Renewing this prescription may not be appropriate.

## 2022-06-21 RX ORDER — TRAMADOL HYDROCHLORIDE 50 MG/1
50 TABLET ORAL EVERY 8 HOURS PRN
Qty: 60 TABLET | Refills: 0 | Status: SHIPPED | OUTPATIENT
Start: 2022-06-21 | End: 2022-07-18

## 2022-06-21 NOTE — TELEPHONE ENCOUNTER
Last Fill: 5/26/22 for 60 tabs for a 20 day supply  Last Appt: 10/26/21  Kerry Sanz LPN 6/21/2022 8:33 AM

## 2022-07-05 ENCOUNTER — MYC REFILL (OUTPATIENT)
Dept: INTERNAL MEDICINE | Facility: CLINIC | Age: 37
End: 2022-07-05

## 2022-07-05 DIAGNOSIS — B00.1 RECURRENT COLD SORES: ICD-10-CM

## 2022-07-05 DIAGNOSIS — L40.50 PSORIATIC ARTHRITIS (H): ICD-10-CM

## 2022-07-06 RX ORDER — OXYCODONE AND ACETAMINOPHEN 5; 325 MG/1; MG/1
1 TABLET ORAL EVERY 4 HOURS PRN
Qty: 30 TABLET | Refills: 0 | Status: SHIPPED | OUTPATIENT
Start: 2022-07-09 | End: 2022-08-01

## 2022-07-18 ENCOUNTER — MYC REFILL (OUTPATIENT)
Dept: INTERNAL MEDICINE | Facility: CLINIC | Age: 37
End: 2022-07-18

## 2022-07-18 DIAGNOSIS — L40.50 PSORIATIC ARTHRITIS (H): ICD-10-CM

## 2022-07-18 RX ORDER — TRAMADOL HYDROCHLORIDE 50 MG/1
50 TABLET ORAL EVERY 8 HOURS PRN
Qty: 60 TABLET | Refills: 0 | Status: SHIPPED | OUTPATIENT
Start: 2022-07-18 | End: 2022-08-05

## 2022-08-01 ENCOUNTER — MYC REFILL (OUTPATIENT)
Dept: INTERNAL MEDICINE | Facility: CLINIC | Age: 37
End: 2022-08-01

## 2022-08-01 DIAGNOSIS — L40.50 PSORIATIC ARTHRITIS (H): ICD-10-CM

## 2022-08-01 DIAGNOSIS — B00.1 RECURRENT COLD SORES: ICD-10-CM

## 2022-08-01 RX ORDER — OXYCODONE AND ACETAMINOPHEN 5; 325 MG/1; MG/1
1 TABLET ORAL EVERY 4 HOURS PRN
Qty: 30 TABLET | Refills: 0 | Status: SHIPPED | OUTPATIENT
Start: 2022-08-01 | End: 2022-09-01

## 2022-08-02 NOTE — PROGRESS NOTES
Adriana is a 36 year old who is being evaluated via a billable video visit.      How would you like to obtain your AVS? MyChart  If the video visit is dropped, the invitation should be resent by: Text to cell phone: [phone]  Will anyone else be joining your video visit? No        Video-Visit Details    Video Start Time: 9:26 AM    Type of service:  Video Visit    Video End Time:9:50 AM    Originating Location (pt. Location): Home    Distant Location (provider location):  [facility]     Platform used for Video Visit: [facility]  Rheumatology Clinic  Rene Granados MD  2022     Name: Chrissy Dixon  MRN: [id number]  Age: 36 year old    : 1985  Referring provider: Nando Haskins    Assessment and Plan:  # Psoriatic arthritis (polyarthritis, scalp and pustular psoriasis; dx'd post-partum in 2015):   Patient notes persistent improvement in knee mobility with reduced swelling and pain. Psoriasis is controlled, and is not requiring topical therapy.  Video exam shows no inflammatory joint or skin changes. Laboratory evaluation on 2021 showed normal comprehensive metabolic panel; hemoglobin A1c was 10.6, markedly elevated; iron studies showed saturation index of only 5.  TSH was suppressed at 0.35; CBC showed MCV of 77, but normal hemoglobin.     Psoriatic arthritis continues much better controlled in the past 6 months.  The majority of antirheumatic effect is exerted by Otezla, as use of Cosentyx has been much less frequent than recommended dosing guidelines suggest.  Requirements for intermittent corticosteroids are reduced (less than 7 days/month) and stable.     I recommend continuing  immunomodulatory therapy.  For the present, Otezla monotherapy, supplemented with intermittent methylprednisolone, allows her to function well enough to perform full-time work and maintain parenting responsibilities and function.    Plan:  1. Continue  Otezla 30 mg twice daily. Recheck complete blood count, creatinine, LFTs  2.  Hold Cosentyx  3.  Use short courses of Medrol (12 mg/day with taper to off over the course of 1 week) very sparingly to address flares of inflammatory joint pain: Both skin and joint manifestations are well controlled currently.  In the next month.  In the next month.    # Type 1 diabetes mellitus:   Hemoglobin A1c remained above 8 at last check in 10-21.  Patient following with Endocrinology.       Follow-up: Return in about 6 months    HPI:   Chrissy Dixon follows up for psoriatic arthritis. I last evaluated the patient on . Psoriatic arthritis was judged improved.  I recommended continuing Cosentyx 300 mg monthly, in combination with Otezla 30 mg twice daily.    Background History:  She has had psoriasis since birth (presented with diffuse rash at birth), was diagnosed with psoriasis at age 11, and developed arthritis in her 20s. She was on Enbrel at age 21 but developed infections such as pneumonia, sinusitis, and mono, and stopped it within 7 months. She was then on methotrexate from 2011 until February 2012, but did not tolerate this due to GI upset. She switched to sulfasalazine 2/2012 and tolerated it well at 1000 mg thrice a day, but had decreasing efficacy as of 11/1/13. She started leflunomide in 10-13 but stopped in 1-14 due to GI intolerance. She started Humira in 1-14, held in 7-14 during pregnancy, restarted in 7-15 for flaring disease but then discontinued due to worsened psoriasis and lack of efficacy. Leflunomide was then restarted but loose stools developed. Leflunomide was stopped and Otezla was started on 9/11/15. She started Stelara in fall 2015 but stopped due to cost and inconvenience in early 2016. She has continued with Otezla monotherapy through present. She restarted Stelara in 12-18.  Stelara was discontinued in late 2019 for lack of benefit.  She started Cosentyx in November 2019 with excellent  "relief of joint pain.  Combination Cosentyx and Otezla was begun in early 2020.  Otezla continued through 2021 in 2022, but Cosentyx used only intermittently during that time.  In mid 2022, good to excellent control achieved with Otezla alone, supplemented by infrequent (once monthly) use of low-dose 3-day Medrol courses.    Interval history August 2, 2022    Health overall is good.  Several viral illnesses with URI symptom in recent weeks for her and children.  Knee and joint pain control is much better.  She has occasional flareups 1-2 times per month in joint pain, mostly knees; she uses prednisone 15-20 mg daily for 2-3 days to manage visibly swollen knees, with lasting resolution.  If she stands for work, she has more pain; she is working 2.5 days weekly.    Skin is \"really good\"; no new patches or scalp disease. No recent need for topical therapy.    She is taking otezla alone 60 mg bid; she has only intermittent ability to get cosentyx. Last dose of the latter occurred 3 mos ago, she has been only able to use it ~ every other month.    Diabetes control is gradually improving aided by continuous BS monitoring.    Interval history 12-    She is overall doing well. She does note increased breakthrough inflammation affecting her hands. There is swelling of the L 5th and R 3 PIP joints, intermittent.    Knee inflammation is still there, consistent but not severe. She uses low dose medrol for increased knee swelling; in the last 2 months, she has used medrol most days, when cosentyx has been inconsistent. A/nkles and toes are painful especially when sits for a long time at work. She has continued otezla and cosentyx combination. 2-4 days before each cosentx, joint pain escalates.  No psoriasis at all since starting otezla.    She has encountered consistent insurance resistance to timely cosentyx shipping.    She is working with Dr. Haskins to work up low iron saturation index.    Hx 11-19:  Today, she " reports that her knee mobility is 80% improved. She states that she has only had 1 flare in her knee since starting secukinumab. She does note that she sometimes has swelling in her toes and fingers, but this does not limit her. She states that she has been able to walk daily now that her knee pain is much better.    She explains that the introduction of secukinumab injections seems to be the most effective of her managements as she noticed resolution of knee symptoms very shortly after her first injection. She continued Otezla, did try a lower dosage, and, after lowering the dosage, she experienced a recurrence of psoriasis plaques. Other than this Otezla dosing change, her psoriasis has been mild and scant. She has also been using triamcinolone cream.    She states that this month marks her third dose of Cosentyx. She explains that one week prior to receiving this injection, she hs increased morning stiffness in her hands and feet. She notes that, two days after receiving this Cosentyx injection, her morning stiffness is much better.     She reports that she has not been on prednisone or other steroids since shortly after her last visit with me. She explains that her blood sugars have been much easier to control now that she is off of prednisone.     She reports that her last knee injection or aspiration took place around 3 months ago.      Review of Systems:   Pertinent items are noted in HPI or as below, remainder of complete ROS is negative.      No recent problems with hearing or vision. No swallowing problems.   No breathing difficulty, shortness of breath, coughing, or wheezing.  No chest pain or palpitations.  No heart burn, indigestion, abdominal pain, nausea, vomiting, diarrhea.  No urination problems, no bloody, cloudy urine, no dysuria.  No numbing, tingling, weakness.  No headaches or confusion.  No easy bleeding or bruising.     Active Medications:   Current Outpatient Medications   Medication Sig      apremilast (OTEZLA) 30 MG tablet Take 1 tablet (30 mg) by mouth 2 times daily     cholecalciferol (VITAMIN D3) 1000 UNIT tablet Take 1 tablet (1,000 Units) by mouth daily     clobetasol (TEMOVATE) 0.05 % external solution Apply topically 2 times daily (Patient taking differently: Apply topically 2 times daily as needed)     Continuous Blood Gluc Sensor (DEXCOM G6 SENSOR) MISC 1 Device by Device route every 10 days     Continuous Blood Gluc Transmit (DEXCOM G6 TRANSMITTER) MISC Inject 1 each Subcutaneous continuous     Fluocinolone Acetonide (DERMA-SMOOTHE/FS SCALP) 0.01 % OIL Apply to scalp at bedtime, then wear showercap, rinse off in morning. (Patient taking differently: Apply to scalp at bedtime, then wear showercap, rinse off in morning as needed.)     fluticasone (FLONASE) 50 MCG/ACT nasal spray Spray 2 sprays into both nostrils daily     golimumab (SIMPONI) 50 MG/0.5ML pre-filled syringe injection Inject 0.5 mLs (50 mg) Subcutaneous every 28 days Hold for signs of infection, and seek medical attention.     ibuprofen (ADVIL/MOTRIN) 600 MG tablet Take 1 tablet (600 mg) by mouth every 8 hours as needed for moderate pain     insulin aspart (NOVOLOG FLEXPEN) 100 UNIT/ML pen Inject 1 unit/12 gms CHO with meals plus correction. Pt uses approx 35 units in 24 hrs.     insulin detemir (LEVEMIR FLEXTOUCH) 100 UNIT/ML pen Inject 24 units subcutaneous each am and 8 units subcutaneous each pm.     insulin NPH (NOVOLIN N FLEXPEN) 100 UNIT/ML injection Only use when taking steroids. If steroid dose is 12-16 mg take NPH 10 units subcutaneous in am and if steroid dose 6-8 mg take NPH 6 units subcutaneous in am.     insulin  UNIT/ML injection Only use when taking steroids. If steroid dose is 12-16 mg take NPH 10 units subcutaneous in am and if steroid dose 6-8 mg take NPH 6 units subcutaneous in am.     insulin pen needle (31G X 8 MM) 31G X 8 MM miscellaneous Use  3-4 pen needles daily or as directed.     LEVEMIR  FLEXTOUCH 100 UNIT/ML pen INJECT 30 UNITS UNDER THE SKIN EVERY MORNING BEFORE BREAKFAST     metFORMIN (GLUCOPHAGE) 500 MG tablet Take 2 tablets (1,000 mg) by mouth 2 times daily (with meals)     methylPREDNISolone (MEDROL) 4 MG tablet Take 3 tabs daily for 7 days, then 2 tabs daily for 7 days.     methylPREDNISolone (MEDROL) 4 MG tablet TAKE 3 TABLETS DAILY BY MOUTH FOR 1 WEEK THEN 2 TABLETS DAILY FOR 1 WEEK THEN OFF     ONETOUCH LANCETS MISC Use to test blood sugar 6 to 8 times daily or as directed. Okay to dispense One Touch Verio products per patient's insurance.     oxyCODONE-acetaminophen (PERCOCET) 5-325 MG tablet Take 1 tablet by mouth every 4 hours as needed for severe pain (must last 30 days from dispense date)     predniSONE (DELTASONE) 5 MG tablet Take 4 tabs daily for 7 days, then 3 tabs daily for 7 days.     secukinumab (COSENTYX SENSOREADY PEN) 150 MG/ML Sensoready pen Inject 2 mLs (300 mg) Subcutaneous every 28 days Hold for signs of infection, and seek medical attention.     semaglutide (OZEMPIC, 0.25 OR 0.5 MG/DOSE,) 2 MG/1.5ML SOPN pen Inject 0.5 mg weekly.     traMADol (ULTRAM) 50 MG tablet Take 1 tablet (50 mg) by mouth every 8 hours as needed for moderate pain . Must last 30 days     triamcinolone (KENALOG) 0.1 % cream Apply topically 2 times daily (Patient taking differently: Apply topically 2 times daily as needed)     valACYclovir (VALTREX) 500 MG tablet Take 1 tablet (500 mg) by mouth daily as needed     No current facility-administered medications for this visit.         Allergies:  Diagnostic X-Ray Materials   Betadine   Levofloxacin   Penicillins   Pneumococcal Vaccines   Prenatal Vit-Fe Fumarate-Fa  Tdap    Past Medical History:  Diabetes    Psoriatic arthritis   Diabetes mellitus type 2, controlled   Psoriasis arthropathia   Malaise and fatigue  Iron deficiency anemia due to chronic blood loss  Right knee pain    Past Surgical History:  Arthroscopy knee with meniscectomy, right  (11/20/2018)  AS Hysteroscopy Surgical W/Endometrial, bilateral (2017)   Tonsillar abscess    Family History:    The patient's family history includes Connective Tissue Disorder in her father; Diabetes in her maternal grandfather, maternal uncle, maternal uncle, and mother; Hypertension in her maternal grandfather, maternal grandmother, and mother; Prostate Cancer in her maternal grandfather; Thyroid Disease in her mother.    Social History:  The patient reports that she quit smoking about 12 years ago. Her smoking use included cigarettes. She has a 5.00 pack-year smoking history. She has never used smokeless tobacco. She reports that she does not drink alcohol or use drugs.   PCP: Nando Haskins  Marital Status:      Physical Exam:   There were no vitals taken for this visit.   Wt Readings from Last 4 Encounters:   04/29/22 40.8 kg (90 lb)   12/09/21 52.2 kg (115 lb)   10/26/21 47.2 kg (104 lb 1.6 oz)   09/14/20 50.8 kg (112 lb)   thin, vibrant appearing  Eyes: Normal EOM, PERRLA, vision, conjunctiva, sclera.  ENT: Normal external ears, nose, hearing, lips, teeth, gums, throat. No mucous membrane lesions, normal saliva pool.  Neck: No mass or thyroid enlargement.  Resp: breathing unlaboreed  MS: The TMJ, neck, shoulder, elbow, wrist, MCP/PIP/DIP were normal. Knees with normal range of motion.  Skin: No nail pitting, alopecia, rash, nodules, or lesions. No flaking or redness along the hairline or in the ears. No psoriasis over the back.  Neuro: Normal cranial nerves  Psych: Normal judgement, orientation, memory, affect.     Laboratory:   RHEUM RESULTS Latest Ref Rng & Units 7/1/2008 3/14/2011 3/15/2011   ALBUMIN 3.4 - 5.0 g/dL 4.4 - -   ALT 0 - 50 U/L 11 59(H) 51(H)   AST 0 - 45 U/L 18 91(H) 55(H)   ANACAS <3.00 U - - -   ANCA - - - -   CK TOTAL 32 - 200 U/L 50 - -   CREATININE 0.52 - 1.04 mg/dL 0.71 0.69 0.66   CRP 0.0 - 8.0 mg/L - - -   JOHN <1.0 - - -   GFR ESTIMATE, IF BLACK >60 mL/min/[1.73:m2]  Not Calculated >90 >90   GFR ESTIMATE >60 mL/min/1.73m2 Not Calculated >90 >90   HEMATOCRIT 35.0 - 47.0 % 43.0 33.9(L) 33.0(L)   HEMOGLOBIN 11.7 - 15.7 g/dL 14.3 11.4(L) 11.1(L)   HEPBANG (Negative) - - -   HCVAB NEG - - -   WBC 4.0 - 11.0 10e3/uL 7.9 9.9 7.9   RBC 3.80 - 5.20 10e6/uL 4.89 3.94 3.88   RDW 10.0 - 15.0 % 12.3 12.6 13.1   MCHC 31.5 - 36.5 g/dL 33.3 33.7 33.7   MCV 78 - 100 fL 88 86 85   PLATELET COUNT 150 - 450 10e3/uL 451(H) 123(L) 111(L)   RHEUMATOID FACTOR 0 - 14 IU/mL - - -   ESR 0 - 20 mm/h - - -   QUANTIFERON-TB GOLD PLUS RESULT NEG:Negative - - -     Rheumatoid Factor   Date Value Ref Range Status   08/11/2011 11 0 - 14 IU/mL Final     Cyclic Cit Pept IgG/IgA   Date Value Ref Range Status   08/11/2011 <20  Interpretation:  Negative <20 UNITS Final     CHALO Screen by EIA   Date Value Ref Range Status   04/09/2014 <1.0  Interpretation:  Negative <1.0 Final     Hepatitis B Core Ana Rosa   Date Value Ref Range Status   08/02/2019 Nonreactive NR^Nonreactive Final     Antistreptolysin O   Date Value Ref Range Status   09/11/2015 109 0 - 120 IU/mL Final     Comment:     A single ASO analysis may not be meaningful due to the variability of ASO   values   within the normal population.  Both clinical and laboratory findings should   be   considered in reaching a diagnosis.  A single analysis may be less   informative   than a repeat analysis showing a change.       Quantiferon-TB Gold Plus Result   Date Value Ref Range Status   12/26/2018 Negative NEG^Negative Final     Comment:     No interferon gamma response to M.tuberculosis antigens was detected.   Infection with M.tuberculosis is unlikely, however a single negative result   does not exclude infection. In patients at high risk for infection, a second   test should be considered  in accordance with the 2017 ATS/IDSA/CDC Clinical Practice Guidelines for   Diagnosis of Tuberculosis in Adults and Children [Angela DIXON et   al.Clin.Infect.Dis. 2017  64(2):111-115].       TB1 Ag minus Nil Value   Date Value Ref Range Status   12/26/2018 0.00 IU/mL Final     TB2 Ag minus Nil Value   Date Value Ref Range Status   12/26/2018 0.00 IU/mL Final     Mitogen minus Nil Result   Date Value Ref Range Status   12/26/2018 >10.00 IU/mL Final     Nil Result   Date Value Ref Range Status   12/26/2018 0.02 IU/mL Final     Neutrophil Cytoplasmic IgG Antibody   Date Value Ref Range Status   05/31/2013   Final    <1:20  Reference range: <1:20  (Note)  The ANCA IFA is <1:20; therefore, no further testing will  be performed.  INTERPRETIVE INFORMATION: Anti-Neutrophil Cyto Ab, IgG    Neutrophil Cytoplasmic Antibodies (C-ANCA = granular  cytoplasmic staining, P-ANCA = perinuclear staining) are  found in the serum of over 90 percent of patients with  certain necrotizing systemic vasculitides, and usually in  less than 5 percent of patients with collagen vascular  disease or arthritis.  Performed by [facility],  [address] [place] [id number] [phone]  [email or web address], Nataly Roth MD, Lab. Director

## 2022-08-04 ENCOUNTER — VIRTUAL VISIT (OUTPATIENT)
Dept: RHEUMATOLOGY | Facility: CLINIC | Age: 37
End: 2022-08-04
Payer: COMMERCIAL

## 2022-08-04 DIAGNOSIS — L40.50 PSORIATIC ARTHRITIS (H): ICD-10-CM

## 2022-08-04 PROCEDURE — 99214 OFFICE O/P EST MOD 30 MIN: CPT | Mod: 95 | Performed by: INTERNAL MEDICINE

## 2022-08-04 RX ORDER — APREMILAST 30 MG/1
30 TABLET, FILM COATED ORAL 2 TIMES DAILY
Qty: 180 TABLET | Refills: 5 | Status: SHIPPED | OUTPATIENT
Start: 2022-08-04 | End: 2023-07-27

## 2022-08-04 RX ORDER — METHYLPREDNISOLONE 4 MG/1
TABLET ORAL
Qty: 35 TABLET | Refills: 2 | Status: SHIPPED | OUTPATIENT
Start: 2022-08-04 | End: 2022-12-02

## 2022-08-04 NOTE — PATIENT INSTRUCTIONS
Dx: psoriatic arthritis    Plan:  1. Continue Otezla 30 mg twice daily. Recheck complete blood count, creatinine, LFTs  2.  Hold Cosentyx  3.  Use short courses of Medrol (12 mg/day with taper to off over the course of 1 week) very sparingly to address flares of inflammatory joint pain

## 2022-08-05 ENCOUNTER — TELEPHONE (OUTPATIENT)
Dept: RHEUMATOLOGY | Facility: CLINIC | Age: 37
End: 2022-08-05

## 2022-08-11 ENCOUNTER — MYC REFILL (OUTPATIENT)
Dept: INTERNAL MEDICINE | Facility: CLINIC | Age: 37
End: 2022-08-11

## 2022-08-11 DIAGNOSIS — L40.50 PSORIATIC ARTHRITIS (H): ICD-10-CM

## 2022-08-11 RX ORDER — TRAMADOL HYDROCHLORIDE 50 MG/1
50 TABLET ORAL EVERY 8 HOURS PRN
Qty: 60 TABLET | Refills: 0 | Status: CANCELLED | OUTPATIENT
Start: 2022-08-11

## 2022-08-27 ENCOUNTER — HEALTH MAINTENANCE LETTER (OUTPATIENT)
Age: 37
End: 2022-08-27

## 2022-09-01 ENCOUNTER — MYC REFILL (OUTPATIENT)
Dept: INTERNAL MEDICINE | Facility: CLINIC | Age: 37
End: 2022-09-01

## 2022-09-01 DIAGNOSIS — B00.1 RECURRENT COLD SORES: ICD-10-CM

## 2022-09-01 DIAGNOSIS — L40.50 PSORIATIC ARTHRITIS (H): ICD-10-CM

## 2022-09-02 NOTE — TELEPHONE ENCOUNTER
oxyCODONE-acetaminophen (PERCOCET) 5-325 MG tablet 30 tablet 0 8/1/2022     Last Written Prescription Date:  8-1-2022  Last Fill Quantity: 30,   # refills: 0  Last Office Visit : 10-  Future Office visit:  none    Routing refill request to provider for review/approval because:  Controlled medication      Kathleen M Doege RN

## 2022-09-06 ENCOUNTER — MYC REFILL (OUTPATIENT)
Dept: INTERNAL MEDICINE | Facility: CLINIC | Age: 37
End: 2022-09-06

## 2022-09-06 DIAGNOSIS — B00.1 RECURRENT COLD SORES: ICD-10-CM

## 2022-09-06 DIAGNOSIS — L40.50 PSORIATIC ARTHRITIS (H): ICD-10-CM

## 2022-09-06 RX ORDER — OXYCODONE AND ACETAMINOPHEN 5; 325 MG/1; MG/1
1 TABLET ORAL EVERY 4 HOURS PRN
Qty: 30 TABLET | Refills: 0 | Status: CANCELLED | OUTPATIENT
Start: 2022-09-06

## 2022-09-07 ENCOUNTER — MYC REFILL (OUTPATIENT)
Dept: INTERNAL MEDICINE | Facility: CLINIC | Age: 37
End: 2022-09-07

## 2022-09-07 DIAGNOSIS — B00.1 RECURRENT COLD SORES: ICD-10-CM

## 2022-09-07 DIAGNOSIS — L40.50 PSORIATIC ARTHRITIS (H): ICD-10-CM

## 2022-09-07 RX ORDER — OXYCODONE AND ACETAMINOPHEN 5; 325 MG/1; MG/1
1 TABLET ORAL EVERY 4 HOURS PRN
Qty: 30 TABLET | Refills: 0 | Status: CANCELLED | OUTPATIENT
Start: 2022-09-07

## 2022-09-08 ENCOUNTER — MYC REFILL (OUTPATIENT)
Dept: INTERNAL MEDICINE | Facility: CLINIC | Age: 37
End: 2022-09-08

## 2022-09-08 DIAGNOSIS — L40.50 PSORIATIC ARTHRITIS (H): ICD-10-CM

## 2022-09-09 ENCOUNTER — MYC MEDICAL ADVICE (OUTPATIENT)
Dept: INTERNAL MEDICINE | Facility: CLINIC | Age: 37
End: 2022-09-09

## 2022-09-09 RX ORDER — OXYCODONE AND ACETAMINOPHEN 5; 325 MG/1; MG/1
1 TABLET ORAL EVERY 4 HOURS PRN
Qty: 30 TABLET | Refills: 0 | Status: SHIPPED | OUTPATIENT
Start: 2022-09-09 | End: 2022-09-28

## 2022-09-09 NOTE — TELEPHONE ENCOUNTER
Please read previous Mychart message from patient and reach out to her if this will be done today. Per patient, if [facility] not in the office, if another provider can sign it that would be great.

## 2022-09-12 ENCOUNTER — MYC REFILL (OUTPATIENT)
Dept: INTERNAL MEDICINE | Facility: CLINIC | Age: 37
End: 2022-09-12

## 2022-09-12 DIAGNOSIS — L40.50 PSORIATIC ARTHRITIS (H): ICD-10-CM

## 2022-09-12 RX ORDER — TRAMADOL HYDROCHLORIDE 50 MG/1
50 TABLET ORAL EVERY 8 HOURS PRN
Qty: 60 TABLET | Refills: 0 | Status: SHIPPED | OUTPATIENT
Start: 2022-09-12 | End: 2022-10-05

## 2022-09-12 RX ORDER — TRAMADOL HYDROCHLORIDE 50 MG/1
50 TABLET ORAL EVERY 8 HOURS PRN
Qty: 60 TABLET | Refills: 0 | Status: CANCELLED | OUTPATIENT
Start: 2022-09-12

## 2022-09-12 NOTE — TELEPHONE ENCOUNTER
traMADol (ULTRAM) 50 MG tablet 60 tablet 0 8/17/2022         Last Written Prescription Date:  8-  Last Fill Quantity: 60,   # refills: 0  Last Office Visit : 12-  Future Office visit:  none    Routing refill request to provider for review/approval because:  Controlled medication

## 2022-09-27 ENCOUNTER — LAB (OUTPATIENT)
Dept: LAB | Facility: CLINIC | Age: 37
End: 2022-09-27
Payer: COMMERCIAL

## 2022-09-27 DIAGNOSIS — E11.9 TYPE 2 DIABETES MELLITUS WITHOUT COMPLICATION, WITH LONG-TERM CURRENT USE OF INSULIN (H): ICD-10-CM

## 2022-09-27 DIAGNOSIS — L40.50 PSORIATIC ARTHRITIS (H): ICD-10-CM

## 2022-09-27 DIAGNOSIS — Z79.4 TYPE 2 DIABETES MELLITUS WITHOUT COMPLICATION, WITH LONG-TERM CURRENT USE OF INSULIN (H): ICD-10-CM

## 2022-09-27 LAB
ALT SERPL W P-5'-P-CCNC: 19 U/L (ref 10–35)
AST SERPL W P-5'-P-CCNC: 15 U/L (ref 10–35)
BASOPHILS # BLD AUTO: 0 10E3/UL (ref 0–0.2)
BASOPHILS NFR BLD AUTO: 0 %
CREAT SERPL-MCNC: 0.44 MG/DL (ref 0.51–0.95)
CRP SERPL-MCNC: <3 MG/L
EOSINOPHIL # BLD AUTO: 0 10E3/UL (ref 0–0.7)
EOSINOPHIL NFR BLD AUTO: 1 %
ERYTHROCYTE [DISTWIDTH] IN BLOOD BY AUTOMATED COUNT: 15.7 % (ref 10–15)
GFR SERPL CREATININE-BSD FRML MDRD: >90 ML/MIN/1.73M2
HCT VFR BLD AUTO: 38.4 % (ref 35–47)
HGB BLD-MCNC: 11.8 G/DL (ref 11.7–15.7)
IMM GRANULOCYTES # BLD: 0 10E3/UL
IMM GRANULOCYTES NFR BLD: 0 %
LYMPHOCYTES # BLD AUTO: 1 10E3/UL (ref 0.8–5.3)
LYMPHOCYTES NFR BLD AUTO: 14 %
MCH RBC QN AUTO: 24.2 PG (ref 26.5–33)
MCHC RBC AUTO-ENTMCNC: 30.7 G/DL (ref 31.5–36.5)
MCV RBC AUTO: 79 FL (ref 78–100)
MONOCYTES # BLD AUTO: 0.2 10E3/UL (ref 0–1.3)
MONOCYTES NFR BLD AUTO: 3 %
NEUTROPHILS # BLD AUTO: 6.3 10E3/UL (ref 1.6–8.3)
NEUTROPHILS NFR BLD AUTO: 83 %
PLATELET # BLD AUTO: 472 10E3/UL (ref 150–450)
RBC # BLD AUTO: 4.87 10E6/UL (ref 3.8–5.2)
WBC # BLD AUTO: 7.6 10E3/UL (ref 4–11)

## 2022-09-27 PROCEDURE — 83036 HEMOGLOBIN GLYCOSYLATED A1C: CPT

## 2022-09-27 PROCEDURE — 84460 ALANINE AMINO (ALT) (SGPT): CPT

## 2022-09-27 PROCEDURE — 86140 C-REACTIVE PROTEIN: CPT

## 2022-09-27 PROCEDURE — 84450 TRANSFERASE (AST) (SGOT): CPT

## 2022-09-27 PROCEDURE — 36415 COLL VENOUS BLD VENIPUNCTURE: CPT

## 2022-09-27 PROCEDURE — 85025 COMPLETE CBC W/AUTO DIFF WBC: CPT

## 2022-09-27 PROCEDURE — 82565 ASSAY OF CREATININE: CPT

## 2022-09-28 ENCOUNTER — MYC MEDICAL ADVICE (OUTPATIENT)
Dept: INTERNAL MEDICINE | Facility: CLINIC | Age: 37
End: 2022-09-28

## 2022-09-28 ENCOUNTER — MYC REFILL (OUTPATIENT)
Dept: INTERNAL MEDICINE | Facility: CLINIC | Age: 37
End: 2022-09-28

## 2022-09-28 DIAGNOSIS — B00.1 RECURRENT COLD SORES: ICD-10-CM

## 2022-09-28 DIAGNOSIS — E11.9 TYPE 2 DIABETES MELLITUS WITHOUT COMPLICATION, WITH LONG-TERM CURRENT USE OF INSULIN (H): Primary | ICD-10-CM

## 2022-09-28 DIAGNOSIS — L40.50 PSORIATIC ARTHRITIS (H): ICD-10-CM

## 2022-09-28 DIAGNOSIS — Z79.4 TYPE 2 DIABETES MELLITUS WITHOUT COMPLICATION, WITH LONG-TERM CURRENT USE OF INSULIN (H): Primary | ICD-10-CM

## 2022-09-28 LAB — HBA1C MFR BLD: 11.5 % (ref 0–5.6)

## 2022-09-28 RX ORDER — OXYCODONE AND ACETAMINOPHEN 5; 325 MG/1; MG/1
1 TABLET ORAL EVERY 4 HOURS PRN
Qty: 30 TABLET | Refills: 0 | Status: SHIPPED | OUTPATIENT
Start: 2022-09-28 | End: 2022-11-07

## 2022-09-28 NOTE — TELEPHONE ENCOUNTER
oxyCODONE-acetaminophen (PERCOCET) 5-325 MG tablet      Last Written Prescription Date:  9-9-22  Last Fill Quantity: 30,   # refills: 0  Last Office Visit : 10-26-21  Future Office visit:  11-1-22    Routing refill request to provider for review/approval because:  Drug not on the [facility], [facility] or [facility] refill protocol or controlled substance

## 2022-10-05 ENCOUNTER — MYC REFILL (OUTPATIENT)
Dept: INTERNAL MEDICINE | Facility: CLINIC | Age: 37
End: 2022-10-05

## 2022-10-05 DIAGNOSIS — L40.50 PSORIATIC ARTHRITIS (H): ICD-10-CM

## 2022-10-07 NOTE — TELEPHONE ENCOUNTER
traMADol (ULTRAM) 50 MG tablet      Last Written Prescription Date:  9-12-22  Last Fill Quantity: 60,   # refills: 0  Last Office Visit : 10-26-21  Future Office visit:  11-1-22    Routing refill request to provider for review/approval because:  Drug not on the [facility], [facility] or [facility] refill protocol or controlled substance

## 2022-10-08 RX ORDER — TRAMADOL HYDROCHLORIDE 50 MG/1
50 TABLET ORAL EVERY 8 HOURS PRN
Qty: 60 TABLET | Refills: 0 | Status: SHIPPED | OUTPATIENT
Start: 2022-10-08 | End: 2022-11-01

## 2022-10-20 NOTE — TELEPHONE ENCOUNTER
Message from NoahAlexander:  Nuria Viera RN Wed Jul 12, 2017 1:12 PM        ----- Message -----   From: Chrissy Dixon   Sent: 7/12/2017 12:44 PM   To: [facility] Staff-  Subject: Medication Renewal Request     Original authorizing provider: MD Chrissy Vallecillo would like a refill of the following medications:  insulin detemir (LEVEMIR FLEXTOUCH) 100 UNIT/ML sean [Nando Haskins MD]    Preferred pharmacy: [facility] [address] [place] [address] [address] AT [facility] [address]    Comment:    
insulin detemir (LEVEMIR FLEXTOUCH) 100 UNIT/ML soln       Last Written Prescription Date:  7-5-16  Last Fill Quantity: 30 ml,   # refills: 3  Last Office Visit : 7-7-17  Future Office visit:  Non    Lab Results   Component Value Date    A1C 8.2 03/20/2017    A1C 8.7 11/30/2016    A1C 7.9 05/18/2016    A1C 7.0 04/09/2014    A1C 4.5 10/11/2013     Lab Results   Component Value Date    MICROL 8 03/20/2017     No results found for: MICROALBUMIN    Kathleen M Doege RN        
Statement Selected

## 2022-10-25 ENCOUNTER — TRANSFERRED RECORDS (OUTPATIENT)
Dept: INTERNAL MEDICINE | Facility: CLINIC | Age: 37
End: 2022-10-25

## 2022-11-01 ENCOUNTER — MYC REFILL (OUTPATIENT)
Dept: INTERNAL MEDICINE | Facility: CLINIC | Age: 37
End: 2022-11-01

## 2022-11-01 DIAGNOSIS — L40.50 PSORIATIC ARTHRITIS (H): ICD-10-CM

## 2022-11-01 RX ORDER — TRAMADOL HYDROCHLORIDE 50 MG/1
50 TABLET ORAL EVERY 8 HOURS PRN
Qty: 60 TABLET | Refills: 0 | Status: SHIPPED | OUTPATIENT
Start: 2022-11-01 | End: 2022-12-07

## 2022-11-01 NOTE — TELEPHONE ENCOUNTER
traMADol (ULTRAM) 50 MG tablet      Last Written Prescription Date:  10/8/22  Last Fill Quantity: 60,   # refills: 0  Last Office Visit : 10/26/21  Future Office visit:  11/29/22    Routing refill request to provider for review/approval because:  Drug not on the [facility], [facility] or [facility] refill protocol or controlled substance

## 2022-11-05 ENCOUNTER — MYC REFILL (OUTPATIENT)
Dept: INTERNAL MEDICINE | Facility: CLINIC | Age: 37
End: 2022-11-05

## 2022-11-05 DIAGNOSIS — B00.1 RECURRENT COLD SORES: ICD-10-CM

## 2022-11-05 DIAGNOSIS — L40.50 PSORIATIC ARTHRITIS (H): ICD-10-CM

## 2022-11-07 ENCOUNTER — MYC REFILL (OUTPATIENT)
Dept: INTERNAL MEDICINE | Facility: CLINIC | Age: 37
End: 2022-11-07

## 2022-11-07 DIAGNOSIS — L40.50 PSORIATIC ARTHRITIS (H): ICD-10-CM

## 2022-11-07 DIAGNOSIS — B00.1 RECURRENT COLD SORES: ICD-10-CM

## 2022-11-07 RX ORDER — OXYCODONE AND ACETAMINOPHEN 5; 325 MG/1; MG/1
1 TABLET ORAL EVERY 4 HOURS PRN
Qty: 30 TABLET | Refills: 0 | Status: SHIPPED | OUTPATIENT
Start: 2022-11-08 | End: 2022-12-02

## 2022-11-07 RX ORDER — OXYCODONE AND ACETAMINOPHEN 5; 325 MG/1; MG/1
1 TABLET ORAL EVERY 4 HOURS PRN
Qty: 30 TABLET | Refills: 0 | Status: CANCELLED | OUTPATIENT
Start: 2022-11-07

## 2022-12-02 ENCOUNTER — MYC REFILL (OUTPATIENT)
Dept: INTERNAL MEDICINE | Facility: CLINIC | Age: 37
End: 2022-12-02

## 2022-12-02 ENCOUNTER — OFFICE VISIT (OUTPATIENT)
Dept: INTERNAL MEDICINE | Facility: CLINIC | Age: 37
End: 2022-12-02
Payer: COMMERCIAL

## 2022-12-02 VITALS
OXYGEN SATURATION: 99 % | SYSTOLIC BLOOD PRESSURE: 123 MMHG | WEIGHT: 110.1 LBS | DIASTOLIC BLOOD PRESSURE: 73 MMHG | BODY MASS INDEX: 19.5 KG/M2 | HEART RATE: 78 BPM

## 2022-12-02 DIAGNOSIS — L40.50 PSORIATIC ARTHRITIS (H): ICD-10-CM

## 2022-12-02 DIAGNOSIS — E11.9 CONTROLLED TYPE 2 DIABETES MELLITUS WITHOUT COMPLICATION, WITH LONG-TERM CURRENT USE OF INSULIN (H): Primary | ICD-10-CM

## 2022-12-02 DIAGNOSIS — R00.0 TACHYCARDIA: ICD-10-CM

## 2022-12-02 DIAGNOSIS — Z79.4 TYPE 2 DIABETES MELLITUS WITHOUT COMPLICATION, WITH LONG-TERM CURRENT USE OF INSULIN (H): ICD-10-CM

## 2022-12-02 DIAGNOSIS — B00.1 RECURRENT COLD SORES: ICD-10-CM

## 2022-12-02 DIAGNOSIS — E05.90 SUBCLINICAL HYPERTHYROIDISM: ICD-10-CM

## 2022-12-02 DIAGNOSIS — Z79.4 CONTROLLED TYPE 2 DIABETES MELLITUS WITHOUT COMPLICATION, WITH LONG-TERM CURRENT USE OF INSULIN (H): Primary | ICD-10-CM

## 2022-12-02 DIAGNOSIS — J31.0 RHINITIS MEDICAMENTOSA: ICD-10-CM

## 2022-12-02 DIAGNOSIS — T48.5X5A RHINITIS MEDICAMENTOSA: ICD-10-CM

## 2022-12-02 DIAGNOSIS — E11.9 TYPE 2 DIABETES MELLITUS WITHOUT COMPLICATION, WITH LONG-TERM CURRENT USE OF INSULIN (H): ICD-10-CM

## 2022-12-02 LAB
ATRIAL RATE - MUSE: 108 BPM
DIASTOLIC BLOOD PRESSURE - MUSE: NORMAL MMHG
INTERPRETATION ECG - MUSE: NORMAL
P AXIS - MUSE: 63 DEGREES
PR INTERVAL - MUSE: 112 MS
QRS DURATION - MUSE: 84 MS
QT - MUSE: 370 MS
QTC - MUSE: 495 MS
R AXIS - MUSE: 55 DEGREES
SYSTOLIC BLOOD PRESSURE - MUSE: NORMAL MMHG
T AXIS - MUSE: 42 DEGREES
VENTRICULAR RATE- MUSE: 108 BPM

## 2022-12-02 PROCEDURE — 99395 PREV VISIT EST AGE 18-39: CPT | Mod: 25 | Performed by: INTERNAL MEDICINE

## 2022-12-02 PROCEDURE — 90471 IMMUNIZATION ADMIN: CPT | Performed by: INTERNAL MEDICINE

## 2022-12-02 PROCEDURE — 90686 IIV4 VACC NO PRSV 0.5 ML IM: CPT | Performed by: INTERNAL MEDICINE

## 2022-12-02 PROCEDURE — 93000 ELECTROCARDIOGRAM COMPLETE: CPT | Performed by: INTERNAL MEDICINE

## 2022-12-02 RX ORDER — METHYLPREDNISOLONE 4 MG/1
TABLET ORAL
Qty: 35 TABLET | Refills: 2 | Status: SHIPPED | OUTPATIENT
Start: 2022-12-02 | End: 2023-02-08

## 2022-12-02 RX ORDER — VALACYCLOVIR HYDROCHLORIDE 500 MG/1
500 TABLET, FILM COATED ORAL DAILY PRN
Qty: 90 TABLET | Refills: 3 | Status: SHIPPED | OUTPATIENT
Start: 2022-12-02

## 2022-12-02 RX ORDER — FLUTICASONE PROPIONATE 50 MCG
2 SPRAY, SUSPENSION (ML) NASAL DAILY
Qty: 48 G | Refills: 11 | Status: SHIPPED | OUTPATIENT
Start: 2022-12-02 | End: 2024-06-25

## 2022-12-02 RX ORDER — PROCHLORPERAZINE 25 MG/1
SUPPOSITORY RECTAL
Qty: 13 EACH | Refills: 3 | Status: SHIPPED | OUTPATIENT
Start: 2022-12-02

## 2022-12-02 ASSESSMENT — ENCOUNTER SYMPTOMS
POLYPHAGIA: 0
WEIGHT LOSS: 0
CHILLS: 0
PARALYSIS: 0
LOSS OF CONSCIOUSNESS: 0
MUSCLE WEAKNESS: 0
INCREASED ENERGY: 0
WEIGHT GAIN: 0
STIFFNESS: 0
SEIZURES: 0
FEVER: 0
PANIC: 0
ALTERED TEMPERATURE REGULATION: 0
TINGLING: 0
WEAKNESS: 0
DEPRESSION: 0
NERVOUS/ANXIOUS: 1
NECK PAIN: 0
FATIGUE: 1
DISTURBANCES IN COORDINATION: 0
MEMORY LOSS: 0
MYALGIAS: 0
JOINT SWELLING: 1
TREMORS: 0
INSOMNIA: 0
NUMBNESS: 0
DECREASED APPETITE: 0
DECREASED CONCENTRATION: 0
SPEECH CHANGE: 0
POLYDIPSIA: 0
MUSCLE CRAMPS: 0
HALLUCINATIONS: 0
ARTHRALGIAS: 1
DIZZINESS: 0
NIGHT SWEATS: 0
HEADACHES: 1
BACK PAIN: 1

## 2022-12-02 NOTE — PROGRESS NOTES
HPI  37-year-old presents today for physical examination.  She is motivated now to improve her diabetic control she is scheduled to get an insulin pump she is following with a new endocrinologist.  She has not been having any hypoglycemic episodes or symptoms.  Her sugars are under better control.  She is not aware of any palpitations rapid or irregular heartbeat at all.  No dizziness lightheadedness or other complaints.  No problems on her present medication she is tolerating her low-dose opioids well with no problems with constipation dizziness itching lightheadedness or other concerns  Past Medical History:   Diagnosis Date     Diabetes      Other psoriasis      Polyarthritis     probable psoriatic arthritis     Past Surgical History:   Procedure Laterality Date     ARTHROSCOPY KNEE WITH MENISCECTOMY Right 11/20/2018    Procedure: Examination Under Anesthesia Right Knee, Right Knee Arthroscopy, Partial Meniscectomy, Chondroplasty, Cyst Decompression, Synovial Biopsy;  Surgeon: Sean Pate MD;  Location:  OR     AS HYSTEROSCOPY, SURGICAL; W/ ENDOMETRIAL ABLATION, ANY METHOD Bilateral 2017    endometrial ablation with bilater TL     COLONOSCOPY N/A 4/29/2022    Procedure: COLONOSCOPY, WITH BIOPSY;  Surgeon: Nando Hunter MD;  Location: [facility] [place]     ESOPHAGOSCOPY, GASTROSCOPY, DUODENOSCOPY (EGD), COMBINED N/A 4/29/2022    Procedure: ESOPHAGOGASTRODUODENOSCOPY, WITH BIOPSY;  Surgeon: Nando Hunter MD;  Location: [facility] [place]     SURGICAL HISTORY OF -       .  Tonsillar abscess     Family History   Problem Relation Age of Onset     Thyroid Disease Mother      Diabetes Mother      Hypertension Mother      Connective Tissue Disorder Father         Psoriasis     Diabetes Maternal Grandfather      Hypertension Maternal Grandfather      Prostate Cancer Maternal Grandfather      Diabetes Maternal Uncle      Diabetes Maternal Uncle      Hypertension Maternal Grandmother      Asthma No family hx of      Asthma  Mother      Colon Cancer Mother      Chronic Obstructive Pulmonary Disease Mother      Arthritis Father      Vision Loss Father      Diabetes Maternal Uncle      Diabetes Maternal Grandmother      Answers for HPI/ROS submitted by the patient on 12/2/2022  General Symptoms: Yes  Skin Symptoms: No  HENT Symptoms: No  EYE SYMPTOMS: No  HEART SYMPTOMS: No  LUNG SYMPTOMS: No  INTESTINAL SYMPTOMS: No  URINARY SYMPTOMS: No  GYNECOLOGIC SYMPTOMS: No  BREAST SYMPTOMS: No  SKELETAL SYMPTOMS: Yes  BLOOD SYMPTOMS: No  NERVOUS SYSTEM SYMPTOMS: Yes  MENTAL HEALTH SYMPTOMS: Yes  Fever: No  Loss of appetite: No  Weight loss: No  Weight gain: No  Fatigue: Yes  Night sweats: No  Chills: No  Increased stress: Yes  Excessive hunger: No  Excessive thirst: No  Feeling hot or cold when others believe the temperature is normal: No  Loss of height: No  Post-operative complications: No  Surgical site pain: No  Hallucinations: No  Change in or Loss of Energy: No  Hyperactivity: No  Confusion: No  Back pain: Yes  Muscle aches: No  Neck pain: No  Swollen joints: Yes  Joint pain: Yes  Bone pain: No  Muscle cramps: No  Muscle weakness: No  Joint stiffness: No  Bone fracture: No  Trouble with coordination: No  Dizziness or trouble with balance: No  Fainting or black-out spells: No  Memory loss: No  Headache: Yes  Seizures: No  Speech problems: No  Tingling: No  Tremor: No  Weakness: No  Difficulty walking: No  Paralysis: No  Numbness: No  Nervous or Anxious: Yes  Depression: No  Trouble sleeping: No  Trouble thinking or concentrating: No  Mood changes: No  Panic attacks: No        Exam:  /73 (BP Location: Right arm, Patient Position: Sitting, Cuff Size: Adult Regular)   Pulse 78   Wt 49.9 kg (110 lb 1.6 oz)   LMP  (LMP Unknown)   SpO2 99%   BMI 19.50 kg/m    110 lbs 1.6 oz  PHYSICAL EXAMINATION:   The patient is alert, oriented with a clear sensorium.   Skin shows no lesions or rashes and good turgor.   Head is normocephalic and  atraumatic.   Eyes show PERRLA. Fundi are benign.   Ears show normal TMs bilaterally.   Mouth shows clear oral mucosa.   Neck shows no nodes, thyromegaly or bruits.   Back is nontender.   Lungs are clear to percussion and auscultation.   Heart shows normal S1 and S2 without murmur or gallop.   Abdomen is soft, nontender without masses or organomegaly.   Extremities show no edema and no evidence of active synovitis.   Neurologic examination shows cranial nerves II-XII intact. Motor shows normal strength. Reflexes are full and symmetrical.   EKG was reviewed showing sinus tachycardia otherwise normal    ASSESSMENT  1 diabetes mellitus improving control with pump planned  2 Right carpel tunnel syndrome  3 psoriasis and  psoriatic arthritis with flares  4 Chronic pain on tramadol and oxycodone (17.5 ME/day)  5 Iron deficiency anemia needs further evaluation  6 Subclinical hyperthyroidism  7 sinus tachycardia    Plan  Reassess her thyroid function and her BMP.  She follows with OB/GYN for GYN evaluation.  Refilled her medications for another year we will continue the same opioid plan    This note was completed using Dragon voice recognition software.      Nando Haskins MD  General Internal Medicine  [facility]  [phone]

## 2022-12-02 NOTE — NURSING NOTE
Chrissy Dixon is a 37 year old female that presents in clinic today for the following:     Chief Complaint   Patient presents with     Physical     Pt here for annual physical and refills on prescriptions       The patient's allergies and medications were reviewed. The patient's vitals were obtained without incident. The patient does not have any other questions for the provider.     Axel See, EMT at 2:14 PM on 12/2/2022.  [facility]: [phone]

## 2022-12-05 RX ORDER — OXYCODONE AND ACETAMINOPHEN 5; 325 MG/1; MG/1
1 TABLET ORAL EVERY 4 HOURS PRN
Qty: 30 TABLET | Refills: 0 | Status: SHIPPED | OUTPATIENT
Start: 2022-12-05 | End: 2023-01-01

## 2022-12-07 ENCOUNTER — MYC REFILL (OUTPATIENT)
Dept: INTERNAL MEDICINE | Facility: CLINIC | Age: 37
End: 2022-12-07

## 2022-12-07 DIAGNOSIS — L40.50 PSORIATIC ARTHRITIS (H): ICD-10-CM

## 2022-12-07 RX ORDER — TRAMADOL HYDROCHLORIDE 50 MG/1
50 TABLET ORAL EVERY 8 HOURS PRN
Qty: 60 TABLET | Refills: 0 | Status: SHIPPED | OUTPATIENT
Start: 2022-12-11 | End: 2023-01-05

## 2022-12-07 NOTE — TELEPHONE ENCOUNTER
Medication last filled on 11/12/22, qty 60, 30 day supply per  data.    Megha Ruiz RN on 12/7/2022 at 10:23 AM

## 2022-12-08 ENCOUNTER — MYC MEDICAL ADVICE (OUTPATIENT)
Dept: INTERNAL MEDICINE | Facility: CLINIC | Age: 37
End: 2022-12-08

## 2022-12-21 DIAGNOSIS — Z79.4 TYPE 2 DIABETES MELLITUS WITHOUT COMPLICATION, WITH LONG-TERM CURRENT USE OF INSULIN (H): ICD-10-CM

## 2022-12-21 DIAGNOSIS — E11.9 TYPE 2 DIABETES MELLITUS WITHOUT COMPLICATION, WITH LONG-TERM CURRENT USE OF INSULIN (H): ICD-10-CM

## 2022-12-22 DIAGNOSIS — Z79.4 TYPE 2 DIABETES MELLITUS WITHOUT COMPLICATION, WITH LONG-TERM CURRENT USE OF INSULIN (H): ICD-10-CM

## 2022-12-22 DIAGNOSIS — E11.9 TYPE 2 DIABETES MELLITUS WITHOUT COMPLICATION, WITH LONG-TERM CURRENT USE OF INSULIN (H): ICD-10-CM

## 2022-12-24 RX ORDER — PROCHLORPERAZINE 25 MG/1
SUPPOSITORY RECTAL
OUTPATIENT
Start: 2022-12-24

## 2022-12-24 NOTE — TELEPHONE ENCOUNTER
DEXCOM G6 TRANSMITTER  Last Written Prescription Date:  12/28/2021  Last Fill Quantity: 1,   # refills: 3  Last Office Visit :  12/2/2022  Future Office visit:  None  Order in chart and order from pharmacy are a little bit different.   Would Provider like the order to day Change 1 each subcutaneous every 90 days?   Please advise and send new order with updated directions.   Thank you       Reyna Chacko RN  Central Triage Red Flags/Med Refills

## 2022-12-26 RX ORDER — PROCHLORPERAZINE 25 MG/1
1 SUPPOSITORY RECTAL
Qty: 13 EACH | Refills: 3 | Status: SHIPPED | OUTPATIENT
Start: 2022-12-26

## 2023-01-01 ENCOUNTER — MYC REFILL (OUTPATIENT)
Dept: INTERNAL MEDICINE | Facility: CLINIC | Age: 38
End: 2023-01-01

## 2023-01-01 DIAGNOSIS — B00.1 RECURRENT COLD SORES: ICD-10-CM

## 2023-01-01 DIAGNOSIS — L40.50 PSORIATIC ARTHRITIS (H): ICD-10-CM

## 2023-01-02 NOTE — TELEPHONE ENCOUNTER
Medication last filled on 12/8/22, qty 30, 30 day supply per  data.    Megha Ruiz RN on 1/2/2023 at 9:03 AM

## 2023-01-03 ENCOUNTER — DOCUMENTATION ONLY (OUTPATIENT)
Dept: INTERNAL MEDICINE | Facility: CLINIC | Age: 38
End: 2023-01-03

## 2023-01-03 RX ORDER — OXYCODONE AND ACETAMINOPHEN 5; 325 MG/1; MG/1
1 TABLET ORAL EVERY 4 HOURS PRN
Qty: 30 TABLET | Refills: 0 | Status: SHIPPED | OUTPATIENT
Start: 2023-01-08 | End: 2023-01-30

## 2023-01-03 NOTE — PROGRESS NOTES
Type of Form Received: insurance    Form Received (Date) 1/3/23   Form Filled out Yes 1/12/23   Placed in provider folder Yes

## 2023-01-05 ENCOUNTER — MYC REFILL (OUTPATIENT)
Dept: INTERNAL MEDICINE | Facility: CLINIC | Age: 38
End: 2023-01-05

## 2023-01-05 DIAGNOSIS — L40.50 PSORIATIC ARTHRITIS (H): ICD-10-CM

## 2023-01-06 ENCOUNTER — MYC MEDICAL ADVICE (OUTPATIENT)
Dept: INTERNAL MEDICINE | Facility: CLINIC | Age: 38
End: 2023-01-06

## 2023-01-06 RX ORDER — TRAMADOL HYDROCHLORIDE 50 MG/1
50 TABLET ORAL EVERY 8 HOURS PRN
Qty: 60 TABLET | Refills: 0 | Status: SHIPPED | OUTPATIENT
Start: 2023-01-06 | End: 2023-02-02

## 2023-01-09 NOTE — TELEPHONE ENCOUNTER
Per  data medication was filled on 1/7/23, Qty 30, 30 day supply.    Megha Ruiz RN on 1/9/2023 at 8:12 AM

## 2023-01-11 ENCOUNTER — TRANSFERRED RECORDS (OUTPATIENT)
Dept: HEALTH INFORMATION MANAGEMENT | Facility: CLINIC | Age: 38
End: 2023-01-11
Payer: COMMERCIAL

## 2023-01-30 ENCOUNTER — MYC REFILL (OUTPATIENT)
Dept: INTERNAL MEDICINE | Facility: CLINIC | Age: 38
End: 2023-01-30
Payer: COMMERCIAL

## 2023-01-30 DIAGNOSIS — L40.50 PSORIATIC ARTHRITIS (H): ICD-10-CM

## 2023-01-30 DIAGNOSIS — B00.1 RECURRENT COLD SORES: ICD-10-CM

## 2023-01-31 RX ORDER — OXYCODONE AND ACETAMINOPHEN 5; 325 MG/1; MG/1
1 TABLET ORAL EVERY 4 HOURS PRN
Qty: 30 TABLET | Refills: 0 | Status: SHIPPED | OUTPATIENT
Start: 2023-02-05 | End: 2023-02-28

## 2023-02-02 ENCOUNTER — MYC REFILL (OUTPATIENT)
Dept: INTERNAL MEDICINE | Facility: CLINIC | Age: 38
End: 2023-02-02
Payer: COMMERCIAL

## 2023-02-02 DIAGNOSIS — L40.50 PSORIATIC ARTHRITIS (H): ICD-10-CM

## 2023-02-02 NOTE — TELEPHONE ENCOUNTER
Subjective     History provided by:  Patient  Arm Injury  Location:  Elbow and arm  Arm location:  R arm  Elbow location:  R elbow  Injury: yes    Time since incident:  4 hours  Mechanism of injury: fall    Mechanism of injury comment:  Pt slipped / tripped over rugs.   Fall:     Point of impact:  Outstretched arms  Pain details:     Quality:  Aching and throbbing    Radiates to:  Does not radiate    Severity:  Moderate    Onset quality:  Sudden    Timing:  Constant    Progression:  Unchanged  Prior injury to area:  No  Relieved by:  Nothing  Worsened by:  Movement  Associated symptoms: decreased range of motion and swelling    Associated symptoms: no fever        Review of Systems   Constitutional: Negative.  Negative for fever.   HENT: Negative.    Respiratory: Negative.    Cardiovascular: Negative.  Negative for chest pain.   Gastrointestinal: Negative.  Negative for abdominal pain.   Endocrine: Negative.    Genitourinary: Negative.  Negative for dysuria.   Musculoskeletal: Positive for arthralgias.   Skin: Negative.    Neurological: Negative.    Psychiatric/Behavioral: Negative.    All other systems reviewed and are negative.      Past Medical History:   Diagnosis Date   • Abnormal uterine bleeding (AUB)    • Allergic     seasonal   • Gestational diabetes    • History of strep sore throat    • Leaky heart valve    • Migraines    • Mixed hyperlipidemia 4/19/2022       No Known Allergies    Past Surgical History:   Procedure Laterality Date   • APPENDECTOMY  1990   • D & C HYSTEROSCOPY N/A 5/24/2019    Procedure: DILATATION AND CURETTAGE HYSTEROSCOPY;  Surgeon: Tevin Reid III, MD;  Location: [facility];  Service: Obstetrics/Gynecology   • ENDOSCOPY     • VAGINAL DELIVERY  1992 1997       Family History   Problem Relation Age of Onset   • Anxiety disorder Mother    • Depression Mother    • Throat cancer Mother    • Alcohol abuse Father    • Heart disease Father    • Alcohol abuse Brother    • Heart disease  Writer responded to patient via MyChart, unable to close encounter at this time as infusion therapy plans are still pending.   Samantha Gomez LPN     Brother    • Cancer Maternal Grandfather    • Lung disease Maternal Grandfather    • Lung disease Maternal Grandmother    • Cancer Maternal Grandmother    • Heart disease Paternal Grandmother    • Breast cancer Neg Hx        Social History     Socioeconomic History   • Marital status: Single   Tobacco Use   • Smoking status: Never   • Smokeless tobacco: Never   Vaping Use   • Vaping Use: Never used   Substance and Sexual Activity   • Alcohol use: No   • Drug use: No   • Sexual activity: Yes     Birth control/protection: Condom     Comment: Single           Objective   Physical Exam  Vitals and nursing note reviewed.   Constitutional:       General: She is not in acute distress.     Appearance: She is well-developed. She is not diaphoretic.   HENT:      Head: Normocephalic and atraumatic.      Right Ear: External ear normal.      Left Ear: External ear normal.      Nose: Nose normal.   Eyes:      Conjunctiva/sclera: Conjunctivae normal.   Neck:      Vascular: No JVD.      Trachea: No tracheal deviation.   Cardiovascular:      Rate and Rhythm: Normal rate.      Heart sounds: No murmur heard.  Pulmonary:      Effort: Pulmonary effort is normal. No respiratory distress.      Breath sounds: No wheezing.   Abdominal:      Palpations: Abdomen is soft.      Tenderness: There is no abdominal tenderness.   Musculoskeletal:         General: Swelling, tenderness and signs of injury present. No deformity.      Cervical back: Normal range of motion and neck supple.      Comments: Localized tenderness of the right elbow.  Decreased range of motion.   Skin:     General: Skin is warm and dry.      Coloration: Skin is not pale.      Findings: No erythema or rash.   Neurological:      Mental Status: She is alert and oriented to person, place, and time.      Cranial Nerves: No cranial nerve deficit.   Psychiatric:         Behavior: Behavior normal.         Thought Content: Thought content normal.         Procedures           ED  Course  ED Course as of 02/02/23 0309   Thu Feb 02, 2023 0226 CT extremity rad interpreted:  1. Limited examination, detailed above.  2. Nondisplaced, incomplete oblique fracture of the coronoid process of the ulna.  3. Cannot exclude a subtle, minimally impacted nonarticular fracture of the radial neck.  4. No elbow dislocation.  5. Moderate elbow effusion. [RB]      ED Course User Index  [RB] Kaarn Umana II, PA                                           Medical Decision Making  49-year-old female presents to the ER with chief complaint of fall.  Patient is complaining of right upper extremity injury.    Closed fracture of proximal end of right forearm, initial encounter: acute illness or injury     Details: Imaging demonstrates fractures of both ulna and radial neck of the proximal right forearm.  Patient placed in a long-arm splint and orthopedic follow-up.  Pain control given.  Amount and/or Complexity of Data Reviewed  Radiology: ordered.      Risk  Prescription drug management.          Final diagnoses:   Closed fracture of proximal end of right forearm, initial encounter       ED Disposition  ED Disposition     ED Disposition   Discharge    Condition   Stable    Comment   --             Skyler Palomo, DO  [address] [address]  [phone]    Schedule an appointment as soon as possible for a visit            Medication List      New Prescriptions    HYDROcodone-acetaminophen 7.5-325 MG per tablet  Commonly known as: NORCO  Take 1 tablet by mouth Every 6 (Six) Hours As Needed for Moderate Pain.           Where to Get Your Medications      These medications were sent to [facility] [address] - [phone] [facility] [phone]   [address] [facility] [address] [facility] [address]    Phone: [phone]   · HYDROcodone-acetaminophen 7.5-325 MG per tablet          Karan Umana II, PA  02/02/23 0309

## 2023-02-03 RX ORDER — TRAMADOL HYDROCHLORIDE 50 MG/1
50 TABLET ORAL EVERY 8 HOURS PRN
Qty: 60 TABLET | Refills: 0 | Status: SHIPPED | OUTPATIENT
Start: 2023-02-09 | End: 2023-02-28

## 2023-02-06 ENCOUNTER — MYC MEDICAL ADVICE (OUTPATIENT)
Dept: INTERNAL MEDICINE | Facility: CLINIC | Age: 38
End: 2023-02-06
Payer: COMMERCIAL

## 2023-02-06 DIAGNOSIS — L40.50 PSORIATIC ARTHRITIS (H): ICD-10-CM

## 2023-02-06 NOTE — TELEPHONE ENCOUNTER
methylPREDNISolone (MEDROL) 4 MG tablet       Last Written Prescription Date:  12-2-22  Last Fill Quantity: 35,   # refills: 2  Last Office Visit : 12-2-22  Future Office visit:  none    Routing refill request to provider for review/approval because:  Drug not on the [facility], [facility] or [facility] refill protocol or controlled substance

## 2023-02-08 RX ORDER — METHYLPREDNISOLONE 4 MG/1
TABLET ORAL
Qty: 35 TABLET | Refills: 2 | Status: SHIPPED | OUTPATIENT
Start: 2023-02-08 | End: 2023-04-06

## 2023-02-28 ENCOUNTER — MYC REFILL (OUTPATIENT)
Dept: INTERNAL MEDICINE | Facility: CLINIC | Age: 38
End: 2023-02-28
Payer: COMMERCIAL

## 2023-02-28 DIAGNOSIS — B00.1 RECURRENT COLD SORES: ICD-10-CM

## 2023-02-28 DIAGNOSIS — L40.50 PSORIATIC ARTHRITIS (H): ICD-10-CM

## 2023-02-28 RX ORDER — TRAMADOL HYDROCHLORIDE 50 MG/1
50 TABLET ORAL EVERY 8 HOURS PRN
Qty: 60 TABLET | Refills: 0 | Status: SHIPPED | OUTPATIENT
Start: 2023-02-28 | End: 2023-03-21

## 2023-02-28 RX ORDER — OXYCODONE AND ACETAMINOPHEN 5; 325 MG/1; MG/1
1 TABLET ORAL EVERY 4 HOURS PRN
Qty: 30 TABLET | Refills: 0 | Status: SHIPPED | OUTPATIENT
Start: 2023-02-28 | End: 2023-03-21

## 2023-03-21 ENCOUNTER — MYC REFILL (OUTPATIENT)
Dept: INTERNAL MEDICINE | Facility: CLINIC | Age: 38
End: 2023-03-21
Payer: COMMERCIAL

## 2023-03-21 DIAGNOSIS — L40.50 PSORIATIC ARTHRITIS (H): ICD-10-CM

## 2023-03-21 DIAGNOSIS — B00.1 RECURRENT COLD SORES: ICD-10-CM

## 2023-03-21 RX ORDER — TRAMADOL HYDROCHLORIDE 50 MG/1
50 TABLET ORAL EVERY 8 HOURS PRN
Qty: 60 TABLET | Refills: 0 | Status: SHIPPED | OUTPATIENT
Start: 2023-03-21 | End: 2023-04-25

## 2023-03-21 RX ORDER — OXYCODONE AND ACETAMINOPHEN 5; 325 MG/1; MG/1
1 TABLET ORAL EVERY 4 HOURS PRN
Qty: 30 TABLET | Refills: 0 | Status: SHIPPED | OUTPATIENT
Start: 2023-03-21 | End: 2023-04-25

## 2023-04-05 ENCOUNTER — MYC MEDICAL ADVICE (OUTPATIENT)
Dept: INTERNAL MEDICINE | Facility: CLINIC | Age: 38
End: 2023-04-05
Payer: COMMERCIAL

## 2023-04-05 DIAGNOSIS — L40.50 PSORIATIC ARTHRITIS (H): ICD-10-CM

## 2023-04-06 RX ORDER — METHYLPREDNISOLONE 4 MG/1
TABLET ORAL
Qty: 35 TABLET | Refills: 2 | Status: SHIPPED | OUTPATIENT
Start: 2023-04-06 | End: 2023-06-05

## 2023-04-06 NOTE — TELEPHONE ENCOUNTER
methylPREDNISolone (MEDROL) 4 MG tablet      TAKE 3 TABLETS DAILY BY MOUTH FOR 1 WEEK THEN 2 TABLETS DAILY FOR 1 WEEK THEN OFF  Last Written Prescription Date:  2-8-23  Last Fill Quantity: 35,   # refills: 2  Last Office Visit : 12-2-22  Future Office visit:  none    Routing refill request to provider for review/approval because:  Drug not on the [facility], [facility] or [facility] refill protocol or controlled substance

## 2023-04-22 ENCOUNTER — HEALTH MAINTENANCE LETTER (OUTPATIENT)
Age: 38
End: 2023-04-22

## 2023-04-25 ENCOUNTER — MYC REFILL (OUTPATIENT)
Dept: INTERNAL MEDICINE | Facility: CLINIC | Age: 38
End: 2023-04-25
Payer: COMMERCIAL

## 2023-04-25 DIAGNOSIS — L40.50 PSORIATIC ARTHRITIS (H): ICD-10-CM

## 2023-04-25 DIAGNOSIS — B00.1 RECURRENT COLD SORES: ICD-10-CM

## 2023-04-26 RX ORDER — TRAMADOL HYDROCHLORIDE 50 MG/1
50 TABLET ORAL EVERY 8 HOURS PRN
Qty: 60 TABLET | Refills: 0 | Status: SHIPPED | OUTPATIENT
Start: 2023-04-28 | End: 2023-05-22

## 2023-04-26 RX ORDER — OXYCODONE AND ACETAMINOPHEN 5; 325 MG/1; MG/1
1 TABLET ORAL EVERY 4 HOURS PRN
Qty: 30 TABLET | Refills: 0 | Status: SHIPPED | OUTPATIENT
Start: 2023-04-28 | End: 2023-05-22

## 2023-05-22 ENCOUNTER — MYC REFILL (OUTPATIENT)
Dept: INTERNAL MEDICINE | Facility: CLINIC | Age: 38
End: 2023-05-22
Payer: COMMERCIAL

## 2023-05-22 DIAGNOSIS — B00.1 RECURRENT COLD SORES: ICD-10-CM

## 2023-05-22 DIAGNOSIS — L40.50 PSORIATIC ARTHRITIS (H): ICD-10-CM

## 2023-05-22 RX ORDER — TRAMADOL HYDROCHLORIDE 50 MG/1
50 TABLET ORAL EVERY 8 HOURS PRN
Qty: 60 TABLET | Refills: 0 | Status: SHIPPED | OUTPATIENT
Start: 2023-05-27 | End: 2023-06-20

## 2023-05-22 RX ORDER — OXYCODONE AND ACETAMINOPHEN 5; 325 MG/1; MG/1
1 TABLET ORAL EVERY 4 HOURS PRN
Qty: 30 TABLET | Refills: 0 | Status: SHIPPED | OUTPATIENT
Start: 2023-05-27 | End: 2023-06-20

## 2023-06-01 ENCOUNTER — MYC MEDICAL ADVICE (OUTPATIENT)
Dept: INTERNAL MEDICINE | Facility: CLINIC | Age: 38
End: 2023-06-01
Payer: COMMERCIAL

## 2023-06-01 DIAGNOSIS — L40.50 PSORIATIC ARTHRITIS (H): ICD-10-CM

## 2023-06-05 RX ORDER — METHYLPREDNISOLONE 4 MG/1
TABLET ORAL
Qty: 35 TABLET | Refills: 2 | Status: SHIPPED | OUTPATIENT
Start: 2023-06-05 | End: 2023-08-08

## 2023-06-20 ENCOUNTER — MYC REFILL (OUTPATIENT)
Dept: INTERNAL MEDICINE | Facility: CLINIC | Age: 38
End: 2023-06-20
Payer: COMMERCIAL

## 2023-06-20 DIAGNOSIS — L40.50 PSORIATIC ARTHRITIS (H): ICD-10-CM

## 2023-06-20 DIAGNOSIS — B00.1 RECURRENT COLD SORES: ICD-10-CM

## 2023-06-20 RX ORDER — OXYCODONE AND ACETAMINOPHEN 5; 325 MG/1; MG/1
1 TABLET ORAL EVERY 4 HOURS PRN
Qty: 30 TABLET | Refills: 0 | Status: SHIPPED | OUTPATIENT
Start: 2023-06-25 | End: 2023-07-17

## 2023-06-20 RX ORDER — TRAMADOL HYDROCHLORIDE 50 MG/1
50 TABLET ORAL EVERY 8 HOURS PRN
Qty: 60 TABLET | Refills: 0 | Status: SHIPPED | OUTPATIENT
Start: 2023-06-25 | End: 2023-07-17

## 2023-07-17 ENCOUNTER — MYC REFILL (OUTPATIENT)
Dept: INTERNAL MEDICINE | Facility: CLINIC | Age: 38
End: 2023-07-17
Payer: COMMERCIAL

## 2023-07-17 DIAGNOSIS — B00.1 RECURRENT COLD SORES: ICD-10-CM

## 2023-07-17 DIAGNOSIS — L40.50 PSORIATIC ARTHRITIS (H): ICD-10-CM

## 2023-07-19 RX ORDER — OXYCODONE AND ACETAMINOPHEN 5; 325 MG/1; MG/1
1 TABLET ORAL EVERY 4 HOURS PRN
Qty: 30 TABLET | Refills: 0 | Status: SHIPPED | OUTPATIENT
Start: 2023-07-24 | End: 2023-08-18

## 2023-07-19 RX ORDER — TRAMADOL HYDROCHLORIDE 50 MG/1
50 TABLET ORAL EVERY 8 HOURS PRN
Qty: 60 TABLET | Refills: 0 | Status: SHIPPED | OUTPATIENT
Start: 2023-07-24 | End: 2023-08-18

## 2023-08-07 ENCOUNTER — MYC MEDICAL ADVICE (OUTPATIENT)
Dept: INTERNAL MEDICINE | Facility: CLINIC | Age: 38
End: 2023-08-07
Payer: COMMERCIAL

## 2023-08-07 DIAGNOSIS — L40.50 PSORIATIC ARTHRITIS (H): ICD-10-CM

## 2023-08-07 NOTE — TELEPHONE ENCOUNTER
methylPREDNISolone (MEDROL) 4 MG tablet      Last Written Prescription Date:  6-5-23  Last Fill Quantity: 35,   # refills: 2  Last Office Visit : 12-2-22  Future Office visit:  none    Routing refill request to provider for review/approval because:  Drug not on the [facility], [facility] or [facility] refill protocol or controlled substance

## 2023-08-08 RX ORDER — METHYLPREDNISOLONE 4 MG/1
TABLET ORAL
Qty: 35 TABLET | Refills: 2 | Status: SHIPPED | OUTPATIENT
Start: 2023-08-08 | End: 2023-10-10

## 2023-08-18 ENCOUNTER — MYC REFILL (OUTPATIENT)
Dept: INTERNAL MEDICINE | Facility: CLINIC | Age: 38
End: 2023-08-18
Payer: COMMERCIAL

## 2023-08-18 DIAGNOSIS — L40.50 PSORIATIC ARTHRITIS (H): ICD-10-CM

## 2023-08-18 DIAGNOSIS — B00.1 RECURRENT COLD SORES: ICD-10-CM

## 2023-08-18 RX ORDER — TRAMADOL HYDROCHLORIDE 50 MG/1
50 TABLET ORAL EVERY 8 HOURS PRN
Qty: 60 TABLET | Refills: 0 | Status: SHIPPED | OUTPATIENT
Start: 2023-08-22 | End: 2023-09-12

## 2023-08-18 RX ORDER — OXYCODONE AND ACETAMINOPHEN 5; 325 MG/1; MG/1
1 TABLET ORAL EVERY 4 HOURS PRN
Qty: 30 TABLET | Refills: 0 | Status: SHIPPED | OUTPATIENT
Start: 2023-08-22 | End: 2023-09-12

## 2023-09-12 ENCOUNTER — MYC REFILL (OUTPATIENT)
Dept: INTERNAL MEDICINE | Facility: CLINIC | Age: 38
End: 2023-09-12
Payer: COMMERCIAL

## 2023-09-12 DIAGNOSIS — L40.50 PSORIATIC ARTHRITIS (H): ICD-10-CM

## 2023-09-12 DIAGNOSIS — B00.1 RECURRENT COLD SORES: ICD-10-CM

## 2023-09-12 RX ORDER — OXYCODONE AND ACETAMINOPHEN 5; 325 MG/1; MG/1
1 TABLET ORAL EVERY 4 HOURS PRN
Qty: 30 TABLET | Refills: 0 | Status: SHIPPED | OUTPATIENT
Start: 2023-09-20 | End: 2023-10-09

## 2023-09-12 RX ORDER — TRAMADOL HYDROCHLORIDE 50 MG/1
50 TABLET ORAL EVERY 8 HOURS PRN
Qty: 60 TABLET | Refills: 0 | Status: SHIPPED | OUTPATIENT
Start: 2023-09-20 | End: 2023-10-09

## 2023-09-15 DIAGNOSIS — E11.9 TYPE 2 DIABETES MELLITUS WITHOUT COMPLICATION, WITH LONG-TERM CURRENT USE OF INSULIN (H): ICD-10-CM

## 2023-09-15 DIAGNOSIS — Z79.4 TYPE 2 DIABETES MELLITUS WITHOUT COMPLICATION, WITH LONG-TERM CURRENT USE OF INSULIN (H): ICD-10-CM

## 2023-09-18 ENCOUNTER — TELEPHONE (OUTPATIENT)
Dept: RHEUMATOLOGY | Facility: CLINIC | Age: 38
End: 2023-09-18

## 2023-09-18 ENCOUNTER — MYC MEDICAL ADVICE (OUTPATIENT)
Dept: RHEUMATOLOGY | Facility: CLINIC | Age: 38
End: 2023-09-18
Payer: COMMERCIAL

## 2023-09-18 NOTE — TELEPHONE ENCOUNTER
Patient would like to know if she can change her 9/22 appt with Dr. Granados to virtual because she has to get her kid to school and she can't make it to the clinic on time.

## 2023-09-22 ENCOUNTER — VIRTUAL VISIT (OUTPATIENT)
Dept: RHEUMATOLOGY | Facility: CLINIC | Age: 38
End: 2023-09-22
Attending: INTERNAL MEDICINE
Payer: COMMERCIAL

## 2023-09-22 VITALS — HEIGHT: 63 IN | WEIGHT: 110 LBS | BODY MASS INDEX: 19.49 KG/M2

## 2023-09-22 DIAGNOSIS — E11.9 CONTROLLED TYPE 2 DIABETES MELLITUS WITHOUT COMPLICATION, WITH LONG-TERM CURRENT USE OF INSULIN (H): Primary | ICD-10-CM

## 2023-09-22 DIAGNOSIS — L40.50 PSORIATIC ARTHRITIS (H): ICD-10-CM

## 2023-09-22 DIAGNOSIS — Z79.4 CONTROLLED TYPE 2 DIABETES MELLITUS WITHOUT COMPLICATION, WITH LONG-TERM CURRENT USE OF INSULIN (H): Primary | ICD-10-CM

## 2023-09-22 PROCEDURE — 99214 OFFICE O/P EST MOD 30 MIN: CPT | Mod: VID | Performed by: INTERNAL MEDICINE

## 2023-09-22 RX ORDER — APREMILAST 30 MG/1
30 TABLET, FILM COATED ORAL 2 TIMES DAILY
Qty: 60 TABLET | Refills: 8 | Status: SHIPPED | OUTPATIENT
Start: 2023-09-22 | End: 2024-07-10

## 2023-09-22 RX ORDER — APREMILAST 30 MG/1
30 TABLET, FILM COATED ORAL 2 TIMES DAILY
Qty: 60 TABLET | Refills: 8 | OUTPATIENT
Start: 2023-09-22 | End: 2023-09-22

## 2023-09-22 ASSESSMENT — PAIN SCALES - GENERAL: PAINLEVEL: SEVERE PAIN (6)

## 2023-09-22 NOTE — LETTER
2023       RE: Chrissy Dixon  [address] [facility] [address]     Dear Colleague,    Thank you for referring your patient, Chrissy Dixon, to the [facility] RHEUMATOLOGY [facility]IC [place] at [facility]. Please see a copy of my visit note below.      [facility]  Rheumatology Clinic  Rene Granados MD  2023     Name: Chrissy Dixon  MRN: [id number]  Age: 38 year old    : 1985  Referring provider: Nando Haskins    Assessment and Plan:  # Psoriatic arthritis (polyarthritis, scalp and pustular psoriasis; dx'd post-partum in 2015):   Patient notes improvement in knee pain since starting to work from home. She has good control of other joint pain and psoriasis flares on Otezla and limited use of steroids. Video exam shows only mild R knee swelling and obvious skin changes.    Data: In 2022, CBC showed platelets at 472,000; CRP, transaminases, and creatinine were normal.  Hemoglobin A1c was markedly elevated at 11.5.    Discussion: Psoriatic arthritis continues to be well controlled since last visit. Most likely, improvement is attributable to Otezla alone, with little/no contribution from intermittent Cosentyx (patient reports using only 1 dose in the past calendar year). Requirements for steroids are minor (only 3-4 days per month) and have been stable. I recommend continuing  immunomodulatory therapy with Otezla monotherapy, supplemented by intermittent methylprednisolone,     Plan:  1. Continue Otezla 30 mg twice daily. Recheck complete blood count, creatinine, LFTs  2.  Hold Cosentyx  3.  Use short courses of Medrol (12 mg/day with taper to off over the course of 1 week) very sparingly to address flares of inflammatory joint pain: Both skin and joint manifestations are well controlled currently.  In the next month.  In the next month.    # Type 1 diabetes mellitus:   Hemoglobin A1c remained above 8 at last  check in 10-21.  Had a couple days of sugars >600 in early September associated with a malfunction in her new insulin pump and a possible UTI. No repeat episodes since then. She has higher sugars when she is taking Medrol, but notes that this has been much easier to control since getting her pump. Patient following with Endocrinology.    I was present with the medical student who took the history and acted as a scribe. I have verified the history, reviewed imaging and laboratory data, and personally performed the physical exam. I formulated the assessment and plan.    Rene Granados M.D.  Staff Rheumatologist,  Health  Pager [phone]    Orders Placed This Encounter   Procedures    Hemoglobin A1c    ALT    AST    CBC with platelets       Follow-up: Return in about 6 months    HPI:   Chrissy Dixon follows up for psoriatic arthritis. I last evaluated the patient on 8-2022. Psoriatic arthritis was judged improved.  I recommended continuing Otezla monotherapy for inflammatory arthritis, and using short courses of Medrol sparingly to address flares of inflammatory joint pain    Background History:  She has had psoriasis since birth (presented with diffuse rash at birth), was diagnosed with psoriasis at age 11, and developed arthritis in her 20s. She was on Enbrel at age 21 but developed infections such as pneumonia, sinusitis, and mono, and stopped it within 7 months. She was then on methotrexate from 2011 until February 2012, but did not tolerate this due to GI upset. She switched to sulfasalazine 2/2012 and tolerated it well at 1000 mg thrice a day, but had decreasing efficacy as of 11/1/13. She started leflunomide in 10-13 but stopped in 1-14 due to GI intolerance. She started Humira in 1-14, held in 7-14 during pregnancy, restarted in 7-15 for flaring disease but then discontinued due to worsened psoriasis and lack of efficacy. Leflunomide was then restarted but loose stools developed. Leflunomide was stopped  and Otezla was started on 9/11/15. She started Stelara in fall 2015 but stopped due to cost and inconvenience in early 2016. She has continued with Otezla monotherapy through present. She restarted Stelara in 12-18.  Stelara was discontinued in late 2019 for lack of benefit.  She started Cosentyx in November 2019 with excellent relief of joint pain.  Combination Cosentyx and Otezla was begun in early 2020.  Otezla continued through 2021 in 2022, but Cosentyx used only intermittently during that time.  In mid 2022, good to excellent control achieved with Otezla alone, supplemented by infrequent (once monthly) use of low-dose 3-day Medrol courses.    Interval History September 22, 2023  Patient recently started working from home and feels that this has been a good change for her overall health because she can walk around, stretch, ice, etc. when she needs to. She also now has an insulin pump, which has helped her keep her sugars in check. She has largely been without pain and stiffness. When she does get pain, it is most often at the back of her knees immediately before or on the first day of her period or when she has been sitting a lot. She has not had any psoriasis patches and feels that her nails have been pretty good; just a few patches under some of her toenails. She has been taking Otezla BID consistently; has not received Cosentyx in at least 2-3 months. She will use 2-3 days of steroids around the start of her period to help control the knee pain that arises around that time.    Patient was seen in urgent care on 09/05 after two days of high blood sugars (>600), some vomiting, and drenching sweats. Found that her pump was not fully connected and UA showed evidence of a UTI, though she was not having symptoms. She was rehydrated and started on antibiotics. Felt back to normal in a couple of days. No similar episodes since. No other health or medication changes.     She is hoping that in addition to her normal  "rheum labs, we can order an A1C. Would also like an order for a flu shot in; she plans to receive her COVID booster at [employer].       Interval history August 2, 2022  Health overall is good.  Several viral illnesses with URI symptom in recent weeks for her and children.  Knee and joint pain control is much better.  She has occasional flareups 1-2 times per month in joint pain, mostly knees; she uses prednisone 15-20 mg daily for 2-3 days to manage visibly swollen knees, with lasting resolution.  If she stands for work, she has more pain; she is working 2.5 days weekly.    Skin is \"really good\"; no new patches or scalp disease. No recent need for topical therapy.    She is taking otezla alone 60 mg bid; she has only intermittent ability to get cosentyx. Last dose of the latter occurred 3 mos ago, she has been only able to use it ~ every other month.    Diabetes control is gradually improving aided by continuous BS monitoring.    Interval history 12-    She is overall doing well. She does note increased breakthrough inflammation affecting her hands. There is swelling of the L 5th and R 3 PIP joints, intermittent.    Knee inflammation is still there, consistent but not severe. She uses low dose medrol for increased knee swelling; in the last 2 months, she has used medrol most days, when cosentyx has been inconsistent. A/nkles and toes are painful especially when sits for a long time at work. She has continued otezla and cosentyx combination. 2-4 days before each cosentx, joint pain escalates.  No psoriasis at all since starting otezla.    She has encountered consistent insurance resistance to timely cosentyx shipping.    She is working with Dr. Haskins to work up low iron saturation index.    Hx 11-19:  Today, she reports that her knee mobility is 80% improved. She states that she has only had 1 flare in her knee since starting secukinumab. She does note that she sometimes has swelling in her toes and " fingers, but this does not limit her. She states that she has been able to walk daily now that her knee pain is much better.    She explains that the introduction of secukinumab injections seems to be the most effective of her managements as she noticed resolution of knee symptoms very shortly after her first injection. She continued Otezla, did try a lower dosage, and, after lowering the dosage, she experienced a recurrence of psoriasis plaques. Other than this Otezla dosing change, her psoriasis has been mild and scant. She has also been using triamcinolone cream.    She states that this month marks her third dose of Cosentyx. She explains that one week prior to receiving this injection, she hs increased morning stiffness in her hands and feet. She notes that, two days after receiving this Cosentyx injection, her morning stiffness is much better.     She reports that she has not been on prednisone or other steroids since shortly after her last visit with me. She explains that her blood sugars have been much easier to control now that she is off of prednisone.     She reports that her last knee injection or aspiration took place around 3 months ago.      Review of Systems:   Pertinent items are noted in HPI or as below, remainder of complete ROS is negative.      No recent problems with hearing or vision   No breathing difficulty, shortness of breath, coughing, or wheezing.  No chest pain  No heart burn, indigestion, abdominal pain, nausea, vomiting, diarrhea.  No urination problems, no bloody, cloudy urine, no dysuria.  No numbing, tingling, weakness.      Active Medications:   Current Outpatient Medications   Medication Sig    apremilast (OTEZLA) 30 MG tablet Take 1 tablet (30 mg) by mouth 2 times daily Please contact clinic to schedule follow-up appointment, thank you.    cholecalciferol (VITAMIN D3) 1000 UNIT tablet Take 1 tablet (1,000 Units) by mouth daily    clobetasol (TEMOVATE) 0.05 % external solution  Apply topically 2 times daily (Patient taking differently: Apply topically 2 times daily as needed)    Continuous Blood Gluc Sensor (DEXCOM G6 SENSOR) MISC 1 Device by Device route every 10 days    Continuous Blood Gluc Transmit (DEXCOM G6 TRANSMITTER) MISC Place 1 each onto the skin every 10 days    Fluocinolone Acetonide (DERMA-SMOOTHE/FS SCALP) 0.01 % OIL Apply to scalp at bedtime, then wear showercap, rinse off in morning. (Patient taking differently: Apply to scalp at bedtime, then wear showercap, rinse off in morning as needed.)    fluticasone (FLONASE) 50 MCG/ACT nasal spray Spray 2 sprays into both nostrils daily    golimumab (SIMPONI) 50 MG/0.5ML pre-filled syringe injection Inject 0.5 mLs (50 mg) Subcutaneous every 28 days Hold for signs of infection, and seek medical attention.    ibuprofen (ADVIL/MOTRIN) 600 MG tablet Take 1 tablet (600 mg) by mouth every 8 hours as needed for moderate pain    insulin aspart (NOVOLOG FLEXPEN) 100 UNIT/ML pen Inject 1 unit/12 gms CHO with meals plus correction. Pt uses approx 35 units in 24 hrs.    insulin detemir (LEVEMIR FLEXTOUCH) 100 UNIT/ML pen Inject 24 units subcutaneous each am and 8 units subcutaneous each pm.    insulin NPH (NOVOLIN N FLEXPEN) 100 UNIT/ML injection Only use when taking steroids. If steroid dose is 12-16 mg take NPH 10 units subcutaneous in am and if steroid dose 6-8 mg take NPH 6 units subcutaneous in am.    insulin  UNIT/ML injection Only use when taking steroids. If steroid dose is 12-16 mg take NPH 10 units subcutaneous in am and if steroid dose 6-8 mg take NPH 6 units subcutaneous in am.    insulin pen needle (31G X 8 MM) 31G X 8 MM miscellaneous Use  3-4 pen needles daily or as directed.    LEVEMIR FLEXTOUCH 100 UNIT/ML pen INJECT 30 UNITS UNDER THE SKIN EVERY MORNING BEFORE BREAKFAST    metFORMIN (GLUCOPHAGE) 500 MG tablet Take 2 tablets (1,000 mg) by mouth 2 times daily (with meals)    methylPREDNISolone (MEDROL) 4 MG tablet TAKE 3  TABLETS DAILY BY MOUTH FOR 1 WEEK THEN 2 TABLETS DAILY FOR 1 WEEK THEN OFF    methylPREDNISolone (MEDROL) 4 MG tablet Take 3 tabs daily for 7 days, then 2 tabs daily for 7 days.    ONETOUCH LANCETS MISC Use to test blood sugar 6 to 8 times daily or as directed. Okay to dispense One Touch Verio products per patient's insurance.    oxyCODONE-acetaminophen (PERCOCET) 5-325 MG tablet Take 1 tablet by mouth every 4 hours as needed for severe pain (must last 30 days from dispense date)    traMADol (ULTRAM) 50 MG tablet Take 1 tablet (50 mg) by mouth every 8 hours as needed for moderate pain Must last 30 days .    triamcinolone (KENALOG) 0.1 % cream Apply topically 2 times daily (Patient taking differently: Apply topically 2 times daily as needed)    valACYclovir (VALTREX) 500 MG tablet Take 1 tablet (500 mg) by mouth daily as needed     No current facility-administered medications for this visit.         Allergies:  Diagnostic X-[employer] Materials   Betadine   Levofloxacin   Penicillins   Pneumococcal Vaccines   Prenatal Vit-Fe Fumarate-Fa  Tdap    Past Medical History:  Diabetes    Psoriatic arthritis   Diabetes mellitus type 2, controlled   Psoriasis arthropathia   Malaise and fatigue  Iron deficiency anemia due to chronic blood loss  Right knee pain    Past Surgical History:  Arthroscopy knee with meniscectomy, right (11/20/2018)  AS Hysteroscopy Surgical W/Endometrial, bilateral (2017)   Tonsillar abscess    Family History:    The patient's family history includes Connective Tissue Disorder in her father; Diabetes in her maternal grandfather, maternal uncle, maternal uncle, and mother; Hypertension in her maternal grandfather, maternal grandmother, and mother; Prostate Cancer in her maternal grandfather; Thyroid Disease in her mother.    Social History:  The patient reports that she quit smoking about 12 years ago. Her smoking use included cigarettes. She has a 5.00 pack-year smoking history. She has never used smokeless  "tobacco. She reports that she does not drink alcohol or use drugs.   PCP: Nando Haskins  Marital Status:      Physical Exam:   Ht 1.6 m (5' 3\")   Wt 49.9 kg (110 lb)   LMP  (LMP Unknown)   BMI 19.49 kg/m     Wt Readings from Last 4 Encounters:   09/22/23 49.9 kg (110 lb)   12/02/22 49.9 kg (110 lb 1.6 oz)   04/29/22 40.8 kg (90 lb)   12/09/21 52.2 kg (115 lb)   thin, vibrant appearing  Eyes: Normal EOM, conjunctiva, sclera.  ENT: Normal external ears, nose, hearing, lips  Neck: No visible mass or thyroid enlargement.  Resp: Breathing unlabored  MS: Knees with valgus changes bilaterally; mild swelling appreciated in R knee superior to the patella  Skin: No alopecia, rash, nodules, lesions, flaking or redness along the hairline visible over video visit.   Neuro: Normal cranial nerves  Psych: Normal judgement, orientation, memory, affect.     Laboratory:       Latest Ref Rng & Units 10/26/2021     1:50 PM 2/1/2022     2:48 PM 9/27/2022     9:26 AM   RHEUM RESULTS   Albumin 3.4 - 5.0 g/dL 4.2      ALT 10 - 35 U/L 24   19    AST 10 - 35 U/L 12   15    Creatinine 0.51 - 0.95 mg/dL 0.47   0.44    CRP Inflammation <5.00 mg/L   <3.00    GFR Estimate >60 mL/min/1.73m2 >90   >90    Hematocrit 35.0 - 47.0 % 38.5  38.0  38.4    Hemoglobin 11.7 - 15.7 g/dL 11.9  12.0  11.8    WBC 4.0 - 11.0 10e3/uL 8.2  7.7  7.6    RBC Count 3.80 - 5.20 10e6/uL 4.98  4.85  4.87    RDW 10.0 - 15.0 % 16.7  17.5  15.7    MCHC 31.5 - 36.5 g/dL 30.9  31.6  30.7    MCV 78 - 100 fL 77  78  79    Platelet Count 150 - 450 10e3/uL 431  382  472      Rheumatoid Factor   Date Value Ref Range Status   08/11/2011 11 0 - 14 IU/mL Final     Cyclic Cit Pept IgG/IgA   Date Value Ref Range Status   08/11/2011 <20  Interpretation:  Negative <20 UNITS Final     CHALO Screen by EIA   Date Value Ref Range Status   04/09/2014 <1.0  Interpretation:  Negative <1.0 Final     Hepatitis B Core Ana Rosa   Date Value Ref Range Status   08/02/2019 Nonreactive " NR^Nonreactive Final     Antistreptolysin O   Date Value Ref Range Status   09/11/2015 109 0 - 120 IU/mL Final     Comment:     A single ASO analysis may not be meaningful due to the variability of ASO   values   within the normal population.  Both clinical and laboratory findings should   be   considered in reaching a diagnosis.  A single analysis may be less   informative   than a repeat analysis showing a change.       Quantiferon-TB Gold Plus Result   Date Value Ref Range Status   12/26/2018 Negative NEG^Negative Final     Comment:     No interferon gamma response to M.tuberculosis antigens was detected.   Infection with M.tuberculosis is unlikely, however a single negative result   does not exclude infection. In patients at high risk for infection, a second   test should be considered  in accordance with the 2017 ATS/IDSA/CDC Clinical Practice Guidelines for   Diagnosis of Tuberculosis in Adults and Children [Lewinsohn DM et   al.Clin.Infect.Dis. 2017 64(2):111-115].       TB1 Ag minus Nil Value   Date Value Ref Range Status   12/26/2018 0.00 IU/mL Final     TB2 Ag minus Nil Value   Date Value Ref Range Status   12/26/2018 0.00 IU/mL Final     Mitogen minus Nil Result   Date Value Ref Range Status   12/26/2018 >10.00 IU/mL Final     Nil Result   Date Value Ref Range Status   12/26/2018 0.02 IU/mL Final     Neutrophil Cytoplasmic IgG Antibody   Date Value Ref Range Status   05/31/2013   Final    <1:20  Reference range: <1:20  (Note)  The ANCA IFA is <1:20; therefore, no further testing will  be performed.  INTERPRETIVE INFORMATION: Anti-Neutrophil Cyto Ab, IgG    Neutrophil Cytoplasmic Antibodies (C-ANCA = granular  cytoplasmic staining, P-ANCA = perinuclear staining) are  found in the serum of over 90 percent of patients with  certain necrotizing systemic vasculitides, and usually in  less than 5 percent of patients with collagen vascular  disease or arthritis.  Performed by [facility],  [address] [place] [address]  [place] [address] [phone]  [email or web address], Nataly Roth MD, Lab. Director         Again, thank you for allowing me to participate in the care of your patient.      Sincerely,    Rene Granados MD

## 2023-09-22 NOTE — PROGRESS NOTES
Virtual Visit Details    Type of service:  Video Visit     Originating Location (pt. Location): Home    Distant Location (provider location):  On-site  Platform used for Video Visit: Snoobe    Video start 0910  End 940      [facility]  Rheumatology Clinic  Rene Granados MD  2023     Name: Chrissy Dixon  MRN: [id number]  Age: 38 year old    : 1985  Referring provider: Nando Haskins    Assessment and Plan:  # Psoriatic arthritis (polyarthritis, scalp and pustular psoriasis; dx'd post-partum in ):   Patient notes improvement in knee pain since starting to work from home. She has good control of other joint pain and psoriasis flares on Otezla and limited use of steroids. Video exam shows only mild R knee swelling and obvious skin changes.    Data: In 2022, CBC showed platelets at 472,000; CRP, transaminases, and creatinine were normal.  Hemoglobin A1c was markedly elevated at 11.5.    Discussion: Psoriatic arthritis continues to be well controlled since last visit. Most likely, improvement is attributable to Otezla alone, with little/no contribution from intermittent Cosentyx (patient reports using only 1 dose in the past calendar year). Requirements for steroids are minor (only 3-4 days per month) and have been stable. I recommend continuing  immunomodulatory therapy with Otezla monotherapy, supplemented by intermittent methylprednisolone,     Plan:  1. Continue Otezla 30 mg twice daily. Recheck complete blood count, creatinine, LFTs  2.  Hold Cosentyx  3.  Use short courses of Medrol (12 mg/day with taper to off over the course of 1 week) very sparingly to address flares of inflammatory joint pain: Both skin and joint manifestations are well controlled currently.  In the next month.  In the next month.    # Type 1 diabetes mellitus:   Hemoglobin A1c remained above 8 at last check in 10-21.  Had a couple days of sugars >600 in early September associated with a malfunction in  her new insulin pump and a possible UTI. No repeat episodes since then. She has higher sugars when she is taking Medrol, but notes that this has been much easier to control since getting her pump. Patient following with Endocrinology.    I was present with the medical student who took the history and acted as a scribe. I have verified the history, reviewed imaging and laboratory data, and personally performed the physical exam. I formulated the assessment and plan.    Rene Granados M.D.  Staff Rheumatologist,  Health  Pager [phone]    Orders Placed This Encounter   Procedures    Hemoglobin A1c    ALT    AST    CBC with platelets       Follow-up: Return in about 6 months    HPI:   Chrissy Zack follows up for psoriatic arthritis. I last evaluated the patient on 8-2022. Psoriatic arthritis was judged improved.  I recommended continuing Otezla monotherapy for inflammatory arthritis, and using short courses of Medrol sparingly to address flares of inflammatory joint pain    Background History:  She has had psoriasis since birth (presented with diffuse rash at birth), was diagnosed with psoriasis at age 11, and developed arthritis in her 20s. She was on Enbrel at age 21 but developed infections such as pneumonia, sinusitis, and mono, and stopped it within 7 months. She was then on methotrexate from 2011 until February 2012, but did not tolerate this due to GI upset. She switched to sulfasalazine 2/2012 and tolerated it well at 1000 mg thrice a day, but had decreasing efficacy as of 11/1/13. She started leflunomide in 10-13 but stopped in 1-14 due to GI intolerance. She started Humira in 1-14, held in 7-14 during pregnancy, restarted in 7-15 for flaring disease but then discontinued due to worsened psoriasis and lack of efficacy. Leflunomide was then restarted but loose stools developed. Leflunomide was stopped and Otezla was started on 9/11/15. She started Stelara in fall 2015 but stopped due to cost and  inconvenience in early 2016. She has continued with Otezla monotherapy through present. She restarted Stelara in 12-18.  Stelara was discontinued in late 2019 for lack of benefit.  She started Cosentyx in November 2019 with excellent relief of joint pain.  Combination Cosentyx and Otezla was begun in early 2020.  Otezla continued through 2021 in 2022, but Cosentyx used only intermittently during that time.  In mid 2022, good to excellent control achieved with Otezla alone, supplemented by infrequent (once monthly) use of low-dose 3-day Medrol courses.    Interval History September 22, 2023  Patient recently started working from home and feels that this has been a good change for her overall health because she can walk around, stretch, ice, etc. when she needs to. She also now has an insulin pump, which has helped her keep her sugars in check. She has largely been without pain and stiffness. When she does get pain, it is most often at the back of her knees immediately before or on the first day of her period or when she has been sitting a lot. She has not had any psoriasis patches and feels that her nails have been pretty good; just a few patches under some of her toenails. She has been taking Otezla BID consistently; has not received Cosentyx in at least 2-3 months. She will use 2-3 days of steroids around the start of her period to help control the knee pain that arises around that time.    Patient was seen in urgent care on 09/05 after two days of high blood sugars (>600), some vomiting, and drenching sweats. Found that her pump was not fully connected and UA showed evidence of a UTI, though she was not having symptoms. She was rehydrated and started on antibiotics. Felt back to normal in a couple of days. No similar episodes since. No other health or medication changes.     She is hoping that in addition to her normal rheum labs, we can order an A1C. Would also like an order for a flu shot in; she plans to  "receive her COVID booster at [employer].       Interval history August 2, 2022  Health overall is good.  Several viral illnesses with URI symptom in recent weeks for her and children.  Knee and joint pain control is much better.  She has occasional flareups 1-2 times per month in joint pain, mostly knees; she uses prednisone 15-20 mg daily for 2-3 days to manage visibly swollen knees, with lasting resolution.  If she stands for work, she has more pain; she is working 2.5 days weekly.    Skin is \"really good\"; no new patches or scalp disease. No recent need for topical therapy.    She is taking otezla alone 60 mg bid; she has only intermittent ability to get cosentyx. Last dose of the latter occurred 3 mos ago, she has been only able to use it ~ every other month.    Diabetes control is gradually improving aided by continuous BS monitoring.    Interval history 12-    She is overall doing well. She does note increased breakthrough inflammation affecting her hands. There is swelling of the L 5th and R 3 PIP joints, intermittent.    Knee inflammation is still there, consistent but not severe. She uses low dose medrol for increased knee swelling; in the last 2 months, she has used medrol most days, when cosentyx has been inconsistent. A/nkles and toes are painful especially when sits for a long time at work. She has continued otezla and cosentyx combination. 2-4 days before each cosentx, joint pain escalates.  No psoriasis at all since starting otezla.    She has encountered consistent insurance resistance to timely cosentyx shipping.    She is working with Dr. Haskins to work up low iron saturation index.    Hx 11-19:  Today, she reports that her knee mobility is 80% improved. She states that she has only had 1 flare in her knee since starting secukinumab. She does note that she sometimes has swelling in her toes and fingers, but this does not limit her. She states that she has been able to walk daily now that " her knee pain is much better.    She explains that the introduction of secukinumab injections seems to be the most effective of her managements as she noticed resolution of knee symptoms very shortly after her first injection. She continued Otezla, did try a lower dosage, and, after lowering the dosage, she experienced a recurrence of psoriasis plaques. Other than this Otezla dosing change, her psoriasis has been mild and scant. She has also been using triamcinolone cream.    She states that this month marks her third dose of Cosentyx. She explains that one week prior to receiving this injection, she hs increased morning stiffness in her hands and feet. She notes that, two days after receiving this Cosentyx injection, her morning stiffness is much better.     She reports that she has not been on prednisone or other steroids since shortly after her last visit with me. She explains that her blood sugars have been much easier to control now that she is off of prednisone.     She reports that her last knee injection or aspiration took place around 3 months ago.      Review of Systems:   Pertinent items are noted in HPI or as below, remainder of complete ROS is negative.      No recent problems with hearing or vision   No breathing difficulty, shortness of breath, coughing, or wheezing.  No chest pain  No heart burn, indigestion, abdominal pain, nausea, vomiting, diarrhea.  No urination problems, no bloody, cloudy urine, no dysuria.  No numbing, tingling, weakness.      Active Medications:   Current Outpatient Medications   Medication Sig    apremilast (OTEZLA) 30 MG tablet Take 1 tablet (30 mg) by mouth 2 times daily Please contact clinic to schedule follow-up appointment, thank you.    cholecalciferol (VITAMIN D3) 1000 UNIT tablet Take 1 tablet (1,000 Units) by mouth daily    clobetasol (TEMOVATE) 0.05 % external solution Apply topically 2 times daily (Patient taking differently: Apply topically 2 times daily as  needed)    Continuous Blood Gluc Sensor (DEXCOM G6 SENSOR) MISC 1 Device by Device route every 10 days    Continuous Blood Gluc Transmit (DEXCOM G6 TRANSMITTER) MISC Place 1 each onto the skin every 10 days    Fluocinolone Acetonide (DERMA-SMOOTHE/FS SCALP) 0.01 % OIL Apply to scalp at bedtime, then wear showercap, rinse off in morning. (Patient taking differently: Apply to scalp at bedtime, then wear showercap, rinse off in morning as needed.)    fluticasone (FLONASE) 50 MCG/ACT nasal spray Spray 2 sprays into both nostrils daily    golimumab (SIMPONI) 50 MG/0.5ML pre-filled syringe injection Inject 0.5 mLs (50 mg) Subcutaneous every 28 days Hold for signs of infection, and seek medical attention.    ibuprofen (ADVIL/MOTRIN) 600 MG tablet Take 1 tablet (600 mg) by mouth every 8 hours as needed for moderate pain    insulin aspart (NOVOLOG FLEXPEN) 100 UNIT/ML pen Inject 1 unit/12 gms CHO with meals plus correction. Pt uses approx 35 units in 24 hrs.    insulin detemir (LEVEMIR FLEXTOUCH) 100 UNIT/ML pen Inject 24 units subcutaneous each am and 8 units subcutaneous each pm.    insulin NPH (NOVOLIN N FLEXPEN) 100 UNIT/ML injection Only use when taking steroids. If steroid dose is 12-16 mg take NPH 10 units subcutaneous in am and if steroid dose 6-8 mg take NPH 6 units subcutaneous in am.    insulin  UNIT/ML injection Only use when taking steroids. If steroid dose is 12-16 mg take NPH 10 units subcutaneous in am and if steroid dose 6-8 mg take NPH 6 units subcutaneous in am.    insulin pen needle (31G X 8 MM) 31G X 8 MM miscellaneous Use  3-4 pen needles daily or as directed.    LEVEMIR FLEXTOUCH 100 UNIT/ML pen INJECT 30 UNITS UNDER THE SKIN EVERY MORNING BEFORE BREAKFAST    metFORMIN (GLUCOPHAGE) 500 MG tablet Take 2 tablets (1,000 mg) by mouth 2 times daily (with meals)    methylPREDNISolone (MEDROL) 4 MG tablet TAKE 3 TABLETS DAILY BY MOUTH FOR 1 WEEK THEN 2 TABLETS DAILY FOR 1 WEEK THEN OFF     methylPREDNISolone (MEDROL) 4 MG tablet Take 3 tabs daily for 7 days, then 2 tabs daily for 7 days.    ONETOUCH LANCETS MISC Use to test blood sugar 6 to 8 times daily or as directed. Okay to dispense One Touch Verio products per patient's insurance.    oxyCODONE-acetaminophen (PERCOCET) 5-325 MG tablet Take 1 tablet by mouth every 4 hours as needed for severe pain (must last 30 days from dispense date)    traMADol (ULTRAM) 50 MG tablet Take 1 tablet (50 mg) by mouth every 8 hours as needed for moderate pain Must last 30 days .    triamcinolone (KENALOG) 0.1 % cream Apply topically 2 times daily (Patient taking differently: Apply topically 2 times daily as needed)    valACYclovir (VALTREX) 500 MG tablet Take 1 tablet (500 mg) by mouth daily as needed     No current facility-administered medications for this visit.         Allergies:  [employer]   Betadine   Levofloxacin   Penicillins   Pneumococcal Vaccines   Prenatal Vit-Fe Fumarate-Fa  Tdap    Past Medical History:  Diabetes    Psoriatic arthritis   Diabetes mellitus type 2, controlled   Psoriasis arthropathia   Malaise and fatigue  Iron deficiency anemia due to chronic blood loss  Right knee pain    Past Surgical History:  Arthroscopy knee with meniscectomy, right (11/20/2018)  AS Hysteroscopy Surgical W/Endometrial, bilateral (2017)   Tonsillar abscess    Family History:    The patient's family history includes Connective Tissue Disorder in her father; Diabetes in her maternal grandfather, maternal uncle, maternal uncle, and mother; Hypertension in her maternal grandfather, maternal grandmother, and mother; Prostate Cancer in her maternal grandfather; Thyroid Disease in her mother.    Social History:  The patient reports that she quit smoking about 12 years ago. Her smoking use included cigarettes. She has a 5.00 pack-year smoking history. She has never used smokeless tobacco. She reports that she does not drink alcohol or use drugs.   PCP:  "Nando Haskins  Marital Status:      Physical Exam:   Ht 1.6 m (5' 3\")   Wt 49.9 kg (110 lb)   LMP  (LMP Unknown)   BMI 19.49 kg/m     Wt Readings from Last 4 Encounters:   09/22/23 49.9 kg (110 lb)   12/02/22 49.9 kg (110 lb 1.6 oz)   04/29/22 40.8 kg (90 lb)   12/09/21 52.2 kg (115 lb)   thin, vibrant appearing  Eyes: Normal EOM, conjunctiva, sclera.  ENT: Normal external ears, nose, hearing, lips  Neck: No visible mass or thyroid enlargement.  Resp: Breathing unlabored  MS: Knees with valgus changes bilaterally; mild swelling appreciated in R knee superior to the patella  Skin: No alopecia, rash, nodules, lesions, flaking or redness along the hairline visible over video visit.   Neuro: Normal cranial nerves  Psych: Normal judgement, orientation, memory, affect.     Laboratory:       Latest Ref Rng & Units 10/26/2021     1:50 PM 2/1/2022     2:48 PM 9/27/2022     9:26 AM   RHEUM RESULTS   Albumin 3.4 - 5.0 g/dL 4.2      ALT 10 - 35 U/L 24   19    AST 10 - 35 U/L 12   15    Creatinine 0.51 - 0.95 mg/dL 0.47   0.44    CRP Inflammation <5.00 mg/L   <3.00    GFR Estimate >60 mL/min/1.73m2 >90   >90    Hematocrit 35.0 - 47.0 % 38.5  38.0  38.4    Hemoglobin 11.7 - 15.7 g/dL 11.9  12.0  11.8    WBC 4.0 - 11.0 10e3/uL 8.2  7.7  7.6    RBC Count 3.80 - 5.20 10e6/uL 4.98  4.85  4.87    RDW 10.0 - 15.0 % 16.7  17.5  15.7    MCHC 31.5 - 36.5 g/dL 30.9  31.6  30.7    MCV 78 - 100 fL 77  78  79    Platelet Count 150 - 450 10e3/uL 431  382  472      Rheumatoid Factor   Date Value Ref Range Status   08/11/2011 11 0 - 14 IU/mL Final     Cyclic Cit Pept IgG/IgA   Date Value Ref Range Status   08/11/2011 <20  Interpretation:  Negative <20 UNITS Final     CHALO Screen by EIA   Date Value Ref Range Status   04/09/2014 <1.0  Interpretation:  Negative <1.0 Final     Hepatitis B Core Ana Rosa   Date Value Ref Range Status   08/02/2019 Nonreactive NR^Nonreactive Final     Antistreptolysin O   Date Value Ref Range Status "   09/11/2015 109 0 - 120 IU/mL Final     Comment:     A single ASO analysis may not be meaningful due to the variability of ASO   values   within the normal population.  Both clinical and laboratory findings should   be   considered in reaching a diagnosis.  A single analysis may be less   informative   than a repeat analysis showing a change.       Quantiferon-TB Gold Plus Result   Date Value Ref Range Status   12/26/2018 Negative NEG^Negative Final     Comment:     No interferon gamma response to M.tuberculosis antigens was detected.   Infection with M.tuberculosis is unlikely, however a single negative result   does not exclude infection. In patients at high risk for infection, a second   test should be considered  in accordance with the 2017 ATS/IDSA/CDC Clinical Practice Guidelines for   Diagnosis of Tuberculosis in Adults and Children [Lewinsohn DM et   al.Clin.Infect.Dis. 2017 64(2):111-115].       TB1 Ag minus Nil Value   Date Value Ref Range Status   12/26/2018 0.00 IU/mL Final     TB2 Ag minus Nil Value   Date Value Ref Range Status   12/26/2018 0.00 IU/mL Final     Mitogen minus Nil Result   Date Value Ref Range Status   12/26/2018 >10.00 IU/mL Final     Nil Result   Date Value Ref Range Status   12/26/2018 0.02 IU/mL Final     Neutrophil Cytoplasmic IgG Antibody   Date Value Ref Range Status   05/31/2013   Final    <1:20  Reference range: <1:20  (Note)  The ANCA IFA is <1:20; therefore, no further testing will  be performed.  INTERPRETIVE INFORMATION: Anti-Neutrophil Cyto Ab, IgG    Neutrophil Cytoplasmic Antibodies (C-ANCA = granular  cytoplasmic staining, P-ANCA = perinuclear staining) are  found in the serum of over 90 percent of patients with  certain necrotizing systemic vasculitides, and usually in  less than 5 percent of patients with collagen vascular  disease or arthritis.  Performed by [facility],  [address] [place] [id number] [phone]  [email or web address], Nataly Roth MD, Lab.  Director

## 2023-09-22 NOTE — PATIENT INSTRUCTIONS
Dx: psoriatic arthritis: control is reasonable No change needed.    Plan:  1. Continue Otezla 30 mg twice daily. Recheck complete blood count, creatinine, liver tests  2.  Hold Cosentyx  3.  Use short courses of Medrol (12 mg/day with taper to off over the course of 3 days) very sparingly to address intermittent flares of inflammatory joint pain

## 2023-09-22 NOTE — NURSING NOTE
Patient reviewed medications and allergies in Mychart during e-check in and said everything looked correct.      Is the patient currently in the state of [place]? YES    Visit mode:VIDEO    If the visit is dropped, the patient can be reconnected by: VIDEO VISIT: Text to cell phone:   Telephone Information:   Mobile [phone]       Will anyone else be joining the visit? NO  (If patient encounters technical issues they should call [phone])    How would you like to obtain your AVS? MyChart    Are changes needed to the allergy or medication list? No    Reason for visit: RECHECK (Follow up)      Sonia OSMAN

## 2023-10-05 ENCOUNTER — TELEPHONE (OUTPATIENT)
Dept: RHEUMATOLOGY | Facility: CLINIC | Age: 38
End: 2023-10-05
Payer: COMMERCIAL

## 2023-10-05 NOTE — TELEPHONE ENCOUNTER
PA Initiation    Medication: OTEZLA 30 MG PO TABS  Insurance Company: [employer] - Phone [phone] Fax [phone]  Ph[facility] Filling the Rx: [facility] - [address] [place] [address]  Filling Pharmacy Phone:    Filling Pharmacy Fax:    Start Date: 10/5/2023    [id number]

## 2023-10-09 ENCOUNTER — MYC REFILL (OUTPATIENT)
Dept: INTERNAL MEDICINE | Facility: CLINIC | Age: 38
End: 2023-10-09
Payer: COMMERCIAL

## 2023-10-09 DIAGNOSIS — L40.50 PSORIATIC ARTHRITIS (H): ICD-10-CM

## 2023-10-09 DIAGNOSIS — B00.1 RECURRENT COLD SORES: ICD-10-CM

## 2023-10-09 NOTE — TELEPHONE ENCOUNTER
Prior Authorization Approval    Medication: OTEZLA 30 MG PO TABS  Authorization Effective Date: 10/9/2023  Authorization Expiration Date: 10/5/2024  Approved Dose/Quantity: bid  Reference #: [id number]   Insurance Company: [employer] - Phone [phone] Fax [phone]  Expected CoPay: $    CoPay Card Available:      Financial Assistance Needed: no2  Which Pharmacy is filling the prescription: [facility] [address]  Pharmacy Notified: yes  Patient Notified: yes

## 2023-10-10 ENCOUNTER — MYC MEDICAL ADVICE (OUTPATIENT)
Dept: INTERNAL MEDICINE | Facility: CLINIC | Age: 38
End: 2023-10-10
Payer: COMMERCIAL

## 2023-10-10 DIAGNOSIS — L40.50 PSORIATIC ARTHRITIS (H): ICD-10-CM

## 2023-10-10 RX ORDER — METHYLPREDNISOLONE 4 MG/1
TABLET ORAL
Qty: 35 TABLET | Refills: 2 | Status: SHIPPED | OUTPATIENT
Start: 2023-10-10 | End: 2023-12-04

## 2023-10-10 RX ORDER — METHYLPREDNISOLONE 4 MG/1
TABLET ORAL
Qty: 35 TABLET | Refills: 2 | Status: CANCELLED | OUTPATIENT
Start: 2023-10-10

## 2023-10-10 RX ORDER — TRAMADOL HYDROCHLORIDE 50 MG/1
50 TABLET ORAL EVERY 8 HOURS PRN
Qty: 60 TABLET | Refills: 0 | Status: SHIPPED | OUTPATIENT
Start: 2023-10-19 | End: 2023-11-10

## 2023-10-10 RX ORDER — OXYCODONE AND ACETAMINOPHEN 5; 325 MG/1; MG/1
1 TABLET ORAL EVERY 4 HOURS PRN
Qty: 30 TABLET | Refills: 0 | Status: SHIPPED | OUTPATIENT
Start: 2023-10-19 | End: 2023-11-10

## 2023-10-10 NOTE — TELEPHONE ENCOUNTER
methylPREDNISolone (MEDROL) 4 MG tablet 35 tablet 2 8/8/2023     Last Office Visit : 9-  Future Office visit:  none    Routing refill request to provider for review/approval because:  Not on protocol.      Kathleen M Doege RN

## 2023-11-01 ENCOUNTER — MYC REFILL (OUTPATIENT)
Dept: INTERNAL MEDICINE | Facility: CLINIC | Age: 38
End: 2023-11-01
Payer: COMMERCIAL

## 2023-11-01 DIAGNOSIS — E11.9 CONTROLLED TYPE 2 DIABETES MELLITUS WITHOUT COMPLICATION, WITH LONG-TERM CURRENT USE OF INSULIN (H): ICD-10-CM

## 2023-11-01 DIAGNOSIS — Z79.4 CONTROLLED TYPE 2 DIABETES MELLITUS WITHOUT COMPLICATION, WITH LONG-TERM CURRENT USE OF INSULIN (H): ICD-10-CM

## 2023-11-01 RX ORDER — INSULIN ASPART 100 [IU]/ML
INJECTION, SOLUTION INTRAVENOUS; SUBCUTANEOUS
Qty: 30 ML | Refills: 4 | Status: CANCELLED | OUTPATIENT
Start: 2023-11-01

## 2023-11-01 RX ORDER — INSULIN ASPART 100 [IU]/ML
INJECTION, SOLUTION INTRAVENOUS; SUBCUTANEOUS
Qty: 30 ML | Refills: 4 | Status: SHIPPED | OUTPATIENT
Start: 2023-11-01 | End: 2024-09-27

## 2023-11-01 NOTE — TELEPHONE ENCOUNTER
insulin aspart (NOVOLOG FLEXPEN) 100 UNIT/ML pen 30 mL 4 3/17/2022     Last Office Visit : 3/17/22  Future Office visit:  0    Routing refill request to provider for review/approval because:  insulin

## 2023-11-02 ENCOUNTER — MYC REFILL (OUTPATIENT)
Dept: INTERNAL MEDICINE | Facility: CLINIC | Age: 38
End: 2023-11-02
Payer: COMMERCIAL

## 2023-11-02 DIAGNOSIS — B00.1 RECURRENT COLD SORES: ICD-10-CM

## 2023-11-02 DIAGNOSIS — L40.50 PSORIATIC ARTHRITIS (H): ICD-10-CM

## 2023-11-03 RX ORDER — TRAMADOL HYDROCHLORIDE 50 MG/1
50 TABLET ORAL EVERY 8 HOURS PRN
Qty: 60 TABLET | Refills: 0 | OUTPATIENT
Start: 2023-11-03

## 2023-11-03 RX ORDER — OXYCODONE AND ACETAMINOPHEN 5; 325 MG/1; MG/1
1 TABLET ORAL EVERY 4 HOURS PRN
Qty: 30 TABLET | Refills: 0 | OUTPATIENT
Start: 2023-11-03

## 2023-11-03 RX ORDER — INSULIN PMP CART,AUT,G6/7,CNTR
EACH SUBCUTANEOUS
OUTPATIENT
Start: 2023-11-03

## 2023-11-10 ENCOUNTER — MYC REFILL (OUTPATIENT)
Dept: INTERNAL MEDICINE | Facility: CLINIC | Age: 38
End: 2023-11-10
Payer: COMMERCIAL

## 2023-11-10 DIAGNOSIS — B00.1 RECURRENT COLD SORES: ICD-10-CM

## 2023-11-10 DIAGNOSIS — L40.50 PSORIATIC ARTHRITIS (H): ICD-10-CM

## 2023-11-14 RX ORDER — OXYCODONE AND ACETAMINOPHEN 5; 325 MG/1; MG/1
1 TABLET ORAL EVERY 4 HOURS PRN
Qty: 30 TABLET | Refills: 0 | Status: SHIPPED | OUTPATIENT
Start: 2023-11-17 | End: 2023-12-11

## 2023-11-14 RX ORDER — TRAMADOL HYDROCHLORIDE 50 MG/1
50 TABLET ORAL EVERY 8 HOURS PRN
Qty: 60 TABLET | Refills: 0 | Status: SHIPPED | OUTPATIENT
Start: 2023-11-17 | End: 2023-12-11

## 2023-12-02 ENCOUNTER — HEALTH MAINTENANCE LETTER (OUTPATIENT)
Age: 38
End: 2023-12-02

## 2023-12-04 ENCOUNTER — MYC REFILL (OUTPATIENT)
Dept: INTERNAL MEDICINE | Facility: CLINIC | Age: 38
End: 2023-12-04
Payer: COMMERCIAL

## 2023-12-04 DIAGNOSIS — Z79.4 TYPE 2 DIABETES MELLITUS WITHOUT COMPLICATION, WITH LONG-TERM CURRENT USE OF INSULIN (H): ICD-10-CM

## 2023-12-04 DIAGNOSIS — L40.50 PSORIATIC ARTHRITIS (H): ICD-10-CM

## 2023-12-04 DIAGNOSIS — E11.9 TYPE 2 DIABETES MELLITUS WITHOUT COMPLICATION, WITH LONG-TERM CURRENT USE OF INSULIN (H): ICD-10-CM

## 2023-12-05 NOTE — TELEPHONE ENCOUNTER
methylPREDNISolone (MEDROL) 4 MG tablet   Last Written Prescription Date:  10/10/2023  Last Fill Quantity: 35,   # refills: 2  Routing refill request to provider for review/approval because:  Medication not on the [facility] refill protocol and last ordered with titrate to stop instructions.    metFORMIN (GLUCOPHAGE) 500 MG tablet   Last Written Prescription Date:  12/2/2022  Last Fill Quantity: 360,   # refills: 3  Last Office Visit : 12/2/2022  Future Office visit:  none  Routing refill request to provider for review/approval because:  Failed medication protocol: required labs past due  - >12 months Creatinine (last done 9/27/2022)  - >3 months A1C for result >8 (last done 9/27/2022)    Creatinine   Date Value Ref Range Status   09/27/2022 0.44 (L) 0.51 - 0.95 mg/dL Final   05/04/2021 0.51 (L) 0.52 - 1.04 mg/dL Final     Hemoglobin A1C   Date Value Ref Range Status   09/27/2022 11.5 (H) 0.0 - 5.6 % Final     Comment:     Normal <5.7%   Prediabetes 5.7-6.4%    Diabetes 6.5% or higher     Note: Adopted from ADA consensus guidelines.   09/14/2020 8.3 (H) 0 - 5.6 % Final     Comment:     Normal <5.7% Prediabetes 5.7-6.4%  Diabetes 6.5% or higher - adopted from ADA   consensus guidelines.

## 2023-12-06 RX ORDER — METHYLPREDNISOLONE 4 MG/1
TABLET ORAL
Qty: 35 TABLET | Refills: 0 | Status: SHIPPED | OUTPATIENT
Start: 2023-12-06 | End: 2023-12-26

## 2023-12-11 ENCOUNTER — MYC REFILL (OUTPATIENT)
Dept: INTERNAL MEDICINE | Facility: CLINIC | Age: 38
End: 2023-12-11
Payer: COMMERCIAL

## 2023-12-11 DIAGNOSIS — L40.50 PSORIATIC ARTHRITIS (H): ICD-10-CM

## 2023-12-11 DIAGNOSIS — B00.1 RECURRENT COLD SORES: ICD-10-CM

## 2023-12-12 RX ORDER — TRAMADOL HYDROCHLORIDE 50 MG/1
50 TABLET ORAL EVERY 8 HOURS PRN
Qty: 60 TABLET | Refills: 0 | Status: SHIPPED | OUTPATIENT
Start: 2023-12-16 | End: 2024-01-10

## 2023-12-12 RX ORDER — OXYCODONE AND ACETAMINOPHEN 5; 325 MG/1; MG/1
1 TABLET ORAL EVERY 4 HOURS PRN
Qty: 30 TABLET | Refills: 0 | Status: SHIPPED | OUTPATIENT
Start: 2023-12-16 | End: 2024-01-10

## 2023-12-26 ENCOUNTER — MYC REFILL (OUTPATIENT)
Dept: INTERNAL MEDICINE | Facility: CLINIC | Age: 38
End: 2023-12-26
Payer: COMMERCIAL

## 2023-12-26 DIAGNOSIS — L40.50 PSORIATIC ARTHRITIS (H): ICD-10-CM

## 2023-12-26 RX ORDER — METHYLPREDNISOLONE 4 MG/1
TABLET ORAL
Qty: 35 TABLET | Refills: 0 | Status: SHIPPED | OUTPATIENT
Start: 2023-12-26 | End: 2024-01-13

## 2024-01-10 ENCOUNTER — MYC REFILL (OUTPATIENT)
Dept: INTERNAL MEDICINE | Facility: CLINIC | Age: 39
End: 2024-01-10
Payer: COMMERCIAL

## 2024-01-10 DIAGNOSIS — L40.50 PSORIATIC ARTHRITIS (H): ICD-10-CM

## 2024-01-10 DIAGNOSIS — B00.1 RECURRENT COLD SORES: ICD-10-CM

## 2024-01-11 RX ORDER — TRAMADOL HYDROCHLORIDE 50 MG/1
50 TABLET ORAL EVERY 8 HOURS PRN
Qty: 60 TABLET | Refills: 0 | Status: SHIPPED | OUTPATIENT
Start: 2024-01-14 | End: 2024-02-04

## 2024-01-11 RX ORDER — OXYCODONE AND ACETAMINOPHEN 5; 325 MG/1; MG/1
1 TABLET ORAL EVERY 4 HOURS PRN
Qty: 30 TABLET | Refills: 0 | Status: SHIPPED | OUTPATIENT
Start: 2024-01-14 | End: 2024-02-04

## 2024-01-13 ENCOUNTER — MYC REFILL (OUTPATIENT)
Dept: INTERNAL MEDICINE | Facility: CLINIC | Age: 39
End: 2024-01-13
Payer: COMMERCIAL

## 2024-01-13 DIAGNOSIS — L40.50 PSORIATIC ARTHRITIS (H): ICD-10-CM

## 2024-01-15 RX ORDER — METHYLPREDNISOLONE 4 MG/1
TABLET ORAL
Qty: 35 TABLET | Refills: 0 | Status: SHIPPED | OUTPATIENT
Start: 2024-01-15 | End: 2024-02-09

## 2024-01-15 NOTE — TELEPHONE ENCOUNTER
methylPREDNISolone (MEDROL) 4 MG tablet   Last Written Prescription Date:  12/26/2023  Last Fill Quantity: 36,   # refills: 0  Last Office Visit : 12/2/2022  Future Office visit:  2/28/2024    Routing refill request to provider for review/approval because:  Drug not on the [facility], [facility] or [facility] refill protocol or controlled substance    Reyna Chacko RN  Central Triage Red Flags/Med Refills

## 2024-02-03 ENCOUNTER — MYC REFILL (OUTPATIENT)
Dept: INTERNAL MEDICINE | Facility: CLINIC | Age: 39
End: 2024-02-03
Payer: COMMERCIAL

## 2024-02-03 DIAGNOSIS — L40.50 PSORIATIC ARTHRITIS (H): ICD-10-CM

## 2024-02-04 ENCOUNTER — MYC REFILL (OUTPATIENT)
Dept: INTERNAL MEDICINE | Facility: CLINIC | Age: 39
End: 2024-02-04
Payer: COMMERCIAL

## 2024-02-04 DIAGNOSIS — B00.1 RECURRENT COLD SORES: ICD-10-CM

## 2024-02-04 DIAGNOSIS — L40.50 PSORIATIC ARTHRITIS (H): ICD-10-CM

## 2024-02-08 ENCOUNTER — MYC REFILL (OUTPATIENT)
Dept: INTERNAL MEDICINE | Facility: CLINIC | Age: 39
End: 2024-02-08
Payer: COMMERCIAL

## 2024-02-08 DIAGNOSIS — B00.1 RECURRENT COLD SORES: ICD-10-CM

## 2024-02-08 DIAGNOSIS — L40.50 PSORIATIC ARTHRITIS (H): ICD-10-CM

## 2024-02-08 RX ORDER — OXYCODONE AND ACETAMINOPHEN 5; 325 MG/1; MG/1
1 TABLET ORAL EVERY 4 HOURS PRN
Qty: 30 TABLET | Refills: 0 | Status: CANCELLED | OUTPATIENT
Start: 2024-02-08

## 2024-02-08 RX ORDER — TRAMADOL HYDROCHLORIDE 50 MG/1
50 TABLET ORAL EVERY 8 HOURS PRN
Qty: 60 TABLET | Refills: 0 | Status: CANCELLED | OUTPATIENT
Start: 2024-02-08

## 2024-02-09 RX ORDER — METHYLPREDNISOLONE 4 MG/1
TABLET ORAL
Qty: 35 TABLET | Refills: 0 | Status: SHIPPED | OUTPATIENT
Start: 2024-02-09 | End: 2024-02-28

## 2024-02-09 RX ORDER — OXYCODONE AND ACETAMINOPHEN 5; 325 MG/1; MG/1
1 TABLET ORAL EVERY 4 HOURS PRN
Qty: 30 TABLET | Refills: 0 | Status: SHIPPED | OUTPATIENT
Start: 2024-02-12 | End: 2024-03-08

## 2024-02-09 RX ORDER — TRAMADOL HYDROCHLORIDE 50 MG/1
50 TABLET ORAL EVERY 8 HOURS PRN
Qty: 60 TABLET | Refills: 0 | Status: SHIPPED | OUTPATIENT
Start: 2024-02-12 | End: 2024-03-08

## 2024-02-09 RX ORDER — METHYLPREDNISOLONE 4 MG/1
TABLET ORAL
Qty: 35 TABLET | Refills: 0 | OUTPATIENT
Start: 2024-02-09

## 2024-02-10 ENCOUNTER — HEALTH MAINTENANCE LETTER (OUTPATIENT)
Age: 39
End: 2024-02-10

## 2024-02-26 ENCOUNTER — OFFICE VISIT (OUTPATIENT)
Dept: INTERNAL MEDICINE | Facility: CLINIC | Age: 39
End: 2024-02-26
Payer: COMMERCIAL

## 2024-02-26 VITALS
DIASTOLIC BLOOD PRESSURE: 69 MMHG | TEMPERATURE: 97.9 F | WEIGHT: 113.8 LBS | HEART RATE: 109 BPM | BODY MASS INDEX: 20.16 KG/M2 | OXYGEN SATURATION: 99 % | SYSTOLIC BLOOD PRESSURE: 109 MMHG

## 2024-02-26 DIAGNOSIS — E05.90 SUBCLINICAL HYPERTHYROIDISM: ICD-10-CM

## 2024-02-26 DIAGNOSIS — J01.00 ACUTE NON-RECURRENT MAXILLARY SINUSITIS: Primary | ICD-10-CM

## 2024-02-26 DIAGNOSIS — Z78.9 HISTORY OF FOREIGN TRAVEL: ICD-10-CM

## 2024-02-26 PROCEDURE — 90471 IMMUNIZATION ADMIN: CPT | Performed by: INTERNAL MEDICINE

## 2024-02-26 PROCEDURE — 99395 PREV VISIT EST AGE 18-39: CPT | Mod: 25 | Performed by: INTERNAL MEDICINE

## 2024-02-26 PROCEDURE — 90686 IIV4 VACC NO PRSV 0.5 ML IM: CPT | Performed by: INTERNAL MEDICINE

## 2024-02-26 RX ORDER — AZITHROMYCIN 250 MG/1
500 TABLET, FILM COATED ORAL DAILY PRN
Qty: 8 TABLET | Refills: 0 | Status: SHIPPED | OUTPATIENT
Start: 2024-02-26 | End: 2024-03-01

## 2024-02-26 RX ORDER — MONTELUKAST SODIUM 10 MG/1
10 TABLET ORAL AT BEDTIME
Qty: 30 TABLET | Refills: 1 | Status: SHIPPED | OUTPATIENT
Start: 2024-02-26

## 2024-02-26 SDOH — HEALTH STABILITY: PHYSICAL HEALTH: ON AVERAGE, HOW MANY DAYS PER WEEK DO YOU ENGAGE IN MODERATE TO STRENUOUS EXERCISE (LIKE A BRISK WALK)?: 2 DAYS

## 2024-02-26 SDOH — HEALTH STABILITY: PHYSICAL HEALTH: ON AVERAGE, HOW MANY MINUTES DO YOU ENGAGE IN EXERCISE AT THIS LEVEL?: 30 MIN

## 2024-02-26 ASSESSMENT — SOCIAL DETERMINANTS OF HEALTH (SDOH): HOW OFTEN DO YOU GET TOGETHER WITH FRIENDS OR RELATIVES?: ONCE A WEEK

## 2024-02-26 NOTE — PROGRESS NOTES
HPI  38-year-old seen today for annual visit.  She has been doing well.  Last 2 days however she has had some maxillary sinus congestion and initially clear drainage now a little more yellow and green with her Reba pot flushes.  She has been doing these frequently along with daily fluticasone.  She is improving somewhat with this.  There is no fever chills sweats or other symptoms.  She does have a scheduled trip to [facility] in 6 days.  Otherwise her arthritis has been largely in remission diabetes under good control on her insulin pump.  She follows with endocrinology regarding this.  Is following with rheumatology regarding her psoriatic arthritis.  Past Medical History:   Diagnosis Date    Diabetes     Other psoriasis     Polyarthritis     probable psoriatic arthritis     Past Surgical History:   Procedure Laterality Date    ARTHROSCOPY KNEE WITH MENISCECTOMY Right 11/20/2018    Procedure: Examination Under Anesthesia Right Knee, Right Knee Arthroscopy, Partial Meniscectomy, Chondroplasty, Cyst Decompression, Synovial Biopsy;  Surgeon: Sean Pate MD;  Location:  OR    AS HYSTEROSCOPY, SURGICAL; W/ ENDOMETRIAL ABLATION, ANY METHOD Bilateral 2017    endometrial ablation with bilater TL    COLONOSCOPY N/A 4/29/2022    Procedure: COLONOSCOPY, WITH BIOPSY;  Surgeon: Nando Hunter MD;  Location: [facility] [place]    ESOPHAGOSCOPY, GASTROSCOPY, DUODENOSCOPY (EGD), COMBINED N/A 4/29/2022    Procedure: ESOPHAGOGASTRODUODENOSCOPY, WITH BIOPSY;  Surgeon: Nando Hunter MD;  Location: [facility] [place]    SURGICAL HISTORY OF -       .  Tonsillar abscess     Family History   Problem Relation Age of Onset    Thyroid Disease Mother     Diabetes Mother     Hypertension Mother     Connective Tissue Disorder Father         Psoriasis    Diabetes Maternal Grandfather     Hypertension Maternal Grandfather     Prostate Cancer Maternal Grandfather     Diabetes Maternal Uncle     Diabetes Maternal Uncle     Hypertension Maternal  Grandmother     Asthma No family hx of     Asthma Mother     Colon Cancer Mother     Chronic Obstructive Pulmonary Disease Mother     Arthritis Father     Vision Loss Father     Diabetes Maternal Uncle     Diabetes Maternal Grandmother          Exam:  /69 (BP Location: Right arm, Patient Position: Sitting, Cuff Size: Adult Regular)   Pulse 109   Temp 97.9  F (36.6  C) (Oral)   Wt 51.6 kg (113 lb 12.8 oz)   LMP  (LMP Unknown)   SpO2 99%   BMI 20.16 kg/m    113 lbs 12.8 oz  PHYSICAL EXAMINATION:   The patient is alert, oriented with a clear sensorium.   Skin shows no lesions or rashes and good turgor.   Head is normocephalic and atraumatic.   Eyes show PERRLA.  Ears show normal TMs bilaterally.   Nasal mucosa is congested bilateral  Mouth shows clear oral mucosa.   Neck shows no nodes or thyromegaly.    Lungs are clear to percussion and auscultation.   Heart shows normal S1 and S2 without murmur or gallop.   Abdomen is soft, nontender without masses or organomegaly.   Extremities show no edema and no evidence of active synovitis.   Neurologic examination shows cranial nerves II-XII intact. Motor shows normal strength. Reflexes are full and symmetrical.       ASSESSMENT  1 diabetes mellitus good control with pump  2 recent acute sinusitis likely viral  3 psoriasis and  psoriatic arthritis controlled  4 Chronic pain on tramadol and oxycodone (17.5 ME/day)  5 History iron deficiency anemia   6 Subclinical hyperthyroidism    Plan  With her upcoming trip to [place] we will give her azithromycin to have on hand should she develop diarrhea.  Will have her use montelukast in addition to fluticasone for the sinusitis and let us know if this is getting worse she develops fever after his other symptoms that develop.  Otherwise plan to continue same medication management.    This note was completed using Dragon voice recognition software.      Nando Haskins MD  General Internal Medicine  Primary Care  [place]  [phone]

## 2024-02-28 ENCOUNTER — TRANSFERRED RECORDS (OUTPATIENT)
Dept: MULTI SPECIALTY CLINIC | Facility: CLINIC | Age: 39
End: 2024-02-28

## 2024-02-28 ENCOUNTER — MYC REFILL (OUTPATIENT)
Dept: INTERNAL MEDICINE | Facility: CLINIC | Age: 39
End: 2024-02-28

## 2024-02-28 DIAGNOSIS — L40.50 PSORIATIC ARTHRITIS (H): ICD-10-CM

## 2024-02-28 LAB — RETINOPATHY: NORMAL

## 2024-03-06 RX ORDER — METHYLPREDNISOLONE 4 MG/1
TABLET ORAL
Qty: 35 TABLET | Refills: 0 | Status: SHIPPED | OUTPATIENT
Start: 2024-03-06 | End: 2024-03-28

## 2024-03-08 ENCOUNTER — MYC REFILL (OUTPATIENT)
Dept: INTERNAL MEDICINE | Facility: CLINIC | Age: 39
End: 2024-03-08
Payer: COMMERCIAL

## 2024-03-08 DIAGNOSIS — B00.1 RECURRENT COLD SORES: ICD-10-CM

## 2024-03-08 DIAGNOSIS — L40.50 PSORIATIC ARTHRITIS (H): ICD-10-CM

## 2024-03-08 RX ORDER — TRAMADOL HYDROCHLORIDE 50 MG/1
50 TABLET ORAL EVERY 8 HOURS PRN
Qty: 60 TABLET | Refills: 0 | Status: SHIPPED | OUTPATIENT
Start: 2024-03-14 | End: 2024-04-02

## 2024-03-08 RX ORDER — OXYCODONE AND ACETAMINOPHEN 5; 325 MG/1; MG/1
1 TABLET ORAL EVERY 4 HOURS PRN
Qty: 30 TABLET | Refills: 0 | Status: SHIPPED | OUTPATIENT
Start: 2024-03-13 | End: 2024-04-02

## 2024-03-09 ENCOUNTER — MYC REFILL (OUTPATIENT)
Dept: INTERNAL MEDICINE | Facility: CLINIC | Age: 39
End: 2024-03-09
Payer: COMMERCIAL

## 2024-03-09 DIAGNOSIS — Z79.4 TYPE 2 DIABETES MELLITUS WITHOUT COMPLICATION, WITH LONG-TERM CURRENT USE OF INSULIN (H): ICD-10-CM

## 2024-03-09 DIAGNOSIS — E11.9 TYPE 2 DIABETES MELLITUS WITHOUT COMPLICATION, WITH LONG-TERM CURRENT USE OF INSULIN (H): ICD-10-CM

## 2024-03-11 ENCOUNTER — LAB (OUTPATIENT)
Dept: LAB | Facility: CLINIC | Age: 39
End: 2024-03-11
Payer: COMMERCIAL

## 2024-03-11 DIAGNOSIS — E05.90 SUBCLINICAL HYPERTHYROIDISM: ICD-10-CM

## 2024-03-11 DIAGNOSIS — E11.9 CONTROLLED TYPE 2 DIABETES MELLITUS WITHOUT COMPLICATION, WITH LONG-TERM CURRENT USE OF INSULIN (H): ICD-10-CM

## 2024-03-11 DIAGNOSIS — Z79.4 CONTROLLED TYPE 2 DIABETES MELLITUS WITHOUT COMPLICATION, WITH LONG-TERM CURRENT USE OF INSULIN (H): ICD-10-CM

## 2024-03-11 DIAGNOSIS — L40.50 PSORIATIC ARTHRITIS (H): ICD-10-CM

## 2024-03-11 LAB
ERYTHROCYTE [DISTWIDTH] IN BLOOD BY AUTOMATED COUNT: 16.2 % (ref 10–15)
HBA1C MFR BLD: 10.8 % (ref 0–5.6)
HCT VFR BLD AUTO: 38 % (ref 35–47)
HGB BLD-MCNC: 11.9 G/DL (ref 11.7–15.7)
MCH RBC QN AUTO: 24.4 PG (ref 26.5–33)
MCHC RBC AUTO-ENTMCNC: 31.3 G/DL (ref 31.5–36.5)
MCV RBC AUTO: 78 FL (ref 78–100)
PLATELET # BLD AUTO: 361 10E3/UL (ref 150–450)
RBC # BLD AUTO: 4.88 10E6/UL (ref 3.8–5.2)
WBC # BLD AUTO: 8.3 10E3/UL (ref 4–11)

## 2024-03-11 PROCEDURE — 36415 COLL VENOUS BLD VENIPUNCTURE: CPT

## 2024-03-11 PROCEDURE — 85027 COMPLETE CBC AUTOMATED: CPT

## 2024-03-11 PROCEDURE — 84443 ASSAY THYROID STIM HORMONE: CPT

## 2024-03-11 PROCEDURE — 84450 TRANSFERASE (AST) (SGOT): CPT

## 2024-03-11 PROCEDURE — 83036 HEMOGLOBIN GLYCOSYLATED A1C: CPT

## 2024-03-11 PROCEDURE — 84460 ALANINE AMINO (ALT) (SGPT): CPT

## 2024-03-11 PROCEDURE — 80061 LIPID PANEL: CPT

## 2024-03-11 NOTE — TELEPHONE ENCOUNTER
metFORMIN (GLUCOPHAGE) 500 MG tablet   Last Written Prescription Date:  12/6/2023  Last Fill Quantity: 360,   # refills: 0  Last Office Visit : 2/26/2024  Future Office visit:  None    Routing refill request to provider for review/approval because:  Second Request  Needing updated A1C and GFR  Please call Pt to schedule.   Recent Labs   Lab Test 09/27/22 0926 09/14/20  1430 11/25/19  0000   A1C 11.5*   < >  --    HEMOGLOBINA1  --   --  7.8*    < > = values in this interval not displayed.   GFR Estimate  Creatinine  Collected: 09/27/22 0926   Result status: Final   Resulting lab: [facility]   Reference range: >60 mL/min/1.73m2   Value: >90     Reyna Chacko RN  Central Triage Red Flags/Med Refills

## 2024-03-12 LAB
ALT SERPL W P-5'-P-CCNC: 15 U/L (ref 0–50)
AST SERPL W P-5'-P-CCNC: 13 U/L (ref 0–45)
CHOLEST SERPL-MCNC: 170 MG/DL
FASTING STATUS PATIENT QL REPORTED: NO
HDLC SERPL-MCNC: 83 MG/DL
LDLC SERPL CALC-MCNC: 72 MG/DL
NONHDLC SERPL-MCNC: 87 MG/DL
TRIGL SERPL-MCNC: 76 MG/DL
TSH SERPL DL<=0.005 MIU/L-ACNC: 1.09 UIU/ML (ref 0.3–4.2)

## 2024-03-14 ENCOUNTER — MYC REFILL (OUTPATIENT)
Dept: INTERNAL MEDICINE | Facility: CLINIC | Age: 39
End: 2024-03-14
Payer: COMMERCIAL

## 2024-03-14 DIAGNOSIS — Z79.4 TYPE 2 DIABETES MELLITUS WITHOUT COMPLICATION, WITH LONG-TERM CURRENT USE OF INSULIN (H): ICD-10-CM

## 2024-03-14 DIAGNOSIS — E11.9 TYPE 2 DIABETES MELLITUS WITHOUT COMPLICATION, WITH LONG-TERM CURRENT USE OF INSULIN (H): ICD-10-CM

## 2024-03-17 NOTE — TELEPHONE ENCOUNTER
metFORMIN (GLUCOPHAGE) 500 MG tablet   Last Written Prescription Date:  12/6/2023  Last Fill Quantity: 360,   # refills: 0  Last Office Visit : 2/26/2024  Future Office visit:  None    Routing refill request to provider for review/approval because:  Second Request  Needing updated GFR on file for this med.  Please call Pt to schedule updated labs    Reyna Chacko RN  Central Triage Red Flags/Med Refills

## 2024-03-28 ENCOUNTER — MYC REFILL (OUTPATIENT)
Dept: INTERNAL MEDICINE | Facility: CLINIC | Age: 39
End: 2024-03-28
Payer: COMMERCIAL

## 2024-03-28 DIAGNOSIS — L40.50 PSORIATIC ARTHRITIS (H): ICD-10-CM

## 2024-03-29 RX ORDER — METHYLPREDNISOLONE 4 MG/1
TABLET ORAL
Qty: 35 TABLET | Refills: 0 | Status: SHIPPED | OUTPATIENT
Start: 2024-03-29 | End: 2024-04-15

## 2024-03-29 NOTE — TELEPHONE ENCOUNTER
methylPREDNISolone (MEDROL) 4 MG tablet   Last Written Prescription Date:  3/6/2024  Last Fill Quantity: 35,   # refills: 0  Last Office Visit : 2/26/2024  Future Office visit:  None    Routing refill request to provider for review/approval because:  Drug not on the [facility] or [facility] refill protocol or controlled substance    Reyna Chacko RN  Central Triage Red Flags/Med Refills

## 2024-04-02 ENCOUNTER — MYC REFILL (OUTPATIENT)
Dept: INTERNAL MEDICINE | Facility: CLINIC | Age: 39
End: 2024-04-02
Payer: COMMERCIAL

## 2024-04-02 DIAGNOSIS — B00.1 RECURRENT COLD SORES: ICD-10-CM

## 2024-04-02 DIAGNOSIS — L40.50 PSORIATIC ARTHRITIS (H): ICD-10-CM

## 2024-04-05 RX ORDER — TRAMADOL HYDROCHLORIDE 50 MG/1
50 TABLET ORAL EVERY 8 HOURS PRN
Qty: 60 TABLET | Refills: 0 | Status: SHIPPED | OUTPATIENT
Start: 2024-04-12 | End: 2024-05-03

## 2024-04-05 RX ORDER — OXYCODONE AND ACETAMINOPHEN 5; 325 MG/1; MG/1
1 TABLET ORAL EVERY 4 HOURS PRN
Qty: 30 TABLET | Refills: 0 | Status: SHIPPED | OUTPATIENT
Start: 2024-04-11 | End: 2024-05-03

## 2024-04-15 ENCOUNTER — MYC REFILL (OUTPATIENT)
Dept: INTERNAL MEDICINE | Facility: CLINIC | Age: 39
End: 2024-04-15
Payer: COMMERCIAL

## 2024-04-15 DIAGNOSIS — L40.50 PSORIATIC ARTHRITIS (H): ICD-10-CM

## 2024-04-15 RX ORDER — METHYLPREDNISOLONE 4 MG/1
TABLET ORAL
Qty: 35 TABLET | Refills: 0 | Status: SHIPPED | OUTPATIENT
Start: 2024-04-15 | End: 2024-05-03

## 2024-05-03 ENCOUNTER — MYC REFILL (OUTPATIENT)
Dept: INTERNAL MEDICINE | Facility: CLINIC | Age: 39
End: 2024-05-03
Payer: COMMERCIAL

## 2024-05-03 DIAGNOSIS — L40.50 PSORIATIC ARTHRITIS (H): ICD-10-CM

## 2024-05-03 DIAGNOSIS — B00.1 RECURRENT COLD SORES: ICD-10-CM

## 2024-05-06 RX ORDER — METHYLPREDNISOLONE 4 MG/1
TABLET ORAL
Qty: 35 TABLET | Refills: 0 | Status: SHIPPED | OUTPATIENT
Start: 2024-05-06 | End: 2024-05-28

## 2024-05-06 RX ORDER — TRAMADOL HYDROCHLORIDE 50 MG/1
50 TABLET ORAL EVERY 8 HOURS PRN
Qty: 60 TABLET | Refills: 0 | Status: SHIPPED | OUTPATIENT
Start: 2024-05-11 | End: 2024-05-31

## 2024-05-06 RX ORDER — OXYCODONE AND ACETAMINOPHEN 5; 325 MG/1; MG/1
1 TABLET ORAL EVERY 4 HOURS PRN
Qty: 30 TABLET | Refills: 0 | Status: SHIPPED | OUTPATIENT
Start: 2024-05-10 | End: 2024-05-31

## 2024-05-23 ENCOUNTER — TRANSFERRED RECORDS (OUTPATIENT)
Dept: HEALTH INFORMATION MANAGEMENT | Facility: CLINIC | Age: 39
End: 2024-05-23

## 2024-05-28 ENCOUNTER — MYC REFILL (OUTPATIENT)
Dept: INTERNAL MEDICINE | Facility: CLINIC | Age: 39
End: 2024-05-28
Payer: COMMERCIAL

## 2024-05-28 DIAGNOSIS — L40.50 PSORIATIC ARTHRITIS (H): ICD-10-CM

## 2024-05-30 RX ORDER — METHYLPREDNISOLONE 4 MG/1
TABLET ORAL
Qty: 35 TABLET | Refills: 0 | Status: SHIPPED | OUTPATIENT
Start: 2024-05-30 | End: 2024-06-25

## 2024-05-31 ENCOUNTER — MYC REFILL (OUTPATIENT)
Dept: INTERNAL MEDICINE | Facility: CLINIC | Age: 39
End: 2024-05-31
Payer: COMMERCIAL

## 2024-05-31 DIAGNOSIS — L40.50 PSORIATIC ARTHRITIS (H): ICD-10-CM

## 2024-05-31 DIAGNOSIS — B00.1 RECURRENT COLD SORES: ICD-10-CM

## 2024-05-31 NOTE — TELEPHONE ENCOUNTER
Tramadol : last filled on 5/11/24 with 30 day supply per  data.  Percocet : last filled on 5/10/24 with 30 day supply per  data.

## 2024-06-01 RX ORDER — OXYCODONE AND ACETAMINOPHEN 5; 325 MG/1; MG/1
1 TABLET ORAL EVERY 4 HOURS PRN
Qty: 30 TABLET | Refills: 0 | Status: SHIPPED | OUTPATIENT
Start: 2024-06-09 | End: 2024-07-01

## 2024-06-01 RX ORDER — TRAMADOL HYDROCHLORIDE 50 MG/1
50 TABLET ORAL EVERY 8 HOURS PRN
Qty: 60 TABLET | Refills: 0 | Status: SHIPPED | OUTPATIENT
Start: 2024-06-09 | End: 2024-07-01

## 2024-06-25 ENCOUNTER — MYC REFILL (OUTPATIENT)
Dept: INTERNAL MEDICINE | Facility: CLINIC | Age: 39
End: 2024-06-25
Payer: COMMERCIAL

## 2024-06-25 DIAGNOSIS — T48.5X5A RHINITIS MEDICAMENTOSA: ICD-10-CM

## 2024-06-25 DIAGNOSIS — J31.0 RHINITIS MEDICAMENTOSA: ICD-10-CM

## 2024-06-25 DIAGNOSIS — L40.50 PSORIATIC ARTHRITIS (H): ICD-10-CM

## 2024-06-26 RX ORDER — FLUTICASONE PROPIONATE 50 MCG
2 SPRAY, SUSPENSION (ML) NASAL DAILY
Qty: 48 G | Refills: 11 | Status: SHIPPED | OUTPATIENT
Start: 2024-06-26

## 2024-06-26 RX ORDER — METHYLPREDNISOLONE 4 MG/1
TABLET ORAL
Qty: 35 TABLET | Refills: 0 | Status: SHIPPED | OUTPATIENT
Start: 2024-06-26 | End: 2024-07-10

## 2024-07-01 ENCOUNTER — MYC REFILL (OUTPATIENT)
Dept: INTERNAL MEDICINE | Facility: CLINIC | Age: 39
End: 2024-07-01
Payer: COMMERCIAL

## 2024-07-01 DIAGNOSIS — L40.50 PSORIATIC ARTHRITIS (H): ICD-10-CM

## 2024-07-01 DIAGNOSIS — B00.1 RECURRENT COLD SORES: ICD-10-CM

## 2024-07-01 RX ORDER — TRAMADOL HYDROCHLORIDE 50 MG/1
50 TABLET ORAL EVERY 8 HOURS PRN
Qty: 60 TABLET | Refills: 0 | Status: SHIPPED | OUTPATIENT
Start: 2024-07-10 | End: 2024-07-31

## 2024-07-01 RX ORDER — OXYCODONE AND ACETAMINOPHEN 5; 325 MG/1; MG/1
1 TABLET ORAL EVERY 4 HOURS PRN
Qty: 30 TABLET | Refills: 0 | Status: SHIPPED | OUTPATIENT
Start: 2024-07-10 | End: 2024-07-31

## 2024-07-01 NOTE — TELEPHONE ENCOUNTER
[place]  data reviewed. LOV 2/26/2024.      Both pended Rxs - fill date on or after 7/10/2024.  Nichole HUYNH LPN  [facility]

## 2024-07-09 DIAGNOSIS — L40.50 PSORIATIC ARTHRITIS (H): ICD-10-CM

## 2024-07-10 ENCOUNTER — MYC REFILL (OUTPATIENT)
Dept: INTERNAL MEDICINE | Facility: CLINIC | Age: 39
End: 2024-07-10
Payer: COMMERCIAL

## 2024-07-10 DIAGNOSIS — L40.50 PSORIATIC ARTHRITIS (H): ICD-10-CM

## 2024-07-10 RX ORDER — APREMILAST 30 MG/1
30 TABLET, FILM COATED ORAL 2 TIMES DAILY
Qty: 180 TABLET | Refills: 0 | Status: SHIPPED | OUTPATIENT
Start: 2024-07-10 | End: 2024-10-07

## 2024-07-10 NOTE — TELEPHONE ENCOUNTER
methylPREDNISolone (MEDROL) 4 MG tablet  0 ordered                Disp-35 tablet, R-0   Start: 06/26/2024 2/26/2024  [facility], Nando Hernandez MD  Internal Medicine    Routed because: not on protocol. My chart request

## 2024-07-15 ENCOUNTER — MYC REFILL (OUTPATIENT)
Dept: INTERNAL MEDICINE | Facility: CLINIC | Age: 39
End: 2024-07-15
Payer: COMMERCIAL

## 2024-07-15 DIAGNOSIS — L40.50 PSORIATIC ARTHRITIS (H): ICD-10-CM

## 2024-07-15 RX ORDER — METHYLPREDNISOLONE 4 MG/1
TABLET ORAL
Qty: 35 TABLET | Refills: 0 | Status: SHIPPED | OUTPATIENT
Start: 2024-07-15 | End: 2024-08-11

## 2024-07-16 RX ORDER — METHYLPREDNISOLONE 4 MG/1
TABLET ORAL
Qty: 35 TABLET | Refills: 0 | OUTPATIENT
Start: 2024-07-16

## 2024-07-31 ENCOUNTER — MYC REFILL (OUTPATIENT)
Dept: INTERNAL MEDICINE | Facility: CLINIC | Age: 39
End: 2024-07-31
Payer: COMMERCIAL

## 2024-07-31 DIAGNOSIS — B00.1 RECURRENT COLD SORES: ICD-10-CM

## 2024-07-31 DIAGNOSIS — L40.50 PSORIATIC ARTHRITIS (H): ICD-10-CM

## 2024-07-31 RX ORDER — TRAMADOL HYDROCHLORIDE 50 MG/1
50 TABLET ORAL EVERY 8 HOURS PRN
Qty: 60 TABLET | Refills: 0 | Status: SHIPPED | OUTPATIENT
Start: 2024-08-08 | End: 2024-09-03

## 2024-07-31 RX ORDER — OXYCODONE AND ACETAMINOPHEN 5; 325 MG/1; MG/1
1 TABLET ORAL EVERY 4 HOURS PRN
Qty: 30 TABLET | Refills: 0 | Status: SHIPPED | OUTPATIENT
Start: 2024-08-08 | End: 2024-09-03

## 2024-08-11 ENCOUNTER — MYC REFILL (OUTPATIENT)
Dept: INTERNAL MEDICINE | Facility: CLINIC | Age: 39
End: 2024-08-11
Payer: COMMERCIAL

## 2024-08-11 DIAGNOSIS — L40.50 PSORIATIC ARTHRITIS (H): ICD-10-CM

## 2024-08-12 RX ORDER — METHYLPREDNISOLONE 4 MG/1
TABLET ORAL
Qty: 35 TABLET | Refills: 0 | Status: SHIPPED | OUTPATIENT
Start: 2024-08-12 | End: 2024-09-06

## 2024-09-03 ENCOUNTER — MYC REFILL (OUTPATIENT)
Dept: INTERNAL MEDICINE | Facility: CLINIC | Age: 39
End: 2024-09-03
Payer: COMMERCIAL

## 2024-09-03 DIAGNOSIS — B00.1 RECURRENT COLD SORES: ICD-10-CM

## 2024-09-03 DIAGNOSIS — L40.50 PSORIATIC ARTHRITIS (H): ICD-10-CM

## 2024-09-03 RX ORDER — OXYCODONE AND ACETAMINOPHEN 5; 325 MG/1; MG/1
1 TABLET ORAL EVERY 4 HOURS PRN
Qty: 30 TABLET | Refills: 0 | Status: SHIPPED | OUTPATIENT
Start: 2024-09-06 | End: 2024-10-01

## 2024-09-03 RX ORDER — TRAMADOL HYDROCHLORIDE 50 MG/1
50 TABLET ORAL EVERY 8 HOURS PRN
Qty: 60 TABLET | Refills: 0 | Status: SHIPPED | OUTPATIENT
Start: 2024-09-06 | End: 2024-10-01

## 2024-09-06 ENCOUNTER — MYC REFILL (OUTPATIENT)
Dept: INTERNAL MEDICINE | Facility: CLINIC | Age: 39
End: 2024-09-06
Payer: COMMERCIAL

## 2024-09-06 DIAGNOSIS — L40.50 PSORIATIC ARTHRITIS (H): ICD-10-CM

## 2024-09-09 NOTE — TELEPHONE ENCOUNTER
methylPREDNISolone (MEDROL) 4 MG tablet  Last Written Prescription Date:  8/12/2024  Last Fill Quantity: 36,   # refills: 0  Last Office Visit : 2/26/2024  Future Office visit:  none  Routing methylPREDNISolone (MEDROL) 4 MG tablet refill request to provider for review/approval because: Medication not on the [facility] refill protocol.

## 2024-09-11 RX ORDER — METHYLPREDNISOLONE 4 MG/1
TABLET ORAL
Qty: 35 TABLET | Refills: 0 | Status: SHIPPED | OUTPATIENT
Start: 2024-09-11 | End: 2024-10-03

## 2024-09-20 ENCOUNTER — TELEPHONE (OUTPATIENT)
Dept: RHEUMATOLOGY | Facility: CLINIC | Age: 39
End: 2024-09-20
Payer: COMMERCIAL

## 2024-09-20 NOTE — TELEPHONE ENCOUNTER
PA Initiation    Medication: OTEZLA 30 MG PO TABS  Insurance Company: [employer] - Phone [phone] Fax [phone]  Pharmacy Filling the Rx: [facility] - [address]  Filling Pharmacy Phone:    Filling Pharmacy Fax:    Start Date: 9/20/2024    [id number]

## 2024-09-25 NOTE — TELEPHONE ENCOUNTER
Prior Authorization Approval    Medication: OTEZLA 30 MG PO TABS  Authorization Effective Date: 9/25/2024  Authorization Expiration Date: 9/20/2025  Approved Dose/Quantity: bid  Reference #: [id number]   Insurance Company: [employer] - Phone [phone] Fax [phone]  Expected CoPay: $    CoPay Card Available: No    Financial Assistance Needed: no  Which Pharmacy is filling the prescription: [facility] - [address] [place] [address]  Pharmacy Notified: yes  Patient Notified: yes

## 2024-09-27 ENCOUNTER — MYC REFILL (OUTPATIENT)
Dept: INTERNAL MEDICINE | Facility: CLINIC | Age: 39
End: 2024-09-27
Payer: COMMERCIAL

## 2024-09-27 DIAGNOSIS — E11.9 CONTROLLED TYPE 2 DIABETES MELLITUS WITHOUT COMPLICATION, WITH LONG-TERM CURRENT USE OF INSULIN (H): ICD-10-CM

## 2024-09-27 DIAGNOSIS — E11.9 TYPE 2 DIABETES MELLITUS WITHOUT COMPLICATION, WITH LONG-TERM CURRENT USE OF INSULIN (H): ICD-10-CM

## 2024-09-27 DIAGNOSIS — Z79.4 TYPE 2 DIABETES MELLITUS WITHOUT COMPLICATION, WITH LONG-TERM CURRENT USE OF INSULIN (H): ICD-10-CM

## 2024-09-27 DIAGNOSIS — Z79.4 CONTROLLED TYPE 2 DIABETES MELLITUS WITHOUT COMPLICATION, WITH LONG-TERM CURRENT USE OF INSULIN (H): ICD-10-CM

## 2024-09-27 NOTE — TELEPHONE ENCOUNTER
insulin aspart (NOVOLOG FLEXPEN) 100 UNIT/ML pen       Last Written Prescription Date:  11-1-23  Last Fill Quantity: 30 ml,   # refills: 4  Last Office Visit : 2-26-24  Future Office visit:  none    Routing refill request to provider for review/approval because:  Overdue GFR, A1c  Med list multiple insulin Rx  Past due appt.

## 2024-09-28 RX ORDER — INSULIN ASPART 100 [IU]/ML
INJECTION, SOLUTION INTRAVENOUS; SUBCUTANEOUS
Qty: 30 ML | Refills: 4 | Status: SHIPPED | OUTPATIENT
Start: 2024-09-28

## 2024-09-30 DIAGNOSIS — L40.50 PSORIATIC ARTHRITIS (H): ICD-10-CM

## 2024-10-01 ENCOUNTER — MYC REFILL (OUTPATIENT)
Dept: INTERNAL MEDICINE | Facility: CLINIC | Age: 39
End: 2024-10-01
Payer: COMMERCIAL

## 2024-10-01 DIAGNOSIS — B00.1 RECURRENT COLD SORES: ICD-10-CM

## 2024-10-01 DIAGNOSIS — L40.50 PSORIATIC ARTHRITIS (H): ICD-10-CM

## 2024-10-03 ENCOUNTER — MYC REFILL (OUTPATIENT)
Dept: INTERNAL MEDICINE | Facility: CLINIC | Age: 39
End: 2024-10-03
Payer: COMMERCIAL

## 2024-10-03 DIAGNOSIS — L40.50 PSORIATIC ARTHRITIS (H): ICD-10-CM

## 2024-10-04 RX ORDER — OXYCODONE AND ACETAMINOPHEN 5; 325 MG/1; MG/1
1 TABLET ORAL EVERY 4 HOURS PRN
Qty: 30 TABLET | Refills: 0 | Status: SHIPPED | OUTPATIENT
Start: 2024-10-04 | End: 2024-11-01

## 2024-10-04 RX ORDER — TRAMADOL HYDROCHLORIDE 50 MG/1
50 TABLET ORAL EVERY 8 HOURS PRN
Qty: 60 TABLET | Refills: 0 | Status: SHIPPED | OUTPATIENT
Start: 2024-10-04 | End: 2024-11-01

## 2024-10-05 RX ORDER — METHYLPREDNISOLONE 4 MG/1
TABLET ORAL
Qty: 35 TABLET | Refills: 0 | Status: SHIPPED | OUTPATIENT
Start: 2024-10-05 | End: 2024-10-31

## 2024-10-07 RX ORDER — APREMILAST 30 MG/1
TABLET, FILM COATED ORAL
Qty: 60 TABLET | Refills: 0 | Status: SHIPPED | OUTPATIENT
Start: 2024-10-07 | End: 2024-11-02

## 2024-10-07 NOTE — TELEPHONE ENCOUNTER
apremilast (OTEZLA) 30 MG tablet   180 tablet 0 7/10/2024       Last Office Visit : 9-  Future Office visit:  none      Scheduling has been notified to contact the pt for appointment.

## 2024-10-08 ENCOUNTER — TELEPHONE (OUTPATIENT)
Dept: RHEUMATOLOGY | Facility: CLINIC | Age: 39
End: 2024-10-08
Payer: COMMERCIAL

## 2024-10-08 NOTE — TELEPHONE ENCOUNTER
Patient confirmed scheduled appointment:  Date: March 4th, 2025  Time: 12:30p  Visit type: Return Rheumatology Video  Provider: Dr. Granados  Location: [place]  Testing/imaging: NA  Additional notes: NA

## 2024-10-29 DIAGNOSIS — L40.50 PSORIATIC ARTHRITIS (H): ICD-10-CM

## 2024-10-31 ENCOUNTER — MYC REFILL (OUTPATIENT)
Dept: INTERNAL MEDICINE | Facility: CLINIC | Age: 39
End: 2024-10-31
Payer: COMMERCIAL

## 2024-10-31 DIAGNOSIS — L40.50 PSORIATIC ARTHRITIS (H): ICD-10-CM

## 2024-11-01 ENCOUNTER — MYC REFILL (OUTPATIENT)
Dept: INTERNAL MEDICINE | Facility: CLINIC | Age: 39
End: 2024-11-01
Payer: COMMERCIAL

## 2024-11-01 DIAGNOSIS — B00.1 RECURRENT COLD SORES: ICD-10-CM

## 2024-11-01 DIAGNOSIS — L40.50 PSORIATIC ARTHRITIS (H): ICD-10-CM

## 2024-11-01 NOTE — TELEPHONE ENCOUNTER
OTEZLA 30 MG TABLET       Last Written Prescription Date:  10-7-24  Last Fill Quantity: 60,   # refills: 0  Last Office Visit : 9-22-23 (RTC 6 M)  Future Office visit:  3-4-25    Routing refill request to provider for review/approval because:  Next appt outside of RTC timeframe

## 2024-11-02 RX ORDER — APREMILAST 30 MG/1
TABLET, FILM COATED ORAL
Qty: 60 TABLET | Refills: 4 | Status: SHIPPED | OUTPATIENT
Start: 2024-11-02

## 2024-11-04 DIAGNOSIS — L40.50 PSORIATIC ARTHRITIS (H): ICD-10-CM

## 2024-11-04 RX ORDER — OXYCODONE AND ACETAMINOPHEN 5; 325 MG/1; MG/1
1 TABLET ORAL EVERY 4 HOURS PRN
Qty: 30 TABLET | Refills: 0 | Status: SHIPPED | OUTPATIENT
Start: 2024-11-04

## 2024-11-04 RX ORDER — METHYLPREDNISOLONE 4 MG/1
TABLET ORAL
Qty: 35 TABLET | Refills: 0 | Status: SHIPPED | OUTPATIENT
Start: 2024-11-04

## 2024-11-04 RX ORDER — TRAMADOL HYDROCHLORIDE 50 MG/1
50 TABLET ORAL EVERY 8 HOURS PRN
Qty: 60 TABLET | Refills: 0 | Status: SHIPPED | OUTPATIENT
Start: 2024-11-05

## 2024-11-06 RX ORDER — APREMILAST 30 MG/1
TABLET, FILM COATED ORAL
Qty: 180 TABLET | OUTPATIENT
Start: 2024-11-06

## 2024-11-06 NOTE — TELEPHONE ENCOUNTER
Signed 4 days ago (11/2/2024):   apremilast (OTEZLA) 30 MG tablet   Sig: TAKE 1 TABLET TWO TIMES DAILY, Please  keep 3-4-25 clinic appt for refills.   Disp: 60 tablet    Refills: 4   Signed by: Rene Granados MD

## 2024-11-09 RX ORDER — INSULIN PMP CART,AUT,G6/7,CNTR
EACH SUBCUTANEOUS
OUTPATIENT
Start: 2024-11-09

## 2024-11-09 NOTE — TELEPHONE ENCOUNTER
Medication not prescribed by Dr. Haskins, Not on current medication list.  Per outside med tab medication has been previously prescribed by  outside prescriber. COSME PAYNE MD,     Megha Ruiz RN on 11/9/2024 at 3:01 PM

## 2024-11-11 ENCOUNTER — MYC MEDICAL ADVICE (OUTPATIENT)
Dept: INTERNAL MEDICINE | Facility: CLINIC | Age: 39
End: 2024-11-11
Payer: COMMERCIAL

## 2024-11-11 DIAGNOSIS — E11.9 TYPE 2 DIABETES MELLITUS WITHOUT COMPLICATION, WITH LONG-TERM CURRENT USE OF INSULIN (H): Primary | ICD-10-CM

## 2024-11-11 DIAGNOSIS — Z79.4 TYPE 2 DIABETES MELLITUS WITHOUT COMPLICATION, WITH LONG-TERM CURRENT USE OF INSULIN (H): Primary | ICD-10-CM

## 2024-11-12 RX ORDER — INSULIN PMP CART,AUT,G6/7,CNTR
1 EACH SUBCUTANEOUS DAILY
Qty: 5 EACH | Refills: 5 | Status: SHIPPED | OUTPATIENT
Start: 2024-11-12

## 2024-11-16 ENCOUNTER — HEALTH MAINTENANCE LETTER (OUTPATIENT)
Age: 39
End: 2024-11-16

## 2024-11-18 ENCOUNTER — TELEPHONE (OUTPATIENT)
Dept: INTERNAL MEDICINE | Facility: CLINIC | Age: 39
End: 2024-11-18
Payer: COMMERCIAL

## 2024-12-02 ENCOUNTER — MYC REFILL (OUTPATIENT)
Dept: INTERNAL MEDICINE | Facility: CLINIC | Age: 39
End: 2024-12-02
Payer: COMMERCIAL

## 2024-12-02 DIAGNOSIS — B00.1 RECURRENT COLD SORES: ICD-10-CM

## 2024-12-02 DIAGNOSIS — L40.50 PSORIATIC ARTHRITIS (H): ICD-10-CM

## 2024-12-02 RX ORDER — OXYCODONE AND ACETAMINOPHEN 5; 325 MG/1; MG/1
1 TABLET ORAL EVERY 4 HOURS PRN
Qty: 30 TABLET | Refills: 0 | Status: SHIPPED | OUTPATIENT
Start: 2024-12-04

## 2024-12-02 RX ORDER — TRAMADOL HYDROCHLORIDE 50 MG/1
50 TABLET ORAL EVERY 8 HOURS PRN
Qty: 60 TABLET | Refills: 0 | Status: SHIPPED | OUTPATIENT
Start: 2024-12-06

## 2024-12-02 NOTE — TELEPHONE ENCOUNTER
Cont[place] substance [place] request notes - [place] reviewed 12/02/24    Refill request received for: Oxycodone-Acetaminophen 5-325  [place]  data reviewed:  Medication last refill: 30 tab, 30 day supply, filled/sold to patient on 11/04/2024  Pended order: Oxycodone-Acetaminophen 5-325, 30 tab, 30 day supply, fill on or after 12/04/2024     Refill request received for: Tramadol Hcl 50 Mg Table  [place]  data reviewed:  Medication last refill: 60 tab, 30 day supply, filled/sold to patient on 11/06/2024  Pended order: Tramadol Hcl 50 Mg Table, 60 tab, 30 day supply, fill on or after 12/06/2024     Primary care provider: Nando Haskins  Last office visit this department: 2/26/2024  Next appointment with PCP: None     Refill request forwarded to provider for review.     Nichole HUYNH LPN  [facility]

## 2024-12-11 ENCOUNTER — MYC MEDICAL ADVICE (OUTPATIENT)
Dept: RHEUMATOLOGY | Facility: CLINIC | Age: 39
End: 2024-12-11
Payer: COMMERCIAL

## 2024-12-11 DIAGNOSIS — L40.50 PSORIATIC ARTHRITIS (H): ICD-10-CM

## 2024-12-11 RX ORDER — METHYLPREDNISOLONE 4 MG/1
TABLET ORAL
Qty: 35 TABLET | Refills: 0 | Status: SHIPPED | OUTPATIENT
Start: 2024-12-11

## 2024-12-11 NOTE — TELEPHONE ENCOUNTER
Patient calling with a flare.  C/o achy to stabbing pain behind the right knee, bilateral wrist, hand and finger pain.  Slight swelling on and off.  Some difficulties with mobility.  Under a lot of stress lately, recently had to put pet to sleep, mom with recurring cancer.  Requesting medrol.  RX pended.     Yudi Servin RN

## 2024-12-19 ENCOUNTER — MYC REFILL (OUTPATIENT)
Dept: INTERNAL MEDICINE | Facility: CLINIC | Age: 39
End: 2024-12-19
Payer: COMMERCIAL

## 2024-12-19 DIAGNOSIS — Z79.4 TYPE 2 DIABETES MELLITUS WITHOUT COMPLICATION, WITH LONG-TERM CURRENT USE OF INSULIN (H): ICD-10-CM

## 2024-12-19 DIAGNOSIS — E11.9 TYPE 2 DIABETES MELLITUS WITHOUT COMPLICATION, WITH LONG-TERM CURRENT USE OF INSULIN (H): ICD-10-CM

## 2024-12-19 NOTE — TELEPHONE ENCOUNTER
metFORMIN (GLUCOPHAGE) 500 MG tablet         Sig: Take 2 tablets (1,000 mg) by mouth 2 times daily (with meals). Patient needs to see primary provider and have labs for further refills. Please call for an appointment at [phone].    Disp: 360 tablet    Refills: 0    Start: 12/19/2024    Class: E-Prescribe    Non-formulary For: Type 2 diabetes mellitus without complication, with long-term current use of insulin (H)    Last ordered: 2 months ago (9/28/2024) by Nando Haskins MD    Biguanide Agents [id number]/[id number]/2024 04:22 AM    Patient has documented A1c within the specified period of time.    Has GFR on file in past 12 months and most recent value is normal    Recent (6 mo) or future (90 days) visit within the authorizing provider's specialty          Last visit: 02/26/2024  Future Visit: NONE    Hemoglobin A1C   Date Value Ref Range Status   03/11/2024 10.8 (H) 0.0 - 5.6 % Final     Comment:     Normal <5.7%   Prediabetes 5.7-6.4%    Diabetes 6.5% or higher     Note: Adopted from ADA consensus guidelines.   Was told by Rheumatologist to reach out to PCP or endocrine to manage.  Unsure if patient reach out to endocrine, private practice in [place].  Endocrine clinic closed down around November, 2024    Overdue GFR:   GFR Estimate   Date Value Ref Range Status   09/27/2022 >90 >60 mL/min/1.73m2 Final     Comment:     Effective December 21, 2021 eGFRcr in adults is calculated using the 2021 CKD-EPI creatinine equation which includes age and gender (Javier et al., NEJM, DOI: 10.1056/KVZKvh2275646)       Rx pending for provider to review for approval because:  High and overdue A1c and overdue GFR        Sage Rashid CMA (AAMA) at 9:05 AM on 12/19/2024

## 2024-12-23 DIAGNOSIS — D50.0 IRON DEFICIENCY ANEMIA DUE TO CHRONIC BLOOD LOSS: Primary | ICD-10-CM

## 2024-12-23 RX ORDER — FERROUS SULFATE 325(65) MG
325 TABLET ORAL
Qty: 100 TABLET | Refills: 3 | Status: SHIPPED | OUTPATIENT
Start: 2024-12-23

## 2024-12-27 ENCOUNTER — MYC REFILL (OUTPATIENT)
Dept: INTERNAL MEDICINE | Facility: CLINIC | Age: 39
End: 2024-12-27
Payer: COMMERCIAL

## 2024-12-27 DIAGNOSIS — L40.50 PSORIATIC ARTHRITIS (H): ICD-10-CM

## 2024-12-27 DIAGNOSIS — B00.1 RECURRENT COLD SORES: ICD-10-CM

## 2024-12-31 RX ORDER — OXYCODONE AND ACETAMINOPHEN 5; 325 MG/1; MG/1
1 TABLET ORAL EVERY 4 HOURS PRN
Qty: 30 TABLET | Refills: 0 | Status: SHIPPED | OUTPATIENT
Start: 2025-01-03

## 2024-12-31 RX ORDER — TRAMADOL HYDROCHLORIDE 50 MG/1
50 TABLET ORAL EVERY 8 HOURS PRN
Qty: 60 TABLET | Refills: 0 | Status: SHIPPED | OUTPATIENT
Start: 2025-01-05

## 2024-12-31 NOTE — TELEPHONE ENCOUNTER
Controlled substance refill request notes    Refill request received for: Oxycodone-Acetaminophen 5-325  [place]  data reviewed 12/31/24:  Medication last refill: 30 tab, 30 day supply, filled/sold to patient on 12/04/2024  Pended order: Oxycodone-Acetaminophen 5-325, 30 tab, 30 day supply, fill on or after 01/03/2025     Refill request received for: Tramadol Hcl 50 Mg Tablet  [place]  data reviewed 12/31/24:  Medication last refill: 60 tab, 30 day supply, filled/sold to patient on 12/06/2024  Pended order: Tramadol Hcl 50 Mg Tablet, 60 tab, 30 day supply, fill on or after 01/05/2025     Primary care provider: Nando Haskins  Last office visit this department: 2/26/2024  Last virtual visit this department: Visit date not found  Next appointment with PCP: None    Refill request forwarded to provider for review.     Nichole HUYNH LPN  [facility]

## 2025-01-27 ENCOUNTER — MYC REFILL (OUTPATIENT)
Dept: INTERNAL MEDICINE | Facility: CLINIC | Age: 40
End: 2025-01-27
Payer: COMMERCIAL

## 2025-01-27 DIAGNOSIS — B00.1 RECURRENT COLD SORES: ICD-10-CM

## 2025-01-27 DIAGNOSIS — L40.50 PSORIATIC ARTHRITIS (H): ICD-10-CM

## 2025-01-27 RX ORDER — TRAMADOL HYDROCHLORIDE 50 MG/1
50 TABLET ORAL EVERY 8 HOURS PRN
Qty: 60 TABLET | Refills: 0 | Status: SHIPPED | OUTPATIENT
Start: 2025-02-04

## 2025-01-27 RX ORDER — OXYCODONE AND ACETAMINOPHEN 5; 325 MG/1; MG/1
1 TABLET ORAL EVERY 4 HOURS PRN
Qty: 30 TABLET | Refills: 0 | Status: SHIPPED | OUTPATIENT
Start: 2025-02-02

## 2025-01-27 NOTE — TELEPHONE ENCOUNTER
Controlled substance refill request notes    Refill request received for: Tramadol Hcl 50 Mg Tablet  [place]  data reviewed 01/27/25:  Medication last refill: 60 tab, 30 day supply, filled/sold to patient on 01/05/2025  Pended order: Tramadol Hcl 50 Mg Tablet, 60 tab, 30 day supply, fill on or after 02/04/2025     Refill request received for: Oxycodone-Acetaminophen 5-325  [place]  data reviewed 01/27/25:  Medication last refill: 30 tab, 30 day supply, filled/sold to patient on 01/03/2025  Pended order: Oxycodone-Acetaminophen 5-325, 30 tab, 30 day supply, fill on or after 02/02/2025     Primary care provider: Nando Haskins  Last office visit this department: 2/26/2024  Last virtual visit this department: Visit date not found  Next appointment with PCP: None     Refill request forwarded to provider for review.     Nichole HUYNH LPN  [facility]

## 2025-01-28 ENCOUNTER — MYC MEDICAL ADVICE (OUTPATIENT)
Dept: RHEUMATOLOGY | Facility: CLINIC | Age: 40
End: 2025-01-28
Payer: COMMERCIAL

## 2025-01-28 DIAGNOSIS — L40.50 PSORIATIC ARTHRITIS (H): ICD-10-CM

## 2025-01-28 RX ORDER — METHYLPREDNISOLONE 4 MG/1
TABLET ORAL
Qty: 35 TABLET | Refills: 0 | Status: SHIPPED | OUTPATIENT
Start: 2025-01-28

## 2025-01-28 NOTE — TELEPHONE ENCOUNTER
Patient calling regarding [employer] message sent.  See attached photos. Patient c/o leg and knee swelling for the past week.  L>R.  C/o bakers cyst in the left.  Achy, throbbing pain.  No redness, warmth.  Also with pain, stiffness, and swelling in the right thumb and left pointer finger.  Asking for medrol.  Last taper pended. Currently takes Otezla.  Uses [employer]. Has follow up appt 3/4.     Yudi Servin RN

## 2025-01-28 NOTE — TELEPHONE ENCOUNTER
Call to [facility]. Also sent [employer] message regarding Dr. Granados's instructions / Medrol.    Mariah Leonardo RN

## 2025-01-28 NOTE — TELEPHONE ENCOUNTER
Thank you for the photo in the update.  I agree that it and flare of inflammatory arthritis is present.  I recommend tapering course of Medrol.  Follow-up if no response within 48 to 72 hours.  Monitor blood sugar closely while using Medrol.

## 2025-02-11 ENCOUNTER — MYC REFILL (OUTPATIENT)
Dept: INTERNAL MEDICINE | Facility: CLINIC | Age: 40
End: 2025-02-11
Payer: COMMERCIAL

## 2025-02-11 DIAGNOSIS — E11.9 TYPE 2 DIABETES MELLITUS WITHOUT COMPLICATION, WITH LONG-TERM CURRENT USE OF INSULIN (H): ICD-10-CM

## 2025-02-11 DIAGNOSIS — Z79.4 TYPE 2 DIABETES MELLITUS WITHOUT COMPLICATION, WITH LONG-TERM CURRENT USE OF INSULIN (H): ICD-10-CM

## 2025-02-13 RX ORDER — PROCHLORPERAZINE 25 MG/1
1 SUPPOSITORY RECTAL
Qty: 13 EACH | Refills: 0 | Status: SHIPPED | OUTPATIENT
Start: 2025-02-13

## 2025-02-13 NOTE — TELEPHONE ENCOUNTER
Last Written Prescription:  Continuous Blood Gluc Transmit (DEXCOM G6 TRANSMITTER) MISC 13 each 3 12/26/2022 -- No   Sig - Route: Place 1 each onto the skin every 10 days - Transdermal     ----------------------  Last Visit Date: 2-26-24  Future Visit Date: none    [x]  Refill decision: Medication unable to be refilled by RN due to:   GAP IN REFILLS                Request from pharmacy:  Requested Prescriptions   Pending Prescriptions Disp Refills    Continuous Glucose Transmitter (DEXCOM G6 TRANSMITTER) MISC 13 each 3     Sig: Place 1 each onto the skin every 10 days.       Diabetic Supplies Protocol Passed - 2/13/2025 10:08 AM        Passed - Medication is active on med list and the sig matches. RN to manually verify dose and sig if red X/fail.     If the protocol passes (green check), you do not need to verify med dose and sig.    A prescription matches if they are the same clinical intention.    For Example: once daily and every morning are the same.    For all fails (red x), verify dose and sig.    If the refill does match what is on file, the RN can still proceed to approve the refill request.     If they do not match, route to the appropriate provider.             Passed - Recent (12 month) or future (90 days) visit with authorizing provider s specialty     The patient must have completed an in-person or virtual visit within the past 12 months or has a future visit scheduled within the next 90 days with the authorizing provider s specialty.  Urgent care and e-visits do not qualify as an office visit for this protocol.          Passed - Medication indicated for associated diagnosis        Passed - Patient is 18 years of age or older

## 2025-02-18 ENCOUNTER — MYC REFILL (OUTPATIENT)
Dept: INTERNAL MEDICINE | Facility: CLINIC | Age: 40
End: 2025-02-18
Payer: COMMERCIAL

## 2025-02-18 DIAGNOSIS — Z79.4 TYPE 2 DIABETES MELLITUS WITHOUT COMPLICATION, WITH LONG-TERM CURRENT USE OF INSULIN (H): ICD-10-CM

## 2025-02-18 DIAGNOSIS — E11.9 TYPE 2 DIABETES MELLITUS WITHOUT COMPLICATION, WITH LONG-TERM CURRENT USE OF INSULIN (H): ICD-10-CM

## 2025-02-19 RX ORDER — PREDNISONE 5 MG/1
TABLET ORAL
Qty: 49 TABLET | Refills: 0 | Status: CANCELLED | OUTPATIENT
Start: 2025-02-19

## 2025-02-19 RX ORDER — METHYLPREDNISOLONE 4 MG/1
TABLET ORAL
Qty: 35 TABLET | Refills: 0 | Status: CANCELLED | OUTPATIENT
Start: 2025-02-19

## 2025-02-19 NOTE — PROGRESS NOTES
[facility]  Rheumatology Clinic  Rene Granados MD  2025     Name: Chrissy Dixon  MRN: [id number]  Age: 39 year old    : 1985  Referring provider: Nando Haskins    Assessment and Plan:  # Psoriatic arthritis (polyarthritis, scalp and pustular psoriasis; dx'd post-partum in 2015):   Patient notes improvement in knee pain since starting to work from home. She has good control of other joint pain and psoriasis flares on Otezla and limited use of steroids. Video exam shows only mild R knee swelling and obvious skin changes.    Data: In 2024, comprehensive metabolic panel was normal; hemoglobin A1c was markedly elevated at 10.7; TSH normal; CBC showed hemoglobin of 10.8, down from 11.9.    Discussion: Psoriatic arthritis has been flaring out since late 2024.  Inflammatory arthritis is present in multiple fingers and in the left knee today.  I do not think that Otezla monotherapy is sufficient to control inflammatory arthritis.  In contrast, psoriasis remains completely suppressed since start of Otezla.  We discussed multipart plan to address inflammatory arthritis.  To start, I recommend adding back methotrexate 6 tablets (15 mg) once weekly.  If methotrexate is well-tolerated, but inflammatory joint symptoms in hands and left knee persist after 6 to 8 weeks, will increase dose.  If methotrexate is inadequate after 3 months, I recommend substituting Tremfya for combination Otezla/methotrexate.  Use low-dose 2-week tapering prednisone courses for control of flaring inflammatory joint pain.    We discussed detailed plans in clinic:    Dx:   psoriatic arthritis: active  Diabetes mellitus: Poorly controlled  Baker's cyst, left knee    Plan:  1. Continue Otezla 30 mg twice daily. Recheck complete blood count, creatinine, liver tests  2.  Start methotrexate 6 tabs (15 mg) once weekly. --q 3 mo AST/ALT, Albumin, CBC with plts  --Limit EtOH intake to 2 drinks weekly; use folate 1 mg  daily.  --Tylenol 500 mg tid prn nausea/HA associated with dosing.  Blood work today    3.  Use short courses of prednisone 15 mg daily for 7 days, then 10 mg daily for 7 days, very sparingly to address intermittent flares of inflammatory joint pain   4.  Progress report to rheumatology in 6 to 8 weeks after starting methotrexate.  Methotrexate dose could be increased if inadequate response.  5.  If no improvement with several months trial of methotrexate, consider substitution of Tremfya for Otezla/methotrexate.    # Type 1 diabetes mellitus:   Hemoglobin A1c remained above 8. Patient following with Endocrinology, but getting reestablished with a new provider.      Rene Granados M.D.  Staff Rheumatologist, [facility]  Pager [phone]    Orders Placed This Encounter   Procedures    CBC with platelets    AST    ALT    Albumin level    Creatinine     Follow-up: Return in about 6-7 months    On the day of the encounter, a total of 32 minutes was spent in chart review, and in counseling and coordination of care, regarding the patient's complex medical problem of psoriatic arthritis, high risk medication, type 1 diabetes    The longitudinal plan of care for the diagnosis(es)/condition(s) as documented were addressed during this visit. Due to the added complexity in care, I will continue to support Adriana in the subsequent management and with ongoing continuity of care.      HPI:   Chrissy Zack follows up for psoriatic arthritis. I last evaluated the patient 9-2023. Psoriatic arthritis was judged improved.  I recommended continuing Otezla monotherapy for inflammatory arthritis, and using short courses of Medrol sparingly to address flares of inflammatory joint pain    Background History:  She has had psoriasis since birth (presented with diffuse rash at birth), was diagnosed with psoriasis at age 11, and developed arthritis in her 20s. She was on Enbrel at age 21 but developed infections such as pneumonia, sinusitis,  "and mono, and stopped it within 7 months. She was then on methotrexate from 2011 until February 2012, but did not tolerate this due to GI upset. She switched to sulfasalazine 2/2012 and tolerated it well at 1000 mg thrice a day, but had decreasing efficacy as of 11/1/13. She started leflunomide in 10-13 but stopped in 1-14 due to GI intolerance. She started Humira in 1-14, held in 7-14 during pregnancy, restarted in 7-15 for flaring disease but then discontinued due to worsened psoriasis and lack of efficacy. Leflunomide was then restarted but loose stools developed. Leflunomide was stopped and Otezla was started on 9/11/15. She started Stelara in fall 2015 but stopped due to cost and inconvenience in early 2016. She has continued with Otezla monotherapy through present. She restarted Stelara in 12-18.  Stelara was discontinued in late 2019 for lack of benefit.  She started Cosentyx in November 2019 with excellent relief of joint pain.  Combination Cosentyx and Otezla was begun in early 2020.  Otezla continued through 2021 in 2022, but Cosentyx used only intermittently during that time.  In mid 2022, good to excellent control achieved with Otezla alone, supplemented by infrequent (once monthly) use of low-dose 3-day Medrol courses.    Interval history March 4, 2025    Worsening L knee swelling, \"pinching\" with bending. Some swelling back and sides of knee. Symptoms responsive to oral prednisone.  Has used 3 course of prednisone in the last 3 months.  Painful Spots on hands and R 4th finger. Also helped with prednisone.  No psoriasis flares since last visit.  \"Psoriasis is completely gone since starting Otezla\"    Diabetes not well controlled--she cites \"stress\" in personal life.    Otezla 30 mg bid  No cosentyx since 8-2023.  Taking tylenol 500 mg for pain; not using nonsteroidals regularly.      Interval History September 22, 2023  Patient recently started working from home and feels that this has been a good " "change for her overall health because she can walk around, stretch, ice, etc. when she needs to. She also now has an insulin pump, which has helped her keep her sugars in check. She has largely been without pain and stiffness. When she does get pain, it is most often at the back of her knees immediately before or on the first day of her period or when she has been sitting a lot. She has not had any psoriasis patches and feels that her nails have been pretty good; just a few patches under some of her toenails. She has been taking Otezla BID consistently; has not received Cosentyx in at least 2-3 months. She will use 2-3 days of steroids around the start of her period to help control the knee pain that arises around that time.    Patient was seen in urgent care on 09/05 after two days of high blood sugars (>600), some vomiting, and drenching sweats. Found that her pump was not fully connected and UA showed evidence of a UTI, though she was not having symptoms. She was rehydrated and started on antibiotics. Felt back to normal in a couple of days. No similar episodes since. No other health or medication changes.     She is hoping that in addition to her normal rheum labs, we can order an A1C. Would also like an order for a flu shot in; she plans to [facility] her COVID booster [facility].       Interval history August 2, 2022  Health overall is good.  Several viral illnesses with URI symptom in recent weeks for her and children.  Knee and joint pain control is much better.  She has occasional flareups 1-2 times per month in joint pain, mostly knees; she uses prednisone 15-20 mg daily for 2-3 days to manage visibly swollen knees, with lasting resolution.  If she stands for work, she has more pain; she is working 2.5 days weekly.    Skin is \"really good\"; no new patches or scalp disease. No recent need for topical therapy.    She is taking otezla alone 60 mg bid; she has only intermittent ability to get cosentyx. Last " dose of the latter occurred 3 mos ago, she has been only able to use it ~ every other month.    Diabetes control is gradually improving aided by continuous BS monitoring.    Interval history 12-    She is overall doing well. She does note increased breakthrough inflammation affecting her hands. There is swelling of the L 5th and R 3 PIP joints, intermittent.    Knee inflammation is still there, consistent but not severe. She uses low dose medrol for increased knee swelling; in the last 2 months, she has used medrol most days, when cosentyx has been inconsistent. A/nkles and toes are painful especially when sits for a long time at work. She has continued otezla and cosentyx combination. 2-4 days before each cosentx, joint pain escalates.  No psoriasis at all since starting otezla.    She has encountered consistent insurance resistance to timely cosentyx shipping.    She is working with Dr. Haskins to work up low iron saturation index.    Hx 11-19:  Today, she reports that her knee mobility is 80% improved. She states that she has only had 1 flare in her knee since starting secukinumab. She does note that she sometimes has swelling in her toes and fingers, but this does not limit her. She states that she has been able to walk daily now that her knee pain is much better.    She explains that the introduction of secukinumab injections seems to be the most effective of her managements as she noticed resolution of knee symptoms very shortly after her first injection. She continued Otezla, did try a lower dosage, and, after lowering the dosage, she experienced a recurrence of psoriasis plaques. Other than this Otezla dosing change, her psoriasis has been mild and scant. She has also been using triamcinolone cream.    She states that this month marks her third dose of Cosentyx. She explains that one week prior to receiving this injection, she hs increased morning stiffness in her hands and feet. She notes that, two  days after receiving this Cosentyx injection, her morning stiffness is much better.     She reports that she has not been on prednisone or other steroids since shortly after her last visit with me. She explains that her blood sugars have been much easier to control now that she is off of prednisone.     She reports that her last knee injection or aspiration took place around 3 months ago.      Review of Systems:   Pertinent items are noted in HPI or as below, remainder of complete ROS is negative.      No recent problems with hearing or vision   No breathing difficulty, shortness of breath, coughing, or wheezing.  No chest pain  No heart burn, indigestion, abdominal pain, nausea, vomiting, diarrhea.  No urination problems, no bloody, cloudy urine, no dysuria.  No numbing, tingling, weakness.      Active Medications:   Current Outpatient Medications   Medication Sig Dispense Refill    apremilast (OTEZLA) 30 MG tablet TAKE 1 TABLET TWO TIMES DAILY, 60 tablet 9    cholecalciferol (VITAMIN D3) 1000 UNIT tablet Take 1 tablet (1,000 Units) by mouth daily 90 tablet 1    clobetasol (TEMOVATE) 0.05 % external solution Apply topically 2 times daily (Patient taking differently: Apply topically 2 times daily as needed.) 50 mL 6    Continuous Glucose Sensor (DEXCOM G6 SENSOR) MISC 1 Device by Device route every 10 days 13 each 3    Continuous Glucose Transmitter (DEXCOM G6 TRANSMITTER) MISC Place 1 each onto the skin every 10 days. For additional refills, please schedule a follow-up appointment at [phone] 13 each 0    ferrous sulfate (FEROSUL) 325 (65 Fe) MG tablet Take 1 tablet (325 mg) by mouth daily (with breakfast). 100 tablet 3    Fluocinolone Acetonide (DERMA-SMOOTHE/FS SCALP) 0.01 % OIL Apply to scalp at bedtime, then wear showercap, rinse off in morning. (Patient taking differently: Apply to scalp at bedtime, then wear showercap, rinse off in morning as needed.) 118 mL 3    fluticasone (FLONASE) 50 MCG/ACT nasal  spray Spray 2 sprays into both nostrils daily 48 g 11    folic acid (FOLVITE) 1 MG tablet Take 1 tablet (1 mg) by mouth daily. 90 tablet 2    golimumab (SIMPONI) 50 MG/0.5ML pre-filled syringe injection Inject 0.5 mLs (50 mg) Subcutaneous every 28 days Hold for signs of infection, and seek medical attention. 0.5 mL 6    ibuprofen (ADVIL/MOTRIN) 600 MG tablet Take 1 tablet (600 mg) by mouth every 8 hours as needed for moderate pain 270 tablet 0    insulin aspart (NOVOLOG FLEXPEN) 100 UNIT/ML pen Inject 1 unit/12 gms CHO with meals plus correction. Pt uses approx 35 units in 24 hrs. 30 mL 4    insulin detemir (LEVEMIR FLEXTOUCH) 100 UNIT/ML pen Inject 24 units subcutaneous each am and 8 units subcutaneous each pm. 60 mL 3    Insulin Disposable Pump (OMNIPOD 5 PODS, GEN 5,) MISC 1 Units every 3 days. Use as directed for insulin pump. Change every 3 days 10 each 5    insulin NPH (NOVOLIN N FLEXPEN) 100 UNIT/ML injection Only use when taking steroids. If steroid dose is 12-16 mg take NPH 10 units subcutaneous in am and if steroid dose 6-8 mg take NPH 6 units subcutaneous in am. 15 mL 3    insulin  UNIT/ML injection Only use when taking steroids. If steroid dose is 12-16 mg take NPH 10 units subcutaneous in am and if steroid dose 6-8 mg take NPH 6 units subcutaneous in am. 15 mL 3    insulin pen needle (31G X 8 MM) 31G X 8 MM miscellaneous Use  3-4 pen needles daily or as directed. 300 each 4    LEVEMIR FLEXTOUCH 100 UNIT/ML pen INJECT 30 UNITS UNDER THE SKIN EVERY MORNING BEFORE BREAKFAST 45 mL 11    metFORMIN (GLUCOPHAGE) 500 MG tablet Take 2 tablets (1,000 mg) by mouth 2 times daily (with meals). Patient needs to see primary provider and have labs for further refills. Please call for an appointment at [phone]. 360 tablet 0    methotrexate 2.5 MG tablet Take 6 tablets (15 mg) by mouth every 7 days. Labs every 8 - 12 weeks for refills. 24 tablet 3    methylPREDNISolone (MEDROL) 4 MG tablet TAKE 3 TABLETS  DAILY BY MOUTH FOR 1 WEEK THEN 2 TABLETS DAILY FOR 1 WEEK THEN OFF 35 tablet 0    montelukast (SINGULAIR) 10 MG tablet Take 1 tablet (10 mg) by mouth at bedtime 30 tablet 1    ONETOUCH LANCETS MISC Use to test blood sugar 6 to 8 times daily or as directed. Okay to dispense One Touch Verio products per patient's insurance. 700 each 1    oxyCODONE-acetaminophen (PERCOCET) 5-325 MG tablet Take 1 tablet by mouth every 4 hours as needed for severe pain. MUST LAST 30 DAYS (30 day supply) 30 tablet 0    predniSONE (DELTASONE) 5 MG tablet Take 1 tablet (5 mg) by mouth daily. Take 3 tabs daily for 1 week, then take 2 tabs daily for 1 week 35 tablet 2    predniSONE (DELTASONE) 5 MG tablet Take 20mg daily for 7 days, then 15mg daily for 7 days 49 tablet 0    [START ON 3/6/2025] traMADol (ULTRAM) 50 MG tablet Take 1 tablet (50 mg) by mouth every 8 hours as needed for moderate pain. MUST LAST 30 DAYS (30 day supply) 60 tablet 0    triamcinolone (KENALOG) 0.1 % cream Apply topically 2 times daily (Patient taking differently: Apply topically 2 times daily as needed.) 453.6 g 1    valACYclovir (VALTREX) 500 MG tablet Take 1 tablet (500 mg) by mouth daily as needed 90 tablet 3     No current facility-administered medications for this visit.         Allergies:  Diagn[employer]   Betadine   Levofloxacin   Penicillins   Pneumococcal Vaccines   Prenatal Vit-Fe Fumarate-Fa  Tdap    Past Medical History:  Diabetes    Psoriatic arthritis   Diabetes mellitus type 2, controlled   Psoriasis arthropathia   Malaise and fatigue  Iron deficiency anemia due to chronic blood loss  Right knee pain    Past Surgical History:  Arthroscopy knee with meniscectomy, right (11/20/2018)  AS Hysteroscopy Surgical W/Endometrial, bilateral (2017)   Tonsillar abscess    Family History:    The patient's family history includes Connective Tissue Disorder in her father; Diabetes in her maternal grandfather, maternal uncle, maternal uncle, and mother;  Hypertension in her maternal grandfather, maternal grandmother, and mother; Prostate Cancer in her maternal grandfather; Thyroid Disease in her mother.    Social History:  The patient reports that she quit smoking about 2010. Her smoking use included cigarettes. She has a 5.00 pack-year smoking history. She has never used smokeless tobacco. She reports that she does not drink alcohol or use drugs.   PCP: Nando Haskins  Marital Status:      Physical Exam:   /81 (BP Location: Left arm, Patient Position: Sitting, Cuff Size: Adult Regular)   Pulse 119   Wt 49.9 kg (110 lb)   LMP  (LMP Unknown)   SpO2 98%   BMI 19.49 kg/m     Wt Readings from Last 4 Encounters:   03/04/25 49.9 kg (110 lb)   02/26/24 51.6 kg (113 lb 12.8 oz)   09/22/23 49.9 kg (110 lb)   12/02/22 49.9 kg (110 lb 1.6 oz)   thin, vibrant appearing  Eyes: Normal EOM, conjunctiva, sclera.  ENT: Normal external ears, nose, hearing, lips  Neck: No visible mass or thyroid enlargement.  Resp: Breathing unlabored  MS: Knees with valgus changes bilaterally; firm cool 5 cm swelling in the popliteal fossa on the left.  No fluid wave or joint effusion in the left knee.  No crepitus.  Right fourth PIP swollen, nontender, nontender swelling in left second PIP.  Skin: No alopecia, rash, nodules, lesions, flaking or redness along the hairline visible over video visit.   Neuro: Normal cranial nerves  Psych: Normal judgement, orientation, memory, affect.     Laboratory:       Latest Ref Rng & Units 9/27/2022     9:26 AM 3/11/2024    10:39 AM 12/20/2024    11:14 AM   RHEUM RESULTS   Albumin 3.5 - 5.2 g/dL   4.0    ALT 0 - 50 U/L 19  15  16    AST 0 - 45 U/L 15  13  16    Creatinine 0.51 - 0.95 mg/dL 0.44   0.51    CRP Inflammation <5.00 mg/L <3.00      GFR Estimate >60 mL/min/1.73m2 >90   >90    Hematocrit 35.0 - 47.0 % 38.4  38.0  35.1    Hemoglobin 11.7 - 15.7 g/dL 11.8  11.9  10.8    WBC 4.0 - 11.0 10e3/uL 7.6  8.3  9.1    RBC Count 3.80 -  5.20 10e6/uL 4.87  4.88  4.45    RDW 10.0 - 15.0 % 15.7  16.2  16.7    MCHC 31.5 - 36.5 g/dL 30.7  31.3  30.8    MCV 78 - 100 fL 79  78  79    Platelet Count 150 - 450 10e3/uL 472  361  430      Rheumatoid Factor   Date Value Ref Range Status   08/11/2011 11 0 - 14 IU/mL Final     Cyclic Cit Pept IgG/IgA   Date Value Ref Range Status   08/11/2011 <20  Interpretation:  Negative <20 UNITS Final     CHALO Screen by EIA   Date Value Ref Range Status   04/09/2014 <1.0  Interpretation:  Negative <1.0 Final     Hepatitis B Core Ana Rosa   Date Value Ref Range Status   08/02/2019 Nonreactive NR^Nonreactive Final     Antistreptolysin O   Date Value Ref Range Status   09/11/2015 109 0 - 120 IU/mL Final     Comment:     A single ASO analysis may not be meaningful due to the variability of ASO   values   within the normal population.  Both clinical and laboratory findings should   be   considered in reaching a diagnosis.  A single analysis may be less   informative   than a repeat analysis showing a change.       Quantiferon-TB Gold Plus Result   Date Value Ref Range Status   12/26/2018 Negative NEG^Negative Final     Comment:     No interferon gamma response to M.tuberculosis antigens was detected.   Infection with M.tuberculosis is unlikely, however a single negative result   does not exclude infection. In patients at high risk for infection, a second   test should be considered  in accordance with the 2017 ATS/IDSA/CDC Clinical Practice Guidelines for   Diagnosis of Tuberculosis in Adults and Children [Angela DIXON et   al.Clin.Infect.Dis. 2017 64(2):111-115].       TB1 Ag minus Nil Value   Date Value Ref Range Status   12/26/2018 0.00 IU/mL Final     TB2 Ag minus Nil Value   Date Value Ref Range Status   12/26/2018 0.00 IU/mL Final     Mitogen minus Nil Result   Date Value Ref Range Status   12/26/2018 >10.00 IU/mL Final     Nil Result   Date Value Ref Range Status   12/26/2018 0.02 IU/mL Final     Neutrophil Cytoplasmic IgG  Antibody   Date Value Ref Range Status   05/31/2013   Final    <1:20  Reference range: <1:20  (Note)  The ANCA IFA is <1:20; therefore, no further testing will  be performed.  INTERPRETIVE INFORMATION: Anti-Neutrophil Cyto Ab, IgG    Neutrophil Cytoplasmic Antibodies (C-ANCA = granular  cytoplasmic staining, P-ANCA = perinuclear staining) are  found in the serum of over 90 percent of patients with  certain necrotizing systemic vasculitides, and usually in  less than 5 percent of patients with collagen vascular  disease or arthritis.  Performed by [facility],  [address] [place] [id number] [phone]  [email or web address], Nataly Roth MD, Lab. Director

## 2025-02-23 NOTE — TELEPHONE ENCOUNTER
Disp Refills Start End REBECCA   Continuous Blood Gluc Sensor (DEXCOM G6 SENSOR) [facility] [place]          Nando Haskins MD  Internal Medicine    24 csc  Nv: none    My chart request.  Gap in rf. Last written       [x]  Refill decision: Medication unable to be refilled by RN due to: Compliance - lapse in therapy/gap in refills; No Shows; Cancellations         Request from pharmacy:  Requested Prescriptions   Pending Prescriptions Disp Refills    Continuous Glucose Sensor (DEXCOM G6 SENSOR) MISC 13 each 3     Si Device by Device route every 10 days       There is no refill protocol information for this order

## 2025-02-24 ENCOUNTER — MYC MEDICAL ADVICE (OUTPATIENT)
Dept: RHEUMATOLOGY | Facility: CLINIC | Age: 40
End: 2025-02-24
Payer: COMMERCIAL

## 2025-02-24 DIAGNOSIS — L40.50 PSORIATIC ARTHRITIS (H): ICD-10-CM

## 2025-02-24 RX ORDER — PROCHLORPERAZINE 25 MG/1
SUPPOSITORY RECTAL
Qty: 13 EACH | Refills: 3 | Status: SHIPPED | OUTPATIENT
Start: 2025-02-24

## 2025-02-24 RX ORDER — PREDNISONE 5 MG/1
TABLET ORAL
Qty: 49 TABLET | Refills: 0 | Status: SHIPPED | OUTPATIENT
Start: 2025-02-24

## 2025-02-24 RX ORDER — PROCHLORPERAZINE 25 MG/1
SUPPOSITORY RECTAL
OUTPATIENT
Start: 2025-02-24

## 2025-02-24 NOTE — TELEPHONE ENCOUNTER
See [employer] Dx message and photos from the patient.  Still with left knee pain.  Difficulty bending the knee and with walking.  L hand and fingers are swollen.  No redness, no warmth.  Patient just finished taper given 2/6 (prednisone 20mg x7 days, 15mg x 7 days).  She is asking for more prednisone as the last dose helped, but still with pain and swelling.  Has follow up 3/4/25.  Uses [facility].     Yudi Servin RN

## 2025-02-26 ENCOUNTER — MYC REFILL (OUTPATIENT)
Dept: INTERNAL MEDICINE | Facility: CLINIC | Age: 40
End: 2025-02-26
Payer: COMMERCIAL

## 2025-02-26 DIAGNOSIS — L40.50 PSORIATIC ARTHRITIS (H): ICD-10-CM

## 2025-02-26 DIAGNOSIS — B00.1 RECURRENT COLD SORES: ICD-10-CM

## 2025-02-26 RX ORDER — TRAMADOL HYDROCHLORIDE 50 MG/1
50 TABLET ORAL EVERY 8 HOURS PRN
Qty: 60 TABLET | Refills: 0 | Status: SHIPPED | OUTPATIENT
Start: 2025-03-06

## 2025-02-26 RX ORDER — OXYCODONE AND ACETAMINOPHEN 5; 325 MG/1; MG/1
1 TABLET ORAL EVERY 4 HOURS PRN
Qty: 30 TABLET | Refills: 0 | Status: SHIPPED | OUTPATIENT
Start: 2025-03-04

## 2025-02-26 NOTE — TELEPHONE ENCOUNTER
Left Voicemail (1st Attempt) and Sent Mychart (1st Attempt) for the patient to call back and schedule the following:    Appointment type: Physical/Return   Provider: PCP  Return date: Next available   Specialty phone number: [phone]

## 2025-02-26 NOTE — TELEPHONE ENCOUNTER
Controlled substance refill request notes    Refill request received for: Oxycodone-Acetaminophen 5-325  [place]  data reviewed 02/26/25:  Medication last refill: 30 tab, 30 day supply, filled/sold to patient on 02/02/2025  Pended order: Oxycodone-Acetaminophen 5-325, 30 tab, 30 day supply, fill on or after 03/04/2025     Refill request received for: Tramadol Hcl 50 Mg Tablet  [place]  data reviewed 02/26/25:  Medication last refill: 60 tab, 30 day supply, filled/sold to patient on 02/04/2025  Pended order: Tramadol Hcl 50 Mg Tablet, 60 tab, 30 day supply, fill on or after 03/06/2025     Primary care provider: Nando Haskins  Last office visit this department: 02/26/2024  Last virtual visit this department: Visit date not found  Next appointment with PCP: None    Refill request forwarded to provider for review.     Nichole HUYNH LPN  [facility]

## 2025-03-04 ENCOUNTER — OFFICE VISIT (OUTPATIENT)
Dept: RHEUMATOLOGY | Facility: CLINIC | Age: 40
End: 2025-03-04
Payer: COMMERCIAL

## 2025-03-04 VITALS
OXYGEN SATURATION: 98 % | SYSTOLIC BLOOD PRESSURE: 124 MMHG | BODY MASS INDEX: 19.49 KG/M2 | HEART RATE: 119 BPM | DIASTOLIC BLOOD PRESSURE: 81 MMHG | WEIGHT: 110 LBS

## 2025-03-04 DIAGNOSIS — L40.50 PSORIATIC ARTHRITIS (H): ICD-10-CM

## 2025-03-04 PROCEDURE — G2211 COMPLEX E/M VISIT ADD ON: HCPCS | Performed by: INTERNAL MEDICINE

## 2025-03-04 PROCEDURE — 1125F AMNT PAIN NOTED PAIN PRSNT: CPT | Performed by: INTERNAL MEDICINE

## 2025-03-04 PROCEDURE — 3079F DIAST BP 80-89 MM HG: CPT | Performed by: INTERNAL MEDICINE

## 2025-03-04 PROCEDURE — 99214 OFFICE O/P EST MOD 30 MIN: CPT | Performed by: INTERNAL MEDICINE

## 2025-03-04 PROCEDURE — 3074F SYST BP LT 130 MM HG: CPT | Performed by: INTERNAL MEDICINE

## 2025-03-04 RX ORDER — PREDNISONE 5 MG/1
5 TABLET ORAL DAILY
Qty: 35 TABLET | Refills: 2 | Status: SHIPPED | OUTPATIENT
Start: 2025-03-04

## 2025-03-04 RX ORDER — METHOTREXATE 2.5 MG/1
15 TABLET ORAL
Qty: 24 TABLET | Refills: 3 | Status: SHIPPED | OUTPATIENT
Start: 2025-03-04

## 2025-03-04 RX ORDER — APREMILAST 30 MG/1
TABLET, FILM COATED ORAL
Qty: 60 TABLET | Refills: 9 | Status: SHIPPED | OUTPATIENT
Start: 2025-03-04

## 2025-03-04 RX ORDER — FOLIC ACID 1 MG/1
1 TABLET ORAL DAILY
Qty: 90 TABLET | Refills: 2 | Status: SHIPPED | OUTPATIENT
Start: 2025-03-04

## 2025-03-04 ASSESSMENT — PAIN SCALES - GENERAL: PAINLEVEL_OUTOF10: SEVERE PAIN (7)

## 2025-03-04 NOTE — PATIENT INSTRUCTIONS
Dx:   psoriatic arthritis: active  Diabetes mellitus: Poorly controlled  Baker's cyst, left knee    Plan:  1. Continue Otezla 30 mg twice daily. Recheck complete blood count, creatinine, liver tests  2.  Start methotrexate 6 tabs (15 mg) once weekly. --q 3 mo AST/ALT, Albumin, CBC with plts  --Limit EtOH intake to 2 drinks weekly; use folate 1 mg daily.  --Tylenol 500 mg tid prn nausea/HA associated with dosing.  Blood work today    3.  Use short courses of prednisone 15 mg daily for 7 days, then 10 mg daily for 7 days, very sparingly to address intermittent flares of inflammatory joint pain   4.  Progress report to rheumatology in 6 to 8 weeks after starting methotrexate.  Methotrexate dose could be increased if inadequate response.  5.  If no improvement with several months trial of methotrexate, consider substitution of Tremfya for Otezla/methotrexate.

## 2025-03-06 ENCOUNTER — LAB (OUTPATIENT)
Dept: LAB | Facility: CLINIC | Age: 40
End: 2025-03-06
Payer: COMMERCIAL

## 2025-03-06 DIAGNOSIS — D50.0 IRON DEFICIENCY ANEMIA DUE TO CHRONIC BLOOD LOSS: ICD-10-CM

## 2025-03-06 DIAGNOSIS — L40.50 PSORIATIC ARTHRITIS (H): ICD-10-CM

## 2025-03-06 LAB
ALBUMIN SERPL BCG-MCNC: 4.3 G/DL (ref 3.5–5.2)
ALT SERPL W P-5'-P-CCNC: 16 U/L (ref 0–50)
AST SERPL W P-5'-P-CCNC: 18 U/L (ref 0–45)
BASOPHILS # BLD AUTO: 0 10E3/UL (ref 0–0.2)
BASOPHILS NFR BLD AUTO: 0 %
CREAT SERPL-MCNC: 0.48 MG/DL (ref 0.51–0.95)
EGFRCR SERPLBLD CKD-EPI 2021: >90 ML/MIN/1.73M2
EOSINOPHIL # BLD AUTO: 0 10E3/UL (ref 0–0.7)
EOSINOPHIL NFR BLD AUTO: 0 %
ERYTHROCYTE [DISTWIDTH] IN BLOOD BY AUTOMATED COUNT: 17.3 % (ref 10–15)
FERRITIN SERPL-MCNC: 13 NG/ML (ref 6–175)
HCT VFR BLD AUTO: 38.9 % (ref 35–47)
HGB BLD-MCNC: 11.8 G/DL (ref 11.7–15.7)
IMM GRANULOCYTES # BLD: 0 10E3/UL
IMM GRANULOCYTES NFR BLD: 0 %
IRON BINDING CAPACITY (ROCHE): 367 UG/DL (ref 240–430)
IRON SATN MFR SERPL: 50 % (ref 15–46)
IRON SERPL-MCNC: 183 UG/DL (ref 37–145)
LYMPHOCYTES # BLD AUTO: 0.7 10E3/UL (ref 0.8–5.3)
LYMPHOCYTES NFR BLD AUTO: 8 %
MCH RBC QN AUTO: 24.8 PG (ref 26.5–33)
MCHC RBC AUTO-ENTMCNC: 30.3 G/DL (ref 31.5–36.5)
MCV RBC AUTO: 82 FL (ref 78–100)
MONOCYTES # BLD AUTO: 0.3 10E3/UL (ref 0–1.3)
MONOCYTES NFR BLD AUTO: 3 %
NEUTROPHILS # BLD AUTO: 8 10E3/UL (ref 1.6–8.3)
NEUTROPHILS NFR BLD AUTO: 89 %
PLATELET # BLD AUTO: 467 10E3/UL (ref 150–450)
RBC # BLD AUTO: 4.76 10E6/UL (ref 3.8–5.2)
WBC # BLD AUTO: 9.1 10E3/UL (ref 4–11)

## 2025-03-24 SDOH — HEALTH STABILITY: PHYSICAL HEALTH: ON AVERAGE, HOW MANY MINUTES DO YOU ENGAGE IN EXERCISE AT THIS LEVEL?: 30 MIN

## 2025-03-24 SDOH — HEALTH STABILITY: PHYSICAL HEALTH: ON AVERAGE, HOW MANY DAYS PER WEEK DO YOU ENGAGE IN MODERATE TO STRENUOUS EXERCISE (LIKE A BRISK WALK)?: 4 DAYS

## 2025-03-24 ASSESSMENT — SOCIAL DETERMINANTS OF HEALTH (SDOH): HOW OFTEN DO YOU GET TOGETHER WITH FRIENDS OR RELATIVES?: ONCE A WEEK

## 2025-03-25 ENCOUNTER — OFFICE VISIT (OUTPATIENT)
Dept: INTERNAL MEDICINE | Facility: CLINIC | Age: 40
End: 2025-03-25
Payer: COMMERCIAL

## 2025-03-25 VITALS
OXYGEN SATURATION: 99 % | DIASTOLIC BLOOD PRESSURE: 84 MMHG | RESPIRATION RATE: 14 BRPM | BODY MASS INDEX: 20 KG/M2 | HEART RATE: 113 BPM | TEMPERATURE: 98.6 F | WEIGHT: 112.9 LBS | SYSTOLIC BLOOD PRESSURE: 124 MMHG | HEIGHT: 63 IN

## 2025-03-25 DIAGNOSIS — E11.9 TYPE 2 DIABETES MELLITUS WITHOUT COMPLICATION, WITH LONG-TERM CURRENT USE OF INSULIN (H): ICD-10-CM

## 2025-03-25 DIAGNOSIS — Z79.4 TYPE 2 DIABETES MELLITUS WITHOUT COMPLICATION, WITH LONG-TERM CURRENT USE OF INSULIN (H): ICD-10-CM

## 2025-03-25 DIAGNOSIS — L40.50 PSORIATIC ARTHRITIS (H): ICD-10-CM

## 2025-03-25 PROCEDURE — 90471 IMMUNIZATION ADMIN: CPT | Performed by: INTERNAL MEDICINE

## 2025-03-25 PROCEDURE — 90656 IIV3 VACC NO PRSV 0.5 ML IM: CPT | Performed by: INTERNAL MEDICINE

## 2025-03-25 PROCEDURE — 99395 PREV VISIT EST AGE 18-39: CPT | Mod: 25 | Performed by: INTERNAL MEDICINE

## 2025-03-25 PROCEDURE — 90480 ADMN SARSCOV2 VAC 1/ONLY CMP: CPT | Performed by: INTERNAL MEDICINE

## 2025-03-25 PROCEDURE — 91320 SARSCV2 VAC 30MCG TRS-SUC IM: CPT | Performed by: INTERNAL MEDICINE

## 2025-03-25 PROCEDURE — 3074F SYST BP LT 130 MM HG: CPT | Performed by: INTERNAL MEDICINE

## 2025-03-25 PROCEDURE — 3079F DIAST BP 80-89 MM HG: CPT | Performed by: INTERNAL MEDICINE

## 2025-03-25 RX ORDER — VALACYCLOVIR HYDROCHLORIDE 500 MG/1
500 TABLET, FILM COATED ORAL DAILY PRN
Qty: 90 TABLET | Refills: 3 | Status: SHIPPED | OUTPATIENT
Start: 2025-03-25

## 2025-03-25 NOTE — PROGRESS NOTES
HPI  39-year-old here today for physical examination.  She is having a flare of her psoriatic arthritis predominantly involving both knees left greater than right but to a lesser extent in the PIP joints.  She has recently started on methotrexate and thus far has tolerated it well although has not noted any significant change in the symptoms after only 2 weeks.  She is able to do some physical activity and walking she is doing this on a regular basis.  Her diet is healthy her sleep is somewhat fragmented in part related to the arthritis.  No tobacco or alcohol.  Reviewed her immunizations and will update these with a flu shot and a COVID vaccine.  Past Medical History:   Diagnosis Date    Diabetes     Other psoriasis     Polyarthritis     probable psoriatic arthritis     Past Surgical History:   Procedure Laterality Date    ARTHROSCOPY KNEE WITH MENISCECTOMY Right 11/20/2018    Procedure: Examination Under Anesthesia Right Knee, Right Knee Arthroscopy, Partial Meniscectomy, Chondroplasty, Cyst Decompression, Synovial Biopsy;  Surgeon: Sean Pate MD;  Location:  OR    AS HYSTEROSCOPY, SURGICAL; W/ ENDOMETRIAL ABLATION, ANY METHOD Bilateral 2017    endometrial ablation with bilater TL    COLONOSCOPY N/A 4/29/2022    Procedure: COLONOSCOPY, WITH BIOPSY;  Surgeon: Nando Hunter MD;  [place] [facility] [place]    ESOPHAGOSCOPY, GASTROSCOPY, DUODENOSCOPY (EGD), COMBINED N/A 4/29/2022    Procedure: ESOPHAGOGASTRODUODENOSCOPY, WITH BIOPSY;  Surgeon: Nando Hunter MD;  [place] [facility] [place]    SURGICAL HISTORY OF -       .  Tonsillar abscess     Family History   Problem Relation Age of Onset    Thyroid Disease Mother     Diabetes Mother     Hypertension Mother     Connective Tissue Disorder Father         Psoriasis    Diabetes Maternal Grandfather     Hypertension Maternal Grandfather     Prostate Cancer Maternal Grandfather     Diabetes Maternal Uncle     Diabetes Maternal Uncle     Hypertension Maternal  "Grandmother     Asthma No family hx of     Asthma Mother     Colon Cancer Mother     Chronic Obstructive Pulmonary Disease Mother     Arthritis Father     Vision Loss Father     Diabetes Maternal Uncle     Diabetes Maternal Grandmother          Exam:  /84 (BP Location: Right arm, Patient Position: Sitting, Cuff Size: Adult Regular)   Pulse 113   Temp 98.6  F (37  C) (Oral)   Resp 14   Ht 1.6 m (5' 3\")   Wt 51.2 kg (112 lb 14.4 oz)   LMP  (LMP Unknown)   SpO2 99%   Breastfeeding No   BMI 20.00 kg/m    112 lbs 14.4 oz  PHYSICAL EXAMINATION:   The patient is alert, oriented with a clear sensorium.   Skin shows no lesions or rashes and good turgor.   Head is normocephalic and atraumatic.   Eyes show PERRLA  Ears show normal TMs bilaterally.   Mouth shows clear oral mucosa.   Neck shows no nodes, thyromegaly or bruits.   Back is nontender.   Lungs are clear to percussion and auscultation.   Heart shows normal S1 and S2 without murmur or gallop.   Abdomen is soft, nontender without masses or organomegaly.   Extremities show no edema and no evidence of active synovitis.   Neurologic examination shows cranial nerves II-XII intact. Motor shows normal strength. Reflexes are full and symmetrical.       ASSESSMENT  1 diabetes mellitus good control with pump  2 recent acute sinusitis likely viral  3 psoriasis and  psoriatic arthritis controlled  4 Chronic back pain on tramadol and oxycodone (17.5 ME/day)  5 History iron deficiency anemia   6 Subclinical hyperthyroidism     Plan  She will follow with endocrinology regarding her diabetes we will check her A1c at the time of her next blood draw.  She also has upcoming meetings with nutrition and her insulin pump to see if he can start working with a G7 CGM.  Otherwise we will continue the same medications as before and tolerating these well without side effects or ill effects  This note was completed using Dragon voice recognition software.      Nando Haskins, " MD  General Internal Medicine  [facility]  [phone]

## 2025-04-28 ENCOUNTER — MYC REFILL (OUTPATIENT)
Dept: INTERNAL MEDICINE | Facility: CLINIC | Age: 40
End: 2025-04-28
Payer: COMMERCIAL

## 2025-04-28 ENCOUNTER — MYC REFILL (OUTPATIENT)
Dept: RHEUMATOLOGY | Facility: CLINIC | Age: 40
End: 2025-04-28
Payer: COMMERCIAL

## 2025-04-28 DIAGNOSIS — L40.50 PSORIATIC ARTHRITIS (H): ICD-10-CM

## 2025-04-28 DIAGNOSIS — B00.1 RECURRENT COLD SORES: ICD-10-CM

## 2025-04-28 RX ORDER — TRAMADOL HYDROCHLORIDE 50 MG/1
50 TABLET ORAL EVERY 8 HOURS PRN
Qty: 60 TABLET | Refills: 0 | Status: SHIPPED | OUTPATIENT
Start: 2025-05-04

## 2025-04-28 RX ORDER — OXYCODONE AND ACETAMINOPHEN 5; 325 MG/1; MG/1
1 TABLET ORAL EVERY 4 HOURS PRN
Qty: 30 TABLET | Refills: 0 | Status: SHIPPED | OUTPATIENT
Start: 2025-05-02

## 2025-04-28 RX ORDER — PREDNISONE 5 MG/1
TABLET ORAL
Qty: 49 TABLET | Refills: 0 | Status: SHIPPED | OUTPATIENT
Start: 2025-04-28

## 2025-04-28 NOTE — TELEPHONE ENCOUNTER
predniSONE (DELTASONE) 5 MG tablet   Start: 02/24/2025   Disp 49  R 0  Take 20mg daily for 7 days, then 15mg daily for 7 days     Rene Granados MD  Rheumatology  Lv 3/4/25  Nv 10/6/25    Refill decision: Medication unable to be refilled by RN due to: Other:high dose not on protocol    Request from pharmacy:  Requested Prescriptions   Pending Prescriptions Disp Refills    predniSONE (DELTASONE) 5 MG tablet 49 tablet 0     Sig: Take 20mg daily for 7 days, then 15mg daily for 7 days       There is no refill protocol information for this order

## 2025-04-28 NOTE — TELEPHONE ENCOUNTER
Controlled substance refill request notes    Refill request received for: [place]xy[place]-Acetaminophen 5-325  [place]  [place] reviewed 04/28/25:  Medication last refill: qty 30, 30 day supply, filled/sold to patient on 04/02/2025  Pended order: Oxycodone-Acetaminophen 5-325, qty 30, 30 day supply, fill on or after 05/02/2025     Refill request received for: Tramadol Hcl 50 Mg Tablet  [place]  data reviewed 04/28/25:  Medication last refill: qty 60, 30 day supply, filled/sold to patient on 04/04/2025  Pended order: Tramadol Hcl 50 Mg Tablet, qty 60, 30 day supply, fill on or after 05/04/2025     Primary care provider: Nando Haskins  Last office visit with this department: 3/25/2025  Next appointment with PCP: None    Refill request forwarded to provider for review.     Nichole HUYNH LPN  [facility]

## 2025-05-10 ENCOUNTER — E-VISIT (OUTPATIENT)
Dept: URGENT CARE | Facility: CLINIC | Age: 40
End: 2025-05-10
Payer: COMMERCIAL

## 2025-05-10 DIAGNOSIS — B96.89 ACUTE BACTERIAL SINUSITIS: Primary | ICD-10-CM

## 2025-05-10 DIAGNOSIS — J01.90 ACUTE BACTERIAL SINUSITIS: Primary | ICD-10-CM

## 2025-05-10 RX ORDER — DOXYCYCLINE HYCLATE 100 MG
100 TABLET ORAL 2 TIMES DAILY
Qty: 14 TABLET | Refills: 0 | Status: SHIPPED | OUTPATIENT
Start: 2025-05-10 | End: 2025-05-17

## 2025-05-10 NOTE — PATIENT INSTRUCTIONS
Dear Chrissy Dixon    After reviewing your responses, I've been able to diagnose you with Acute bacterial sinusitis.      Based on your responses and diagnosis, I have prescribed   Orders Placed This Encounter   Medications     doxycycline hyclate (VIBRA-TABS) 100 MG tablet     Sig: Take 1 tablet (100 mg) by mouth 2 times daily for 7 days.     Dispense:  14 tablet     Refill:  0     May substitute any formulation of 100mg doxycycline available and best covered by insurance    to treat your symptoms. I have sent this to your pharmacy.?     It is also important to stay well hydrated, get lots of rest and take over-the-counter decongestants,?tylenol?or ibuprofen if you?are able to?take those medications per your primary care provider to help relieve discomfort.?     It is important that you take?all of?your prescribed medication even if your symptoms are improving after a few doses.? Taking?all of?your medicine helps prevent the symptoms from returning.?     If your symptoms worsen, you develop severe headache, vomiting, high fever (>102), or are not improving in 7 days, please contact your primary care provider for an appointment or visit any of our convenient Walk-in Care or Urgent Care Centers to be seen which can be found on our website?here.?     Thanks again for choosing?us?as your health care partner,?   ?  JOHN Ray CNP?

## 2025-05-23 ENCOUNTER — MYC REFILL (OUTPATIENT)
Dept: RHEUMATOLOGY | Facility: CLINIC | Age: 40
End: 2025-05-23
Payer: COMMERCIAL

## 2025-05-23 DIAGNOSIS — L40.50 PSORIATIC ARTHRITIS (H): ICD-10-CM

## 2025-05-27 ENCOUNTER — MYC REFILL (OUTPATIENT)
Dept: INTERNAL MEDICINE | Facility: CLINIC | Age: 40
End: 2025-05-27
Payer: COMMERCIAL

## 2025-05-27 DIAGNOSIS — L40.50 PSORIATIC ARTHRITIS (H): ICD-10-CM

## 2025-05-27 DIAGNOSIS — B00.1 RECURRENT COLD SORES: ICD-10-CM

## 2025-05-27 RX ORDER — PREDNISONE 5 MG/1
TABLET ORAL
Qty: 49 TABLET | Refills: 0 | Status: SHIPPED | OUTPATIENT
Start: 2025-05-27

## 2025-05-27 RX ORDER — TRAMADOL HYDROCHLORIDE 50 MG/1
50 TABLET ORAL EVERY 8 HOURS PRN
Qty: 60 TABLET | Refills: 0 | Status: SHIPPED | OUTPATIENT
Start: 2025-06-03

## 2025-05-27 RX ORDER — OXYCODONE AND ACETAMINOPHEN 5; 325 MG/1; MG/1
1 TABLET ORAL EVERY 4 HOURS PRN
Qty: 30 TABLET | Refills: 0 | Status: SHIPPED | OUTPATIENT
Start: 2025-06-01

## 2025-05-27 NOTE — TELEPHONE ENCOUNTER
Controlled substance refill request notes    Refill request received for: [place]xycodone-Acetaminophen 5-325  [place]  [place] reviewed 05/27/25:  Medication last refill: qty 30, 30 day supply, filled/sold to patient on 05/02/2025  Pended order: Oxycodone-Acetaminophen 5-325, qty 30, 30 day supply, fill on or after 06/01/2025     Refill request received for: Tramadol Hcl 50 Mg Tablet  [place]  data reviewed 05/27/25:  Medication last refill: qty 60, 30 day supply, filled/sold to patient on 05/04/2025  Pended order: Tramadol Hcl 50 Mg Tablet, qty 60, 30 day supply, fill on or after 06/03/2025     Primary care provider: Nando Haskins  Last office visit with this department: 3/25/2025  Next appointment with PCP: None    Refill request forwarded to provider for review.     Nichole HUYNH LPN  [facility]

## 2025-05-27 NOTE — TELEPHONE ENCOUNTER
Last Written Prescription:  predniSONE (DELTASONE) 5 MG tablet 49 tablet 0 4/28/2025 -- No   Sig: Take 20mg daily for 7 days, then 15mg daily for 7 days     ----------------------  Last Visit Date: 3/4/25  Future Visit Date: 10/6/25  ----------------------      Refill decision: Medication unable to be refilled by RN due to: Medication not included in refill protocol policy     Tapers not included on refill protocol    Request from pharmacy:  Requested Prescriptions   Pending Prescriptions Disp Refills    predniSONE (DELTASONE) 5 MG tablet 49 tablet 0     Sig: Take 20mg daily for 7 days, then 15mg daily for 7 days       There is no refill protocol information for this order

## 2025-05-28 ENCOUNTER — MYC REFILL (OUTPATIENT)
Dept: INTERNAL MEDICINE | Facility: CLINIC | Age: 40
End: 2025-05-28
Payer: COMMERCIAL

## 2025-05-28 DIAGNOSIS — E11.9 TYPE 2 DIABETES MELLITUS WITHOUT COMPLICATION, WITH LONG-TERM CURRENT USE OF INSULIN (H): ICD-10-CM

## 2025-05-28 DIAGNOSIS — Z79.4 TYPE 2 DIABETES MELLITUS WITHOUT COMPLICATION, WITH LONG-TERM CURRENT USE OF INSULIN (H): ICD-10-CM

## 2025-05-29 RX ORDER — INSULIN PMP CART,AUT,G6/7,CNTR
1 EACH SUBCUTANEOUS
Qty: 10 EACH | Refills: 5 | Status: SHIPPED | OUTPATIENT
Start: 2025-05-29

## 2025-06-03 ENCOUNTER — MYC REFILL (OUTPATIENT)
Dept: INTERNAL MEDICINE | Facility: CLINIC | Age: 40
End: 2025-06-03
Payer: COMMERCIAL

## 2025-06-03 DIAGNOSIS — L40.50 PSORIATIC ARTHRITIS (H): ICD-10-CM

## 2025-06-03 RX ORDER — TRAMADOL HYDROCHLORIDE 50 MG/1
50 TABLET ORAL EVERY 8 HOURS PRN
Qty: 60 TABLET | Refills: 0 | Status: SHIPPED | OUTPATIENT
Start: 2025-06-03

## 2025-06-22 ENCOUNTER — HEALTH MAINTENANCE LETTER (OUTPATIENT)
Age: 40
End: 2025-06-22

## 2025-06-23 ENCOUNTER — MYC REFILL (OUTPATIENT)
Dept: INTERNAL MEDICINE | Facility: CLINIC | Age: 40
End: 2025-06-23
Payer: COMMERCIAL

## 2025-06-23 DIAGNOSIS — L40.50 PSORIATIC ARTHRITIS (H): ICD-10-CM

## 2025-06-23 DIAGNOSIS — B00.1 RECURRENT COLD SORES: ICD-10-CM

## 2025-06-23 RX ORDER — TRAMADOL HYDROCHLORIDE 50 MG/1
50 TABLET ORAL EVERY 8 HOURS PRN
Qty: 60 TABLET | Refills: 0 | Status: SHIPPED | OUTPATIENT
Start: 2025-07-03

## 2025-06-23 RX ORDER — OXYCODONE AND ACETAMINOPHEN 5; 325 MG/1; MG/1
1 TABLET ORAL EVERY 4 HOURS PRN
Qty: 30 TABLET | Refills: 0 | Status: SHIPPED | OUTPATIENT
Start: 2025-07-01

## 2025-06-23 NOTE — TELEPHONE ENCOUNTER
Controlled substance refill request notes    Refill request received for: Tramadol Hcl 50 Mg Tablet  [place]  data reviewed 06/23/25:  Medication last refill: qty 60, 30 day supply, filled/sold to patient on 06/03/2025  Pended order: Tramadol Hcl 50 Mg Tablet, qty 60, 30 day supply, fill on or after 07/03/2025     Refill request received for: Oxycodone-Acetaminophen 5-325  [place]  data reviewed 06/23/25:  Medication last refill: qty 30, 30 day supply, filled/sold to patient on 06/01/2025  Pended order: Oxycodone-Acetaminophen 5-325, qty 30, 30 day supply, fill on or after 07/01/2025     Primary care provider: Nando Haskins  Last office visit with this department: 3/25/2025  Next appointment with PCP: none    Refill request forwarded to provider for review.     Nichole HUYNH LPN  [facility]

## 2025-07-07 ENCOUNTER — MYC REFILL (OUTPATIENT)
Dept: RHEUMATOLOGY | Facility: CLINIC | Age: 40
End: 2025-07-07
Payer: COMMERCIAL

## 2025-07-07 DIAGNOSIS — L40.50 PSORIATIC ARTHRITIS (H): ICD-10-CM

## 2025-07-08 RX ORDER — METHOTREXATE 2.5 MG/1
15 TABLET ORAL
Qty: 24 TABLET | Refills: 3 | Status: SHIPPED | OUTPATIENT
Start: 2025-07-08

## 2025-07-14 ENCOUNTER — MYC REFILL (OUTPATIENT)
Dept: RHEUMATOLOGY | Facility: CLINIC | Age: 40
End: 2025-07-14
Payer: COMMERCIAL

## 2025-07-14 DIAGNOSIS — L40.50 PSORIATIC ARTHRITIS (H): ICD-10-CM

## 2025-07-16 RX ORDER — PREDNISONE 5 MG/1
TABLET ORAL
Qty: 49 TABLET | Refills: 0 | Status: SHIPPED | OUTPATIENT
Start: 2025-07-16

## 2025-07-16 NOTE — TELEPHONE ENCOUNTER
Last Written Prescription:  predniSONE (DELTASONE) 5 MG tablet 49 tablet 0 5/27/2025 -- No   Sig: Take 20mg daily for 7 days, then 15mg daily for 7 days     ----------------------  Last Visit Date: 03/04/2025 Office Visit Rheumatology - ELMIRA Granados  Future Visit Date: 10/6/25  ----------------------      Refill decision:   [] Medication refilled per  Medication Refill in Ambulatory Care  policy.  [x] Medication unable to be refilled by RN due to: Medication not included in refill protocol policy   Prednisone Tapers not on Rheum refill protocol      Request from pharmacy:  Requested Prescriptions   Pending Prescriptions Disp Refills    predniSONE (DELTASONE) 5 MG tablet 49 tablet 0     Sig: Take 20mg daily for 7 days, then 15mg daily for 7 days       There is no refill protocol information for this order      Anushka KAUFMAN RN  [facility]/Red Flag Triage & Med Refill Team

## 2025-07-23 ENCOUNTER — MYC REFILL (OUTPATIENT)
Dept: INTERNAL MEDICINE | Facility: CLINIC | Age: 40
End: 2025-07-23
Payer: COMMERCIAL

## 2025-07-23 DIAGNOSIS — L40.50 PSORIATIC ARTHRITIS (H): ICD-10-CM

## 2025-07-23 DIAGNOSIS — B00.1 RECURRENT COLD SORES: ICD-10-CM

## 2025-07-23 RX ORDER — OXYCODONE AND ACETAMINOPHEN 5; 325 MG/1; MG/1
1 TABLET ORAL EVERY 4 HOURS PRN
Qty: 30 TABLET | Refills: 0 | Status: SHIPPED | OUTPATIENT
Start: 2025-07-31

## 2025-07-23 RX ORDER — TRAMADOL HYDROCHLORIDE 50 MG/1
50 TABLET ORAL EVERY 8 HOURS PRN
Qty: 60 TABLET | Refills: 0 | Status: SHIPPED | OUTPATIENT
Start: 2025-07-31

## 2025-07-23 NOTE — TELEPHONE ENCOUNTER
Controlled substance refill request notes    Refill request received for: [place]xycodone-Acetaminophen 5-325  [place]  data reviewed 07/23/25:  Medication last refill: qty 30, 30 day supply, filled/sold to patient on 07/01/2025  Pended order: Oxycodone-Acetaminophen 5-325, qty 30, 30 day supply, fill on or after 07/31/2025     Refill request received for: Tramadol Hcl 50 Mg Tablet  [place]  data reviewed 07/23/25:  Medication last refill: qty 60, 30 day supply, filled/sold to patient on 07/01/2025  Pended order: Tramadol Hcl 50 Mg Tablet, qty 60, 30 day supply, fill on or after 07/31/2025     Primary care provider: Nando Haskins  Last office visit with this department: 3/25/2025  Next appointment with PCP: none    Refill request forwarded to provider for review.     Nichole HUYNH LPN  [facility]     no

## 2025-07-25 ENCOUNTER — HOSPITAL ENCOUNTER (EMERGENCY)
Facility: CLINIC | Age: 40
Discharge: HOME | End: 2025-07-25
Attending: EMERGENCY MEDICINE | Admitting: EMERGENCY MEDICINE
Payer: COMMERCIAL

## 2025-07-25 ENCOUNTER — APPOINTMENT (OUTPATIENT)
Dept: RADIOLOGY | Facility: CLINIC | Age: 40
End: 2025-07-25
Attending: EMERGENCY MEDICINE
Payer: COMMERCIAL

## 2025-07-25 VITALS
HEIGHT: 63 IN | DIASTOLIC BLOOD PRESSURE: 69 MMHG | RESPIRATION RATE: 22 BRPM | OXYGEN SATURATION: 100 % | HEART RATE: 97 BPM | WEIGHT: 110 LBS | SYSTOLIC BLOOD PRESSURE: 121 MMHG | BODY MASS INDEX: 19.49 KG/M2 | TEMPERATURE: 98.8 F

## 2025-07-25 DIAGNOSIS — R73.9 HYPERGLYCEMIA: ICD-10-CM

## 2025-07-25 DIAGNOSIS — R07.9 CHEST PAIN, UNSPECIFIED TYPE: Primary | ICD-10-CM

## 2025-07-25 LAB
ANION GAP SERPL CALCULATED.3IONS-SCNC: 14 MMOL/L (ref 7–15)
ATRIAL RATE - MUSE: 98 BPM
BASOPHILS # BLD AUTO: 0 10E3/UL (ref 0–0.2)
BASOPHILS NFR BLD AUTO: 0 %
BUN SERPL-MCNC: 10.5 MG/DL (ref 6–20)
CALCIUM SERPL-MCNC: 10.2 MG/DL (ref 8.8–10.4)
CHLORIDE SERPL-SCNC: 93 MMOL/L (ref 98–107)
CREAT SERPL-MCNC: 0.51 MG/DL (ref 0.51–0.95)
D DIMER PPP FEU-MCNC: <0.27 UG/ML FEU (ref 0–0.5)
DIASTOLIC BLOOD PRESSURE - MUSE: 73 MMHG
EGFRCR SERPLBLD CKD-EPI 2021: >90 ML/MIN/1.73M2
EOSINOPHIL # BLD AUTO: 0.1 10E3/UL (ref 0–0.7)
EOSINOPHIL NFR BLD AUTO: 1 %
ERYTHROCYTE [DISTWIDTH] IN BLOOD BY AUTOMATED COUNT: 14.7 % (ref 10–15)
GLUCOSE BLDC GLUCOMTR-MCNC: 292 MG/DL (ref 70–99)
GLUCOSE SERPL-MCNC: 288 MG/DL (ref 70–99)
HCG SERPL QL: NEGATIVE
HCO3 SERPL-SCNC: 30 MMOL/L (ref 22–29)
HCT VFR BLD AUTO: 43.2 % (ref 35–47)
HGB BLD-MCNC: 14.3 G/DL (ref 11.7–15.7)
HOLD SPECIMEN: NORMAL
IMM GRANULOCYTES # BLD: 0 10E3/UL
IMM GRANULOCYTES NFR BLD: 0 %
INTERPRETATION ECG - MUSE: NORMAL
LYMPHOCYTES # BLD AUTO: 3 10E3/UL (ref 0.8–5.3)
LYMPHOCYTES NFR BLD AUTO: 28 %
MCH RBC QN AUTO: 30.6 PG (ref 26.5–33)
MCHC RBC AUTO-ENTMCNC: 33.1 G/DL (ref 31.5–36.5)
MCV RBC AUTO: 92 FL (ref 78–100)
MONOCYTES # BLD AUTO: 0.5 10E3/UL (ref 0–1.3)
MONOCYTES NFR BLD AUTO: 5 %
NEUTROPHILS # BLD AUTO: 7.2 10E3/UL (ref 1.6–8.3)
NEUTROPHILS NFR BLD AUTO: 66 %
NRBC # BLD AUTO: 0 10E3/UL
NRBC BLD AUTO-RTO: 0 /100
P AXIS - MUSE: 78 DEGREES
PLATELET # BLD AUTO: 379 10E3/UL (ref 150–450)
POTASSIUM SERPL-SCNC: 3.8 MMOL/L (ref 3.4–5.3)
PR INTERVAL - MUSE: 118 MS
QRS DURATION - MUSE: 84 MS
QT - MUSE: 356 MS
QTC - MUSE: 454 MS
R AXIS - MUSE: 60 DEGREES
RBC # BLD AUTO: 4.68 10E6/UL (ref 3.8–5.2)
SODIUM SERPL-SCNC: 137 MMOL/L (ref 135–145)
SYSTOLIC BLOOD PRESSURE - MUSE: 127 MMHG
T AXIS - MUSE: 38 DEGREES
TROPONIN T SERPL HS-MCNC: <6 NG/L
VENTRICULAR RATE- MUSE: 98 BPM
WBC # BLD AUTO: 10.9 10E3/UL (ref 4–11)

## 2025-07-25 PROCEDURE — 84703 CHORIONIC GONADOTROPIN ASSAY: CPT | Performed by: EMERGENCY MEDICINE

## 2025-07-25 PROCEDURE — 258N000003 HC RX IP 258 OP 636: Performed by: EMERGENCY MEDICINE

## 2025-07-25 PROCEDURE — 250N000011 HC RX IP 250 OP 636: Performed by: EMERGENCY MEDICINE

## 2025-07-25 PROCEDURE — 93005 ELECTROCARDIOGRAM TRACING: CPT | Performed by: EMERGENCY MEDICINE

## 2025-07-25 PROCEDURE — 84484 ASSAY OF TROPONIN QUANT: CPT | Performed by: EMERGENCY MEDICINE

## 2025-07-25 PROCEDURE — 96361 HYDRATE IV INFUSION ADD-ON: CPT

## 2025-07-25 PROCEDURE — 99285 EMERGENCY DEPT VISIT HI MDM: CPT | Mod: 25 | Performed by: EMERGENCY MEDICINE

## 2025-07-25 PROCEDURE — 96374 THER/PROPH/DIAG INJ IV PUSH: CPT

## 2025-07-25 PROCEDURE — 85025 COMPLETE CBC W/AUTO DIFF WBC: CPT | Performed by: EMERGENCY MEDICINE

## 2025-07-25 PROCEDURE — 36415 COLL VENOUS BLD VENIPUNCTURE: CPT | Performed by: EMERGENCY MEDICINE

## 2025-07-25 PROCEDURE — 71046 X-RAY EXAM CHEST 2 VIEWS: CPT

## 2025-07-25 PROCEDURE — 85379 FIBRIN DEGRADATION QUANT: CPT | Performed by: EMERGENCY MEDICINE

## 2025-07-25 PROCEDURE — 84132 ASSAY OF SERUM POTASSIUM: CPT | Performed by: EMERGENCY MEDICINE

## 2025-07-25 PROCEDURE — 82962 GLUCOSE BLOOD TEST: CPT

## 2025-07-25 RX ORDER — KETOROLAC TROMETHAMINE 15 MG/ML
15 INJECTION, SOLUTION INTRAMUSCULAR; INTRAVENOUS ONCE
Status: COMPLETED | OUTPATIENT
Start: 2025-07-25 | End: 2025-07-25

## 2025-07-25 RX ORDER — ONDANSETRON 2 MG/ML
4 INJECTION INTRAMUSCULAR; INTRAVENOUS EVERY 30 MIN PRN
Status: DISCONTINUED | OUTPATIENT
Start: 2025-07-25 | End: 2025-07-25 | Stop reason: HOSPADM

## 2025-07-25 RX ADMIN — SODIUM CHLORIDE 1000 ML: 0.9 INJECTION, SOLUTION INTRAVENOUS at 01:08

## 2025-07-25 RX ADMIN — KETOROLAC TROMETHAMINE 15 MG: 15 INJECTION, SOLUTION INTRAMUSCULAR; INTRAVENOUS at 01:09

## 2025-07-25 ASSESSMENT — COLUMBIA-SUICIDE SEVERITY RATING SCALE - C-SSRS
6. HAVE YOU EVER DONE ANYTHING, STARTED TO DO ANYTHING, OR PREPARED TO DO ANYTHING TO END YOUR LIFE?: NO
1. IN THE PAST MONTH, HAVE YOU WISHED YOU WERE DEAD OR WISHED YOU COULD GO TO SLEEP AND NOT WAKE UP?: NO
2. HAVE YOU ACTUALLY HAD ANY THOUGHTS OF KILLING YOURSELF IN THE PAST MONTH?: NO

## 2025-07-25 ASSESSMENT — ACTIVITIES OF DAILY LIVING (ADL)
ADLS_ACUITY_SCORE: 41

## 2025-07-25 NOTE — ED TRIAGE NOTES
Pt reports intermittent right/mid chest pain for the past 3 days with SOB and difficulty taking deep breaths. Significantly worse today. Pain is described as sharp, with abnormal feeling of weakness down RUE. Pt reports glucose in 300s today and difficult to control.     Triage Assessment (Adult)       Row Name 07/25/25 0048          Triage Assessment    Airway WDL WDL        Respiratory WDL    Respiratory WDL X;rhythm/pattern     Rhythm/Pattern, Respiratory shortness of breath        Skin Circulation/Temperature WDL    Skin Circulation/Temperature WDL WDL        Cardiac WDL    Cardiac WDL X;chest pain        Chest Pain Assessment    Chest Pain Location anterior chest, right     Character sharp        Peripheral/Neurovascular WDL    Peripheral Neurovascular WDL WDL        Cognitive/Neuro/Behavioral WDL    Cognitive/Neuro/Behavioral WDL WDL

## 2025-07-25 NOTE — ED PROVIDER NOTES
EMERGENCY DEPARTMENT ENCOUNTER      NAME: Chrissy Dixon  AGE: 39 year old female  YOB: 1985  MRN: [id number]  EVALUATION DATE & TIME: 7/25/2025 12:45 AM    PCP: Nando Haskins    ED PROVIDER: Ramsey Potts M.D.      Chief Complaint   Patient presents with    Chest Pain    Shortness of Breath         FINAL IMPRESSION:  1. Chest pain, unspecified type    2. Hyperglycemia          ED COURSE & MEDICAL DECISION MAKING:    Pertinent Labs & Imaging studies reviewed. (See chart for details)  39 year old female presents to the Emergency Department for evaluation of chest pain and elevated blood sugar.  Patient has a history of psoriatic arthritis, diabetes with worsening right-sided chest pain.  Somewhat diminished on the right.  Given this did do an x-ray looking for pneumothorax or other cause.  X-rays negative.  May just be splinting.  Did consider PE however D-dimer is negative.  Would not pursue CT scan based on this.  EKG is normal.  Troponin is normal.  Does not seem to be cardiac.  Blood sugar is elevated at 288 but no signs of DKA.  May be pleurisy.  Is unclear was causing her chest pain.  Discussed supportive care.  Will continue to treat her blood sugar at home.  Follow-up with primary.  Return for worsening symptoms    12:55 AM I met with the patient to gather history and to perform my initial exam. I discussed the plan for care while in the Emergency Department.   1:24 AM I independently reviewed and interpreted the chest x-ray. No pneumothorax, pneumonia, or other cause of the pain.  2:51 AM We discussed the plan for discharge and the patient is agreeable. Reviewed supportive cares, symptomatic treatment, outpatient follow up, and reasons to return to the Emergency Department. Patient to be discharged by ED RN.      At the conclusion of the encounter I discussed the results of all of the tests and the disposition. The questions were answered. The patient or family acknowledged  "understanding and was agreeable with the care plan.     Medical Decision Making    Discharge. No recommendations on prescription strength medication(s). See documentation for any additional details.    MIPS (CTPE, Dental pain, [place], Sinusitis, Asthma/COPD, Head Trauma): Not Applicable    SEPSIS: None             MEDICATIONS GIVEN IN THE EMERGENCY:  Medications   ondansetron (ZOFRAN) injection 4 mg (has no administration in time range)   sodium chloride 0.9% BOLUS 1,000 mL (0 mLs Intravenous Stopped 7/25/25 0220)   ketorolac (TORADOL) injection 15 mg (15 mg Intravenous $Given 7/25/25 0109)       NEW PRESCRIPTIONS STARTED AT TODAY'S ER VISIT  Discharge Medication List as of 7/25/2025  3:04 AM             =================================================================    HPI    Patient information was obtained from: patient    Use of : N/A        Chrissy TERI Dixon is a 39 year old female with a pertinent history of diabetes and psoriatic arthritis who presents to this ED for evaluation of chest pain and shortness of breath.    Per patient, for the last 2-3 days she has had chest pain in her right upper chest that \"goes into her armpit\". Before going to bed she took a deep breath and felt a sudden shooting pain in her chest/back. Right after she had the shooting pain she had some nausea which is gone here in the ED. Her shortness of breath and chest pain is still present here in the ED this morning (07/25/2025). She is a type 1 diabetic (problem list in chart shows type 2) and her sugars have been elevated which is abnormal for her. Also, she is on prednisone for arthritis. Besides prednisone she also noted being on methotrexate and Otezla. Her last period was on June 15th but she noted that her periods are not regular because she is on birth control.    She has had no fever, cough, vomiting, or issues urinating/defecating. She did not mention taking anything for her pain. As far as she knows she has no " allergies to any medications.        PAST MEDICAL HISTORY:  Past Medical History:   Diagnosis Date    Diabetes     Other psoriasis     Polyarthritis     probable psoriatic arthritis       PAST SURGICAL HISTORY:  Past Surgical History:   Procedure Laterality Date    ARTHROSCOPY KNEE WITH MENISCECTOMY Right 11/20/2018    Procedure: Examination Under Anesthesia Right Knee, Right Knee Arthroscopy, Partial Meniscectomy, Chondroplasty, Cyst Decompression, Synovial Biopsy;  Surgeon: Sean Pate MD;  Location: [facility] OR    AS HYSTEROSCOPY, SURGICAL; W/ ENDOMETRIAL ABLATION, ANY METHOD Bilateral 2017    endometrial ablation with bilater TL    COLONOSCOPY N/A 4/29/2022    Procedure: COLONOSCOPY, WITH BIOPSY;  Surgeon: Nando Hunter MD;  Location: [facility] OR    ESOPHAGOSCOPY, GASTROSCOPY, DUODENOSCOPY (EGD), COMBINED N/A 4/29/2022    Procedure: ESOPHAGOGASTRODUODENOSCOPY, WITH BIOPSY;  Surgeon: Nando Hunter MD;  Location: [facility] OR    SURGICAL HISTORY OF -       .  Tonsillar abscess           CURRENT MEDICATIONS:    Current Facility-Administered Medications   Medication Dose Route Frequency Provider Last Rate Last Admin    ondansetron (ZOFRAN) injection 4 mg  4 mg Intravenous Q30 Min PRN Ramsey Potts MD         Current Outpatient Medications   Medication Sig Dispense Refill    apremilast (OTEZLA) 30 MG tablet TAKE 1 TABLET TWO TIMES DAILY, 60 tablet 9    cholecalciferol (VITAMIN D3) 1000 UNIT tablet Take 1 tablet (1,000 Units) by mouth daily 90 tablet 1    clobetasol (TEMOVATE) 0.05 % external solution Apply topically 2 times daily (Patient taking differently: Apply topically 2 times daily as needed.) 50 mL 6    Continuous Glucose Sensor (DEXCOM G6 SENSOR) MISC 1 Device by Device route every 10 days 13 each 3    Continuous Glucose Transmitter (DEXCOM G6 TRANSMITTER) MISC Place 1 each onto the skin every 10 days. For additional refills, please schedule a follow-up appointment at [phone] 13 each 0     ferrous sulfate (FEROSUL) 325 (65 Fe) MG tablet Take 1 tablet (325 mg) by mouth daily (with breakfast). 100 tablet 3    Fluocinolone Acetonide (DERMA-SMOOTHE/FS SCALP) 0.01 % OIL Apply to scalp at bedtime, then wear showercap, rinse off in morning. (Patient taking differently: Apply to scalp at bedtime, then wear showercap, rinse off in morning as needed.) 118 mL 3    fluticasone (FLONASE) 50 MCG/ACT nasal spray Spray 2 sprays into both nostrils daily 48 g 11    folic acid (FOLVITE) 1 MG tablet Take 1 tablet (1 mg) by mouth daily. 90 tablet 2    golimumab (SIMPONI) 50 MG/0.5ML pre-filled syringe injection Inject 0.5 mLs (50 mg) Subcutaneous every 28 days Hold for signs of infection, and seek medical attention. 0.5 mL 6    ibuprofen (ADVIL/MOTRIN) 600 MG tablet Take 1 tablet (600 mg) by mouth every 8 hours as needed for moderate pain 270 tablet 0    insulin aspart (NOVOLOG FLEXPEN) 100 UNIT/ML pen Inject 1 unit/12 gms CHO with meals plus correction. Pt uses approx 35 units in 24 hrs. 30 mL 4    insulin detemir (LEVEMIR FLEXTOUCH) 100 UNIT/ML pen Inject 24 units subcutaneous each am and 8 units subcutaneous each pm. 60 mL 3    Insulin Disposable Pump (OMNIPOD 5 PODS, GEN 5,) MISC 1 Units every 3 days. Use as directed for insulin pump. Change every 3 days 10 each 5    insulin NPH (NOVOLIN N FLEXPEN) 100 UNIT/ML injection Only use when taking steroids. If steroid dose is 12-16 mg take NPH 10 units subcutaneous in am and if steroid dose 6-8 mg take NPH 6 units subcutaneous in am. 15 mL 3    insulin  UNIT/ML injection Only use when taking steroids. If steroid dose is 12-16 mg take NPH 10 units subcutaneous in am and if steroid dose 6-8 mg take NPH 6 units subcutaneous in am. 15 mL 3    insulin pen needle (31G X 8 MM) 31G X 8 MM miscellaneous Use  3-4 pen needles daily or as directed. 300 each 4    LEVEMIR FLEXTOUCH 100 UNIT/ML pen INJECT 30 UNITS UNDER THE SKIN EVERY MORNING BEFORE BREAKFAST 45 mL 11    metFORMIN  (GLUCOPHAGE) 500 MG tablet Take 2 tablets (1,000 mg) by mouth 2 times daily (with meals). Patient needs to see primary provider and have labs for further refills. Please call for an appointment at [phone]. 360 tablet 3    methotrexate 2.5 MG tablet Take 6 tablets (15 mg) by mouth every 7 days. Labs every 8 - 12 weeks for refills. 24 tablet 3    methylPREDNISolone (MEDROL) 4 MG tablet TAKE 3 TABLETS DAILY BY MOUTH FOR 1 WEEK THEN 2 TABLETS DAILY FOR 1 WEEK THEN OFF 35 tablet 0    montelukast (SINGULAIR) 10 MG tablet Take 1 tablet (10 mg) by mouth at bedtime 30 tablet 1    ONETOUCH LANCETS MISC Use to test blood sugar 6 to 8 times daily or as directed. Okay to dispense One Touch Verio products per patient's insurance. 700 each 1    [START ON 7/31/2025] oxyCODONE-acetaminophen (PERCOCET) 5-325 MG tablet Take 1 tablet by mouth every 4 hours as needed for severe pain. MUST LAST 30 DAYS (30 day supply) 30 tablet 0    predniSONE (DELTASONE) 5 MG tablet Take 20mg daily for 7 days, then 15mg daily for 7 days 49 tablet 0    predniSONE (DELTASONE) 5 MG tablet Take 1 tablet (5 mg) by mouth daily. Take 3 tabs daily for 1 week, then take 2 tabs daily for 1 week 35 tablet 2    [START ON 7/31/2025] traMADol (ULTRAM) 50 MG tablet Take 1 tablet (50 mg) by mouth every 8 hours as needed for moderate pain. MUST LAST 30 DAYS (30 day supply) 60 tablet 0    triamcinolone (KENALOG) 0.1 % cream Apply topically 2 times daily (Patient taking differently: Apply topically 2 times daily as needed.) 453.6 g 1    valACYclovir (VALTREX) 500 MG tablet Take 1 tablet (500 mg) by mouth daily as needed. 90 tablet 3         ALLERGIES:  Allergies   Allergen Reactions    Iodinated Contrast Media Swelling    Betadine [Povidone Iodine] Swelling    Levofloxacin Other (See Comments)     High blood sugars, doesn't feel well at all     Penicillins Hives    Pneumococcal Vaccines Swelling    Prenatal Vit-Fe Fumarate-Fa [Cavan-Folate Ob] Hives    Tdap  [Tetanus-Diphth-Acell Pertussis] Swelling       FAMILY HISTORY:  Family History   Problem Relation Age of Onset    Thyroid Disease Mother     Diabetes Mother     Hypertension Mother     Connective Tissue Disorder Father         Psoriasis    Diabetes Maternal Grandfather     Hypertension Maternal Grandfather     Prostate Cancer Maternal Grandfather     Diabetes Maternal Uncle     Diabetes Maternal Uncle     Hypertension Maternal Grandmother     Asthma No family hx of     Asthma Mother     Colon Cancer Mother     Chronic Obstructive Pulmonary Disease Mother     Arthritis Father     Vision Loss Father     Diabetes Maternal Uncle     Diabetes Maternal Grandmother        SOCIAL HISTORY:   Social History     Socioeconomic History    Marital status:    Occupational History    Occupation: Dental Assist.     Employer: UNKNOWN   Tobacco Use    Smoking status: Former     Current packs/day: 0.00     Average packs/day: 1 pack/day for 5.0 years (5.0 ttl pk-yrs)     Types: Cigarettes     Start date: 2002     Quit date: 2007     Years since quittin.7    Smokeless tobacco: Never   Vaping Use    Vaping status: Never Used   Substance and Sexual Activity    Alcohol use: No     Alcohol/week: 1.7 standard drinks of alcohol     Types: 2 Standard drinks or equivalent per week    Drug use: No    Sexual activity: Yes     Partners: Male     Birth control/protection: I.U.D.     Comment: Mirena   Other Topics Concern    Exercise No    Parent/sibling w/ CABG, MI or angioplasty before 65F 55M? No   Social History Narrative    How much exercise per week? No    How much calcium per day? Diet      How much caffeine per day? Coffee  2    How much vitamin D per day? Diet    Do you/your family wear seatbelts?  Yes    Do you/your family use safety helmets? No    Do you/your family use sunscreen? Yes    Do you/your family keep firearms in the home? No    Do you/your family have a smoke detector(s)? Yes        Do you feel safe in  your home? Yes    Has anyone ever touched you in an unwanted manner? No     Explain          Social Drivers of Health     Financial Resource Strain: Low Risk  (3/24/2025)    Financial Resource Strain     Within the past 12 months, have you or your family members you live with been unable to get utilities (heat, electricity) when it was really needed?: No   Food Insecurity: Low Risk  (3/24/2025)    Food Insecurity     Within the past 12 months, did you worry that your food would run out before you got money to buy more?: No     Within the past 12 months, did the food you bought just not last and you didn t have money to get more?: No   Transportation Needs: Low Risk  (3/24/2025)    Transportation Needs     Within the past 12 months, has lack of transportation kept you from medical appointments, getting your medicines, non-medical meetings or appointments, work, or from getting things that you need?: No   Physical Activity: Insufficiently Active (3/24/2025)    Exercise Vital Sign     Days of Exercise per Week: 4 days     Minutes of Exercise per Session: 30 min   Stress: Stress Concern Present (3/24/2025)    [employer] - Occupational Stress Questionnaire     Feeling of Stress : To some extent   Social Connections: Unknown (3/24/2025)    Social Connection and Isolation Panel [NHANES]     Frequency of Social Gatherings with Friends and Family: Once a week   Interpersonal Safety: Low Risk  (3/25/2025)    Interpersonal Safety     Do you feel physically and emotionally safe where you currently live?: Yes     Within the past 12 months, have you been hit, slapped, kicked or otherwise physically hurt by someone?: No     Within the past 12 months, have you been humiliated or emotionally abused in other ways by your partner or ex-partner?: No   Housing Stability: Low Risk  (3/24/2025)    Housing Stability     Do you have housing? : Yes     Are you worried about losing your housing?: No  "      VITALS:  /69   Pulse 97   Temp 98.8  F (37.1  C) (Oral)   Resp 22   Ht 1.6 m (5' 3\")   Wt 49.9 kg (110 lb)   LMP  (LMP Unknown)   SpO2 100%   BMI 19.49 kg/m      PHYSICAL EXAM    Physical Exam  Vitals and nursing note reviewed.   Constitutional:       General: She is not in acute distress.     Appearance: She is not diaphoretic.   HENT:      Head: Atraumatic.      Mouth/Throat:      Pharynx: No oropharyngeal exudate.   Eyes:      General: No scleral icterus.     Pupils: Pupils are equal, round, and reactive to light.   Cardiovascular:      Rate and Rhythm: Normal rate and regular rhythm.      Heart sounds: Normal heart sounds.   Pulmonary:      Effort: No respiratory distress.      Breath sounds: Normal breath sounds.   Abdominal:      Palpations: Abdomen is soft.      Tenderness: There is no abdominal tenderness. There is no guarding or rebound. Negative signs include Valentin's sign.   Musculoskeletal:         General: No tenderness.   Skin:     General: Skin is warm.      Findings: No rash.   Neurological:      General: No focal deficit present.      Mental Status: She is alert.           LAB:  All pertinent labs reviewed and interpreted.  Labs Ordered and Resulted from Time of ED Arrival to Time of ED Departure   BASIC METABOLIC PANEL (LIMITED OCCURRENCES) - Abnormal       Result Value    Sodium 137      Potassium 3.8      Chloride 93 (*)     Carbon Dioxide (CO2) 30 (*)     Anion Gap 14      Urea Nitrogen 10.5      Creatinine 0.51      GFR Estimate >90      Calcium 10.2      Glucose 288 (*)    GLUCOSE BY METER - Abnormal    GLUCOSE BY METER POCT 292 (*)    D DIMER QUANTITATIVE - Normal    D-Dimer Quantitative <0.27     TROPONIN T, HIGH SENSITIVITY - Normal    Troponin T, High Sensitivity <6     HCG QUALITATIVE PREGNANCY - Normal    hCG Serum Qualitative Negative     CBC WITH PLATELETS AND DIFFERENTIAL    WBC Count 10.9      RBC Count 4.68      Hemoglobin 14.3      Hematocrit 43.2      MCV 92   "    MCH 30.6      MCHC 33.1      RDW 14.7      Platelet Count 379      % Neutrophils 66      % Lymphocytes 28      % Monocytes 5      % Eosinophils 1      % Basophils 0      % Immature Granulocytes 0      NRBCs per 100 WBC 0      Absolute Neutrophils 7.2      Absolute Lymphocytes 3.0      Absolute Monocytes 0.5      Absolute Eosinophils 0.1      Absolute Basophils 0.0      Absolute Immature Granulocytes 0.0      Absolute NRBCs 0.0     GLUCOSE MONITOR NURSING POCT       RADIOLOGY:  Reviewed all pertinent imaging. Please see official radiology report.  Chest XR,  PA & LAT   Final Result   IMPRESSION: Normal heart size and pulmonary vascularity. Lungs are clear. Prominent left nipple shadow. Bony structures are unremarkable. No acute findings.          EKG:    Performed at: 0054  Impression: Normal EKG.  When compared to previous dated December 2, 2022 no significant changes  Sinus rhythm rate of 98.  .  QRS 84.  QTc 454    I have independently reviewed and interpreted the EKG(s) documented above.    PROCEDURES:         I, Michael Veliz, am serving as a scribe to document services personally performed by Dr. Ramsey Potts, based on my observation and the provider's statements to me. I, Ramsey Potts MD attest that Michael Veliz is acting in a scribe capacity, has observed my performance of the services and has documented them in accordance with my direction.    Ramsey Potts M.D.  Emergency Medicine  [facility] EMERGENCY ROOM  [address] [facility] [address] [facility] [id number] [phone]  Dept: [phone]      Ramsey Potts MD  07/25/25 9517

## 2025-08-04 ENCOUNTER — MYC REFILL (OUTPATIENT)
Dept: RHEUMATOLOGY | Facility: CLINIC | Age: 40
End: 2025-08-04
Payer: COMMERCIAL

## 2025-08-04 DIAGNOSIS — L40.50 PSORIATIC ARTHRITIS (H): ICD-10-CM

## 2025-08-06 RX ORDER — PREDNISONE 5 MG/1
TABLET ORAL
Qty: 49 TABLET | Refills: 0 | Status: SHIPPED | OUTPATIENT
Start: 2025-08-06

## 2025-08-25 ENCOUNTER — MYC REFILL (OUTPATIENT)
Dept: INTERNAL MEDICINE | Facility: CLINIC | Age: 40
End: 2025-08-25
Payer: COMMERCIAL

## 2025-08-25 DIAGNOSIS — L40.50 PSORIATIC ARTHRITIS (H): ICD-10-CM

## 2025-08-25 DIAGNOSIS — B00.1 RECURRENT COLD SORES: ICD-10-CM

## 2025-08-25 RX ORDER — OXYCODONE AND ACETAMINOPHEN 5; 325 MG/1; MG/1
1 TABLET ORAL EVERY 4 HOURS PRN
Qty: 30 TABLET | Refills: 0 | Status: SHIPPED | OUTPATIENT
Start: 2025-08-30

## 2025-08-25 RX ORDER — TRAMADOL HYDROCHLORIDE 50 MG/1
50 TABLET ORAL EVERY 8 HOURS PRN
Qty: 60 TABLET | Refills: 0 | Status: SHIPPED | OUTPATIENT
Start: 2025-08-30

## (undated) DEVICE — SPECIMEN CONTAINER 3OZ W/FORMALIN 59901

## (undated) DEVICE — LINEN TOWEL PACK X5 5464

## (undated) DEVICE — SYR 30ML SLIP TIP W/O NDL 302833

## (undated) DEVICE — SU ETHILON 3-0 PS-1 18" 1663G

## (undated) DEVICE — SOL WATER IRRIG 500ML BOTTLE 2F7113

## (undated) DEVICE — PREP CHLORAPREP 26ML TINTED ORANGE  260815

## (undated) DEVICE — BUR ARTHREX COOLCUT SABRE 3.8MMX13CM AR-8380SR

## (undated) DEVICE — LINEN DRAPE 54X72" 5467

## (undated) DEVICE — PAD ARMBOARD FOAM EGGCRATE COVIDEN 3114367

## (undated) DEVICE — TUBING SYSTEM ARTHREX PATIENT REDEUCE AR-6421

## (undated) DEVICE — TUBING SYSTEM ARTHREX PUMP REDEUCE AR-6411

## (undated) DEVICE — GOWN IMPERVIOUS 2XL BLUE

## (undated) DEVICE — SUCTION MANIFOLD NEPTUNE 2 SYS 4 PORT 0702-020-000

## (undated) DEVICE — SOL NACL 0.9% IRRIG 3000ML BAG 2B7477

## (undated) DEVICE — ENDO FORCEP BX CAPTURA PRO SPIKE G50696

## (undated) DEVICE — ENDO BITE BLOCK ADULT OMNI-BLOC

## (undated) DEVICE — SUCTION MANIFOLD NEPTUNE 2 SYS 1 PORT 702-025-000

## (undated) DEVICE — TUBING SUCTION 12"X1/4" N612

## (undated) DEVICE — KIT ENDO TURNOVER/PROCEDURE CARRY-ON 101822

## (undated) DEVICE — SUCTION CATH AIRLIFE TRI-FLO W/CONTROL PORT 14FR  T60C

## (undated) DEVICE — GLOVE PROTEXIS BLUE W/NEU-THERA 8.0  2D73EB80

## (undated) DEVICE — GLOVE PROTEXIS POWDER FREE SMT 8.0  2D72PT80X

## (undated) DEVICE — GLOVE EXAM NITRILE LG PF LATEX FREE 5064

## (undated) DEVICE — PACK ARTHROSCOPY CUSTOM ASC

## (undated) RX ORDER — FENTANYL CITRATE 50 UG/ML
INJECTION, SOLUTION INTRAMUSCULAR; INTRAVENOUS
Status: DISPENSED
Start: 2018-11-20

## (undated) RX ORDER — PROPOFOL 10 MG/ML
INJECTION, EMULSION INTRAVENOUS
Status: DISPENSED
Start: 2018-11-20

## (undated) RX ORDER — LIDOCAINE HYDROCHLORIDE 10 MG/ML
INJECTION, SOLUTION INFILTRATION; PERINEURAL
Status: DISPENSED
Start: 2017-09-08

## (undated) RX ORDER — DEXAMETHASONE SODIUM PHOSPHATE 4 MG/ML
INJECTION, SOLUTION INTRA-ARTICULAR; INTRALESIONAL; INTRAMUSCULAR; INTRAVENOUS; SOFT TISSUE
Status: DISPENSED
Start: 2018-11-20

## (undated) RX ORDER — GABAPENTIN 300 MG/1
CAPSULE ORAL
Status: DISPENSED
Start: 2018-11-20

## (undated) RX ORDER — TRIAMCINOLONE ACETONIDE 40 MG/ML
INJECTION, SUSPENSION INTRA-ARTICULAR; INTRAMUSCULAR
Status: DISPENSED
Start: 2017-09-08

## (undated) RX ORDER — ONDANSETRON 2 MG/ML
INJECTION INTRAMUSCULAR; INTRAVENOUS
Status: DISPENSED
Start: 2018-11-20

## (undated) RX ORDER — ONDANSETRON 2 MG/ML
INJECTION INTRAMUSCULAR; INTRAVENOUS
Status: DISPENSED
Start: 2022-04-29

## (undated) RX ORDER — CLINDAMYCIN PHOSPHATE 900 MG/50ML
INJECTION, SOLUTION INTRAVENOUS
Status: DISPENSED
Start: 2018-11-20

## (undated) RX ORDER — TRIAMCINOLONE ACETONIDE 40 MG/ML
INJECTION, SUSPENSION INTRA-ARTICULAR; INTRAMUSCULAR
Status: DISPENSED
Start: 2017-07-07

## (undated) RX ORDER — EPINEPHRINE 1 MG/ML
INJECTION, SOLUTION, CONCENTRATE INTRAVENOUS
Status: DISPENSED
Start: 2018-11-20

## (undated) RX ORDER — LIDOCAINE HYDROCHLORIDE 20 MG/ML
INJECTION, SOLUTION EPIDURAL; INFILTRATION; INTRACAUDAL; PERINEURAL
Status: DISPENSED
Start: 2018-11-20

## (undated) RX ORDER — ACETAMINOPHEN 325 MG/1
TABLET ORAL
Status: DISPENSED
Start: 2018-11-20

## (undated) RX ORDER — HALOPERIDOL 5 MG/ML
INJECTION INTRAMUSCULAR
Status: DISPENSED
Start: 2022-04-29

## (undated) RX ORDER — OXYCODONE HYDROCHLORIDE 5 MG/1
TABLET ORAL
Status: DISPENSED
Start: 2018-11-20

## (undated) RX ORDER — LIDOCAINE HYDROCHLORIDE 10 MG/ML
INJECTION, SOLUTION INFILTRATION; PERINEURAL
Status: DISPENSED
Start: 2017-07-07

## (undated) RX ORDER — KETOROLAC TROMETHAMINE 30 MG/ML
INJECTION, SOLUTION INTRAMUSCULAR; INTRAVENOUS
Status: DISPENSED
Start: 2018-11-20

## (undated) RX ORDER — BUPIVACAINE HYDROCHLORIDE 2.5 MG/ML
INJECTION, SOLUTION INFILTRATION; PERINEURAL
Status: DISPENSED
Start: 2018-11-20